# Patient Record
Sex: FEMALE | Race: BLACK OR AFRICAN AMERICAN | Employment: OTHER | URBAN - METROPOLITAN AREA
[De-identification: names, ages, dates, MRNs, and addresses within clinical notes are randomized per-mention and may not be internally consistent; named-entity substitution may affect disease eponyms.]

---

## 2017-01-11 RX ORDER — FELODIPINE 10 MG/1
TABLET, EXTENDED RELEASE ORAL
Qty: 30 TAB | Refills: 0 | Status: SHIPPED | OUTPATIENT
Start: 2017-01-11 | End: 2017-01-11 | Stop reason: SDUPTHER

## 2017-01-12 RX ORDER — FELODIPINE 10 MG/1
TABLET, EXTENDED RELEASE ORAL
Qty: 90 TAB | Refills: 0 | Status: SHIPPED | OUTPATIENT
Start: 2017-01-12 | End: 2017-05-11 | Stop reason: SDUPTHER

## 2017-02-10 ENCOUNTER — OFFICE VISIT (OUTPATIENT)
Dept: INTERNAL MEDICINE CLINIC | Age: 72
End: 2017-02-10

## 2017-02-10 VITALS
DIASTOLIC BLOOD PRESSURE: 125 MMHG | HEART RATE: 97 BPM | SYSTOLIC BLOOD PRESSURE: 172 MMHG | OXYGEN SATURATION: 78 % | WEIGHT: 268.8 LBS | HEIGHT: 62 IN | TEMPERATURE: 97.7 F | BODY MASS INDEX: 49.47 KG/M2

## 2017-02-10 DIAGNOSIS — G40.909 SEIZURE DISORDER (HCC): Primary | ICD-10-CM

## 2017-02-10 DIAGNOSIS — E66.01 MORBID OBESITY DUE TO EXCESS CALORIES (HCC): Chronic | ICD-10-CM

## 2017-02-10 DIAGNOSIS — E78.5 DYSLIPIDEMIA: ICD-10-CM

## 2017-02-10 DIAGNOSIS — I87.2 VENOUS INSUFFICIENCY: ICD-10-CM

## 2017-02-10 DIAGNOSIS — E11.9 TYPE 2 DIABETES MELLITUS WITHOUT COMPLICATION, WITHOUT LONG-TERM CURRENT USE OF INSULIN (HCC): Chronic | ICD-10-CM

## 2017-02-10 DIAGNOSIS — J45.909 UNCOMPLICATED ASTHMA, UNSPECIFIED ASTHMA SEVERITY: ICD-10-CM

## 2017-02-10 DIAGNOSIS — I48.0 PAROXYSMAL ATRIAL FIBRILLATION (HCC): Chronic | ICD-10-CM

## 2017-02-10 DIAGNOSIS — I10 ESSENTIAL HYPERTENSION: ICD-10-CM

## 2017-02-10 LAB
INR BLD: 1.8
PT POC: 21.2 SEC
VALID INTERNAL CONTROL?: YES

## 2017-02-10 RX ORDER — CLONIDINE HYDROCHLORIDE 0.2 MG/1
0.2 TABLET ORAL 2 TIMES DAILY
Qty: 30 TAB | Refills: 11 | Status: SHIPPED | OUTPATIENT
Start: 2017-02-10 | End: 2020-01-26 | Stop reason: CLARIF

## 2017-02-10 NOTE — PROGRESS NOTES
Chief Complaint   Patient presents with    Diabetes     follow up     Coagulation disorder   1. Have you been to the ER, urgent care clinic since your last visit? Hospitalized since your last visit? No    2. Have you seen or consulted any other health care providers outside of the [address] [place] [address] since your last visit? Include any pap smears or colon screening. No    Patient also in office for pt/inr check. Patient taking coumadin 5 mg 1 tab M-Sat, and nothing on Sun. PT: 21.2  INR: 1.8  Per Dr Gael Victor, take coumadin daily and rto in 2 weeks for pt/inr check.

## 2017-02-10 NOTE — PROGRESS NOTES
[facility]  [place] [address]  [address] [place] [address]  Phone:  [phone]  Fax: [phone]       Chief Complaint   Patient presents with    Diabetes     follow up     Coagulation disorder   . SUBJECTIVE:    Robert Boucher is a 74 y.o. female comes in for return visit accompanied by her daughter. Generally she has been doing well. She complains about nocturia but has been taking her diuretic at night. She did not really appreciate the mechanism by which the medication worked. Her breathing has been doing well with minimal wheezing. She remains ambulatory with the help of her rollator but she remains quite deconditioned. She has been taking her statin as prescribed in view of her increased cardiovascular risk. 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 She has swelling of her lower extremities related to venous insufficiency which is chronic. Current Outpatient Prescriptions   Medication Sig Dispense Refill    cloNIDine HCl (CATAPRES) 0.2 mg tablet Take 1 Tab by mouth two (2) times a day. 30 Tab 11    felodipine (PLENDIL SR) 10 mg 24 hr tablet TAKE 1 TABLET BY MOUTH EVERY DAY 90 Tab 0    phenytoin ER (DILANTIN ER) 100 mg ER capsule TAKE 4 CAPSULES BY MOUTH EVERY NIGHT AT BEDTIME 360 Cap 6    metoprolol tartrate (LOPRESSOR) 25 mg tablet TAKE 1 TABLET BY MOUTH AT 9 AM AND AT 9  Tab 1    aspirin delayed-release 81 mg tablet Take 81 mg by mouth daily.  spironolactone-hydrochlorothiazide (ALDACTAZIDE) 25-25 mg per tablet TAKE 1 TABLET BY MOUTH ONCE DAILY 90 Tab 11    tiotropium (SPIRIVA) 18 mcg inhalation capsule Take 1 Cap by inhalation daily. 30 Cap 11    albuterol (PROAIR HFA) 90 mcg/actuation inhaler Take 2 Puffs by inhalation every four (4) hours as needed for Wheezing or Shortness of Breath.  1 Inhaler 11    guaiFENesin-codeine (ROBITUSSIN AC) 100-10 mg/5 mL solution Take 5 mL by mouth four (4) times daily as needed for Cough. Max Daily Amount: 20 mL. 180 mL 3    levETIRAcetam 1,000 mg tablet Take 1 Tab by mouth two (2) times a day. 60 Tab 11    atorvastatin (LIPITOR) 80 mg tablet Take 1 Tab by mouth daily. 30 Tab 11    albuterol (PROVENTIL VENTOLIN) 2.5 mg /3 mL (0.083 %) nebulizer solution USE 1 VIAL VIA NEBULIZER EVERY 6 HOURS AS NEEDED. DX: J45.909 120 Each 11    lisinopril (PRINIVIL, ZESTRIL) 40 mg tablet Take 1 Tab by mouth daily. 30 Tab 11    allopurinol (ZYLOPRIM) 300 mg tablet TAKE 1 TABLET BY MOUTH ONCE DAILY 30 Tab 11    warfarin (COUMADIN) 5 mg tablet TAKE 1 TABLET BY MOUTH MONDAY THROUGH FRIDAY AND 1& 1/2 TABLETS ON SATURDAY AND SUNDAY 60 Tab 11    budesonide-formoterol (SYMBICORT) 160-4.5 mcg/Actuation HFA inhaler Take 2 Puffs by inhalation two (2) times a day. 10.2 Inhaler 11    atorvastatin (LIPITOR) 40 mg tablet TK 1 T PO ONCE D  11    metFORMIN (GLUCOPHAGE) 500 mg tablet TAKE 1 TABLET BY MOUTH TWICE DAILY WITH MEALS 180 Tab 3    sitagliptin (JANUVIA) 100 mg tablet Take 1 tablet by mouth daily.  30 tablet 11     Past Medical History   Diagnosis Date    Arthritis     Asthma     Diabetes ([facility]     Hypertension     Other ill-defined conditions(799.89)      rt knee surgery    Stroke [facility])      Past Surgical History   Procedure Laterality Date    Hx colonoscopy      Hx gyn       No Known Allergies      REVIEW OF SYSTEMS:  General: negative for - chills or fever  ENT: negative for - headaches, nasal congestion or tinnitus  Respiratory: negative for - cough, hemoptysis, shortness of breath or wheezing  Cardiovascular : negative for - chest pain, edema, palpitations or shortness of breath  Gastrointestinal: negative for - abdominal pain, blood in stools, heartburn or nausea/vomiting  Genito-Urinary: no dysuria, trouble voiding, or hematuria  Musculoskeletal: negative for - gait disturbance, joint pain, joint stiffness or joint swelling  Neurological: no TIA or stroke symptoms  Hematologic: no bruises, no bleeding, no swollen glands  Integument: no lumps, mole changes, nail changes or rash  Endocrine: no malaise/lethargy or unexpected weight changes      Social History     Social History    Marital status:      Spouse name: N/A    Number of children: 1    Years of education: N/A     Occupational History    disabled--CVA      Social History Main Topics    Smoking status: Former Smoker    Smokeless tobacco: Never Used      Comment: 20+years ago    Alcohol use No    Drug use: No    Sexual activity: Not Currently     Other Topics Concern    None     Social History Narrative     History reviewed. No pertinent family history. OBJECTIVE:    Visit Vitals    BP (!) 172/125 (BP 1 Location: Left arm, BP Patient Position: Sitting)    Pulse 97    Temp 97.7 °F (36.5 °C) (Oral)    Ht 5' 2\" (1.575 m)    Wt 268 lb 12.8 oz (121.9 kg)    SpO2 (!) 78%    BMI 49.16 kg/m2     CONSTITUTIONAL: well , well nourished, appears age appropriate  EYES: perrla, eom intact  ENMT:moist mucous membranes, pharynx clear  NECK: supple. Thyroid normal  RESPIRATORY: Chest: clear to ascultation and percussion   CARDIOVASCULAR: Heart: regular rate and rhythm  GASTROINTESTINAL: Abdomen: soft, bowel sounds active  HEMATOLOGIC: no pathological lymph nodes palpated  MUSCULOSKELETAL: Extremities: no edema, pulse 1+   INTEGUMENT: No unusual rashes or suspicious skin lesions noted. Nails appear normal.  NEUROLOGIC: non-focal exam   MENTAL STATUS: alert and oriented, appropriate affect      ASSESSMENT:  1. Seizure disorder ([address]    2. Uncomplicated asthma, unspecified asthma severity    3. Dyslipidemia    4. Essential hypertension    5. Venous insufficiency    6. Morbid obesity due to excess calories ([address]    7. Type 2 diabetes mellitus without complication, without long-term current use of insulin ([address]    8. Paroxysmal atrial fibrillation (HCC)        PLAN:    1.  The patient's blood pressure is 156/70. I will add Clonidine . 2 mg q.h.s. I contemplated adding Diltiazem but she is on a beta blocker and I did not want to slow her heart rate down too much although her heart rate is quite reasonable at this point and probably could not be tolerated. I also instruct the daughter to give her the diuretic in the morning rather than night. 2. She has been seizure free and will continue her anticonvulsants. 3. INR is acceptable today. 4. I again encourage her to minimize weight gain by reducing carbohydrate intake. 5. She is no longer on Metformin because her hemoglobin A1C's have been perfectly normal.  I thought in the long run this would be quite acceptable but I will continue to monitor her hemoglobin A1C. 6. No adjustments are made in her statin. 7. She will follow-up with her podiatrist also. ADDENDUM:  The INR was actually 1.8, lower than I anticipated and she will therefore increase her Coumadin to 5 mg daily. She was previously taking it six days a week. Follow-up Disposition:  Return in about 3 months (around 5/10/2017), or RTO in 2 weeks for INR.       Kaylee Shields MD

## 2017-02-10 NOTE — MR AVS SNAPSHOT
Visit Information Date & Time Provider Department Dept. Phone Encounter #  
 2/10/2017  9:00 AM Lorenzo Kraus MD [facility] [phone] [id number] Follow-up Instructions Return in about 3 months (around 5/10/2017), or RTO in 2 weeks for INR. Your Appointments 4/28/2017  9:00 AM  
ROUTINE CARE with Lorenzo Kraus MD  
[address] [place] [address] and Primary Care [facility] Appt Note: 6 month f/u  
 [address] [facility]  
  
   
 [address] [id number] Upcoming Health Maintenance Date Due  
 FOOT EXAM Q1 4/13/2016 MICROALBUMIN Q1 4/13/2016 EYE EXAM RETINAL OR DILATED Q1 4/13/2016 MEDICARE YEARLY EXAM 2/5/2017 GLAUCOMA SCREENING Q2Y 4/13/2017 HEMOGLOBIN A1C Q6M 5/28/2017 LIPID PANEL Q1 11/28/2017 FOBT Q 1 YEAR AGE 50-75 11/28/2017 Pneumococcal 65+ Low/Medium Risk (2 of 2 - PPSV23) 2/10/2018 BREAST CANCER SCRN MAMMOGRAM 7/1/2018 DTaP/Tdap/Td series (2 - Td) 2/10/2027 Allergies as of 2/10/2017  Review Complete On: 12/11/2016 By: Lorenzo Kraus MD  
 No Known Allergies Current Immunizations  Never Reviewed Name Date Influenza High Dose Vaccine PF 11/28/2016 Not reviewed this visit You Were Diagnosed With   
  
 Codes Comments Seizure disorder ([facility]    -  Primary ICD-10-CM: C20.311 ICD-9-CM: 345.90 Uncomplicated asthma, unspecified asthma severity     ICD-10-CM: J45.909 ICD-9-CM: 493.90 Dyslipidemia     ICD-10-CM: E78.5 ICD-9-CM: 272.4 Essential hypertension     ICD-10-CM: I10 
ICD-9-CM: 401.9 Venous insufficiency     ICD-10-CM: I87.2 ICD-9-CM: 459.81 Morbid obesity due to excess calories (HCC)     ICD-10-CM: E66.01 
ICD-9-CM: 278.01 Type 2 diabetes mellitus without complication, without long-term current use of insulin (HCC)     ICD-10-CM: E11.9 ICD-9-CM: 250.00 Vitals BP Pulse Temp Height(growth percentile) Weight(growth percentile) SpO2  
 (!) 172/125 (BP 1 Location: Left arm, BP Patient Position: Sitting) 97 97.7 °F (36.5 °C) (Oral) 5' 2\" (1.575 m) 268 lb 12.8 oz (121.9 kg) (!) 78% BMI OB Status Smoking Status 49.16 kg/m2 Postmenopausal Former Smoker BMI and BSA Data Body Mass Index Body Surface Area  
 49.16 kg/m 2 2.31 m 2 Preferred Pharmacy Pharmacy Name Phone [address], [address]  [phone] Your Updated Medication List  
  
   
This list is accurate as of: 2/10/17 10:02 AM.  Always use your most recent med list.  
  
  
  
  
 * albuterol 2.5 mg /3 mL (0.083 %) nebulizer solution Commonly known as:  PROVENTIL VENTOLIN  
USE 1 VIAL VIA NEBULIZER EVERY 6 HOURS AS NEEDED. DX: J45.909  
  
 * albuterol 90 mcg/actuation inhaler Commonly known as:  PROAIR HFA Take 2 Puffs by inhalation every four (4) hours as needed for Wheezing or Shortness of Breath. allopurinol 300 mg tablet Commonly known as:  ZYLOPRIM  
TAKE 1 TABLET BY MOUTH ONCE DAILY  
  
 aspirin delayed-release 81 mg tablet Take 81 mg by mouth daily. * atorvastatin 80 mg tablet Commonly known as:  LIPITOR Take 1 Tab by mouth daily. * atorvastatin 40 mg tablet Commonly known as:  LIPITOR TK 1 T PO ONCE D  
  
 budesonide-formoterol 160-4.5 mcg/actuation HFA inhaler Commonly known as:  SYMBICORT Take 2 Puffs by inhalation two (2) times a day. cloNIDine HCl 0.2 mg tablet Commonly known as:  CATAPRES Take 1 Tab by mouth two (2) times a day. felodipine 10 mg 24 hr tablet Commonly known as:  PLENDIL SR  
TAKE 1 TABLET BY MOUTH EVERY DAY  
  
 guaiFENesin-codeine 100-10 mg/5 mL solution Commonly known as:  ROBITUSSIN AC Take 5 mL by mouth four (4) times daily as needed for Cough. Max Daily Amount: 20 mL. levETIRAcetam 1,000 mg tablet Take 1 Tab by mouth two (2) times a day. lisinopril 40 mg tablet Commonly known as:  Aundra Charlene Take 1 Tab by mouth daily. metFORMIN 500 mg tablet Commonly known as:  GLUCOPHAGE  
TAKE 1 TABLET BY MOUTH TWICE DAILY WITH MEALS  
  
 metoprolol tartrate 25 mg tablet Commonly known as:  LOPRESSOR  
TAKE 1 TABLET BY MOUTH AT 9 AM AND AT 9 PM  
  
 phenytoin  mg ER capsule Commonly known as:  DILANTIN ER  
TAKE 4 CAPSULES BY MOUTH EVERY NIGHT AT BEDTIME SITagliptin 100 mg tablet Commonly known as:  Susannah Bannister Take 1 tablet by mouth daily. spironolactone-hydrochlorothiazide 25-25 mg per tablet Commonly known as:  ALDACTAZIDE TAKE 1 TABLET BY MOUTH ONCE DAILY  
  
 tiotropium 18 mcg inhalation capsule Commonly known as:  Wilfred Bunkers Take 1 Cap by inhalation daily. warfarin 5 mg tablet Commonly known as:  COUMADIN  
TAKE 1 TABLET BY MOUTH MONDAY THROUGH FRIDAY AND 1& 1/2 TABLETS ON SATURDAY AND SUNDAY * Notice: This list has 4 medication(s) that are the same as other medications prescribed for you. Read the directions carefully, and ask your doctor or other care provider to review them with you. Prescriptions Sent to Pharmacy Refills  
 cloNIDine HCl (CATAPRES) 0.2 mg tablet 11 Sig: Take 1 Tab by mouth two (2) times a day. Class: Normal  
 Pharmacy: [facility] [address] #: [phone] Route: Oral  
  
We Performed the Following HEMOGLOBIN A1C WITH EAG [71904 CPT(R)] METABOLIC PANEL, BASIC [98275 CPT(R)] Follow-up Instructions Return in about 3 months (around 5/10/2017), or RTO in 2 weeks for INR. Introducing [facility]! Beti Ramsey introduces [employer] patient portal. Now you can access parts of your medical record, email your doctor's office, and request medication refills online. 1. In your internet browser, go to [email or web address] 2. Click on the First Time User? Click Here link in the Sign In box. You will see the New Member Sign Up page. 3. Enter your Flipswap Access Code exactly as it appears below. You will not need to use this code after youve completed the sign-up process. If you do not sign up before the expiration date, you must request a new code. · Flipswap Access Code: [email or web address] Expires: 2/26/2017 11:55 AM 
 
4. Enter the last four digits of your Social Security Number (xxxx) and Date of Birth (mm/dd/yyyy) as indicated and click Submit. You will be taken to the next sign-up page. 5. Create a VoÃ¶lks SAt ID. This will be your Flipswap login ID and cannot be changed, so think of one that is secure and easy to remember. 6. Create a Flipswap password. You can change your password at any time. 7. Enter your Password Reset Question and Answer. This can be used at a later time if you forget your password. 8. Enter your e-mail address. You will receive e-mail notification when new information is available in [address] 9. Click Sign Up. You can now view and download portions of your medical record. 10. Click the Download Summary menu link to download a portable copy of your medical information. If you have questions, please visit the Frequently Asked Questions section of the Fl[employer] website. Remember, Flipswap is NOT to be used for urgent needs. For medical emergencies, dial 911. Now available from your iPhone and Android! Please provide this summary of care documentation to your next provider. Your primary care clinician is listed as Manohar Mo. If you have any questions after today's visit, please call [phone].

## 2017-02-11 LAB
BUN SERPL-MCNC: 20 MG/DL (ref 8–27)
BUN/CREAT SERPL: 22 (ref 11–26)
CALCIUM SERPL-MCNC: 9.6 MG/DL (ref 8.7–10.3)
CHLORIDE SERPL-SCNC: 105 MMOL/L (ref 96–106)
CO2 SERPL-SCNC: 18 MMOL/L (ref 18–29)
CREAT SERPL-MCNC: 0.92 MG/DL (ref 0.57–1)
EST. AVERAGE GLUCOSE BLD GHB EST-MCNC: 140 MG/DL
GLUCOSE SERPL-MCNC: 94 MG/DL (ref 65–99)
HBA1C MFR BLD: 6.5 % (ref 4.8–5.6)
POTASSIUM SERPL-SCNC: 5.6 MMOL/L (ref 3.5–5.2)
SODIUM SERPL-SCNC: 142 MMOL/L (ref 134–144)

## 2017-02-24 ENCOUNTER — CLINICAL SUPPORT (OUTPATIENT)
Dept: INTERNAL MEDICINE CLINIC | Age: 72
End: 2017-02-24

## 2017-02-24 DIAGNOSIS — I48.0 PAROXYSMAL ATRIAL FIBRILLATION (HCC): Primary | Chronic | ICD-10-CM

## 2017-02-24 LAB
INR BLD: 2
PT POC: 24 SEC
VALID INTERNAL CONTROL?: YES

## 2017-02-24 NOTE — PROGRESS NOTES
Patient in office for pt/inr check. Patient taking coumadin 5 mg daily, nothing on Sunday. PT: 24.0  INR: 2.0  Per Dr. Devika Urbina, no changes and rto in 1 month for pt/inr check.

## 2017-02-24 NOTE — MR AVS SNAPSHOT
Visit Information Date & Time Provider Department Dept. Phone Encounter #  
 2/24/2017  9:00 AM Kaylee Shields MD [facility] [phone] [id number] Your Appointments 5/8/2017  9:00 AM  
Any with Kaylee Shields MD  
[address] [place] [address] and Primary Care [facility]) Appt Note: 3 month follow up  
 [address] [facility] [place]  
  
   
 [address] [address] Upcoming Health Maintenance Date Due  
 FOOT EXAM Q1 4/13/2016 MICROALBUMIN Q1 4/13/2016 EYE EXAM RETINAL OR DILATED Q1 4/13/2016 GLAUCOMA SCREENING Q2Y 4/13/2017 HEMOGLOBIN A1C Q6M 8/10/2017 LIPID PANEL Q1 11/28/2017 FOBT Q 1 YEAR AGE 50-75 11/28/2017 Pneumococcal 65+ Low/Medium Risk (2 of 2 - PPSV23) 2/10/2018 MEDICARE YEARLY EXAM 2/11/2018 BREAST CANCER SCRN MAMMOGRAM 7/1/2018 DTaP/Tdap/Td series (2 - Td) 2/10/2027 Allergies as of 2/24/2017  Review Complete On: 2/11/2017 By: Kaylee Shields MD  
 No Known Allergies Current Immunizations  Never Reviewed Name Date Influenza High Dose Vaccine PF 11/28/2016 Not reviewed this visit You Were Diagnosed With   
  
 Codes Comments Paroxysmal atrial fibrillation (HCC)    -  Primary ICD-10-CM: I48.0 ICD-9-CM: 427.31 Vitals OB Status Postmenopausal       
  
  
Preferred Pharmacy Pharmacy Name Phone [address], [address] [phone] Your Updated Medication List  
  
   
This list is accurate as of: 2/24/17  9:46 AM.  Always use your most recent med list.  
  
  
  
  
 * albuterol 2.5 mg /3 mL (0.083 %) nebulizer solution Commonly known as:  PROVENTIL VENTOLIN  
USE 1 VIAL VIA NEBULIZER EVERY 6 HOURS AS NEEDED. DX: J45.909  
  
 * albuterol 90 mcg/actuation inhaler Commonly known as:  PROAIR HFA  
 Take 2 Puffs by inhalation every four (4) hours as needed for Wheezing or Shortness of Breath. allopurinol 300 mg tablet Commonly known as:  ZYLOPRIM  
TAKE 1 TABLET BY MOUTH ONCE DAILY  
  
 aspirin delayed-release 81 mg tablet Take 81 mg by mouth daily. * atorvastatin 80 mg tablet Commonly known as:  LIPITOR Take 1 Tab by mouth daily. * atorvastatin 40 mg tablet Commonly known as:  LIPITOR TK 1 T PO ONCE D  
  
 budesonide-formoterol 160-4.5 mcg/actuation HFA inhaler Commonly known as:  SYMBICORT Take 2 Puffs by inhalation two (2) times a day. cloNIDine HCl 0.2 mg tablet Commonly known as:  CATAPRES Take 1 Tab by mouth two (2) times a day. felodipine 10 mg 24 hr tablet Commonly known as:  PLENDIL SR  
TAKE 1 TABLET BY MOUTH EVERY DAY  
  
 guaiFENesin-codeine 100-10 mg/5 mL solution Commonly known as:  ROBITUSSIN AC Take 5 mL by mouth four (4) times daily as needed for Cough. Max Daily Amount: 20 mL.  
  
 levETIRAcetam 1,000 mg tablet Take 1 Tab by mouth two (2) times a day. lisinopril 40 mg tablet Commonly known as:  Doris Ciales Take 1 Tab by mouth daily. metFORMIN 500 mg tablet Commonly known as:  GLUCOPHAGE  
TAKE 1 TABLET BY MOUTH TWICE DAILY WITH MEALS  
  
 metoprolol tartrate 25 mg tablet Commonly known as:  LOPRESSOR  
TAKE 1 TABLET BY MOUTH AT 9 AM AND AT 9 PM  
  
 phenytoin  mg ER capsule Commonly known as:  DILANTIN ER  
TAKE 4 CAPSULES BY MOUTH EVERY NIGHT AT BEDTIME SITagliptin 100 mg tablet Commonly known as:  Vallery Lora Take 1 tablet by mouth daily. spironolactone-hydrochlorothiazide 25-25 mg per tablet Commonly known as:  ALDACTAZIDE TAKE 1 TABLET BY MOUTH ONCE DAILY  
  
 tiotropium 18 mcg inhalation capsule Commonly known as:  Reinbeck Gottron Take 1 Cap by inhalation daily. warfarin 5 mg tablet Commonly known as:  COUMADIN  
 TAKE 1 TABLET BY MOUTH MONDAY THROUGH FRIDAY AND 1& 1/2 TABLETS ON SATURDAY AND SUNDAY * Notice: This list has 4 medication(s) that are the same as other medications prescribed for you. Read the directions carefully, and ask your doctor or other care provider to review them with you. We Performed the Following AMB POC PT/INR [39991 CPT(R)] Introducing [facility] & HEALTH SERVICES! [facility] introduces [employer] patient portal. Now you can access parts of your medical record, email your doctor's office, and request medication refills online. 1. In your internet browser, go to [email or web address] 2. Click on the First Time User? Click Here link in the Sign In box. You will see the New Member Sign Up page. 3. Enter your [employer] Access Code exactly as it appears below. You will not need to use this code after youve completed the sign-up process. If you do not sign up before the expiration date, you must request a new code. · [employer] Access Code: [email or web address] Expires: 2/26/2017 11:55 AM 
 
4. Enter the last four digits of your Social Security Number (xxxx) and Date of Birth (mm/dd/yyyy) as indicated and click Submit. You will be taken to the next sign-up page. 5. Create a [employer]din ID. This will be your [employer] login ID and cannot be changed, so think of one that is secure and easy to remember. 6. Create a [employer] password. You can change your password at any time. 7. Enter your Password Reset Question and Answer. This can be used at a later time if you forget your password. 8. Enter your e-mail address. You will receive e-mail notification when new information is available in [address] 9. Click Sign Up. You can now view and download portions of your medical record. 10. Click the Download Summary menu link to download a portable copy of your medical information.  
 
If you have questions, please visit the Frequently Asked Questions section of the [employer]. Remember, [employer] is NOT to be used for urgent needs. For medical emergencies, dial 911. Now available from your iPhone and Android! Please provide this summary of care documentation to your next provider. Your primary care clinician is listed as Manohar Mo. If you have any questions after today's visit, please call [phone].

## 2017-03-24 ENCOUNTER — CLINICAL SUPPORT (OUTPATIENT)
Dept: INTERNAL MEDICINE CLINIC | Age: 72
End: 2017-03-24

## 2017-03-24 DIAGNOSIS — I48.0 PAROXYSMAL ATRIAL FIBRILLATION (HCC): Primary | Chronic | ICD-10-CM

## 2017-03-24 LAB
INR BLD: 2.4
PT POC: 28.4 SEC
VALID INTERNAL CONTROL?: YES

## 2017-03-24 NOTE — MR AVS SNAPSHOT
Visit Information Date & Time Provider Department Dept. Phone Encounter #  
 3/24/2017  9:00 AM Jacoby Roberts MD [facility] [phone] [id number] Your Appointments 5/8/2017  9:00 AM  
Any with Jacoby Roberts MD  
[address] [place] [address] and Primary Care [facility]) Appt Note: 3 month follow up  
 [address] [place]  
  
   
 [address] [id number] Upcoming Health Maintenance Date Due  
 FOOT EXAM Q1 4/13/2016 MICROALBUMIN Q1 4/13/2016 EYE EXAM RETINAL OR DILATED Q1 4/13/2016 GLAUCOMA SCREENING Q2Y 4/13/2017 HEMOGLOBIN A1C Q6M 8/10/2017 LIPID PANEL Q1 11/28/2017 FOBT Q 1 YEAR AGE 50-75 11/28/2017 Pneumococcal 65+ Low/Medium Risk (2 of 2 - PPSV23) 2/10/2018 MEDICARE YEARLY EXAM 2/11/2018 DTaP/Tdap/Td series (2 - Td) 2/10/2027 Allergies as of 3/24/2017  Review Complete On: 2/11/2017 By: Jacoby Roberts MD  
 No Known Allergies Current Immunizations  Never Reviewed Name Date Influenza High Dose Vaccine PF 11/28/2016 Not reviewed this visit You Were Diagnosed With   
  
 Codes Comments Paroxysmal atrial fibrillation (HCC)    -  Primary ICD-10-CM: I48.0 ICD-9-CM: 427.31 Vitals OB Status Smoking Status Postmenopausal Former Smoker Preferred Pharmacy Pharmacy Name Phone [address]  [phone] Your Updated Medication List  
  
   
This list is accurate as of: 3/24/17 10:18 AM.  Always use your most recent med list.  
  
  
  
  
 * albuterol 2.5 mg /3 mL (0.083 %) nebulizer solution Commonly known as:  PROVENTIL VENTOLIN  
USE 1 VIAL VIA NEBULIZER EVERY 6 HOURS AS NEEDED. DX: J45.909  
  
 * albuterol 90 mcg/actuation inhaler Commonly known as:  PROAIR HFA  
 Take 2 Puffs by inhalation every four (4) hours as needed for Wheezing or Shortness of Breath. allopurinol 300 mg tablet Commonly known as:  ZYLOPRIM  
TAKE 1 TABLET BY MOUTH ONCE DAILY  
  
 aspirin delayed-release 81 mg tablet Take 81 mg by mouth daily. * atorvastatin 80 mg tablet Commonly known as:  LIPITOR Take 1 Tab by mouth daily. * atorvastatin 40 mg tablet Commonly known as:  LIPITOR TK 1 T PO ONCE D  
  
 budesonide-formoterol 160-4.5 mcg/actuation HFA inhaler Commonly known as:  SYMBICORT Take 2 Puffs by inhalation two (2) times a day. cloNIDine HCl 0.2 mg tablet Commonly known as:  CATAPRES Take 1 Tab by mouth two (2) times a day. felodipine 10 mg 24 hr tablet Commonly known as:  PLENDIL SR  
TAKE 1 TABLET BY MOUTH EVERY DAY  
  
 guaiFENesin-codeine 100-10 mg/5 mL solution Commonly known as:  ROBITUSSIN AC Take 5 mL by mouth four (4) times daily as needed for Cough. Max Daily Amount: 20 mL.  
  
 levETIRAcetam 1,000 mg tablet Take 1 Tab by mouth two (2) times a day. lisinopril 40 mg tablet Commonly known as:  Chandni Lopes Take 1 Tab by mouth daily. metFORMIN 500 mg tablet Commonly known as:  GLUCOPHAGE  
TAKE 1 TABLET BY MOUTH TWICE DAILY WITH MEALS  
  
 metoprolol tartrate 25 mg tablet Commonly known as:  LOPRESSOR  
TAKE 1 TABLET BY MOUTH AT 9 AM AND AT 9 PM  
  
 phenytoin  mg ER capsule Commonly known as:  DILANTIN ER  
TAKE 4 CAPSULES BY MOUTH EVERY NIGHT AT BEDTIME SITagliptin 100 mg tablet Commonly known as:  Guatay Norlander Take 1 tablet by mouth daily. spironolactone-hydrochlorothiazide 25-25 mg per tablet Commonly known as:  ALDACTAZIDE TAKE 1 TABLET BY MOUTH ONCE DAILY  
  
 tiotropium 18 mcg inhalation capsule Commonly known as:  Graydon Hoguet Take 1 Cap by inhalation daily. warfarin 5 mg tablet Commonly known as:  COUMADIN  
 TAKE 1 TABLET BY MOUTH MONDAY THROUGH FRIDAY AND 1& 1/2 TABLETS ON SATURDAY AND SUNDAY * Notice: This list has 4 medication(s) that are the same as other medications prescribed for you. Read the directions carefully, and ask your doctor or other care provider to review them with you. We Performed the Following AMB POC PT/INR [28554 CPT(R)] Introducing [facility] SERVICES! Zackary Rodriguez introduces [employer] patient portal. Now you can access parts of your medical record, email your doctor's office, and request medication refills online. 1. In your internet browser, go to [email or web address] 2. Click on the First Time User? Click Here link in the Sign In box. You will see the New Member Sign Up page. 3. Enter your [employer] Access Code exactly as it appears below. You will not need to use this code after youve completed the sign-up process. If you do not sign up before the expiration date, you must request a new code. · [employer] Access Code: [email or web address] Expires: 6/22/2017 10:18 AM 
 
4. Enter the last four digits of your Social Security Number (xxxx) and Date of Birth (mm/dd/yyyy) as indicated and click Submit. You will be taken to the next sign-up page. 5. Create a [employer] ID. This will be your [employer] login ID and cannot be changed, so think of one that is secure and easy to remember. 6. Create a [employer] password. You can change your password at any time. 7. Enter your Password Reset Question and Answer. This can be used at a later time if you forget your password. 8. Enter your e-mail address. You will receive e-mail notification when new information is available in [address] 9. Click Sign Up. You can now view and download portions of your medical record. 10. Click the Download Summary menu link to download a portable copy of your medical information.  
 
If you have questions, please visit the Frequently Asked Questions section of the [employer]. Remember, [employer] is NOT to be used for urgent needs. For medical emergencies, dial 911. Now available from your iPhone and Android! Please provide this summary of care documentation to your next provider. Your primary care clinician is listed as Manohar Mo. If you have any questions after today's visit, please call [phone].

## 2017-03-24 NOTE — PROGRESS NOTES
Patient in office for pt/inr check. Patient taking coumadin 5 mg, 1 tab daily, nothing on Sunday. PT: 28.4  INR: 2.4  Per Dr. Radha Lira, no changes and rto in 1 month for pt/inr check and wellness.

## 2017-04-04 RX ORDER — ALLOPURINOL 300 MG/1
TABLET ORAL
Qty: 30 TAB | Refills: 11 | Status: SHIPPED | OUTPATIENT
Start: 2017-04-04 | End: 2017-04-05 | Stop reason: SDUPTHER

## 2017-04-05 RX ORDER — ALLOPURINOL 300 MG/1
TABLET ORAL
Qty: 90 TAB | Refills: 3 | Status: SHIPPED | OUTPATIENT
Start: 2017-04-05 | End: 2018-01-22 | Stop reason: SDUPTHER

## 2017-04-24 ENCOUNTER — OFFICE VISIT (OUTPATIENT)
Dept: INTERNAL MEDICINE CLINIC | Age: 72
End: 2017-04-24

## 2017-04-24 VITALS
OXYGEN SATURATION: 99 % | BODY MASS INDEX: 49.37 KG/M2 | HEIGHT: 62 IN | RESPIRATION RATE: 16 BRPM | TEMPERATURE: 97.3 F | HEART RATE: 43 BPM | WEIGHT: 268.3 LBS | DIASTOLIC BLOOD PRESSURE: 61 MMHG | SYSTOLIC BLOOD PRESSURE: 139 MMHG

## 2017-04-24 DIAGNOSIS — E11.9 TYPE 2 DIABETES MELLITUS WITHOUT COMPLICATION, WITHOUT LONG-TERM CURRENT USE OF INSULIN (HCC): Chronic | ICD-10-CM

## 2017-04-24 DIAGNOSIS — R79.89 PRERENAL AZOTEMIA: ICD-10-CM

## 2017-04-24 DIAGNOSIS — R19.7 DIARRHEA, UNSPECIFIED TYPE: Primary | ICD-10-CM

## 2017-04-24 DIAGNOSIS — J45.909 UNCOMPLICATED ASTHMA, UNSPECIFIED ASTHMA SEVERITY: ICD-10-CM

## 2017-04-24 DIAGNOSIS — Z00.00 ROUTINE GENERAL MEDICAL EXAMINATION AT A HEALTH CARE FACILITY: ICD-10-CM

## 2017-04-24 DIAGNOSIS — I10 ESSENTIAL HYPERTENSION: ICD-10-CM

## 2017-04-24 DIAGNOSIS — E66.01 MORBID OBESITY DUE TO EXCESS CALORIES (HCC): Chronic | ICD-10-CM

## 2017-04-24 DIAGNOSIS — I48.0 PAROXYSMAL ATRIAL FIBRILLATION (HCC): Chronic | ICD-10-CM

## 2017-04-24 NOTE — PROGRESS NOTES
[facility]  [facility]  [address] [place] [id number]  Phone:  [phone]  Fax: [phone]       Chief Complaint   Patient presents with    Annual Wellness Visit    Coagulation disorder   . SUBJECTIVE:    Monica Ontiveros is a 74 y.o. female comes in for return visit stating that she has done fairly well. Her biggest complaint is that of having episodic diarrhea which she has had off and on for well over a year plus. She did not want to tell me about this earlier because of the concern about the need for a colonoscopy. She has had no blood. She will take Pepto Bismol or Imodium but when taking the latter it prevents her from having a bowel movement for two to three days. She has been taking her Coumadin for her atrial fibrillation. Her asthma has been quite stable. She has been taking all of her medications as prescribed. Current Outpatient Prescriptions   Medication Sig Dispense Refill    allopurinol (ZYLOPRIM) 300 mg tablet TAKE 1 TABLET BY MOUTH ONCE DAILY 90 Tab 3    felodipine (PLENDIL SR) 10 mg 24 hr tablet TAKE 1 TABLET BY MOUTH EVERY DAY 90 Tab 0    phenytoin ER (DILANTIN ER) 100 mg ER capsule TAKE 4 CAPSULES BY MOUTH EVERY NIGHT AT BEDTIME 360 Cap 6    metoprolol tartrate (LOPRESSOR) 25 mg tablet TAKE 1 TABLET BY MOUTH AT 9 AM AND AT 9  Tab 1    aspirin delayed-release 81 mg tablet Take 81 mg by mouth daily.  spironolactone-hydrochlorothiazide (ALDACTAZIDE) 25-25 mg per tablet TAKE 1 TABLET BY MOUTH ONCE DAILY 90 Tab 11    tiotropium (SPIRIVA) 18 mcg inhalation capsule Take 1 Cap by inhalation daily. 30 Cap 11    albuterol (PROAIR HFA) 90 mcg/actuation inhaler Take 2 Puffs by inhalation every four (4) hours as needed for Wheezing or Shortness of Breath. 1 Inhaler 11    levETIRAcetam 1,000 mg tablet Take 1 Tab by mouth two (2) times a day. 60 Tab 11    atorvastatin (LIPITOR) 80 mg tablet Take 1 Tab by mouth daily. 30 Tab 11    albuterol (PROVENTIL VENTOLIN) 2.5 mg /3 mL (0.083 %) nebulizer solution USE 1 VIAL VIA NEBULIZER EVERY 6 HOURS AS NEEDED. DX: J45.909 120 Each 11    lisinopril (PRINIVIL, ZESTRIL) 40 mg tablet Take 1 Tab by mouth daily. 30 Tab 11    warfarin (COUMADIN) 5 mg tablet TAKE 1 TABLET BY MOUTH MONDAY THROUGH FRIDAY AND 1& 1/2 TABLETS ON SATURDAY AND SUNDAY 60 Tab 11    budesonide-formoterol (SYMBICORT) 160-4.5 mcg/Actuation HFA inhaler Take 2 Puffs by inhalation two (2) times a day. 10.2 Inhaler 11    cloNIDine HCl (CATAPRES) 0.2 mg tablet Take 1 Tab by mouth two (2) times a day. 30 Tab 11    guaiFENesin-codeine (ROBITUSSIN AC) 100-10 mg/5 mL solution Take 5 mL by mouth four (4) times daily as needed for Cough. Max Daily Amount: 20 mL. 180 mL 3    metFORMIN (GLUCOPHAGE) 500 mg tablet TAKE 1 TABLET BY MOUTH TWICE DAILY WITH MEALS 180 Tab 3    sitagliptin (JANUVIA) 100 mg tablet Take 1 tablet by mouth daily.  30 tablet 11     Past Medical History:   Diagnosis Date    Arthritis     Asthma     Diabetes ([facility] [address]     Hypertension     Other ill-defined conditions     rt knee surgery    Stroke [facility])      Past Surgical History:   Procedure Laterality Date    HX COLONOSCOPY      HX GYN       Allergies   Allergen Reactions    Clonidine Other (comments)     Patient becomes incoherent         REVIEW OF SYSTEMS:  General: negative for - chills or fever  ENT: negative for - headaches, nasal congestion or tinnitus  Respiratory: negative for - cough, hemoptysis, shortness of breath or wheezing  Cardiovascular : negative for - chest pain, edema, palpitations or shortness of breath  Gastrointestinal: negative for - abdominal pain, blood in stools, heartburn or nausea/vomiting  Genito-Urinary: no dysuria, trouble voiding, or hematuria  Musculoskeletal: negative for - gait disturbance, joint pain, joint stiffness or joint swelling  Neurological: no TIA or stroke symptoms  Hematologic: no bruises, no bleeding, no swollen glands  Integument: no lumps, mole changes, nail changes or rash  Endocrine: no malaise/lethargy or unexpected weight changes      Social History     Social History    Marital status:      Spouse name: N/A    Number of children: 1    Years of education: N/A     Occupational History    disabled--CVA      Social History Main Topics    Smoking status: Former Smoker    Smokeless tobacco: Never Used      Comment: 20+years ago    Alcohol use No    Drug use: No    Sexual activity: Not Currently     Other Topics Concern    None     Social History Narrative     History reviewed. No pertinent family history. OBJECTIVE:    Visit Vitals    /61 (BP 1 Location: Left arm, BP Patient Position: Sitting)    Pulse (!) 43    Temp 97.3 °F (36.3 °C) (Oral)    Resp 16    Ht 5' 2\" (1.575 m)    Wt 268 lb 4.8 oz (121.7 kg)    SpO2 99%    BMI 49.07 kg/m2     CONSTITUTIONAL: well , well nourished, appears age appropriate  EYES: perrla, eom intact  ENMT:moist mucous membranes, pharynx clear  NECK: supple. Thyroid normal  RESPIRATORY: Chest: clear to ascultation and percussion   CARDIOVASCULAR: Heart: regular rate and rhythm  GASTROINTESTINAL: Abdomen: soft, bowel sounds active  HEMATOLOGIC: no pathological lymph nodes palpated  MUSCULOSKELETAL: Extremities: trace edema, pulse 1+   INTEGUMENT: No unusual rashes or suspicious skin lesions noted. Nails appear normal.  NEUROLOGIC: non-focal exam   MENTAL STATUS: alert and oriented, appropriate affect      ASSESSMENT:  1. Diarrhea, unspecified type    2. Paroxysmal atrial fibrillation (HCC)    3. Type 2 diabetes mellitus without complication, without long-term current use of insulin ([address]    4. Essential hypertension    5. Uncomplicated asthma, unspecified asthma severity    6. Morbid obesity due to excess calories ([address]    7. Prerenal azotemia    8. Routine general medical examination at a health care facility        PLAN:    1.  As far as the patient's chronic, recurring diarrhea is concerned, she really needs to have a colonoscopy done. She does not want to have this. In the intervening time I suggest using the Pepto Bismol since the Imodium is a bit potent. 2. INR today is acceptable and she will continue the current dose of Coumadin. 3. Her last hemoglobin A1C was excellent as is relates to her diabetes. 4. Blood pressure is superb. No adjustments are made. 5. Her asthma is quite stable. She will continue her inhalers as prescribed. 6. I again encourage her to lose weight by reducing carbohydrate intake, specifically avoiding sweets, white bread, and white potatoes. Follow-up Disposition:  Return in about 2 months (around 6/24/2017), or rto monthly for INR. Barbara Light MD    This is a Subsequent Medicare Annual Wellness Visit providing Personalized Prevention Plan Services (PPPS) (Performed 12 months after initial AWV and PPPS )    I have reviewed the patient's medical history in detail and updated the computerized patient record. History     Past Medical History:   Diagnosis Date    Arthritis     Asthma     Diabetes ([facility] [address]     Hypertension     Other ill-defined conditions     rt knee surgery    Stroke [facility])       Past Surgical History:   Procedure Laterality Date    HX COLONOSCOPY      HX GYN       Current Outpatient Prescriptions   Medication Sig Dispense Refill    allopurinol (ZYLOPRIM) 300 mg tablet TAKE 1 TABLET BY MOUTH ONCE DAILY 90 Tab 3    felodipine (PLENDIL SR) 10 mg 24 hr tablet TAKE 1 TABLET BY MOUTH EVERY DAY 90 Tab 0    phenytoin ER (DILANTIN ER) 100 mg ER capsule TAKE 4 CAPSULES BY MOUTH EVERY NIGHT AT BEDTIME 360 Cap 6    metoprolol tartrate (LOPRESSOR) 25 mg tablet TAKE 1 TABLET BY MOUTH AT 9 AM AND AT 9  Tab 1    aspirin delayed-release 81 mg tablet Take 81 mg by mouth daily.       spironolactone-hydrochlorothiazide (ALDACTAZIDE) 25-25 mg per tablet TAKE 1 TABLET BY MOUTH ONCE DAILY 90 Tab 11    tiotropium (SPIRIVA) 18 mcg inhalation capsule Take 1 Cap by inhalation daily. 30 Cap 11    albuterol (PROAIR HFA) 90 mcg/actuation inhaler Take 2 Puffs by inhalation every four (4) hours as needed for Wheezing or Shortness of Breath. 1 Inhaler 11    levETIRAcetam 1,000 mg tablet Take 1 Tab by mouth two (2) times a day. 60 Tab 11    atorvastatin (LIPITOR) 80 mg tablet Take 1 Tab by mouth daily. 30 Tab 11    albuterol (PROVENTIL VENTOLIN) 2.5 mg /3 mL (0.083 %) nebulizer solution USE 1 VIAL VIA NEBULIZER EVERY 6 HOURS AS NEEDED. DX: J45.909 120 Each 11    lisinopril (PRINIVIL, ZESTRIL) 40 mg tablet Take 1 Tab by mouth daily. 30 Tab 11    warfarin (COUMADIN) 5 mg tablet TAKE 1 TABLET BY MOUTH MONDAY THROUGH FRIDAY AND 1& 1/2 TABLETS ON SATURDAY AND SUNDAY 60 Tab 11    budesonide-formoterol (SYMBICORT) 160-4.5 mcg/Actuation HFA inhaler Take 2 Puffs by inhalation two (2) times a day. 10.2 Inhaler 11    cloNIDine HCl (CATAPRES) 0.2 mg tablet Take 1 Tab by mouth two (2) times a day. 30 Tab 11    guaiFENesin-codeine (ROBITUSSIN AC) 100-10 mg/5 mL solution Take 5 mL by mouth four (4) times daily as needed for Cough. Max Daily Amount: 20 mL. 180 mL 3    metFORMIN (GLUCOPHAGE) 500 mg tablet TAKE 1 TABLET BY MOUTH TWICE DAILY WITH MEALS 180 Tab 3    sitagliptin (JANUVIA) 100 mg tablet Take 1 tablet by mouth daily. 30 tablet 11     Allergies   Allergen Reactions    Clonidine Other (comments)     Patient becomes incoherent     History reviewed. No pertinent family history.   Social History   Substance Use Topics    Smoking status: Former Smoker    Smokeless tobacco: Never Used      Comment: 20+years ago    Alcohol use No     Patient Active Problem List   Diagnosis Code    Paroxysmal atrial fibrillation (HCC) I48.0    Morbid obesity (HCC) E66.01    DM type 2 (diabetes mellitus, type 2) (HCC) E11.9    Total knee replacement status Z96.659    Seizure disorder ([facility] [address] G40.909    Asthma J45.909    Dyslipidemia E78.5    HTN (hypertension) I10    Primary osteoarthritis of left knee M17.12    Venous insufficiency I87.2    Proteinuria R80.9       Depression Risk Factor Screening:     PHQ 2 / 9, over the last two weeks 4/24/2017   Little interest or pleasure in doing things Not at all   Feeling down, depressed or hopeless Not at all   Total Score PHQ 2 0     Alcohol Risk Factor Screening: On any occasion during the past 3 months, have you had more than 3 drinks containing alcohol? No    Do you average more than 7 drinks per week? No      Functional Ability and Level of Safety:     Hearing Loss   none    Activities of Daily Living   Partial assistance. Requires assistance with: no ADLs    Fall Risk     Fall Risk Assessment, last 12 mths 4/24/2017   Able to walk? Yes   Fall in past 12 months? No   Fall with injury? -   Number of falls in past 12 months -   Fall Risk Score -     Abuse Screen   Patient is not abused    Review of Systems   A comprehensive review of systems was negative. Physical Examination     Evaluation of Cognitive Function:  Mood/affect:  neutral  Appearance: age appropriate  Family member/caregiver input: none    Visit Vitals    /61 (BP 1 Location: Left arm, BP Patient Position: Sitting)    Pulse (!) 43    Temp 97.3 °F (36.3 °C) (Oral)    Resp 16    Ht 5' 2\" (1.575 m)    Wt 268 lb 4.8 oz (121.7 kg)    SpO2 99%    BMI 49.07 kg/m2     General appearance: alert, cooperative, no distress, appears stated age  Neck: supple, symmetrical, trachea midline, no adenopathy, thyroid: not enlarged, symmetric, no tenderness/mass/nodules, no carotid bruit and no JVD  Lungs: clear to auscultation bilaterally  Heart: regular rate and rhythm, S1, S2 normal, no murmur, click, rub or gallop  Abdomen: soft, non-tender.  Bowel sounds normal. No masses,  no organomegaly  Extremities: edema trace, mild degenerative changes L knee  Neurologic: Grossly normal    Patient Care Team:  Dionne Ferris, MD as PCP - General (Internal Medicine)    Advice/Referrals/Counseling   Education and counseling provided:  Are appropriate based on today's review and evaluation      Assessment/Plan       ICD-10-CM ICD-9-CM    1. Diarrhea, unspecified type R19.7 787.91    2. Paroxysmal atrial fibrillation (HCC) I48.0 427.31    3. Type 2 diabetes mellitus without complication, without long-term current use of insulin (HCC) E11.9 250.00    4. Essential hypertension I10 401.9    5. Uncomplicated asthma, unspecified asthma severity J45.909 493.90    6. Morbid obesity due to excess calories ([facility]) E66.01 278.01    7. Prerenal azotemia S15.16 640.6 METABOLIC PANEL, BASIC   8. Routine general medical examination at a health care facility Z00.00 V70.0    .

## 2017-04-24 NOTE — MR AVS SNAPSHOT
Visit Information Date & Time Provider Department Dept. Phone Encounter #  
 4/24/2017  9:45 AM Simone Lopez [facility] and [address] [id number] Follow-up Instructions Return in about 2 months (around 6/24/2017), or rto monthly for INR. Upcoming Health Maintenance Date Due MICROALBUMIN Q1 4/13/2016 FOOT EXAM Q1 5/24/2017* EYE EXAM RETINAL OR DILATED Q1 5/24/2017* GLAUCOMA SCREENING Q2Y 5/29/2017* HEMOGLOBIN A1C Q6M 8/10/2017 LIPID PANEL Q1 11/28/2017 FOBT Q 1 YEAR AGE 50-75 11/28/2017 Pneumococcal 65+ Low/Medium Risk (2 of 2 - PPSV23) 2/10/2018 MEDICARE YEARLY EXAM 2/11/2018 DTaP/Tdap/Td series (2 - Td) 2/10/2027 *Topic was postponed. The date shown is not the original due date. Allergies as of 4/24/2017  Review Complete On: 4/24/2017 By: Riya Servin MD  
  
 Severity Noted Reaction Type Reactions Clonidine  04/24/2017    Other (comments) Patient becomes incoherent Current Immunizations  Never Reviewed Name Date Influenza High Dose Vaccine PF 11/28/2016 Not reviewed this visit You Were Diagnosed With   
  
 Codes Comments Diarrhea, unspecified type    -  Primary ICD-10-CM: R19.7 ICD-9-CM: 787.91 Paroxysmal atrial fibrillation (HCC)     ICD-10-CM: I48.0 ICD-9-CM: 427.31 Type 2 diabetes mellitus without complication, without long-term current use of insulin (HCC)     ICD-10-CM: E11.9 ICD-9-CM: 250.00 Essential hypertension     ICD-10-CM: I10 
ICD-9-CM: 401.9 Uncomplicated asthma, unspecified asthma severity     ICD-10-CM: J45.909 ICD-9-CM: 493.90 Morbid obesity due to excess calories (HCC)     ICD-10-CM: E66.01 
ICD-9-CM: 278.01 Prerenal azotemia     ICD-10-CM: R79.89 ICD-9-CM: 790.6 Vitals BP Pulse Temp Resp Height(growth percentile) Weight(growth percentile)  139/61 (BP 1 Location: Left arm, BP Patient Position: Sitting) (!) 43 97.3 °F (36.3 °C) (Oral) 16 5' 2\" (1.575 m) 268 lb 4.8 oz (121.7 kg) SpO2 BMI OB Status Smoking Status 99% 49.07 kg/m2 Postmenopausal Former Smoker BMI and BSA Data Body Mass Index Body Surface Area 49.07 kg/m 2 2.31 m 2 Preferred Pharmacy Pharmacy Name Phone [address], [address] [phone] Your Updated Medication List  
  
   
This list is accurate as of: 4/24/17 12:45 PM.  Always use your most recent med list.  
  
  
  
  
 * albuterol 2.5 mg /3 mL (0.083 %) nebulizer solution Commonly known as:  PROVENTIL VENTOLIN  
USE 1 VIAL VIA NEBULIZER EVERY 6 HOURS AS NEEDED. DX: J45.909  
  
 * albuterol 90 mcg/actuation inhaler Commonly known as:  PROAIR HFA Take 2 Puffs by inhalation every four (4) hours as needed for Wheezing or Shortness of Breath. allopurinol 300 mg tablet Commonly known as:  ZYLOPRIM  
TAKE 1 TABLET BY MOUTH ONCE DAILY  
  
 aspirin delayed-release 81 mg tablet Take 81 mg by mouth daily. atorvastatin 80 mg tablet Commonly known as:  LIPITOR Take 1 Tab by mouth daily. budesonide-formoterol 160-4.5 mcg/actuation HFA inhaler Commonly known as:  SYMBICORT Take 2 Puffs by inhalation two (2) times a day. cloNIDine HCl 0.2 mg tablet Commonly known as:  CATAPRES Take 1 Tab by mouth two (2) times a day. felodipine 10 mg 24 hr tablet Commonly known as:  PLENDIL SR  
TAKE 1 TABLET BY MOUTH EVERY DAY  
  
 guaiFENesin-codeine 100-10 mg/5 mL solution Commonly known as:  ROBITUSSIN AC Take 5 mL by mouth four (4) times daily as needed for Cough. Max Daily Amount: 20 mL.  
  
 levETIRAcetam 1,000 mg tablet Take 1 Tab by mouth two (2) times a day. lisinopril 40 mg tablet Commonly known as:  Karene Bolls Take 1 Tab by mouth daily. metFORMIN 500 mg tablet Commonly known as:  GLUCOPHAGE  
 TAKE 1 TABLET BY MOUTH TWICE DAILY WITH MEALS  
  
 metoprolol tartrate 25 mg tablet Commonly known as:  LOPRESSOR  
TAKE 1 TABLET BY MOUTH AT 9 AM AND AT 9 PM  
  
 phenytoin  mg ER capsule Commonly known as:  DILANTIN ER  
TAKE 4 CAPSULES BY MOUTH EVERY NIGHT AT BEDTIME SITagliptin 100 mg tablet Commonly known as:  Anurag Palencias Take 1 tablet by mouth daily. spironolactone-hydrochlorothiazide 25-25 mg per tablet Commonly known as:  ALDACTAZIDE TAKE 1 TABLET BY MOUTH ONCE DAILY  
  
 tiotropium 18 mcg inhalation capsule Commonly known as:  Alexandre Smiles Take 1 Cap by inhalation daily. warfarin 5 mg tablet Commonly known as:  COUMADIN  
TAKE 1 TABLET BY MOUTH MONDAY THROUGH FRIDAY AND 1& 1/2 TABLETS ON SATURDAY AND SUNDAY * Notice: This list has 2 medication(s) that are the same as other medications prescribed for you. Read the directions carefully, and ask your doctor or other care provider to review them with you. We Performed the Following METABOLIC PANEL, BASIC [18369 CPT(R)] Follow-up Instructions Return in about 2 months (around 6/24/2017), or rto monthly for INR. Introducing [facility] & HEALTH SERVICES! Alexandr Benitez introduces [employer] patient portal. Now you can access parts of your medical record, email your doctor's office, and request medication refills online. 1. In your internet browser, go to [email or web address] 2. Click on the First Time User? Click Here link in the Sign In box. You will see the New Member Sign Up page. 3. Enter your [employer] Access Code exactly as it appears below. You will not need to use this code after youve completed the sign-up process. If you do not sign up before the expiration date, you must request a new code. · [employer] Access Code: [email or web address] Expires: 6/22/2017 10:18 AM 
 
4.  Enter the last four digits of your Social Security Number (xxxx) and Date of Birth (mm/dd/yyyy) as indicated and click Submit. You will be taken to the next sign-up page. 5. Create a [employer] ID. This will be your [employer] login ID and cannot be changed, so think of one that is secure and easy to remember. 6. Create a [employer] password. You can change your password at any time. 7. Enter your Password Reset Question and Answer. This can be used at a later time if you forget your password. 8. Enter your e-mail address. You will receive e-mail notification when new information is available in [address] 9. Click Sign Up. You can now view and download portions of your medical record. 10. Click the Download Summary menu link to download a portable copy of your medical information. If you have questions, please visit the Frequently Asked Questions section of the [employer] website. Remember, [employer] is NOT to be used for urgent needs. For medical emergencies, dial 911. Now available from your iPhone and Android! Please provide this summary of care documentation to your next provider. Your primary care clinician is listed as Manohar Mo. If you have any questions after today's visit, please call [phone].

## 2017-04-24 NOTE — PROGRESS NOTES
Chief Complaint   Patient presents with    Annual Wellness Visit    Coagulation disorder   1. Have you been to the ER, urgent care clinic since your last visit? Hospitalized since your last visit? No    2. Have you seen or consulted any other health care providers outside of the [address] [place] [address] since your last visit? Include any pap smears or colon screening.  No

## 2017-04-25 LAB
BUN SERPL-MCNC: 30 MG/DL (ref 8–27)
BUN/CREAT SERPL: 31 (ref 12–28)
CALCIUM SERPL-MCNC: 9.3 MG/DL (ref 8.7–10.3)
CHLORIDE SERPL-SCNC: 103 MMOL/L (ref 96–106)
CO2 SERPL-SCNC: 22 MMOL/L (ref 18–29)
CREAT SERPL-MCNC: 0.96 MG/DL (ref 0.57–1)
GLUCOSE SERPL-MCNC: 101 MG/DL (ref 65–99)
POTASSIUM SERPL-SCNC: 5.7 MMOL/L (ref 3.5–5.2)
SODIUM SERPL-SCNC: 140 MMOL/L (ref 134–144)

## 2017-05-03 RX ORDER — LISINOPRIL 40 MG/1
40 TABLET ORAL DAILY
Qty: 30 TAB | Refills: 11 | Status: SHIPPED | OUTPATIENT
Start: 2017-05-03 | End: 2018-05-29 | Stop reason: SDUPTHER

## 2017-05-03 RX ORDER — ATORVASTATIN CALCIUM 80 MG/1
80 TABLET, FILM COATED ORAL DAILY
Qty: 30 TAB | Refills: 11 | Status: SHIPPED | OUTPATIENT
Start: 2017-05-03 | End: 2017-11-13 | Stop reason: SDUPTHER

## 2017-05-12 RX ORDER — CODEINE PHOSPHATE AND GUAIFENESIN 10; 100 MG/5ML; MG/5ML
5 SOLUTION ORAL
Qty: 140 ML | Refills: 0 | Status: ON HOLD | OUTPATIENT
Start: 2017-05-12

## 2017-05-30 ENCOUNTER — CLINICAL SUPPORT (OUTPATIENT)
Dept: INTERNAL MEDICINE CLINIC | Age: 72
End: 2017-05-30

## 2017-05-30 DIAGNOSIS — I48.0 PAROXYSMAL ATRIAL FIBRILLATION (HCC): Primary | Chronic | ICD-10-CM

## 2017-05-30 LAB
INR BLD: 2
PT POC: 23.7 SECONDS
VALID INTERNAL CONTROL?: YES

## 2017-05-30 NOTE — MR AVS SNAPSHOT
Visit Information Date & Time Provider Department Dept. Phone Encounter #  
 5/30/2017  9:30 AM Chelsie Hernandez MD [facility] Sports Medicine and [place] [id number] Your Appointments 7/3/2017  9:00 AM  
Any with Chelsie Hernandez MD  
[address] [place] [address] and Primary Care [address] [facility]) Appt Note: f/u htn  
 [address] [facility] [place]  
  
   
 [address] [address] Upcoming Health Maintenance Date Due  
 FOOT EXAM Q1 4/13/2016 MICROALBUMIN Q1 4/13/2016 EYE EXAM RETINAL OR DILATED Q1 4/13/2016 GLAUCOMA SCREENING Q2Y 4/13/2017 INFLUENZA AGE 9 TO ADULT 8/1/2017 HEMOGLOBIN A1C Q6M 8/10/2017 LIPID PANEL Q1 11/28/2017 FOBT Q 1 YEAR AGE 50-75 11/28/2017 Pneumococcal 65+ Low/Medium Risk (2 of 2 - PPSV23) 2/10/2018 MEDICARE YEARLY EXAM 4/25/2018 DTaP/Tdap/Td series (2 - Td) 2/10/2027 Allergies as of 5/30/2017  Review Complete On: 4/24/2017 By: Chelsie Hernandez MD  
  
 Severity Noted Reaction Type Reactions Clonidine  04/24/2017    Other (comments) Patient becomes incoherent Current Immunizations  Never Reviewed Name Date Influenza High Dose Vaccine PF 11/28/2016 Not reviewed this visit Vitals OB Status Smoking Status Postmenopausal Former Smoker Preferred Pharmacy Pharmacy Name Phone [address]  [phone] Your Updated Medication List  
  
   
This list is accurate as of: 5/30/17 10:05 AM.  Always use your most recent med list.  
  
  
  
  
 * albuterol 2.5 mg /3 mL (0.083 %) nebulizer solution Commonly known as:  PROVENTIL VENTOLIN  
USE 1 VIAL VIA NEBULIZER EVERY 6 HOURS AS NEEDED. DX: J45.909  
  
 * albuterol 90 mcg/actuation inhaler Commonly known as:  PROAIR HFA  
 Take 2 Puffs by inhalation every four (4) hours as needed for Wheezing or Shortness of Breath. allopurinol 300 mg tablet Commonly known as:  ZYLOPRIM  
TAKE 1 TABLET BY MOUTH ONCE DAILY  
  
 aspirin delayed-release 81 mg tablet Take 81 mg by mouth daily. atorvastatin 80 mg tablet Commonly known as:  LIPITOR Take 1 Tab by mouth daily. budesonide-formoterol 160-4.5 mcg/actuation HFA inhaler Commonly known as:  SYMBICORT Take 2 Puffs by inhalation two (2) times a day. cloNIDine HCl 0.2 mg tablet Commonly known as:  CATAPRES Take 1 Tab by mouth two (2) times a day. felodipine 10 mg 24 hr tablet Commonly known as:  PLENDIL SR  
TAKE 1 TABLET BY MOUTH EVERY DAY  
  
 guaiFENesin-codeine 100-10 mg/5 mL solution Commonly known as:  Renzo Wale Take 5 mL by mouth three (3) times daily as needed for Cough. Max Daily Amount: 15 mL. levETIRAcetam 1,000 mg tablet Take 1 Tab by mouth two (2) times a day. lisinopril 40 mg tablet Commonly known as:  Chitra Virgil Take 1 Tab by mouth daily. metFORMIN 500 mg tablet Commonly known as:  GLUCOPHAGE  
TAKE 1 TABLET BY MOUTH TWICE DAILY WITH MEALS  
  
 metoprolol tartrate 25 mg tablet Commonly known as:  LOPRESSOR  
TAKE 1 TABLET BY MOUTH AT 9 AM AND AT 9 PM  
  
 phenytoin  mg ER capsule Commonly known as:  DILANTIN ER  
TAKE 4 CAPSULES BY MOUTH EVERY NIGHT AT BEDTIME SITagliptin 100 mg tablet Commonly known as:  Kamala Nigel Take 1 tablet by mouth daily. spironolactone-hydrochlorothiazide 25-25 mg per tablet Commonly known as:  ALDACTAZIDE TAKE 1 TABLET BY MOUTH ONCE DAILY  
  
 tiotropium 18 mcg inhalation capsule Commonly known as:  Deatra Severance Take 1 Cap by inhalation daily. warfarin 5 mg tablet Commonly known as:  COUMADIN  
TAKE 1 TABLET BY MOUTH MONDAY THROUGH FRIDAY AND 1& 1/2 TABLETS ON SATURDAY AND SUNDAY * Notice: This list has 2 medication(s) that are the same as other medications prescribed for you. Read the directions carefully, and ask your doctor or other care provider to review them with you. Introducing [facility]! [facility] introduces [employer] patient portal. Now you can access parts of your medical record, email your doctor's office, and request medication refills online. 1. In your internet browser, go to [email or web address] 2. Click on the First Time User? Click Here link in the Sign In box. You will see the New Member Sign Up page. 3. Enter your [employer] Access Code exactly as it appears below. You will not need to use this code after youve completed the sign-up process. If you do not sign up before the expiration date, you must request a new code. · [employer] Access Code: [email or web address] Expires: 6/22/2017 10:18 AM 
 
4. Enter the last four digits of your Social Security Number (xxxx) and Date of Birth (mm/dd/yyyy) as indicated and click Submit. You will be taken to the next sign-up page. 5. Create a [employer] ID. This will be your [employer] login ID and cannot be changed, so think of one that is secure and easy to remember. 6. Create a [employer] password. You can change your password at any time. 7. Enter your Password Reset Question and Answer. This can be used at a later time if you forget your password. 8. Enter your e-mail address. You will receive e-mail notification when new information is available in [address] 9. Click Sign Up. You can now view and download portions of your medical record. 10. Click the Download Summary menu link to download a portable copy of your medical information. If you have questions, please visit the Frequently Asked Questions section of the [employer] website. Remember, [employer] is NOT to be used for urgent needs. For medical emergencies, dial 911. Now available from your iPhone and Android! Please provide this summary of care documentation to your next provider. Your primary care clinician is listed as Manohar Mo. If you have any questions after today's visit, please call [phone].

## 2017-05-30 NOTE — PROGRESS NOTES
Patient in office for pt/inr check. Patient states that she is taking coumadin 5 mg daily, nothing on Sunday. PT: 23.7  INR: 2.0  Per Dr. Justine Pepper, no changes and rto in one month for pt/inr check.

## 2017-06-13 RX ORDER — WARFARIN SODIUM 5 MG/1
TABLET ORAL
Qty: 60 TAB | Refills: 11 | Status: SHIPPED | OUTPATIENT
Start: 2017-06-13 | End: 2017-06-13 | Stop reason: SDUPTHER

## 2017-06-13 RX ORDER — WARFARIN SODIUM 5 MG/1
TABLET ORAL
Qty: 103 TAB | Refills: 3 | Status: SHIPPED | OUTPATIENT
Start: 2017-06-13 | End: 2018-06-18 | Stop reason: SDUPTHER

## 2017-06-20 RX ORDER — BLOOD-GLUCOSE METER
1 EACH MISCELLANEOUS DAILY
Qty: 1 EACH | Refills: 0 | Status: ON HOLD | OUTPATIENT
Start: 2017-06-20

## 2017-06-20 RX ORDER — LANCETS 28 GAUGE
EACH MISCELLANEOUS
Qty: 100 LANCET | Refills: 11 | Status: ON HOLD | OUTPATIENT
Start: 2017-06-20

## 2017-07-13 ENCOUNTER — OFFICE VISIT (OUTPATIENT)
Dept: INTERNAL MEDICINE CLINIC | Age: 72
End: 2017-07-13

## 2017-07-13 VITALS
DIASTOLIC BLOOD PRESSURE: 69 MMHG | HEIGHT: 62 IN | BODY MASS INDEX: 48.55 KG/M2 | HEART RATE: 56 BPM | OXYGEN SATURATION: 16 % | TEMPERATURE: 98.6 F | RESPIRATION RATE: 18 BRPM | WEIGHT: 263.8 LBS | SYSTOLIC BLOOD PRESSURE: 107 MMHG

## 2017-07-13 DIAGNOSIS — E11.9 TYPE 2 DIABETES MELLITUS WITHOUT COMPLICATION, WITHOUT LONG-TERM CURRENT USE OF INSULIN (HCC): Chronic | ICD-10-CM

## 2017-07-13 DIAGNOSIS — E78.5 DYSLIPIDEMIA: ICD-10-CM

## 2017-07-13 DIAGNOSIS — R79.89 RENAL AZOTEMIA: ICD-10-CM

## 2017-07-13 DIAGNOSIS — E66.01 MORBID OBESITY DUE TO EXCESS CALORIES (HCC): Chronic | ICD-10-CM

## 2017-07-13 DIAGNOSIS — J45.909 UNCOMPLICATED ASTHMA, UNSPECIFIED ASTHMA SEVERITY: ICD-10-CM

## 2017-07-13 DIAGNOSIS — G40.909 SEIZURE DISORDER (HCC): ICD-10-CM

## 2017-07-13 DIAGNOSIS — I10 ESSENTIAL HYPERTENSION: ICD-10-CM

## 2017-07-13 DIAGNOSIS — O22.30 DVT (DEEP VEIN THROMBOSIS) IN PREGNANCY: Primary | ICD-10-CM

## 2017-07-13 NOTE — MR AVS SNAPSHOT
Visit Information Date & Time Provider Department Dept. Phone Encounter #  
 7/13/2017  9:00 AM Carol Spicer MD [facility] Sports Medicine and [address] [id number] Follow-up Instructions Return in about 3 months (around 10/13/2017), or RTO 1 month for INR. Your Appointments 10/23/2017  9:30 AM  
Any with Carol Spicer MD  
[address] [place] [address] and Primary Care [address] [place] [address]) Appt Note: 3 month f/u  
 [address] [facility]  
  
   
 [address] [id number] Upcoming Health Maintenance Date Due MICROALBUMIN Q1 4/13/2016 GLAUCOMA SCREENING Q2Y 4/13/2017 HEMOGLOBIN A1C Q6M 8/10/2017 FOOT EXAM Q1 10/13/2017* EYE EXAM RETINAL OR DILATED Q1 10/13/2017* INFLUENZA AGE 9 TO ADULT 8/1/2017 LIPID PANEL Q1 11/28/2017 FOBT Q 1 YEAR AGE 50-75 11/28/2017 Pneumococcal 65+ Low/Medium Risk (2 of 2 - PPSV23) 2/10/2018 MEDICARE YEARLY EXAM 4/25/2018 DTaP/Tdap/Td series (2 - Td) 2/10/2027 *Topic was postponed. The date shown is not the original due date. Allergies as of 7/13/2017  Review Complete On: 7/13/2017 By: Katey Choudhury Severity Noted Reaction Type Reactions Clonidine  04/24/2017    Other (comments) Patient becomes incoherent Current Immunizations  Never Reviewed Name Date Influenza High Dose Vaccine PF 11/28/2016 Not reviewed this visit You Were Diagnosed With   
  
 Codes Comments DVT (deep vein thrombosis) in pregnancy ([facility].)    -  Primary ICD-10-CM: O22.30, I82.409 ICD-9-CM: 671.30 Morbid obesity due to excess calories (HCC)     ICD-10-CM: E66.01 
ICD-9-CM: 278.01 Type 2 diabetes mellitus without complication, without long-term current use of insulin (HCC)     ICD-10-CM: E11.9 ICD-9-CM: 250.00 Seizure disorder ([facility].)     ICD-10-CM: G40.909 ICD-9-CM: 345.90  Essential hypertension     ICD-10-CM: I10 
 ICD-9-CM: 401.9 Uncomplicated asthma, unspecified asthma severity     ICD-10-CM: J45.909 ICD-9-CM: 493.90 Renal azotemia     ICD-10-CM: R79.89 ICD-9-CM: 790.6 Dyslipidemia     ICD-10-CM: E78.5 ICD-9-CM: 272.4 Vitals BP Pulse Temp Resp Height(growth percentile) Weight(growth percentile) 107/69 (BP 1 Location: Left arm, BP Patient Position: Sitting) (!) 56 98.6 °F (37 °C) (Oral) 18 5' 2\" (1.575 m) 263 lb 12.8 oz (119.7 kg) SpO2 BMI OB Status Smoking Status (!) 16% 48.25 kg/m2 Postmenopausal Former Smoker Vitals History BMI and BSA Data Body Mass Index Body Surface Area  
 48.25 kg/m 2 2.29 m 2 Preferred Pharmacy Pharmacy Name Phone [address], [address]  [phone] Your Updated Medication List  
  
   
This list is accurate as of: 7/13/17 11:37 AM.  Always use your most recent med list.  
  
  
  
  
 * albuterol 2.5 mg /3 mL (0.083 %) nebulizer solution Commonly known as:  PROVENTIL VENTOLIN  
USE 1 VIAL VIA NEBULIZER EVERY 6 HOURS AS NEEDED. DX: J45.909  
  
 * albuterol 90 mcg/actuation inhaler Commonly known as:  PROAIR HFA Take 2 Puffs by inhalation every four (4) hours as needed for Wheezing or Shortness of Breath. allopurinol 300 mg tablet Commonly known as:  ZYLOPRIM  
TAKE 1 TABLET BY MOUTH ONCE DAILY  
  
 aspirin delayed-release 81 mg tablet Take 81 mg by mouth daily. atorvastatin 80 mg tablet Commonly known as:  LIPITOR Take 1 Tab by mouth daily. Blood-Glucose Meter Misc Commonly known as:  TRUE METRIX AIR GLUCOSE METER  
1 Device by Does Not Apply route daily. Use to test blood sugars daily. dx.e11.9  
  
 budesonide-formoterol 160-4.5 mcg/actuation HFA inhaler Commonly known as:  SYMBICORT Take 2 Puffs by inhalation two (2) times a day. cloNIDine HCl 0.2 mg tablet Commonly known as:  CATAPRES  
 Take 1 Tab by mouth two (2) times a day. felodipine 10 mg 24 hr tablet Commonly known as:  PLENDIL SR  
TAKE 1 TABLET BY MOUTH EVERY DAY  
  
 glucose blood VI test strips strip Commonly known as:  TRUE METRIX GLUCOSE TEST STRIP Use to test blood sugar once daily. Dx.e11.9  
  
 guaiFENesin-codeine 100-10 mg/5 mL solution Commonly known as:  Latoya [place] Take 5 mL by mouth three (3) times daily as needed for Cough. Max Daily Amount: 15 mL.  
  
 lancets 28 gauge Misc Commonly known as:  Melany Hence Use to test blood sugar once daily. Dx.e11.9  
  
 levETIRAcetam 1,000 mg tablet Take 1 Tab by mouth two (2) times a day. lisinopril 40 mg tablet Commonly known as:  Donavan Boehringer Take 1 Tab by mouth daily. metFORMIN 500 mg tablet Commonly known as:  GLUCOPHAGE  
TAKE 1 TABLET BY MOUTH TWICE DAILY WITH MEALS  
  
 metoprolol tartrate 25 mg tablet Commonly known as:  LOPRESSOR  
TAKE 1 TABLET BY MOUTH AT 9 AM AND AT 9 PM  
  
 phenytoin  mg ER capsule Commonly known as:  DILANTIN ER  
TAKE 4 CAPSULES BY MOUTH EVERY NIGHT AT BEDTIME SITagliptin 100 mg tablet Commonly known as:  Alicia Bodily Take 1 tablet by mouth daily. spironolactone-hydrochlorothiazide 25-25 mg per tablet Commonly known as:  ALDACTAZIDE TAKE 1 TABLET BY MOUTH ONCE DAILY  
  
 tiotropium 18 mcg inhalation capsule Commonly known as:  Cara Leavens Take 1 Cap by inhalation daily. warfarin 5 mg tablet Commonly known as:  COUMADIN  
TAKE 1 TABLET BY MOUTH MONDAY THROUGH FRIDAY AND 1& 1/2 TABLETS ON SATURDAY AND SUNDAY * Notice: This list has 2 medication(s) that are the same as other medications prescribed for you. Read the directions carefully, and ask your doctor or other care provider to review them with you. We Performed the Following APOLIPOPROTEIN B [id number] CPT(R)] CREATININE, UR, RANDOM [id number] CPT(R)] CYSTATIN C [[id number] Custom] HEMOGLOBIN A1C WITH EAG [24909 CPT(R)] METABOLIC PANEL, BASIC [09798 CPT(R)] [place] [[id number] Custom] Follow-up Instructions Return in about 3 months (around 10/13/2017), or RTO 1 month for INR. Introducing [facility]! Carol Louis introduces [employer] patient portal. Now you can access parts of your medical record, email your doctor's office, and request medication refills online. 1. In your internet browser, go to [email or web address] 2. Click on the First Time User? Click Here link in the Sign In box. You will see the New Member Sign Up page. 3. Enter your [employer] Access Code exactly as it appears below. You will not need to use this code after youve completed the sign-up process. If you do not sign up before the expiration date, you must request a new code. · [employer] Access Code: [email or web address] Expires: 10/11/2017 11:37 AM 
 
4. Enter the last four digits of your Social Security Number (xxxx) and Date of Birth (mm/dd/yyyy) as indicated and click Submit. You will be taken to the next sign-up page. 5. Create a [employer] ID. This will be your [employer] login ID and cannot be changed, so think of one that is secure and easy to remember. 6. Create a [employer] password. You can change your password at any time. 7. Enter your Password Reset Question and Answer. This can be used at a later time if you forget your password. 8. Enter your e-mail address. You will receive e-mail notification when new information is available in [address] 9. Click Sign Up. You can now view and download portions of your medical record. 10. Click the Download Summary menu link to download a portable copy of your medical information. If you have questions, please visit the Frequently Asked Questions section of the [employer] website. Remember, [employer] is NOT to be used for urgent needs. For medical emergencies, dial 911. Now available from your iPhone and Android! Please provide this summary of care documentation to your next provider. Your primary care clinician is listed as Manohar Mo. If you have any questions after today's visit, please call [phone].

## 2017-07-13 NOTE — PROGRESS NOTES
[address] [facility]  [place] [address]  [address]  [address] [address]  Phone:  [phone]  Fax: [phone]    Chief Complaint   Patient presents with    Hypertension    Coagulation disorder     pt/inr check       SUBJECTIVE:    Shanna Lynn is a 74 y.o. female comes in for a return visit stating that she has done fairly well. There has been no meaningful weight loss since I last saw her. She continues to ambulate minimally, although, she is able to walk with the use of a Rollator. Her asthma has been relatively quiescent. She is taking her Coumadin and her last INR was excellent. She states that her diabetes has been doing quite well. She has a past history of primary hypertension, dyslipidemia, and gout  all of which have been reasonably stable. Finally, she remains seizure free. Current Outpatient Prescriptions   Medication Sig Dispense Refill    glucose blood VI test strips (TRUE METRIX GLUCOSE TEST STRIP) strip Use to test blood sugar once daily. Dx.e11.9 50 Strip 11    warfarin (COUMADIN) 5 mg tablet TAKE 1 TABLET BY MOUTH MONDAY THROUGH FRIDAY AND 1& 1/2 TABLETS ON SATURDAY AND SUNDAY 103 Tab 3    felodipine (PLENDIL SR) 10 mg 24 hr tablet TAKE 1 TABLET BY MOUTH EVERY DAY 90 Tab 3    atorvastatin (LIPITOR) 80 mg tablet Take 1 Tab by mouth daily. 30 Tab 11    lisinopril (PRINIVIL, ZESTRIL) 40 mg tablet Take 1 Tab by mouth daily. 30 Tab 11    allopurinol (ZYLOPRIM) 300 mg tablet TAKE 1 TABLET BY MOUTH ONCE DAILY 90 Tab 3    phenytoin ER (DILANTIN ER) 100 mg ER capsule TAKE 4 CAPSULES BY MOUTH EVERY NIGHT AT BEDTIME 360 Cap 6    aspirin delayed-release 81 mg tablet Take 81 mg by mouth daily.  spironolactone-hydrochlorothiazide (ALDACTAZIDE) 25-25 mg per tablet TAKE 1 TABLET BY MOUTH ONCE DAILY 90 Tab 11    tiotropium (SPIRIVA) 18 mcg inhalation capsule Take 1 Cap by inhalation daily.  30 Cap 11    levETIRAcetam 1,000 mg tablet Take 1 Tab by mouth two (2) times a day. 60 Tab 11    metFORMIN (GLUCOPHAGE) 500 mg tablet TAKE 1 TABLET BY MOUTH TWICE DAILY WITH MEALS 180 Tab 3    sitagliptin (JANUVIA) 100 mg tablet Take 1 tablet by mouth daily. 30 tablet 11    metoprolol tartrate (LOPRESSOR) 25 mg tablet TAKE 1 TABLET BY MOUTH TWICE DAILY AT 9 AM AND AT 9  Tab 0    Blood-Glucose Meter (TRUE METRIX AIR GLUCOSE METER) [facility] 1 Device by Does Not Apply route daily. Use to test blood sugars daily. dx.e11.9 1 Each 0    lancets (TRUEPLUS LANCETS) 28 gauge misc Use to test blood sugar once daily. Dx.e11.9 100 Lancet 11    guaiFENesin-codeine (VIRTUSSIN AC) 100-10 mg/5 mL solution Take 5 mL by mouth three (3) times daily as needed for Cough. Max Daily Amount: 15 mL. 140 mL 0    cloNIDine HCl (CATAPRES) 0.2 mg tablet Take 1 Tab by mouth two (2) times a day. 30 Tab 11    albuterol (PROAIR HFA) 90 mcg/actuation inhaler Take 2 Puffs by inhalation every four (4) hours as needed for Wheezing or Shortness of Breath. 1 Inhaler 11    albuterol (PROVENTIL VENTOLIN) 2.5 mg /3 mL (0.083 %) nebulizer solution USE 1 VIAL VIA NEBULIZER EVERY 6 HOURS AS NEEDED. DX: J45.909 120 Each 11    budesonide-formoterol (SYMBICORT) 160-4.5 mcg/Actuation HFA inhaler Take 2 Puffs by inhalation two (2) times a day.  10.2 Inhaler 11     Past Medical History:   Diagnosis Date    Arthritis     Asthma     Diabetes [facility]     Hypertension     Other ill-defined conditions     rt knee surgery    Stroke [facility])      Past Surgical History:   Procedure Laterality Date    HX COLONOSCOPY      HX GYN       Allergies   Allergen Reactions    Clonidine Other (comments)     Patient becomes incoherent       REVIEW OF SYSTEMS:  General: negative for - chills or fever  ENT: negative for - headaches, nasal congestion or tinnitus  Respiratory: negative for - cough, hemoptysis, shortness of breath or wheezing  Cardiovascular : negative for - chest pain, edema, palpitations or shortness of breath  Gastrointestinal: negative for - abdominal pain, blood in stools, heartburn or nausea/vomiting  Genito-Urinary: no dysuria, trouble voiding, or hematuria  Musculoskeletal: negative for - gait disturbance, joint pain, joint stiffness or joint swelling  Neurological: no TIA or stroke symptoms  Hematologic: no bruises, no bleeding, no swollen glands  Integument: no lumps, mole changes, nail changes or rash  Endocrine:no malaise/lethargy or unexpected weight changes      Social History     Social History    Marital status:      Spouse name: N/A    Number of children: 1    Years of education: N/A     Occupational History    disabled--CVA      Social History Main Topics    Smoking status: Former Smoker    Smokeless tobacco: Never Used      Comment: 20+years ago    Alcohol use No    Drug use: No    Sexual activity: Not Currently     Other Topics Concern    None     Social History Narrative     History reviewed. No pertinent family history. OBJECTIVE:     Visit Vitals    /69 (BP 1 Location: Left arm, BP Patient Position: Sitting)    Pulse (!) 56    Temp 98.6 °F (37 °C) (Oral)    Resp 18    Ht 5' 2\" (1.575 m)    Wt 263 lb 12.8 oz (119.7 kg)    SpO2 (!) 16%    BMI 48.25 kg/m2     CONSTITUTIONAL: well , well nourished, appears age appropriate  EYES: perrla, eom intact  ENMT:moist mucous membranes, pharynx clear  NECK: supple. Thyroid normal  RESPIRATORY: Chest: clear to ascultation and percussion   CARDIOVASCULAR: Heart: regular rate and rhythm  GASTROINTESTINAL: Abdomen: soft, bowel sounds active  HEMATOLOGIC: no pathological lymph nodes palpated  MUSCULOSKELETAL: Extremities: no edema, pulse 1+   INTEGUMENT: No unusual rashes or suspicious skin lesions noted. Nails appear normal.  NEUROLOGIC: non-focal exam   MENTAL STATUS: alert and oriented, appropriate affect     ASSESSMENT:   1. DVT (deep vein thrombosis) in pregnancy ([address]    2.  Morbid obesity due to excess calories ([address] 3. Type 2 diabetes mellitus without complication, without long-term current use of insulin ([facility]    4. Seizure disorder ([facility]    5. Essential hypertension    6. Uncomplicated asthma, unspecified asthma severity    7. Renal azotemia    8. Dyslipidemia        PLAN:    1. INR is acceptable and no adjustments are made today. She is taking one tablet Monday through Saturday and none on Sunday. 2. She needs to lose weight. I again emphasize the need to minimize carbohydrate intake by suggesting that she avoid sweets, white bread, and white potatoes. 3. She does have headaches and has had them for the last week or so. This is relatively new. I suggest to the family that they give me a call if the headaches continue for another week or so. At that point, I will have her see an ophthalmologist and a neurologist.                     4. Her diabetes historically has been doing well, but a hemoglobin A1C will be checked today. 5. Blood pressure is excellent and no adjustments are made. 6. Her asthma is reasonably stable. She continues with p.r.n. use of her nebulizer. 7. She remains on her anticonvulsants. She has not had any breakthrough seizures. 8.  She also has a renal azotemia. There is modest degree of proteinuria present with a P/C ratio of about 2, which is moderately elevated. I am not entirely sure of the etiology. Hopefully, this is not progressive. Appropriate labs will be drawn as it relates to this. ATTENTION:   This medical record was transcribed using an electronic medical records system. Although proofread, it may and can contain electronic and spelling errors. Other human spelling and other errors may be present. Corrections may be executed at a later time. Please feel free to contact us for any clarifications as needed.       Follow-up Disposition:  Return in about 3 months (around 10/13/2017), or RTO 1 month for INR.       Namita Recio MD

## 2017-07-13 NOTE — PROGRESS NOTES
Chief Complaint   Patient presents with    Hypertension    Coagulation disorder     pt/inr check     1. Have you been to the ER, urgent care clinic since your last visit? Hospitalized since your last visit? No    2. Have you seen or consulted any other health care providers outside of the [address] [place] [address] since your last visit? Include any pap smears or colon screening. No     Patient states that she has \"migraines\"/headaches daily, she rates the pain at about an 8.

## 2017-07-14 RX ORDER — METOPROLOL TARTRATE 25 MG/1
TABLET, FILM COATED ORAL
Qty: 180 TAB | Refills: 0 | Status: SHIPPED | OUTPATIENT
Start: 2017-07-14 | End: 2017-10-11 | Stop reason: SDUPTHER

## 2017-07-15 LAB
APO B SERPL-MCNC: 116 MG/DL (ref 54–133)
BUN SERPL-MCNC: 29 MG/DL (ref 8–27)
BUN/CREAT SERPL: 24 (ref 12–28)
CALCIUM SERPL-MCNC: 9.6 MG/DL (ref 8.7–10.3)
CHLORIDE SERPL-SCNC: 101 MMOL/L (ref 96–106)
CO2 SERPL-SCNC: 18 MMOL/L (ref 18–29)
CREAT SERPL-MCNC: 1.2 MG/DL (ref 0.57–1)
CYSTATIN C SERPL-MCNC: 2.19 MG/L (ref 0.53–0.95)
EST. AVERAGE GLUCOSE BLD GHB EST-MCNC: 137 MG/DL
GLUCOSE SERPL-MCNC: 105 MG/DL (ref 65–99)
HBA1C MFR BLD: 6.4 % (ref 4.8–5.6)
POTASSIUM SERPL-SCNC: 5.8 MMOL/L (ref 3.5–5.2)
SODIUM SERPL-SCNC: 138 MMOL/L (ref 134–144)

## 2017-07-19 LAB
CREAT UR-MCNC: 94.2 MG/DL
PROT UR-MCNC: 13.4 MG/DL

## 2017-08-18 RX ORDER — LEVETIRACETAM 1000 MG/1
1000 TABLET ORAL 2 TIMES DAILY
Qty: 60 TAB | Refills: 11 | Status: SHIPPED | OUTPATIENT
Start: 2017-08-18 | End: 2018-09-13 | Stop reason: SDUPTHER

## 2017-10-11 RX ORDER — METOPROLOL TARTRATE 25 MG/1
TABLET, FILM COATED ORAL
Qty: 180 TAB | Refills: 0 | Status: SHIPPED | OUTPATIENT
Start: 2017-10-11 | End: 2018-01-11 | Stop reason: SDUPTHER

## 2017-10-23 ENCOUNTER — OFFICE VISIT (OUTPATIENT)
Dept: INTERNAL MEDICINE CLINIC | Age: 72
End: 2017-10-23

## 2017-10-23 VITALS
SYSTOLIC BLOOD PRESSURE: 123 MMHG | WEIGHT: 270.3 LBS | TEMPERATURE: 98.3 F | RESPIRATION RATE: 18 BRPM | BODY MASS INDEX: 49.74 KG/M2 | DIASTOLIC BLOOD PRESSURE: 76 MMHG | HEART RATE: 62 BPM | OXYGEN SATURATION: 91 % | HEIGHT: 62 IN

## 2017-10-23 DIAGNOSIS — I10 ESSENTIAL HYPERTENSION: ICD-10-CM

## 2017-10-23 DIAGNOSIS — I48.0 PAROXYSMAL ATRIAL FIBRILLATION (HCC): Chronic | ICD-10-CM

## 2017-10-23 DIAGNOSIS — S06.2X0A LACERATION AND CONTUSION OF CEREBRAL CORTEX, RIGHT, WITHOUT LOSS OF CONSCIOUSNESS, INITIAL ENCOUNTER (HCC): Primary | ICD-10-CM

## 2017-10-23 DIAGNOSIS — Z23 ENCOUNTER FOR IMMUNIZATION: ICD-10-CM

## 2017-10-23 DIAGNOSIS — E78.5 DYSLIPIDEMIA: ICD-10-CM

## 2017-10-23 DIAGNOSIS — J45.20 INTERMITTENT ASTHMA WITHOUT COMPLICATION, UNSPECIFIED ASTHMA SEVERITY: ICD-10-CM

## 2017-10-23 DIAGNOSIS — E11.9 TYPE 2 DIABETES MELLITUS WITHOUT COMPLICATION, WITHOUT LONG-TERM CURRENT USE OF INSULIN (HCC): Chronic | ICD-10-CM

## 2017-10-23 NOTE — MR AVS SNAPSHOT
Visit Information Date & Time Provider Department Dept. Phone Encounter #  
 10/23/2017  9:30 AM Cindy Shelton MD [facility] and [address] [id number] Upcoming Health Maintenance Date Due  
 FOOT EXAM Q1 4/13/2016 MICROALBUMIN Q1 4/13/2016 EYE EXAM RETINAL OR DILATED Q1 4/13/2016 GLAUCOMA SCREENING Q2Y 4/13/2017 INFLUENZA AGE 9 TO ADULT 8/1/2017 FOBT Q 1 YEAR AGE 50-75 11/28/2017 LIPID PANEL Q1 11/28/2017 HEMOGLOBIN A1C Q6M 1/13/2018 Pneumococcal 65+ Low/Medium Risk (2 of 2 - PPSV23) 2/10/2018 MEDICARE YEARLY EXAM 4/25/2018 DTaP/Tdap/Td series (2 - Td) 2/10/2027 Allergies as of 10/23/2017  Review Complete On: 10/23/2017 By: Bulmaro Ortiz Severity Noted Reaction Type Reactions Clonidine  04/24/2017    Other (comments) Patient becomes incoherent Current Immunizations  Never Reviewed Name Date Influenza High Dose Vaccine PF 11/28/2016 Not reviewed this visit You Were Diagnosed With   
  
 Codes Comments Laceration and contusion of cerebral cortex, right, without loss of consciousness, initial encounter ([facility] [address] [facility]    -  Primary ICD-10-CM: B22.447Y 
ICD-9-CM: 851.81 Type 2 diabetes mellitus without complication, without long-term current use of insulin (HCC)     ICD-10-CM: E11.9 ICD-9-CM: 250.00 Intermittent asthma without complication, unspecified asthma severity     ICD-10-CM: J45.20 ICD-9-CM: 493.90 Essential hypertension     ICD-10-CM: I10 
ICD-9-CM: 401.9 Dyslipidemia     ICD-10-CM: E78.5 ICD-9-CM: 272.4 Paroxysmal atrial fibrillation (HCC)     ICD-10-CM: I48.0 ICD-9-CM: 427.31 Vitals BP Pulse Temp Resp Height(growth percentile) Weight(growth percentile) 123/76 (BP 1 Location: Left arm, BP Patient Position: Sitting) 62 98.3 °F (36.8 °C) (Oral) 18 5' 2\" (1.575 m) 270 lb 4.8 oz (122.6 kg) SpO2 BMI OB Status Smoking Status 91% 49.44 kg/m2 Postmenopausal Former Smoker BMI and BSA Data Body Mass Index Body Surface Area  
 49.44 kg/m 2 2.32 m 2 Preferred Pharmacy Pharmacy Name Phone [address] [phone] Your Updated Medication List  
  
   
This list is accurate as of: 10/23/17 12:05 PM.  Always use your most recent med list.  
  
  
  
  
 * albuterol 2.5 mg /3 mL (0.083 %) nebulizer solution Commonly known as:  PROVENTIL VENTOLIN  
USE 1 VIAL VIA NEBULIZER EVERY 6 HOURS AS NEEDED. DX: J45.909  
  
 * albuterol 90 mcg/actuation inhaler Commonly known as:  PROAIR HFA Take 2 Puffs by inhalation every four (4) hours as needed for Wheezing or Shortness of Breath. allopurinol 300 mg tablet Commonly known as:  ZYLOPRIM  
TAKE 1 TABLET BY MOUTH ONCE DAILY  
  
 aspirin delayed-release 81 mg tablet Take 81 mg by mouth daily. atorvastatin 80 mg tablet Commonly known as:  LIPITOR Take 1 Tab by mouth daily. Blood-Glucose Meter Misc Commonly known as:  TRUE METRIX AIR GLUCOSE METER  
1 Device by Does Not Apply route daily. Use to test blood sugars daily. dx.e11.9  
  
 budesonide-formoterol 160-4.5 mcg/actuation Hfaa Commonly known as:  SYMBICORT Take 2 Puffs by inhalation two (2) times a day. cloNIDine HCl 0.2 mg tablet Commonly known as:  CATAPRES Take 1 Tab by mouth two (2) times a day. felodipine 10 mg 24 hr tablet Commonly known as:  PLENDIL SR  
TAKE 1 TABLET BY MOUTH EVERY DAY  
  
 glucose blood VI test strips strip Commonly known as:  TRUE METRIX GLUCOSE TEST STRIP Use to test blood sugar once daily. Dx.e11.9  
  
 guaiFENesin-codeine 100-10 mg/5 mL solution Commonly known as:  Leon Ernesto Take 5 mL by mouth three (3) times daily as needed for Cough. Max Daily Amount: 15 mL.  
  
 lancets 28 gauge Misc Commonly known as:  Carley Griffith  
 Use to test blood sugar once daily. Dx.e11.9  
  
 levETIRAcetam 1,000 mg tablet Take 1 Tab by mouth two (2) times a day. lisinopril 40 mg tablet Commonly known as:  Doris Decker Take 1 Tab by mouth daily. metFORMIN 500 mg tablet Commonly known as:  GLUCOPHAGE  
TAKE 1 TABLET BY MOUTH TWICE DAILY WITH MEALS  
  
 metoprolol tartrate 25 mg tablet Commonly known as:  LOPRESSOR  
TAKE 1 TABLET BY MOUTH TWICE DAILY AT 9 AM AND AT 9 PM  
  
 phenytoin  mg ER capsule Commonly known as:  DILANTIN ER  
TAKE 4 CAPSULES BY MOUTH EVERY NIGHT AT BEDTIME SITagliptin 100 mg tablet Commonly known as:  Vallery Lora Take 1 tablet by mouth daily. spironolactone-hydrochlorothiazide 25-25 mg per tablet Commonly known as:  ALDACTAZIDE TAKE 1 TABLET BY MOUTH ONCE DAILY  
  
 tiotropium 18 mcg inhalation capsule Commonly known as:  [place] [employer] Take 1 Cap by inhalation daily. warfarin 5 mg tablet Commonly known as:  COUMADIN  
TAKE 1 TABLET BY MOUTH MONDAY THROUGH FRIDAY AND 1& 1/2 TABLETS ON SATURDAY AND SUNDAY * Notice: This list has 2 medication(s) that are the same as other medications prescribed for you. Read the directions carefully, and ask your doctor or other care provider to review them with you. We Performed the Following APOLIPOPROTEIN B [id number] CPT(R)] CBC WITH AUTOMATED DIFF [33388 CPT(R)] HEMOGLOBIN A1C WITH EAG [28073 CPT(R)] METABOLIC PANEL, BASIC [18934 CPT(R)] Introducing [facility]! Pierre Armendariz introduces [employer] patient portal. Now you can access parts of your medical record, email your doctor's office, and request medication refills online. 1. In your internet browser, go to [email or web address] 2. Click on the First Time User? Click Here link in the Sign In box. You will see the New Member Sign Up page. 3. Enter your [employer] Access Code exactly as it appears below.  You will not need to use this code after youve completed the sign-up process. If you do not sign up before the expiration date, you must request a new code. · [employer] Access Code: [email or web address] Expires: 1/21/2018 12:05 PM 
 
4. Enter the last four digits of your Social Security Number (xxxx) and Date of Birth (mm/dd/yyyy) as indicated and click Submit. You will be taken to the next sign-up page. 5. Create a [employer] ID. This will be your [employer] login ID and cannot be changed, so think of one that is secure and easy to remember. 6. Create a [employer] password. You can change your password at any time. 7. Enter your Password Reset Question and Answer. This can be used at a later time if you forget your password. 8. Enter your e-mail address. You will receive e-mail notification when new information is available in [address] 9. Click Sign Up. You can now view and download portions of your medical record. 10. Click the Download Summary menu link to download a portable copy of your medical information. If you have questions, please visit the Frequently Asked Questions section of the [employer] website. Remember, [employer] is NOT to be used for urgent needs. For medical emergencies, dial 911. Now available from your iPhone and Android! Please provide this summary of care documentation to your next provider. Your primary care clinician is listed as Manohar Mo. If you have any questions after today's visit, please call [phone].

## 2017-10-23 NOTE — PROGRESS NOTES
[address] [facility]  [place] [address]  [address] [place] [address]  Phone:  [phone]  Fax: [phone]       Chief Complaint   Patient presents with    Hypertension    Diabetes   . SUBJECTIVE:    Marianne Arnett is a 74 y.o. female Comes in for follow up. She inadvertently fell at home striking the right side of her head. She sent to [facility], and appropriate x-rays were done, and everything was negative. She did have a laceration on her right brow, but this was treated with an adhesive. She has had no seizures, which is great. Asthma has been reasonably stable. Her diabetes is also doing quite well based on blood sugars that are brought in by the daughter. She has been taking her antihypertensive medication as prescribed, as is the case with her statin. Her stamina remains quite low because she is not consistently ambulatory for the sole purpose of exercising. Current Outpatient Prescriptions   Medication Sig Dispense Refill    metoprolol tartrate (LOPRESSOR) 25 mg tablet TAKE 1 TABLET BY MOUTH TWICE DAILY AT 9 AM AND AT 9  Tab 0    levETIRAcetam 1,000 mg tablet Take 1 Tab by mouth two (2) times a day. 60 Tab 11    Blood-Glucose Meter (TRUE METRIX AIR GLUCOSE METER) misc 1 Device by Does Not Apply route daily. Use to test blood sugars daily. dx.e11.9 1 Each 0    lancets (TRUEPLUS LANCETS) 28 gauge misc Use to test blood sugar once daily. Dx.e11.9 100 Lancet 11    warfarin (COUMADIN) 5 mg tablet TAKE 1 TABLET BY MOUTH MONDAY THROUGH FRIDAY AND 1& 1/2 TABLETS ON SATURDAY AND SUNDAY 103 Tab 3    felodipine (PLENDIL SR) 10 mg 24 hr tablet TAKE 1 TABLET BY MOUTH EVERY DAY 90 Tab 3    guaiFENesin-codeine (VIRTUSSIN AC) 100-10 mg/5 mL solution Take 5 mL by mouth three (3) times daily as needed for Cough. Max Daily Amount: 15 mL. 140 mL 0    atorvastatin (LIPITOR) 80 mg tablet Take 1 Tab by mouth daily.  30 Tab 11    lisinopril (Leena Escort) 40 mg tablet Take 1 Tab by mouth daily. 30 Tab 11    allopurinol (ZYLOPRIM) 300 mg tablet TAKE 1 TABLET BY MOUTH ONCE DAILY 90 Tab 3    cloNIDine HCl (CATAPRES) 0.2 mg tablet Take 1 Tab by mouth two (2) times a day. 30 Tab 11    phenytoin ER (DILANTIN ER) 100 mg ER capsule TAKE 4 CAPSULES BY MOUTH EVERY NIGHT AT BEDTIME 360 Cap 6    aspirin delayed-release 81 mg tablet Take 81 mg by mouth daily.  spironolactone-hydrochlorothiazide (ALDACTAZIDE) 25-25 mg per tablet TAKE 1 TABLET BY MOUTH ONCE DAILY 90 Tab 11    tiotropium (SPIRIVA) 18 mcg inhalation capsule Take 1 Cap by inhalation daily. 30 Cap 11    albuterol (PROAIR HFA) 90 mcg/actuation inhaler Take 2 Puffs by inhalation every four (4) hours as needed for Wheezing or Shortness of Breath. 1 Inhaler 11    albuterol (PROVENTIL VENTOLIN) 2.5 mg /3 mL (0.083 %) nebulizer solution USE 1 VIAL VIA NEBULIZER EVERY 6 HOURS AS NEEDED. DX: J45.909 120 Each 11    sitagliptin (JANUVIA) 100 mg tablet Take 1 tablet by mouth daily. 30 tablet 11    budesonide-formoterol (SYMBICORT) 160-4.5 mcg/Actuation HFA inhaler Take 2 Puffs by inhalation two (2) times a day. 10.2 Inhaler 11    glucose blood VI test strips (TRUE METRIX GLUCOSE TEST STRIP) strip Use to test blood sugar once daily.  Dx.e11.9 50 Strip 11    metFORMIN (GLUCOPHAGE) 500 mg tablet TAKE 1 TABLET BY MOUTH TWICE DAILY WITH MEALS 180 Tab 3     Past Medical History:   Diagnosis Date    Arthritis     Asthma     Diabetes ([address]     Hypertension     Other ill-defined conditions(799.89)     rt knee surgery    Stroke [facility])      Past Surgical History:   Procedure Laterality Date    HX COLONOSCOPY      HX GYN       Allergies   Allergen Reactions    Clonidine Other (comments)     Patient becomes incoherent         REVIEW OF SYSTEMS:  General: negative for - chills or fever  ENT: negative for - headaches, nasal congestion or tinnitus  Respiratory: negative for - cough, hemoptysis, shortness of breath or wheezing  Cardiovascular : negative for - chest pain, edema, palpitations or shortness of breath  Gastrointestinal: negative for - abdominal pain, blood in stools, heartburn or nausea/vomiting  Genito-Urinary: no dysuria, trouble voiding, or hematuria  Musculoskeletal: negative for - gait disturbance, joint pain, joint stiffness or joint swelling  Neurological: no TIA or stroke symptoms  Hematologic: no bruises, no bleeding, no swollen glands  Integument: no lumps, mole changes, nail changes or rash  Endocrine: no malaise/lethargy or unexpected weight changes      Social History     Social History    Marital status:      Spouse name: N/A    Number of children: 1    Years of education: N/A     Occupational History    disabled--CVA      Social History Main Topics    Smoking status: Former Smoker    Smokeless tobacco: Never Used      Comment: 20+years ago    Alcohol use No    Drug use: No    Sexual activity: Not Currently     Other Topics Concern    None     Social History Narrative     History reviewed. No pertinent family history. OBJECTIVE:    Visit Vitals    /76 (BP 1 Location: Left arm, BP Patient Position: Sitting)    Pulse 62    Temp 98.3 °F (36.8 °C) (Oral)    Resp 18    Ht 5' 2\" (1.575 m)    Wt 270 lb 4.8 oz (122.6 kg)    SpO2 91%    BMI 49.44 kg/m2     CONSTITUTIONAL: well , well nourished, appears age appropriate  EYES: perrla, eom intact  ENMT:moist mucous membranes, pharynx clear  NECK: supple. Thyroid normal  RESPIRATORY: Chest: clear to ascultation and percussion   CARDIOVASCULAR: Heart: regular rate and rhythm  GASTROINTESTINAL: Abdomen: soft, bowel sounds active  HEMATOLOGIC: no pathological lymph nodes palpated  MUSCULOSKELETAL: Extremities: no edema, pulse 1+   INTEGUMENT: No unusual rashes or suspicious skin lesions noted. Nails appear normal.  NEUROLOGIC: non-focal exam   MENTAL STATUS: alert and oriented, appropriate affect      ASSESSMENT:  1.  Laceration and contusion of cerebral cortex, right, without loss of consciousness, initial encounter ([facility]    2. Type 2 diabetes mellitus without complication, without long-term current use of insulin ([facility] [facility]    3. Intermittent asthma without complication, unspecified asthma severity    4. Essential hypertension    5. Dyslipidemia    6. Paroxysmal atrial fibrillation (HCC)    7. Encounter for immunization        PLAN:    1. The laceration is stable. Nothing more need be done. 2. Her diabetes is doing well, but I will await the results of her hemoglobin A1c.  3. She will continue her Coumadin. INR today is acceptable. No adjustments are made. 4. Her asthma is reasonably stable. 5. Blood pressure is excellent today. No adjustments are made. She will continue statin as prescribed and efficacy and compliance will be assessed. She will return to the office monthly for an INR. .  Orders Placed This Encounter    Influenza virus vaccine ([address]) 65 years and older    METABOLIC PANEL, BASIC    CBC WITH AUTOMATED DIFF    HEMOGLOBIN A1C WITH EAG    APOLIPOPROTEIN B         Follow-up Disposition:  Return in about 3 months (around 1/23/2018), or monthly for INR.       Anette Muñoz MD

## 2017-10-23 NOTE — PROGRESS NOTES
1. Have you been to the ER, urgent care clinic since your last visit? Hospitalized since your last visit? Yes Reason for visit: fall    2. Have you seen or consulted any other health care providers outside of the [address] [place] [address] since your last visit? Include any pap smears or colon screening.  Yes Where: [facility]

## 2017-10-24 LAB
APO B SERPL-MCNC: 101 MG/DL (ref 54–133)
BASOPHILS # BLD AUTO: 0 X10E3/UL (ref 0–0.2)
BASOPHILS NFR BLD AUTO: 0 %
BUN SERPL-MCNC: 27 MG/DL (ref 8–27)
BUN/CREAT SERPL: 26 (ref 12–28)
CALCIUM SERPL-MCNC: 9.5 MG/DL (ref 8.7–10.3)
CHLORIDE SERPL-SCNC: 100 MMOL/L (ref 96–106)
CO2 SERPL-SCNC: 21 MMOL/L (ref 18–29)
CREAT SERPL-MCNC: 1.05 MG/DL (ref 0.57–1)
EOSINOPHIL # BLD AUTO: 0.3 X10E3/UL (ref 0–0.4)
EOSINOPHIL NFR BLD AUTO: 5 %
ERYTHROCYTE [DISTWIDTH] IN BLOOD BY AUTOMATED COUNT: 14.7 % (ref 12.3–15.4)
EST. AVERAGE GLUCOSE BLD GHB EST-MCNC: 137 MG/DL
GFR SERPLBLD CREATININE-BSD FMLA CKD-EPI: 52 ML/MIN/1.73
GFR SERPLBLD CREATININE-BSD FMLA CKD-EPI: 60 ML/MIN/1.73
GLUCOSE SERPL-MCNC: 97 MG/DL (ref 65–99)
HBA1C MFR BLD: 6.4 % (ref 4.8–5.6)
HCT VFR BLD AUTO: 36.1 % (ref 34–46.6)
HGB BLD-MCNC: 11.8 G/DL (ref 11.1–15.9)
IMM GRANULOCYTES # BLD: 0 X10E3/UL (ref 0–0.1)
IMM GRANULOCYTES NFR BLD: 0 %
LYMPHOCYTES # BLD AUTO: 2.4 X10E3/UL (ref 0.7–3.1)
LYMPHOCYTES NFR BLD AUTO: 43 %
MCH RBC QN AUTO: 29 PG (ref 26.6–33)
MCHC RBC AUTO-ENTMCNC: 32.7 G/DL (ref 31.5–35.7)
MCV RBC AUTO: 89 FL (ref 79–97)
MONOCYTES # BLD AUTO: 0.7 X10E3/UL (ref 0.1–0.9)
MONOCYTES NFR BLD AUTO: 13 %
NEUTROPHILS # BLD AUTO: 2.2 X10E3/UL (ref 1.4–7)
NEUTROPHILS NFR BLD AUTO: 39 %
PLATELET # BLD AUTO: 234 X10E3/UL (ref 150–379)
POTASSIUM SERPL-SCNC: 4.9 MMOL/L (ref 3.5–5.2)
RBC # BLD AUTO: 4.07 X10E6/UL (ref 3.77–5.28)
SODIUM SERPL-SCNC: 138 MMOL/L (ref 134–144)
WBC # BLD AUTO: 5.5 X10E3/UL (ref 3.4–10.8)

## 2017-10-25 ENCOUNTER — TELEPHONE (OUTPATIENT)
Dept: INTERNAL MEDICINE CLINIC | Age: 72
End: 2017-10-25

## 2017-10-25 NOTE — TELEPHONE ENCOUNTER
Patient called office requesting refill of cough syrp. Per Dr. Stefania Ferrari patient given cheratussin ac 1 tsp q 6 hrs. prn #120 ml. Patient told no refills.

## 2017-11-14 RX ORDER — ATORVASTATIN CALCIUM 80 MG/1
80 TABLET, FILM COATED ORAL DAILY
Qty: 90 TAB | Refills: 3 | Status: SHIPPED | OUTPATIENT
Start: 2017-11-14 | End: 2018-11-09 | Stop reason: SDUPTHER

## 2017-11-27 ENCOUNTER — CLINICAL SUPPORT (OUTPATIENT)
Dept: INTERNAL MEDICINE CLINIC | Age: 72
End: 2017-11-27

## 2017-11-27 DIAGNOSIS — O22.30 DVT (DEEP VEIN THROMBOSIS) IN PREGNANCY: Primary | ICD-10-CM

## 2017-11-27 LAB
INR BLD: 1.9
PT POC: 22.2 SECONDS
VALID INTERNAL CONTROL?: YES

## 2017-11-27 NOTE — PROGRESS NOTES
Patient in office for pt/inr check. Patient states that she is taking coumadin 5 mg; 1 tab daily and nothing on Sunday. Results for orders placed or performed in visit on 11/27/17   AMB POC PT/INR   Result Value Ref Range    VALID INTERNAL CONTROL POC Yes     Prothrombin time (POC) 22.2 seconds    INR POC 1.9      Per Dr. Ruddy Palmer, no changes and return to office in 2 weeks for pt/inr check.

## 2017-11-29 RX ORDER — SPIRONOLACTONE AND HYDROCHLOROTHIAZIDE 25; 25 MG/1; MG/1
1 TABLET ORAL DAILY
Qty: 90 TAB | Refills: 11 | Status: SHIPPED | OUTPATIENT
Start: 2017-11-29 | End: 2019-01-30 | Stop reason: SDUPTHER

## 2017-12-12 ENCOUNTER — CLINICAL SUPPORT (OUTPATIENT)
Dept: INTERNAL MEDICINE CLINIC | Age: 72
End: 2017-12-12

## 2017-12-12 DIAGNOSIS — Z79.01 ENCOUNTER FOR MONITORING COUMADIN THERAPY: Primary | ICD-10-CM

## 2017-12-12 DIAGNOSIS — Z51.81 ENCOUNTER FOR MONITORING COUMADIN THERAPY: Primary | ICD-10-CM

## 2017-12-12 LAB
INR BLD: 1.6
PT POC: 19.2 SECONDS
VALID INTERNAL CONTROL?: YES

## 2017-12-12 NOTE — PROGRESS NOTES
Kandice Martinez is a 74 y.o. female who presents today for Anticoagulation monitoring. Current dose:  Coumadin 5mg  1 tab PO everyday except on Sunday she takes none  Medication Changes:  yes   Dietary Changes:  no    Latest INR:  Results for orders placed or performed in visit on 12/12/17   AMB POC PT/INR   Result Value Ref Range    VALID INTERNAL CONTROL POC Yes     Prothrombin time (POC) 19.2 seconds    INR POC 1.6          New Coumadin dose: coumadin 5 mg 1 tab PO  daily  . Next check to be scheduled for  2 weeks.     Gabriel Doyle LPN

## 2017-12-12 NOTE — MR AVS SNAPSHOT
Visit Information Date & Time Provider Department Dept. Phone Encounter #  
 12/12/2017  4:00 PM [facility] [phone] [id number] Your Appointments 12/12/2017  4:00 PM  
Nurse Visit with Mingkaya Deepali [facility] ([facility]) Appt Note: PT/INR; rescheduled appt  
 [address]  
[phone]  
  
   
 [address] [facility] [address]  
  
    
 1/22/2018  9:00 AM  
Any with Katerina Blevins MD  
[facility] [address]) Appt Note: 3 month f/u htn  
 [address] [place]  
  
   
 [address] [facility] [address] Upcoming Health Maintenance Date Due  
 FOOT EXAM Q1 4/13/2016 MICROALBUMIN Q1 4/13/2016 EYE EXAM RETINAL OR DILATED Q1 4/13/2016 GLAUCOMA SCREENING Q2Y 4/13/2017 LIPID PANEL Q1 11/28/2017 FOBT Q 1 YEAR AGE 50-75 11/28/2017 Pneumococcal 65+ Low/Medium Risk (2 of 2 - PPSV23) 2/10/2018 HEMOGLOBIN A1C Q6M 4/23/2018 MEDICARE YEARLY EXAM 4/25/2018 DTaP/Tdap/Td series (2 - Td) 2/10/2027 Allergies as of 12/12/2017  Review Complete On: 10/29/2017 By: Katerina Blevins MD  
  
 Severity Noted Reaction Type Reactions Clonidine  04/24/2017    Other (comments) Patient becomes incoherent Current Immunizations  Never Reviewed Name Date Influenza High Dose Vaccine PF 10/23/2017, 11/28/2016 Not reviewed this visit You Were Diagnosed With   
  
 Codes Comments Encounter for monitoring coumadin therapy    -  Primary ICD-10-CM: Z51.81, Z79.01 
ICD-9-CM: V58.83, V58.61 Vitals OB Status Smoking Status Postmenopausal Former Smoker Preferred Pharmacy Pharmacy Name Phone [address], [address] [phone] Your Updated Medication List  
  
   
This list is accurate as of: 12/12/17  3:05 PM.  Always use your most recent med list.  
  
  
  
  
 * albuterol 2.5 mg /3 mL (0.083 %) nebulizer solution Commonly known as:  PROVENTIL VENTOLIN  
USE 1 VIAL VIA NEBULIZER EVERY 6 HOURS AS NEEDED. DX: J45.909  
  
 * albuterol 90 mcg/actuation inhaler Commonly known as:  PROAIR HFA Take 2 Puffs by inhalation every four (4) hours as needed for Wheezing or Shortness of Breath. allopurinol 300 mg tablet Commonly known as:  ZYLOPRIM  
TAKE 1 TABLET BY MOUTH ONCE DAILY  
  
 aspirin delayed-release 81 mg tablet Take 81 mg by mouth daily. atorvastatin 80 mg tablet Commonly known as:  LIPITOR Take 1 Tab by mouth daily. Blood-Glucose Meter Misc Commonly known as:  TRUE METRIX AIR GLUCOSE METER  
1 Device by Does Not Apply route daily. Use to test blood sugars daily. dx.e11.9  
  
 budesonide-formoterol 160-4.5 mcg/actuation Hfaa Commonly known as:  SYMBICORT Take 2 Puffs by inhalation two (2) times a day. cloNIDine HCl 0.2 mg tablet Commonly known as:  CATAPRES Take 1 Tab by mouth two (2) times a day. felodipine 10 mg 24 hr tablet Commonly known as:  PLENDIL SR  
TAKE 1 TABLET BY MOUTH EVERY DAY  
  
 glucose blood VI test strips strip Commonly known as:  TRUE METRIX GLUCOSE TEST STRIP Use to test blood sugar once daily. Dx.e11.9  
  
 guaiFENesin-codeine 100-10 mg/5 mL solution Commonly known as:  Dorethia Donaldson Take 5 mL by mouth three (3) times daily as needed for Cough. Max Daily Amount: 15 mL.  
  
 lancets 28 gauge Misc Commonly known as:  Morro Ricketts Use to test blood sugar once daily. Dx.e11.9  
  
 levETIRAcetam 1,000 mg tablet Take 1 Tab by mouth two (2) times a day. lisinopril 40 mg tablet Commonly known as:  Marge Annmarie Take 1 Tab by mouth daily. metFORMIN 500 mg tablet Commonly known as:  GLUCOPHAGE  
 TAKE 1 TABLET BY MOUTH TWICE DAILY WITH MEALS  
  
 metoprolol tartrate 25 mg tablet Commonly known as:  LOPRESSOR  
TAKE 1 TABLET BY MOUTH TWICE DAILY AT 9 AM AND AT 9 PM  
  
 phenytoin  mg ER capsule Commonly known as:  DILANTIN ER  
TAKE 4 CAPSULES BY MOUTH EVERY NIGHT AT BEDTIME SITagliptin 100 mg tablet Commonly known as:  Ferol Chiquito Take 1 tablet by mouth daily. spironolactone-hydrochlorothiazide 25-25 mg per tablet Commonly known as:  ALDACTAZIDE Take 1 Tab by mouth daily. tiotropium 18 mcg inhalation capsule Commonly known as:  Marvia Gottron Take 1 Cap by inhalation daily. warfarin 5 mg tablet Commonly known as:  COUMADIN  
TAKE 1 TABLET BY MOUTH MONDAY THROUGH FRIDAY AND 1& 1/2 TABLETS ON SATURDAY AND SUNDAY * Notice: This list has 2 medication(s) that are the same as other medications prescribed for you. Read the directions carefully, and ask your doctor or other care provider to review them with you. We Performed the Following AMB POC PT/INR [64536 CPT(R)] Introducing [facility] & HEALTH SERVICES! [facility] introduces [employer] patient portal. Now you can access parts of your medical record, email your doctor's office, and request medication refills online. 1. In your internet browser, go to [email or web address] 2. Click on the First Time User? Click Here link in the Sign In box. You will see the New Member Sign Up page. 3. Enter your [employer] Access Code exactly as it appears below. You will not need to use this code after youve completed the sign-up process. If you do not sign up before the expiration date, you must request a new code. · [employer] Access Code: [email or web address] Expires: 1/21/2018 11:05 AM 
 
4. Enter the last four digits of your Social Security Number (xxxx) and Date of Birth (mm/dd/yyyy) as indicated and click Submit. You will be taken to the next sign-up page. 5. Create a L[employer]Up ID. This will be your L[employer] login ID and cannot be changed, so think of one that is secure and easy to remember. 6. Create a L[employer]Up password. You can change your password at any time. 7. Enter your Password Reset Question and Answer. This can be used at a later time if you forget your password. 8. Enter your e-mail address. You will receive e-mail notification when new information is available in [address] 9. Click Sign Up. You can now view and download portions of your medical record. 10. Click the Download Summary menu link to download a portable copy of your medical information. If you have questions, please visit the Frequently Asked Questions section of the [employer] website. Remember, [employer] is NOT to be used for urgent needs. For medical emergencies, dial 911. Now available from your iPhone and Android! Please provide this summary of care documentation to your next provider. Your primary care clinician is listed as Manohar Mo. If you have any questions after today's visit, please call [phone].

## 2017-12-31 RX ORDER — PHENYTOIN SODIUM 100 MG/1
CAPSULE, EXTENDED RELEASE ORAL
Qty: 360 CAP | Refills: 0 | Status: SHIPPED | OUTPATIENT
Start: 2017-12-31 | End: 2018-01-22 | Stop reason: SDUPTHER

## 2018-01-02 ENCOUNTER — CLINICAL SUPPORT (OUTPATIENT)
Dept: INTERNAL MEDICINE CLINIC | Age: 73
End: 2018-01-02

## 2018-01-02 DIAGNOSIS — Z51.81 ENCOUNTER FOR MONITORING COUMADIN THERAPY: Primary | ICD-10-CM

## 2018-01-02 DIAGNOSIS — Z79.01 ENCOUNTER FOR MONITORING COUMADIN THERAPY: Primary | ICD-10-CM

## 2018-01-02 LAB
INR BLD: 2.1
PT POC: 25 SECONDS
VALID INTERNAL CONTROL?: YES

## 2018-01-02 NOTE — MR AVS SNAPSHOT
Visit Information Date & Time Provider Department Dept. Phone Encounter #  
 1/2/2018  4:30 PM [facility] and [address] [place] [address] [phone] [id number] Your Appointments 1/22/2018  9:00 AM  
Any with Annmarie Cole MD  
[address] [place] [address] and Primary Care [facility]) Appt Note: 3 month f/u htn  
 [address] [place]  
  
   
 [address] [address] Upcoming Health Maintenance Date Due  
 FOOT EXAM Q1 4/13/2016 MICROALBUMIN Q1 4/13/2016 EYE EXAM RETINAL OR DILATED Q1 4/13/2016 GLAUCOMA SCREENING Q2Y 4/13/2017 LIPID PANEL Q1 11/28/2017 FOBT Q 1 YEAR AGE 50-75 11/28/2017 Pneumococcal 65+ Low/Medium Risk (2 of 2 - PPSV23) 2/10/2018 HEMOGLOBIN A1C Q6M 4/23/2018 MEDICARE YEARLY EXAM 4/25/2018 BREAST CANCER SCRN MAMMOGRAM 7/1/2018 DTaP/Tdap/Td series (2 - Td) 2/10/2027 Allergies as of 1/2/2018  Review Complete On: 10/29/2017 By: Annmarie Cole MD  
  
 Severity Noted Reaction Type Reactions Clonidine  04/24/2017    Other (comments) Patient becomes incoherent Current Immunizations  Never Reviewed Name Date Influenza High Dose Vaccine PF 10/23/2017, 11/28/2016 Not reviewed this visit You Were Diagnosed With   
  
 Codes Comments Encounter for monitoring coumadin therapy    -  Primary ICD-10-CM: Z51.81, Z79.01 
ICD-9-CM: V58.83, V58.61 Vitals OB Status Smoking Status Postmenopausal Former Smoker Preferred Pharmacy Pharmacy Name Phone [address], [address] [phone] Your Updated Medication List  
  
   
This list is accurate as of: 1/2/18  5:00 PM.  Always use your most recent med list.  
  
  
  
  
 * albuterol 2.5 mg /3 mL (0.083 %) nebulizer solution Commonly known as:  PROVENTIL VENTOLIN  
 USE 1 VIAL VIA NEBULIZER EVERY 6 HOURS AS NEEDED. DX: J45.909  
  
 * albuterol 90 mcg/actuation inhaler Commonly known as:  PROAIR HFA Take 2 Puffs by inhalation every four (4) hours as needed for Wheezing or Shortness of Breath. allopurinol 300 mg tablet Commonly known as:  ZYLOPRIM  
TAKE 1 TABLET BY MOUTH ONCE DAILY  
  
 aspirin delayed-release 81 mg tablet Take 81 mg by mouth daily. atorvastatin 80 mg tablet Commonly known as:  LIPITOR Take 1 Tab by mouth daily. Blood-Glucose Meter Misc Commonly known as:  TRUE METRIX AIR GLUCOSE METER  
1 Device by Does Not Apply route daily. Use to test blood sugars daily. dx.e11.9  
  
 budesonide-formoterol 160-4.5 mcg/actuation Hfaa Commonly known as:  SYMBICORT Take 2 Puffs by inhalation two (2) times a day. cloNIDine HCl 0.2 mg tablet Commonly known as:  CATAPRES Take 1 Tab by mouth two (2) times a day. felodipine 10 mg 24 hr tablet Commonly known as:  PLENDIL SR  
TAKE 1 TABLET BY MOUTH EVERY DAY  
  
 glucose blood VI test strips strip Commonly known as:  TRUE METRIX GLUCOSE TEST STRIP Use to test blood sugar once daily. Dx.e11.9  
  
 guaiFENesin-codeine 100-10 mg/5 mL solution Commonly known as:  Gela Jovana Take 5 mL by mouth three (3) times daily as needed for Cough. Max Daily Amount: 15 mL.  
  
 lancets 28 gauge Misc Commonly known as:  Charlesetta Shorten Use to test blood sugar once daily. Dx.e11.9  
  
 levETIRAcetam 1,000 mg tablet Take 1 Tab by mouth two (2) times a day. lisinopril 40 mg tablet Commonly known as:  Rheta Deandre Take 1 Tab by mouth daily. metFORMIN 500 mg tablet Commonly known as:  GLUCOPHAGE  
TAKE 1 TABLET BY MOUTH TWICE DAILY WITH MEALS  
  
 metoprolol tartrate 25 mg tablet Commonly known as:  LOPRESSOR  
TAKE 1 TABLET BY MOUTH TWICE DAILY AT 9 AM AND AT 9 PM  
  
 phenytoin  mg ER capsule Commonly known as:  DILANTIN ER  
 TAKE 4 CAPSULES BY MOUTH EVERY NIGHT AT BEDTIME SITagliptin 100 mg tablet Commonly known as:  Valaria Dicker Take 1 tablet by mouth daily. spironolactone-hydrochlorothiazide 25-25 mg per tablet Commonly known as:  ALDACTAZIDE Take 1 Tab by mouth daily. tiotropium 18 mcg inhalation capsule Commonly known as:  Valere John Take 1 Cap by inhalation daily. warfarin 5 mg tablet Commonly known as:  COUMADIN  
TAKE 1 TABLET BY MOUTH MONDAY THROUGH FRIDAY AND 1& 1/2 TABLETS ON SATURDAY AND SUNDAY * Notice: This list has 2 medication(s) that are the same as other medications prescribed for you. Read the directions carefully, and ask your doctor or other care provider to review them with you. We Performed the Following AMB POC PT/INR [22407 CPT(R)] Introducing [facility] & HEALTH SERVICES! [facility] introduces [employer] patient portal. Now you can access parts of your medical record, email your doctor's office, and request medication refills online. 1. In your internet browser, go to [email or web address] 2. Click on the First Time User? Click Here link in the Sign In box. You will see the New Member Sign Up page. 3. Enter your [employer] Access Code exactly as it appears below. You will not need to use this code after youve completed the sign-up process. If you do not sign up before the expiration date, you must request a new code. · [employer] Access Code: [email or web address] Expires: 1/21/2018 11:05 AM 
 
4. Enter the last four digits of your Social Security Number (xxxx) and Date of Birth (mm/dd/yyyy) as indicated and click Submit. You will be taken to the next sign-up page. 5. Create a [employer] ID. This will be your [employer] login ID and cannot be changed, so think of one that is secure and easy to remember. 6. Create a [employer] password. You can change your password at any time. 7. Enter your Password Reset Question and Answer.  This can be used at a later time if you forget your password. 8. Enter your e-mail address. You will receive e-mail notification when new information is available in [address] 9. Click Sign Up. You can now view and download portions of your medical record. 10. Click the Download Summary menu link to download a portable copy of your medical information. If you have questions, please visit the Frequently Asked Questions section of the [employer] website. Remember, [employer] is NOT to be used for urgent needs. For medical emergencies, dial 911. Now available from your iPhone and Android! Please provide this summary of care documentation to your next provider. Your primary care clinician is listed as Manohar Mo. If you have any questions after today's visit, please call [phone].

## 2018-01-02 NOTE — PROGRESS NOTES
Lasandra Opitz is a 74 y.o. female who presents today for Anticoagulation monitoring. Current dose:  Coumadin 5 mg PO daily. Medication Changes:  no  Dietary Changes:  no    Latest INR:  Results for orders placed or performed in visit on 01/02/18   AMB POC PT/INR   Result Value Ref Range    VALID INTERNAL CONTROL POC Yes     Prothrombin time (POC) 25.0 seconds    INR POC 2.1          New Coumadin dose:.current treatment plan is effective, no change in therapy. Next check to be scheduled for  4 weeks.     Sissy Urban LPN

## 2018-01-11 RX ORDER — METOPROLOL TARTRATE 25 MG/1
TABLET, FILM COATED ORAL
Qty: 180 TAB | Refills: 0 | Status: SHIPPED | OUTPATIENT
Start: 2018-01-11 | End: 2018-01-22 | Stop reason: SDUPTHER

## 2018-01-22 ENCOUNTER — OFFICE VISIT (OUTPATIENT)
Dept: INTERNAL MEDICINE CLINIC | Age: 73
End: 2018-01-22

## 2018-01-22 VITALS
BODY MASS INDEX: 49.96 KG/M2 | HEART RATE: 58 BPM | WEIGHT: 271.5 LBS | RESPIRATION RATE: 16 BRPM | DIASTOLIC BLOOD PRESSURE: 85 MMHG | HEIGHT: 62 IN | OXYGEN SATURATION: 94 % | TEMPERATURE: 97.9 F | SYSTOLIC BLOOD PRESSURE: 137 MMHG

## 2018-01-22 DIAGNOSIS — Z96.651 STATUS POST TOTAL RIGHT KNEE REPLACEMENT: Chronic | ICD-10-CM

## 2018-01-22 DIAGNOSIS — E11.9 TYPE 2 DIABETES MELLITUS WITHOUT COMPLICATION, WITHOUT LONG-TERM CURRENT USE OF INSULIN (HCC): Chronic | ICD-10-CM

## 2018-01-22 DIAGNOSIS — E78.5 DYSLIPIDEMIA: ICD-10-CM

## 2018-01-22 DIAGNOSIS — I48.0 PAROXYSMAL ATRIAL FIBRILLATION (HCC): Primary | Chronic | ICD-10-CM

## 2018-01-22 DIAGNOSIS — I10 ESSENTIAL HYPERTENSION: ICD-10-CM

## 2018-01-22 DIAGNOSIS — E66.01 MORBID OBESITY (HCC): Chronic | ICD-10-CM

## 2018-01-22 DIAGNOSIS — J45.20 INTERMITTENT ASTHMA WITHOUT COMPLICATION, UNSPECIFIED ASTHMA SEVERITY: ICD-10-CM

## 2018-01-22 LAB
INR BLD: 3.2
PT POC: 38.4 SECONDS
VALID INTERNAL CONTROL?: YES

## 2018-01-22 RX ORDER — METOPROLOL TARTRATE 25 MG/1
TABLET, FILM COATED ORAL
Qty: 180 TAB | Refills: 3 | Status: SHIPPED | OUTPATIENT
Start: 2018-01-22 | End: 2019-01-30 | Stop reason: SDUPTHER

## 2018-01-22 RX ORDER — ALLOPURINOL 300 MG/1
TABLET ORAL
Qty: 90 TAB | Refills: 3 | Status: SHIPPED | OUTPATIENT
Start: 2018-01-22 | End: 2018-04-17 | Stop reason: SDUPTHER

## 2018-01-22 RX ORDER — PHENYTOIN SODIUM 100 MG/1
CAPSULE, EXTENDED RELEASE ORAL
Qty: 360 CAP | Refills: 3 | Status: SHIPPED | OUTPATIENT
Start: 2018-01-22 | End: 2019-02-20 | Stop reason: SDUPTHER

## 2018-01-22 NOTE — PROGRESS NOTES
[facility]  [place] [address]  [address] [place] [address]  Phone:  [phone]  Fax: [phone]    Chief Complaint   Patient presents with    Blood Clot     Patient also in office for pt/inr check. SUBJECTIVE:    Breanna Wells is a 74 y.o. female   Comes in for a return visit stating she has done fairly well. She is accompanied by her granddaughter. Her diabetes has been doing quite well based on her previous HbA1c. She has not had any symptomatic hypoglycemia either. I reminded her granddaughter to check her blood sugars ac and not pc. She has remained seizure free with the Keppra and Phenytoin. She complains of a pain in her right foot. She also has pain in the right prosthetic knee but does not want anything done. She has continuing to walk with her Rollator. There has been no meaningful weight change. She has a past history of primary hypertension, dyslipidemia and gout. Her asthma has been reasonably stable also. MedDATA/gwo              Current Outpatient Prescriptions   Medication Sig Dispense Refill    glucose blood VI test strips (TRUE METRIX GLUCOSE TEST STRIP) strip Use to test blood sugar once daily. Dx.e11.9 100 Strip 11    metoprolol tartrate (LOPRESSOR) 25 mg tablet TAKE 1 TABLET BY MOUTH TWICE DAILY AT 9 AM AND AT 9  Tab 3    phenytoin ER (DILANTIN ER) 100 mg ER capsule TAKE 4 CAPSULES BY MOUTH EVERY NIGHT AT BEDTIME 360 Cap 3    allopurinol (ZYLOPRIM) 300 mg tablet TAKE 1 TABLET BY MOUTH ONCE DAILY 90 Tab 3    spironolactone-hydrochlorothiazide (ALDACTAZIDE) 25-25 mg per tablet Take 1 Tab by mouth daily. 90 Tab 11    atorvastatin (LIPITOR) 80 mg tablet Take 1 Tab by mouth daily. 90 Tab 3    levETIRAcetam 1,000 mg tablet Take 1 Tab by mouth two (2) times a day. 60 Tab 11    Blood-Glucose Meter (TRUE METRIX AIR GLUCOSE METER) misc 1 Device by Does Not Apply route daily. Use to test blood sugars daily. dx.e11.9 1 Each 0  lancets (TRUEPLUS LANCETS) 28 gauge misc Use to test blood sugar once daily. Dx.e11.9 100 Lancet 11    warfarin (COUMADIN) 5 mg tablet TAKE 1 TABLET BY MOUTH MONDAY THROUGH FRIDAY AND 1& 1/2 TABLETS ON SATURDAY AND SUNDAY 103 Tab 3    felodipine (PLENDIL SR) 10 mg 24 hr tablet TAKE 1 TABLET BY MOUTH EVERY DAY 90 Tab 3    lisinopril (PRINIVIL, ZESTRIL) 40 mg tablet Take 1 Tab by mouth daily. 30 Tab 11    aspirin delayed-release 81 mg tablet Take 81 mg by mouth daily.  tiotropium (SPIRIVA) 18 mcg inhalation capsule Take 1 Cap by inhalation daily. 30 Cap 11    albuterol (PROAIR HFA) 90 mcg/actuation inhaler Take 2 Puffs by inhalation every four (4) hours as needed for Wheezing or Shortness of Breath. 1 Inhaler 11    albuterol (PROVENTIL VENTOLIN) 2.5 mg /3 mL (0.083 %) nebulizer solution USE 1 VIAL VIA NEBULIZER EVERY 6 HOURS AS NEEDED. DX: J45.909 120 Each 11    budesonide-formoterol (SYMBICORT) 160-4.5 mcg/Actuation HFA inhaler Take 2 Puffs by inhalation two (2) times a day. 10.2 Inhaler 11    guaiFENesin-codeine (VIRTUSSIN AC) 100-10 mg/5 mL solution Take 5 mL by mouth three (3) times daily as needed for Cough. Max Daily Amount: 15 mL. 140 mL 0    cloNIDine HCl (CATAPRES) 0.2 mg tablet Take 1 Tab by mouth two (2) times a day. 30 Tab 11    metFORMIN (GLUCOPHAGE) 500 mg tablet TAKE 1 TABLET BY MOUTH TWICE DAILY WITH MEALS 180 Tab 3    sitagliptin (JANUVIA) 100 mg tablet Take 1 tablet by mouth daily.  30 tablet 11     Past Medical History:   Diagnosis Date    Arthritis     Asthma     Diabetes ([facility] [address]     Hypertension     Other ill-defined conditions(799.89)     rt knee surgery    Stroke [facility])      Past Surgical History:   Procedure Laterality Date    HX COLONOSCOPY      HX GYN       Allergies   Allergen Reactions    Clonidine Other (comments)     Patient becomes incoherent       REVIEW OF SYSTEMS:  General: negative for - chills or fever  ENT: negative for - headaches, nasal congestion or tinnitus  Respiratory: negative for - cough, hemoptysis, shortness of breath or wheezing  Cardiovascular : negative for - chest pain, edema, palpitations or shortness of breath  Gastrointestinal: negative for - abdominal pain, blood in stools, heartburn or nausea/vomiting  Genito-Urinary: no dysuria, trouble voiding, or hematuria  Musculoskeletal: negative for - gait disturbance, joint pain, joint stiffness or joint swelling  Neurological: no TIA or stroke symptoms  Hematologic: no bruises, no bleeding, no swollen glands  Integument: no lumps, mole changes, nail changes or rash  Endocrine:no malaise/lethargy or unexpected weight changes      Social History     Social History    Marital status:      Spouse name: N/A    Number of children: 1    Years of education: N/A     Occupational History    disabled--CVA      Social History Main Topics    Smoking status: Former Smoker    Smokeless tobacco: Never Used      Comment: 20+years ago    Alcohol use No    Drug use: No    Sexual activity: Not Currently     Other Topics Concern    None     Social History Narrative     History reviewed. No pertinent family history. OBJECTIVE:     Visit Vitals    /85 (BP 1 Location: Right arm, BP Patient Position: Sitting)    Pulse (!) 58    Temp 97.9 °F (36.6 °C) (Oral)    Resp 16    Ht 5' 2\" (1.575 m)    Wt 271 lb 8 oz (123.2 kg)    SpO2 94%    BMI 49.66 kg/m2     CONSTITUTIONAL: well , well nourished, appears age appropriate  EYES: perrla, eom intact  ENMT:moist mucous membranes, pharynx clear  NECK: supple. Thyroid normal  RESPIRATORY: Chest: clear to ascultation and percussion   CARDIOVASCULAR: Heart: regular rate and rhythm  GASTROINTESTINAL: Abdomen: soft, bowel sounds active  HEMATOLOGIC: no pathological lymph nodes palpated  MUSCULOSKELETAL: Extremities: trace edema, pulse 1+   INTEGUMENT: No unusual rashes or suspicious skin lesions noted.  Nails appear normal.  NEUROLOGIC: non-focal exam   MENTAL STATUS: alert and oriented, appropriate affect     ASSESSMENT:   1. Paroxysmal atrial fibrillation (HCC)    2. Morbid obesity ([address]    3. Type 2 diabetes mellitus without complication, without long-term current use of insulin (HCC)    4. Status post total right knee replacement    5. Essential hypertension    6. Dyslipidemia    7. Intermittent asthma without complication, unspecified asthma severity      Diagnoses and all orders for this visit:    1. Paroxysmal atrial fibrillation [facility])  Assessment & Plan:  Stable, based on history, physical exam and review of pertinent labs, studies and medications; meds reconciled; continue current treatment plan. Key CAD CHF Meds             metoprolol tartrate (LOPRESSOR) 25 mg tablet  (Taking) TAKE 1 TABLET BY MOUTH TWICE DAILY AT 9 AM AND AT 9 PM    spironolactone-hydrochlorothiazide (ALDACTAZIDE) 25-25 mg per tablet  (Taking) Take 1 Tab by mouth daily. warfarin (COUMADIN) 5 mg tablet  (Taking) TAKE 1 TABLET BY MOUTH MONDAY THROUGH FRIDAY AND 1& 1/2 TABLETS ON SATURDAY AND SUNDAY    felodipine (PLENDIL SR) 10 mg 24 hr tablet  (Taking) TAKE 1 TABLET BY MOUTH EVERY DAY    lisinopril (PRINIVIL, ZESTRIL) 40 mg tablet  (Taking) Take 1 Tab by mouth daily. aspirin delayed-release 81 mg tablet  (Taking) Take 81 mg by mouth daily. cloNIDine HCl (CATAPRES) 0.2 mg tablet Take 1 Tab by mouth two (2) times a day. Key Antihyperlipidemia Meds             atorvastatin (LIPITOR) 80 mg tablet  (Taking) Take 1 Tab by mouth daily. Lab Results   Component Value Date/Time    Sodium 142 01/22/2018 10:10 AM    Potassium 5.3 01/22/2018 10:10 AM    Cholesterol, total 175 01/22/2018 10:10 AM    HDL Cholesterol 60 01/22/2018 10:10 AM    LDL, calculated 99 01/22/2018 10:10 AM    Triglyceride 79 01/22/2018 10:10 AM    INR 5.6 10/24/2016 10:05 AM    INR POC 3.2 01/22/2018 09:33 AM    Prothrombin time 55.8 10/24/2016 10:05 AM       Orders:  -     AMB POC PT/INR    2.  Morbid obesity [facility])  Assessment & Plan:  Stable, based on history, physical exam and review of pertinent labs, studies and medications; meds reconciled; lifestyle modifications recommended. Key Obesity Meds             spironolactone-hydrochlorothiazide (ALDACTAZIDE) 25-25 mg per tablet  (Taking) Take 1 Tab by mouth daily. metFORMIN (GLUCOPHAGE) 500 mg tablet TAKE 1 TABLET BY MOUTH TWICE DAILY WITH MEALS        Lab Results   Component Value Date/Time    Hemoglobin A1c 6.3 01/22/2018 10:10 AM    Glucose 87 01/22/2018 10:10 AM    Cholesterol, total 175 01/22/2018 10:10 AM    HDL Cholesterol 60 01/22/2018 10:10 AM    LDL, calculated 99 01/22/2018 10:10 AM    Triglyceride 79 01/22/2018 10:10 AM    Sodium 142 01/22/2018 10:10 AM    Potassium 5.3 01/22/2018 10:10 AM             3. Type 2 diabetes mellitus without complication, without long-term current use of insulin ([facility])  Assessment & Plan:  Stable, based on history, physical exam and review of pertinent labs, studies and medications; meds reconciled; continue current treatment plan. Key Antihyperglycemic Medications             metFORMIN (GLUCOPHAGE) 500 mg tablet TAKE 1 TABLET BY MOUTH TWICE DAILY WITH MEALS    sitagliptin (JANUVIA) 100 mg tablet Take 1 tablet by mouth daily. Other Key Diabetic Medications             phenytoin ER (DILANTIN ER) 100 mg ER capsule  (Taking) TAKE 4 CAPSULES BY MOUTH EVERY NIGHT AT BEDTIME    atorvastatin (LIPITOR) 80 mg tablet  (Taking) Take 1 Tab by mouth daily. levETIRAcetam 1,000 mg tablet  (Taking) Take 1 Tab by mouth two (2) times a day. lisinopril (PRINIVIL, ZESTRIL) 40 mg tablet  (Taking) Take 1 Tab by mouth daily.         Lab Results   Component Value Date/Time    Hemoglobin A1c 6.3 01/22/2018 10:10 AM    Glucose 87 01/22/2018 10:10 AM    Creatinine 0.99 01/22/2018 10:10 AM    Cholesterol, total 175 01/22/2018 10:10 AM    HDL Cholesterol 60 01/22/2018 10:10 AM    LDL, calculated 99 01/22/2018 10:10 AM    Triglyceride 79 01/22/2018 10:10 AM     Diabetic Foot and Eye Exam  Status   Topic Date Due    Eye Exam  04/13/2016    Diabetic Foot Care  01/22/2019       Orders:  -     MICROALBUMIN, UR, RAND  -      DIABETES FOOT EXAM  -     REFERRAL TO OPHTHALMOLOGY  -     HEMOGLOBIN A1C WITH EAG    4. Status post total right knee replacement    5. Essential hypertension  -     METABOLIC PANEL, BASIC    6. Dyslipidemia  -     LIPID PANEL    7. Intermittent asthma without complication, unspecified asthma severity    Other orders  -     glucose blood VI test strips (TRUE METRIX GLUCOSE TEST STRIP) strip; Use to test blood sugar once daily. Dx.e11.9  -     metoprolol tartrate (LOPRESSOR) 25 mg tablet; TAKE 1 TABLET BY MOUTH TWICE DAILY AT 9 AM AND AT 9 PM  -     phenytoin ER (DILANTIN ER) 100 mg ER capsule; TAKE 4 CAPSULES BY MOUTH EVERY NIGHT AT BEDTIME  -     allopurinol (ZYLOPRIM) 300 mg tablet; TAKE 1 TABLET BY MOUTH ONCE DAILY        PLAN:    1. Her diabetes hopefully is doing well but I will await the results of her hemoglobin A1c. She is to check blood sugars ac breakfast one day, ac dinner the next day alternating. An increased frequency will occur only if the HbA1c is not at goal.   2. INR is a bit elevated. She will hold Coumadin for the next 2 days and resume thereafter as previously taken. I see no reason to make any adjustments since this dose has been quite stable for the last several years. She is currently taking Warfarin 5 mg. 3. Again, she needs to lose weight. We have talked about this ad nauseum, so to speak. This is driving some of her existing co-morbidities. I emphasized the importance of eating meals, minimizing snacking and avoiding processed carbohydrates. 4. Blood pressure excellent today, no adjustments are made. 5. She will continue statin as prescribed, efficacy and compliance will be assessed. 6. She remains fortunately seizure-free, no adjustments are made.   7. She is given refills on Allopurinol, Metoprolol and Dilantin. MedDATA/gwo     8. Asthma has been reasonably stable. She will continue her bronchodilators as prescribed. 9.  She also continues to complain of pain in her right prosthetic knee. She does not want anything done. There is a significant amount of joint laxity present. .  Orders Placed This Encounter    MICROALBUMIN, UR, RAND    LIPID PANEL    METABOLIC PANEL, BASIC    HEMOGLOBIN A1C WITH EAG    REFERRAL TO OPHTHALMOLOGY    AMB POC PT/INR    glucose blood VI test strips (TRUE METRIX GLUCOSE TEST STRIP) strip    metoprolol tartrate (LOPRESSOR) 25 mg tablet    phenytoin ER (DILANTIN ER) 100 mg ER capsule    allopurinol (ZYLOPRIM) 300 mg tablet         ATTENTION:   This medical record was transcribed using an electronic medical records system. Although proofread, it may and can contain electronic and spelling errors. Other human spelling and other errors may be present. Corrections may be executed at a later time. Please feel free to contact us for any clarifications as needed. Follow-up Disposition:  Return in about 3 months (around 4/22/2018), or monthly INR.       Janell Briceño MD

## 2018-01-22 NOTE — MR AVS SNAPSHOT
[address] [facility] 
 
 
 [address] [id number] 
[phone] Patient: Jamari Thorne MRN: [id number] [facility]:[birth year] Visit Information Date & Time Provider Department Dept. Phone Encounter #  
 1/22/2018  9:00 AM Hector Bowser MD [facility] and [place] [address] [id number] Your Appointments 2/5/2018  4:30 PM  
Nurse Visit with [address] [facility] ([facility]) Appt Note: PT/INR check [address] [place]  
  
   
 [address] [id number] Upcoming Health Maintenance Date Due  
 FOOT EXAM Q1 4/13/2016 MICROALBUMIN Q1 4/13/2016 EYE EXAM RETINAL OR DILATED Q1 4/13/2016 GLAUCOMA SCREENING Q2Y 4/13/2017 LIPID PANEL Q1 11/28/2017 FOBT Q 1 YEAR AGE 50-75 11/28/2017 Pneumococcal 65+ Low/Medium Risk (2 of 2 - PPSV23) 2/10/2018 HEMOGLOBIN A1C Q6M 4/23/2018 MEDICARE YEARLY EXAM 4/25/2018 BREAST CANCER SCRN MAMMOGRAM 7/1/2018 DTaP/Tdap/Td series (2 - Td) 2/10/2027 Allergies as of 1/22/2018  Review Complete On: 1/22/2018 By: Chavez Jose Severity Noted Reaction Type Reactions Clonidine  04/24/2017    Other (comments) Patient becomes incoherent Current Immunizations  Never Reviewed Name Date Influenza High Dose Vaccine PF 10/23/2017, 11/28/2016 Not reviewed this visit You Were Diagnosed With   
  
 Codes Comments Paroxysmal atrial fibrillation (HCC)    -  Primary ICD-10-CM: I48.0 ICD-9-CM: 427.31 Morbid obesity ([facility] [address]     ICD-10-CM: E66.01 
ICD-9-CM: 278.01 Type 2 diabetes mellitus without complication, without long-term current use of insulin (HCC)     ICD-10-CM: E11.9 ICD-9-CM: 250.00 Status post total right knee replacement     ICD-10-CM: G02.239 ICD-9-CM: V43.65 Essential hypertension     ICD-10-CM: I10 
ICD-9-CM: 401.9 Dyslipidemia     ICD-10-CM: E78.5 ICD-9-CM: 272.4 Intermittent asthma without complication, unspecified asthma severity     ICD-10-CM: J45.20 ICD-9-CM: 493.90 Vitals BP Pulse Temp Resp Height(growth percentile) Weight(growth percentile) 137/85 (BP 1 Location: Right arm, BP Patient Position: Sitting) (!) 58 97.9 °F (36.6 °C) (Oral) 16 5' 2\" (1.575 m) 271 lb 8 oz (123.2 kg) SpO2 BMI OB Status Smoking Status 94% 49.66 kg/m2 Postmenopausal Former Smoker Vitals History BMI and BSA Data Body Mass Index Body Surface Area  
 49.66 kg/m 2 2.32 m 2 Preferred Pharmacy Pharmacy Name Phone [address], [address]  [phone] Your Updated Medication List  
  
   
This list is accurate as of: 1/22/18 10:19 AM.  Always use your most recent med list.  
  
  
  
  
 * albuterol 2.5 mg /3 mL (0.083 %) nebulizer solution Commonly known as:  PROVENTIL VENTOLIN  
USE 1 VIAL VIA NEBULIZER EVERY 6 HOURS AS NEEDED. DX: J45.909  
  
 * albuterol 90 mcg/actuation inhaler Commonly known as:  PROAIR HFA Take 2 Puffs by inhalation every four (4) hours as needed for Wheezing or Shortness of Breath. allopurinol 300 mg tablet Commonly known as:  ZYLOPRIM  
TAKE 1 TABLET BY MOUTH ONCE DAILY  
  
 aspirin delayed-release 81 mg tablet Take 81 mg by mouth daily. atorvastatin 80 mg tablet Commonly known as:  LIPITOR Take 1 Tab by mouth daily. Blood-Glucose Meter Misc Commonly known as:  TRUE METRIX AIR GLUCOSE METER  
1 Device by Does Not Apply route daily. Use to test blood sugars daily. dx.e11.9  
  
 budesonide-formoterol 160-4.5 mcg/actuation Hfaa Commonly known as:  SYMBICORT Take 2 Puffs by inhalation two (2) times a day. cloNIDine HCl 0.2 mg tablet Commonly known as:  CATAPRES Take 1 Tab by mouth two (2) times a day. felodipine 10 mg 24 hr tablet Commonly known as:  PLENDIL SR  
TAKE 1 TABLET BY MOUTH EVERY DAY  
  
 glucose blood VI test strips strip Commonly known as:  TRUE METRIX GLUCOSE TEST STRIP Use to test blood sugar once daily. Dx.e11.9  
  
 guaiFENesin-codeine 100-10 mg/5 mL solution Commonly known as:  Dotty Roof Take 5 mL by mouth three (3) times daily as needed for Cough. Max Daily Amount: 15 mL.  
  
 lancets 28 gauge Misc Commonly known as:  Perlie Reinbeck Use to test blood sugar once daily. Dx.e11.9  
  
 levETIRAcetam 1,000 mg tablet Take 1 Tab by mouth two (2) times a day. lisinopril 40 mg tablet Commonly known as:  Aquino Evans Take 1 Tab by mouth daily. metFORMIN 500 mg tablet Commonly known as:  GLUCOPHAGE  
TAKE 1 TABLET BY MOUTH TWICE DAILY WITH MEALS  
  
 metoprolol tartrate 25 mg tablet Commonly known as:  LOPRESSOR  
TAKE 1 TABLET BY MOUTH TWICE DAILY AT 9 AM AND AT 9 PM  
  
 phenytoin  mg ER capsule Commonly known as:  DILANTIN ER  
TAKE 4 CAPSULES BY MOUTH EVERY NIGHT AT BEDTIME SITagliptin 100 mg tablet Commonly known as:  Heydi Loop Take 1 tablet by mouth daily. spironolactone-hydrochlorothiazide 25-25 mg per tablet Commonly known as:  ALDACTAZIDE Take 1 Tab by mouth daily. tiotropium 18 mcg inhalation capsule Commonly known as:  Margaretann Spell Take 1 Cap by inhalation daily. warfarin 5 mg tablet Commonly known as:  COUMADIN  
TAKE 1 TABLET BY MOUTH MONDAY THROUGH FRIDAY AND 1& 1/2 TABLETS ON SATURDAY AND SUNDAY * Notice: This list has 2 medication(s) that are the same as other medications prescribed for you. Read the directions carefully, and ask your doctor or other care provider to review them with you. Prescriptions Sent to Pharmacy Refills  
 glucose blood VI test strips (TRUE METRIX GLUCOSE TEST STRIP) strip 11 Sig: Use to test blood sugar once daily. Dx.e11.9  Class: Normal  
 Pharmacy: [facility] [address] Ph #: [phone]  
 metoprolol tartrate (LOPRESSOR) 25 mg tablet 3 Sig: TAKE 1 TABLET BY MOUTH TWICE DAILY AT 9 AM AND AT 9 PM  
 Class: Normal  
 Pharmacy: [facility] [address] Ph #: [phone]  
 phenytoin ER (DILANTIN ER) 100 mg ER capsule 3 Sig: TAKE 4 CAPSULES BY MOUTH EVERY NIGHT AT BEDTIME Class: Normal  
 Pharmacy: [facility] [address] [place] [address] Par Ph #: [phone]  
 allopurinol (ZYLOPRIM) 300 mg tablet 3 Sig: TAKE 1 TABLET BY MOUTH ONCE DAILY Class: Normal  
 Pharmacy: [facility], [address] [place] [address] Par Ph #: [phone] We Performed the Following AMB POC PT/INR [06284 CPT(R)] HEMOGLOBIN A1C WITH EAG [80672 CPT(R)]  DIABETES FOOT EXAM [HM7 Custom] LIPID PANEL [47405 CPT(R)] METABOLIC PANEL, BASIC [50980 CPT(R)] MICROALBUMIN, UR, RAND [id number] CPT(R)] REFERRAL TO OPHTHALMOLOGY [REF57 Custom] Comments:  
 Please evaluate patient for ophthalmic involvement via diabetes Referral Information Referral ID Referred By Referred To  
  
 [id number] Ashly GREENE Not Available Visits Status Start Date End Date 1 New Request 1/22/18 1/22/19 If your referral has a status of pending review or denied, additional information will be sent to support the outcome of this decision. Introducing [facility]! [facility] introduces [employer] patient portal. Now you can access parts of your medical record, email your doctor's office, and request medication refills online. 1. In your internet browser, go to [email or web address] 2. Click on the First Time User? Click Here link in the Sign In box. You will see the New Member Sign Up page. 3. Enter your [employer] Access Code exactly as it appears below. You will not need to use this code after youve completed the sign-up process. If you do not sign up before the expiration date, you must request a new code. · [employer] Access Code: [email or web address] Expires: 4/22/2018  9:31 AM 
 
4. Enter the last four digits of your Social Security Number (xxxx) and Date of Birth (mm/dd/yyyy) as indicated and click Submit. You will be taken to the next sign-up page. 5. Create a [employer] ID. This will be your [employer] login ID and cannot be changed, so think of one that is secure and easy to remember. 6. Create a [employer] password. You can change your password at any time. 7. Enter your Password Reset Question and Answer. This can be used at a later time if you forget your password. 8. Enter your e-mail address. You will receive e-mail notification when new information is available in [address] 9. Click Sign Up. You can now view and download portions of your medical record. 10. Click the Download Summary menu link to download a portable copy of your medical information. If you have questions, please visit the Frequently Asked Questions section of the [employer] website. Remember, [employer] is NOT to be used for urgent needs. For medical emergencies, dial 911. Now available from your iPhone and Android! Please provide this summary of care documentation to your next provider. Your primary care clinician is listed as Manohar Mo. If you have any questions after today's visit, please call [phone].

## 2018-01-22 NOTE — PROGRESS NOTES
Chief Complaint   Patient presents with    Blood Clot     Patient also in office for pt/inr check. 1. Have you been to the ER, urgent care clinic since your last visit? Hospitalized since your last visit? No    2. Have you seen or consulted any other health care providers outside of the [address] [place] [address] since your last visit? Include any pap smears or colon screening. No    Patient also in office for pt/inr check. Patient states that she is taking coumadin 5 mg daily.   Results for orders placed or performed in visit on 01/22/18   AMB POC PT/INR   Result Value Ref Range    VALID INTERNAL CONTROL POC Yes     Prothrombin time (POC) 38.4 seconds    INR POC 3.2

## 2018-01-23 LAB
BUN SERPL-MCNC: 26 MG/DL (ref 8–27)
BUN/CREAT SERPL: 26 (ref 12–28)
CALCIUM SERPL-MCNC: 9.2 MG/DL (ref 8.7–10.3)
CHLORIDE SERPL-SCNC: 102 MMOL/L (ref 96–106)
CHOLEST SERPL-MCNC: 175 MG/DL (ref 100–199)
CO2 SERPL-SCNC: 22 MMOL/L (ref 18–29)
CREAT SERPL-MCNC: 0.99 MG/DL (ref 0.57–1)
EST. AVERAGE GLUCOSE BLD GHB EST-MCNC: 134 MG/DL
GLUCOSE SERPL-MCNC: 87 MG/DL (ref 65–99)
HBA1C MFR BLD: 6.3 % (ref 4.8–5.6)
HDLC SERPL-MCNC: 60 MG/DL
LDLC SERPL CALC-MCNC: 99 MG/DL (ref 0–99)
MICROALBUMIN UR-MCNC: 135.1 UG/ML
POTASSIUM SERPL-SCNC: 5.3 MMOL/L (ref 3.5–5.2)
SODIUM SERPL-SCNC: 142 MMOL/L (ref 134–144)
TRIGL SERPL-MCNC: 79 MG/DL (ref 0–149)
VLDLC SERPL CALC-MCNC: 16 MG/DL (ref 5–40)

## 2018-01-28 PROBLEM — O22.30 DVT (DEEP VEIN THROMBOSIS) IN PREGNANCY: Status: RESOLVED | Noted: 2017-07-13 | Resolved: 2018-01-28

## 2018-01-28 NOTE — ASSESSMENT & PLAN NOTE
Stable, based on history, physical exam and review of pertinent labs, studies and medications; meds reconciled; continue current treatment plan. Key CAD CHF Meds             metoprolol tartrate (LOPRESSOR) 25 mg tablet  (Taking) TAKE 1 TABLET BY MOUTH TWICE DAILY AT 9 AM AND AT 9 PM    spironolactone-hydrochlorothiazide (ALDACTAZIDE) 25-25 mg per tablet  (Taking) Take 1 Tab by mouth daily. warfarin (COUMADIN) 5 mg tablet  (Taking) TAKE 1 TABLET BY MOUTH MONDAY THROUGH FRIDAY AND 1& 1/2 TABLETS ON SATURDAY AND SUNDAY    felodipine (PLENDIL SR) 10 mg 24 hr tablet  (Taking) TAKE 1 TABLET BY MOUTH EVERY DAY    lisinopril (PRINIVIL, ZESTRIL) 40 mg tablet  (Taking) Take 1 Tab by mouth daily. aspirin delayed-release 81 mg tablet  (Taking) Take 81 mg by mouth daily. cloNIDine HCl (CATAPRES) 0.2 mg tablet Take 1 Tab by mouth two (2) times a day. Key Antihyperlipidemia Meds             atorvastatin (LIPITOR) 80 mg tablet  (Taking) Take 1 Tab by mouth daily.         Lab Results   Component Value Date/Time    Sodium 142 01/22/2018 10:10 AM    Potassium 5.3 01/22/2018 10:10 AM    Cholesterol, total 175 01/22/2018 10:10 AM    HDL Cholesterol 60 01/22/2018 10:10 AM    LDL, calculated 99 01/22/2018 10:10 AM    Triglyceride 79 01/22/2018 10:10 AM    INR 5.6 10/24/2016 10:05 AM    INR POC 3.2 01/22/2018 09:33 AM    Prothrombin time 55.8 10/24/2016 10:05 AM

## 2018-01-28 NOTE — ASSESSMENT & PLAN NOTE
Stable, based on history, physical exam and review of pertinent labs, studies and medications; meds reconciled; continue current treatment plan. Key Antihyperglycemic Medications             metFORMIN (GLUCOPHAGE) 500 mg tablet TAKE 1 TABLET BY MOUTH TWICE DAILY WITH MEALS    sitagliptin (JANUVIA) 100 mg tablet Take 1 tablet by mouth daily. Other Key Diabetic Medications             phenytoin ER (DILANTIN ER) 100 mg ER capsule  (Taking) TAKE 4 CAPSULES BY MOUTH EVERY NIGHT AT BEDTIME    atorvastatin (LIPITOR) 80 mg tablet  (Taking) Take 1 Tab by mouth daily. levETIRAcetam 1,000 mg tablet  (Taking) Take 1 Tab by mouth two (2) times a day. lisinopril (PRINIVIL, ZESTRIL) 40 mg tablet  (Taking) Take 1 Tab by mouth daily.         Lab Results   Component Value Date/Time    Hemoglobin A1c 6.3 01/22/2018 10:10 AM    Glucose 87 01/22/2018 10:10 AM    Creatinine 0.99 01/22/2018 10:10 AM    Cholesterol, total 175 01/22/2018 10:10 AM    HDL Cholesterol 60 01/22/2018 10:10 AM    LDL, calculated 99 01/22/2018 10:10 AM    Triglyceride 79 01/22/2018 10:10 AM     Diabetic Foot and Eye Exam HM Status   Topic Date Due    Eye Exam  04/13/2016    Diabetic Foot Care  01/22/2019

## 2018-01-28 NOTE — ASSESSMENT & PLAN NOTE
Stable, based on history, physical exam and review of pertinent labs, studies and medications; meds reconciled; lifestyle modifications recommended. Key Obesity Meds             spironolactone-hydrochlorothiazide (ALDACTAZIDE) 25-25 mg per tablet  (Taking) Take 1 Tab by mouth daily.     metFORMIN (GLUCOPHAGE) 500 mg tablet TAKE 1 TABLET BY MOUTH TWICE DAILY WITH MEALS        Lab Results   Component Value Date/Time    Hemoglobin A1c 6.3 01/22/2018 10:10 AM    Glucose 87 01/22/2018 10:10 AM    Cholesterol, total 175 01/22/2018 10:10 AM    HDL Cholesterol 60 01/22/2018 10:10 AM    LDL, calculated 99 01/22/2018 10:10 AM    Triglyceride 79 01/22/2018 10:10 AM    Sodium 142 01/22/2018 10:10 AM    Potassium 5.3 01/22/2018 10:10 AM

## 2018-03-01 ENCOUNTER — CLINICAL SUPPORT (OUTPATIENT)
Dept: INTERNAL MEDICINE CLINIC | Age: 73
End: 2018-03-01

## 2018-03-01 DIAGNOSIS — I48.0 PAROXYSMAL ATRIAL FIBRILLATION (HCC): Primary | Chronic | ICD-10-CM

## 2018-03-01 LAB
INR BLD: 5.9
PT POC: 70.5 SECONDS
VALID INTERNAL CONTROL?: YES

## 2018-03-01 NOTE — PROGRESS NOTES
Patient in office for pt/inr check. Patient states that she take coumadin 5 mg daily. Results for orders placed or performed in visit on 03/01/18   AMB POC PT/INR   Result Value Ref Range    VALID INTERNAL CONTROL POC Yes     Prothrombin time (POC) 70.5 seconds    INR POC 5.9      Per Dr. Bhumi Oliver, hold coumadin and return to office on 3/6/18 for pt/inr check.

## 2018-03-06 ENCOUNTER — CLINICAL SUPPORT (OUTPATIENT)
Dept: INTERNAL MEDICINE CLINIC | Age: 73
End: 2018-03-06

## 2018-03-06 DIAGNOSIS — I48.0 PAROXYSMAL ATRIAL FIBRILLATION (HCC): Primary | Chronic | ICD-10-CM

## 2018-03-06 LAB
INR BLD: 1.3
PT POC: 15.2 SECONDS
VALID INTERNAL CONTROL?: YES

## 2018-03-06 NOTE — MR AVS SNAPSHOT
[address] [facility] 
 
 
 [address] [address] 
[phone] Patient: Florecita Villaseñor MRN: [id number]:[id number] Visit Information Date & Time Provider Department Dept. Phone Encounter #  
 3/6/2018  9:30 AM [facility] and [address] [place] [address] [phone] [id number] Your Appointments 4/23/2018  9:00 AM  
Any with Trena Saunders MD  
[address] [place] [address] and Primary Care [facility]) Appt Note: 3 month follow up  
 [address] [facility]  
  
   
 [address] [address] Upcoming Health Maintenance Date Due  
 EYE EXAM RETINAL OR DILATED Q1 4/13/2016 GLAUCOMA SCREENING Q2Y 4/13/2017 Pneumococcal 65+ Low/Medium Risk (2 of 2 - PPSV23) 2/10/2018 MEDICARE YEARLY EXAM 4/25/2018 HEMOGLOBIN A1C Q6M 7/22/2018 FOOT EXAM Q1 1/22/2019 MICROALBUMIN Q1 1/22/2019 LIPID PANEL Q1 1/22/2019 DTaP/Tdap/Td series (2 - Td) 2/10/2027 Allergies as of 3/6/2018  Review Complete On: 1/28/2018 By: Trena Saunders MD  
  
 Severity Noted Reaction Type Reactions Clonidine  04/24/2017    Other (comments) Patient becomes incoherent Current Immunizations  Never Reviewed Name Date Influenza High Dose Vaccine PF 10/23/2017, 11/28/2016 Not reviewed this visit You Were Diagnosed With   
  
 Codes Comments Paroxysmal atrial fibrillation (HCC)    -  Primary ICD-10-CM: I48.0 ICD-9-CM: 427.31 Vitals OB Status Smoking Status Postmenopausal Former Smoker Preferred Pharmacy Pharmacy Name Phone [address] [facility] [facility], [address] [phone] Your Updated Medication List  
  
   
This list is accurate as of 3/6/18 10:19 AM.  Always use your most recent med list.  
  
  
  
  
 * albuterol 2.5 mg /3 mL (0.083 %) nebulizer solution Commonly known as:  PROVENTIL VENTOLIN  
USE 1 VIAL VIA NEBULIZER EVERY 6 HOURS AS NEEDED. DX: J45.909  
  
 * albuterol 90 mcg/actuation inhaler Commonly known as:  PROAIR HFA Take 2 Puffs by inhalation every four (4) hours as needed for Wheezing or Shortness of Breath. allopurinol 300 mg tablet Commonly known as:  ZYLOPRIM  
TAKE 1 TABLET BY MOUTH ONCE DAILY  
  
 aspirin delayed-release 81 mg tablet Take 81 mg by mouth daily. atorvastatin 80 mg tablet Commonly known as:  LIPITOR Take 1 Tab by mouth daily. Blood-Glucose Meter Misc Commonly known as:  TRUE METRIX AIR GLUCOSE METER  
1 Device by Does Not Apply route daily. Use to test blood sugars daily. dx.e11.9  
  
 budesonide-formoterol 160-4.5 mcg/actuation Hfaa Commonly known as:  SYMBICORT Take 2 Puffs by inhalation two (2) times a day. cloNIDine HCl 0.2 mg tablet Commonly known as:  CATAPRES Take 1 Tab by mouth two (2) times a day. felodipine 10 mg 24 hr tablet Commonly known as:  PLENDIL SR  
TAKE 1 TABLET BY MOUTH EVERY DAY  
  
 glucose blood VI test strips strip Commonly known as:  TRUE METRIX GLUCOSE TEST STRIP Use to test blood sugar once daily. Dx.e11.9  
  
 guaiFENesin-codeine 100-10 mg/5 mL solution Commonly known as:  Deneice Niall Take 5 mL by mouth three (3) times daily as needed for Cough. Max Daily Amount: 15 mL.  
  
 lancets 28 gauge Misc Commonly known as:  Nelsy Hudson Use to test blood sugar once daily. Dx.e11.9  
  
 levETIRAcetam 1,000 mg tablet Take 1 Tab by mouth two (2) times a day. lisinopril 40 mg tablet Commonly known as:  Era Yeison Take 1 Tab by mouth daily. metFORMIN 500 mg tablet Commonly known as:  GLUCOPHAGE  
TAKE 1 TABLET BY MOUTH TWICE DAILY WITH MEALS  
  
 metoprolol tartrate 25 mg tablet Commonly known as:  LOPRESSOR  
TAKE 1 TABLET BY MOUTH TWICE DAILY AT 9 AM AND AT 9 PM  
  
 phenytoin  mg ER capsule Commonly known as:  DILANTIN ER  
TAKE 4 CAPSULES BY MOUTH EVERY NIGHT AT BEDTIME SITagliptin 100 mg tablet Commonly known as:  Gill Shack Take 1 tablet by mouth daily. spironolactone-hydrochlorothiazide 25-25 mg per tablet Commonly known as:  ALDACTAZIDE Take 1 Tab by mouth daily. tiotropium 18 mcg inhalation capsule Commonly known as:  Devota Martha Take 1 Cap by inhalation daily. warfarin 5 mg tablet Commonly known as:  COUMADIN  
TAKE 1 TABLET BY MOUTH MONDAY THROUGH FRIDAY AND 1& 1/2 TABLETS ON SATURDAY AND SUNDAY * Notice: This list has 2 medication(s) that are the same as other medications prescribed for you. Read the directions carefully, and ask your doctor or other care provider to review them with you. We Performed the Following AMB POC PT/INR [65509 CPT(R)] Introducing [facility] & [facility] SERVICES! Lillie Stewart introduces [employer] patient portal. Now you can access parts of your medical record, email your doctor's office, and request medication refills online. 1. In your internet browser, go to [email or web address] 2. Click on the First Time User? Click Here link in the Sign In box. You will see the New Member Sign Up page. 3. Enter your Soraa Access Code exactly as it appears below. You will not need to use this code after youve completed the sign-up process. If you do not sign up before the expiration date, you must request a new code. · Soraa Access Code: [email or web address] Expires: 4/22/2018  9:31 AM 
 
4. Enter the last four digits of your Social Security Number (xxxx) and Date of Birth (mm/dd/yyyy) as indicated and click Submit. You will be taken to the next sign-up page. 5. Create a Ryonett ID. This will be your Soraa login ID and cannot be changed, so think of one that is secure and easy to remember. 6. Create a Ryonett password. You can change your password at any time. 7. Enter your Password Reset Question and Answer. This can be used at a later time if you forget your password. 8. Enter your e-mail address. You will receive e-mail notification when new information is available in [address] 9. Click Sign Up. You can now view and download portions of your medical record. 10. Click the Download Summary menu link to download a portable copy of your medical information. If you have questions, please visit the Frequently Asked Questions section of the [employer] website. Remember, [employer] is NOT to be used for urgent needs. For medical emergencies, dial 911. Now available from your iPhone and Android! Please provide this summary of care documentation to your next provider. Your primary care clinician is listed as Manohar Mo. If you have any questions after today's visit, please call [phone].

## 2018-03-06 NOTE — PROGRESS NOTES
Patient in office for pt/inr check. Patient states that she is currently not taking coumadin. Results for orders placed or performed in visit on 03/06/18   AMB POC PT/INR   Result Value Ref Range    VALID INTERNAL CONTROL POC Yes     Prothrombin time (POC) 15.2 seconds    INR POC 1.3      Per Dr. Marianna Rockwell, he advises that patient start back taking coumadin 5 mg daily and return to office in 1 week for pt/inr check.

## 2018-03-13 ENCOUNTER — CLINICAL SUPPORT (OUTPATIENT)
Dept: INTERNAL MEDICINE CLINIC | Age: 73
End: 2018-03-13

## 2018-03-13 DIAGNOSIS — I48.0 PAROXYSMAL ATRIAL FIBRILLATION (HCC): Primary | Chronic | ICD-10-CM

## 2018-03-13 LAB
INR BLD: 2
PT POC: 24.4 SECONDS
VALID INTERNAL CONTROL?: YES

## 2018-03-13 NOTE — PROGRESS NOTES
Patient in office for pt/inr check. Patient states that she is taking coumadin 5 mg daily. Results for orders placed or performed in visit on 03/13/18   AMB POC PT/INR   Result Value Ref Range    VALID INTERNAL CONTROL POC Yes     Prothrombin time (POC) 24.4 seconds    INR POC 2.0      Per Garfield Pearce, no changes and return to office in 1 month for pt/inr check.

## 2018-03-13 NOTE — MR AVS SNAPSHOT
[address] [facility] 
 
 
 [address] [address] 
[phone] Patient: Meagan Arredondo MRN: [id number] Visit Information Date & Time Provider Department Dept. Phone Encounter #  
 3/13/2018  9:00 AM NURSE [address] Sports Medicine and [address] [place] [address] [phone] [id number] Your Appointments 4/23/2018  9:00 AM  
Any with Sulaiman Shankar MD  
[address] [place] [address] and Primary Care [facility]) Appt Note: 3 month follow up  
 [address] 1 [facility]  
  
   
 [address] [address] Upcoming Health Maintenance Date Due  
 EYE EXAM RETINAL OR DILATED Q1 4/13/2016 GLAUCOMA SCREENING Q2Y 4/13/2017 Pneumococcal 65+ Low/Medium Risk (2 of 2 - PPSV23) 2/10/2018 MEDICARE YEARLY EXAM 4/25/2018 HEMOGLOBIN A1C Q6M 7/22/2018 FOOT EXAM Q1 1/22/2019 MICROALBUMIN Q1 1/22/2019 LIPID PANEL Q1 1/22/2019 DTaP/Tdap/Td series (2 - Td) 2/10/2027 Allergies as of 3/13/2018  Review Complete On: 1/28/2018 By: Sulaiman Shankar MD  
  
 Severity Noted Reaction Type Reactions Clonidine  04/24/2017    Other (comments) Patient becomes incoherent Current Immunizations  Never Reviewed Name Date Influenza High Dose Vaccine PF 10/23/2017, 11/28/2016 Not reviewed this visit You Were Diagnosed With   
  
 Codes Comments Paroxysmal atrial fibrillation (HCC)    -  Primary ICD-10-CM: I48.0 ICD-9-CM: 427.31 Vitals OB Status Smoking Status Postmenopausal Former Smoker Preferred Pharmacy Pharmacy Name Phone [address] [facility], [address] [phone] Your Updated Medication List  
  
   
This list is accurate as of 3/13/18 10:19 AM.  Always use your most recent med list.  
  
  
  
  
 * albuterol 2.5 mg /3 mL (0.083 %) nebulizer solution Commonly known as:  PROVENTIL VENTOLIN  
USE 1 VIAL VIA NEBULIZER EVERY 6 HOURS AS NEEDED. DX: J45.909  
  
 * albuterol 90 mcg/actuation inhaler Commonly known as:  PROAIR HFA Take 2 Puffs by inhalation every four (4) hours as needed for Wheezing or Shortness of Breath. allopurinol 300 mg tablet Commonly known as:  ZYLOPRIM  
TAKE 1 TABLET BY MOUTH ONCE DAILY  
  
 aspirin delayed-release 81 mg tablet Take 81 mg by mouth daily. atorvastatin 80 mg tablet Commonly known as:  LIPITOR Take 1 Tab by mouth daily. Blood-Glucose Meter Misc Commonly known as:  TRUE METRIX AIR GLUCOSE METER  
1 Device by Does Not Apply route daily. Use to test blood sugars daily. dx.e11.9  
  
 budesonide-formoterol 160-4.5 mcg/actuation Hfaa Commonly known as:  SYMBICORT Take 2 Puffs by inhalation two (2) times a day. cloNIDine HCl 0.2 mg tablet Commonly known as:  CATAPRES Take 1 Tab by mouth two (2) times a day. felodipine 10 mg 24 hr tablet Commonly known as:  PLENDIL SR  
TAKE 1 TABLET BY MOUTH EVERY DAY  
  
 glucose blood VI test strips strip Commonly known as:  TRUE METRIX GLUCOSE TEST STRIP Use to test blood sugar once daily. Dx.e11.9  
  
 guaiFENesin-codeine 100-10 mg/5 mL solution Commonly known as:  Gela [place] Take 5 mL by mouth three (3) times daily as needed for Cough. Max Daily Amount: 15 mL.  
  
 lancets 28 gauge Misc Commonly known as:  Charlesetta Shorten Use to test blood sugar once daily. Dx.e11.9  
  
 levETIRAcetam 1,000 mg tablet Take 1 Tab by mouth two (2) times a day. lisinopril 40 mg tablet Commonly known as:  Rheta Deandre Take 1 Tab by mouth daily. metFORMIN 500 mg tablet Commonly known as:  GLUCOPHAGE  
TAKE 1 TABLET BY MOUTH TWICE DAILY WITH MEALS  
  
 metoprolol tartrate 25 mg tablet Commonly known as:  LOPRESSOR  
 TAKE 1 TABLET BY MOUTH TWICE DAILY AT 9 AM AND AT 9 PM  
  
 phenytoin  mg ER capsule Commonly known as:  DILANTIN ER  
TAKE 4 CAPSULES BY MOUTH EVERY NIGHT AT BEDTIME SITagliptin 100 mg tablet Commonly known as:  Vickie Seat Take 1 tablet by mouth daily. spironolactone-hydrochlorothiazide 25-25 mg per tablet Commonly known as:  ALDACTAZIDE Take 1 Tab by mouth daily. tiotropium 18 mcg inhalation capsule Commonly known as:  [place] Take 1 Cap by inhalation daily. warfarin 5 mg tablet Commonly known as:  COUMADIN  
TAKE 1 TABLET BY MOUTH MONDAY THROUGH FRIDAY AND 1& 1/2 TABLETS ON SATURDAY AND SUNDAY * Notice: This list has 2 medication(s) that are the same as other medications prescribed for you. Read the directions carefully, and ask your doctor or other care provider to review them with you. We Performed the Following AMB POC PT/INR [61542 CPT(R)] Introducing [facility] & [facility] SERVICES! Matti Schuler introduces [employer] patient portal. Now you can access parts of your medical record, email your doctor's office, and request medication refills online. 1. In your internet browser, go to [email or web address] 2. Click on the First Time User? Click Here link in the Sign In box. You will see the New Member Sign Up page. 3. Enter your [employer] Reviews Access Code exactly as it appears below. You will not need to use this code after youve completed the sign-up process. If you do not sign up before the expiration date, you must request a new code. · [employer] Access Code: [email or web address] Expires: 4/22/2018 10:31 AM 
 
4. Enter the last four digits of your Social Security Number (xxxx) and Date of Birth (mm/dd/yyyy) as indicated and click Submit. You will be taken to the next sign-up page. 5. Create a Golden Reviews ID. This will be your [employer] login ID and cannot be changed, so think of one that is secure and easy to remember. 6. Create a [employer] password. You can change your password at any time. 7. Enter your Password Reset Question and Answer. This can be used at a later time if you forget your password. 8. Enter your e-mail address. You will receive e-mail notification when new information is available in [address] 9. Click Sign Up. You can now view and download portions of your medical record. 10. Click the Download Summary menu link to download a portable copy of your medical information. If you have questions, please visit the Frequently Asked Questions section of the [employer] website. Remember, [employer] is NOT to be used for urgent needs. For medical emergencies, dial 911. Now available from your iPhone and Android! Please provide this summary of care documentation to your next provider. Your primary care clinician is listed as Manohar Mo. If you have any questions after today's visit, please call [phone].

## 2018-04-17 ENCOUNTER — OFFICE VISIT (OUTPATIENT)
Dept: INTERNAL MEDICINE CLINIC | Age: 73
End: 2018-04-17

## 2018-04-17 VITALS
BODY MASS INDEX: 50.44 KG/M2 | RESPIRATION RATE: 14 BRPM | WEIGHT: 274.1 LBS | HEIGHT: 62 IN | OXYGEN SATURATION: 94 % | HEART RATE: 62 BPM | SYSTOLIC BLOOD PRESSURE: 139 MMHG | DIASTOLIC BLOOD PRESSURE: 80 MMHG | TEMPERATURE: 97.8 F

## 2018-04-17 DIAGNOSIS — I10 ESSENTIAL HYPERTENSION: ICD-10-CM

## 2018-04-17 DIAGNOSIS — E78.5 DYSLIPIDEMIA: ICD-10-CM

## 2018-04-17 DIAGNOSIS — I48.0 PAROXYSMAL ATRIAL FIBRILLATION (HCC): Primary | Chronic | ICD-10-CM

## 2018-04-17 DIAGNOSIS — G40.909 SEIZURE DISORDER (HCC): ICD-10-CM

## 2018-04-17 DIAGNOSIS — E11.9 TYPE 2 DIABETES MELLITUS WITHOUT COMPLICATION, WITHOUT LONG-TERM CURRENT USE OF INSULIN (HCC): Chronic | ICD-10-CM

## 2018-04-17 DIAGNOSIS — E66.01 MORBID OBESITY (HCC): Chronic | ICD-10-CM

## 2018-04-17 DIAGNOSIS — M10.9 GOUT, UNSPECIFIED CAUSE, UNSPECIFIED CHRONICITY, UNSPECIFIED SITE: ICD-10-CM

## 2018-04-17 DIAGNOSIS — J45.20 INTERMITTENT ASTHMA WITHOUT COMPLICATION, UNSPECIFIED ASTHMA SEVERITY: ICD-10-CM

## 2018-04-17 LAB
INR BLD: 2.6
PT POC: 31.8 SECONDS
VALID INTERNAL CONTROL?: YES

## 2018-04-17 NOTE — MR AVS SNAPSHOT
[address] [place] [address]. [address] [address] 
[phone] Patient: Levi Grier MRN: [id number] Visit Information Date & Time Provider Department Dept. Phone Encounter #  
 4/17/2018 10:45 AM MD [facility] and Primary Care [id number] Upcoming Health Maintenance Date Due  
 EYE EXAM RETINAL OR DILATED Q1 4/13/2016 GLAUCOMA SCREENING Q2Y 4/13/2017 Pneumococcal 65+ Low/Medium Risk (2 of 2 - PPSV23) 5/17/2018* MEDICARE YEARLY EXAM 4/25/2018 HEMOGLOBIN A1C Q6M 7/22/2018 FOOT EXAM Q1 1/22/2019 MICROALBUMIN Q1 1/22/2019 LIPID PANEL Q1 1/22/2019 DTaP/Tdap/Td series (2 - Td) 2/10/2027 *Topic was postponed. The date shown is not the original due date. Allergies as of 4/17/2018  Review Complete On: 4/17/2018 By: Min Rosario Severity Noted Reaction Type Reactions Clonidine  04/24/2017    Other (comments) Patient becomes incoherent Current Immunizations  Never Reviewed Name Date Influenza High Dose Vaccine PF 10/23/2017, 11/28/2016 Not reviewed this visit You Were Diagnosed With   
  
 Codes Comments Paroxysmal atrial fibrillation (HCC)    -  Primary ICD-10-CM: I48.0 ICD-9-CM: 427.31 [facility]     ICD-10-CM: E66.01 
ICD-9-CM: 278.01 Type 2 diabetes mellitus without complication, without long-term current use of insulin (HCC)     ICD-10-CM: E11.9 ICD-9-CM: 250.00 Seizure disorder ([facility]     ICD-10-CM: G40.909 ICD-9-CM: 345.90 Intermittent asthma without complication, unspecified asthma severity     ICD-10-CM: J45.20 ICD-9-CM: 493.90 Dyslipidemia     ICD-10-CM: E78.5 ICD-9-CM: 272.4 Essential hypertension     ICD-10-CM: I10 
ICD-9-CM: 401.9 Gout, unspecified cause, unspecified chronicity, unspecified site     ICD-10-CM: M10.9 ICD-9-CM: 274.9 Vitals BP Pulse Temp Resp Height(growth percentile) Weight(growth percentile) 139/80 (BP 1 Location: Left arm, BP Patient Position: Sitting) 62 97.8 °F (36.6 °C) (Oral) 14 5' 2\" (1.575 m) 274 lb 1.6 oz (124.3 kg) SpO2 BMI OB Status Smoking Status 94% 50.13 kg/m2 Postmenopausal Former Smoker Vitals History BMI and BSA Data Body Mass Index Body Surface Area  
 50.13 kg/m 2 2.33 m 2 Preferred Pharmacy Pharmacy Name Phone [facility], [address]  [phone] Your Updated Medication List  
  
   
This list is accurate as of 4/17/18 12:34 PM.  Always use your most recent med list.  
  
  
  
  
 * albuterol 2.5 mg /3 mL (0.083 %) nebulizer solution Commonly known as:  PROVENTIL VENTOLIN  
USE 1 VIAL VIA NEBULIZER EVERY 6 HOURS AS NEEDED. DX: J45.909  
  
 * albuterol 90 mcg/actuation inhaler Commonly known as:  PROAIR HFA Take 2 Puffs by inhalation every four (4) hours as needed for Wheezing or Shortness of Breath. allopurinol 300 mg tablet Commonly known as:  ZYLOPRIM  
TAKE 1 TABLET BY MOUTH ONCE DAILY  
  
 aspirin delayed-release 81 mg tablet Take 81 mg by mouth daily. atorvastatin 80 mg tablet Commonly known as:  LIPITOR Take 1 Tab by mouth daily. Blood-Glucose Meter Misc Commonly known as:  TRUE METRIX AIR GLUCOSE METER  
1 Device by Does Not Apply route daily. Use to test blood sugars daily. dx.e11.9  
  
 budesonide-formoterol 160-4.5 mcg/actuation Hfaa Commonly known as:  SYMBICORT Take 2 Puffs by inhalation two (2) times a day. cloNIDine HCl 0.2 mg tablet Commonly known as:  CATAPRES Take 1 Tab by mouth two (2) times a day. felodipine 10 mg 24 hr tablet Commonly known as:  PLENDIL SR  
TAKE 1 TABLET BY MOUTH EVERY DAY  
  
 glucose blood VI test strips strip Commonly known as:  TRUE METRIX GLUCOSE TEST STRIP  
 Use to test blood sugar once daily. Dx.e11.9  
  
 guaiFENesin-codeine 100-10 mg/5 mL solution Commonly known as:  Courtney Carrillo Take 5 mL by mouth three (3) times daily as needed for Cough. Max Daily Amount: 15 mL.  
  
 lancets 28 gauge Misc Commonly known as:  Viv Read Use to test blood sugar once daily. Dx.e11.9  
  
 levETIRAcetam 1,000 mg tablet Take 1 Tab by mouth two (2) times a day. lisinopril 40 mg tablet Commonly known as:  Self [place] Take 1 Tab by mouth daily. metFORMIN 500 mg tablet Commonly known as:  GLUCOPHAGE  
TAKE 1 TABLET BY MOUTH TWICE DAILY WITH MEALS  
  
 metoprolol tartrate 25 mg tablet Commonly known as:  LOPRESSOR  
TAKE 1 TABLET BY MOUTH TWICE DAILY AT 9 AM AND AT 9 PM  
  
 phenytoin  mg ER capsule Commonly known as:  DILANTIN ER  
TAKE 4 CAPSULES BY MOUTH EVERY NIGHT AT BEDTIME SITagliptin 100 mg tablet Commonly known as:  Angela Velez Take 1 tablet by mouth daily. spironolactone-hydrochlorothiazide 25-25 mg per tablet Commonly known as:  ALDACTAZIDE Take 1 Tab by mouth daily. tiotropium 18 mcg inhalation capsule Commonly known as:  Lavone Crank Take 1 Cap by inhalation daily. warfarin 5 mg tablet Commonly known as:  COUMADIN  
TAKE 1 TABLET BY MOUTH MONDAY THROUGH FRIDAY AND 1& 1/2 TABLETS ON SATURDAY AND SUNDAY * Notice: This list has 2 medication(s) that are the same as other medications prescribed for you. Read the directions carefully, and ask your doctor or other care provider to review them with you. We Performed the Following AMB POC PT/INR [54905 CPT(R)] APOLIPOPROTEIN B [id number] CPT(R)] HEMOGLOBIN A1C WITH EAG [84956 CPT(R)] METABOLIC PANEL, BASIC [78760 CPT(R)] Introducing [facility] HEALTH SERVICES! Hannah Raphael introduces [employer] patient portal. Now you can access parts of your medical record, email your doctor's office, and request medication refills online. 1. In your internet browser, go to [email or web address] 2. Click on the First Time User? Click Here link in the Sign In box. You will see the New Member Sign Up page. 3. Enter your [email or web address] Access Code exactly as it appears below. You will not need to use this code after youve completed the sign-up process. If you do not sign up before the expiration date, you must request a new code. · [email or web address] Access Code: [email or web address] Expires: 4/22/2018 10:31 AM 
 
4. Enter the last four digits of your Social Security Number (xxxx) and Date of Birth (mm/dd/yyyy) as indicated and click Submit. You will be taken to the next sign-up page. 5. Create a GoHealtht ID. This will be your [email or web address] login ID and cannot be changed, so think of one that is secure and easy to remember. 6. Create a [email or web address] password. You can change your password at any time. 7. Enter your Password Reset Question and Answer. This can be used at a later time if you forget your password. 8. Enter your e-mail address. You will receive e-mail notification when new information is available in [address] 9. Click Sign Up. You can now view and download portions of your medical record. 10. Click the Download Summary menu link to download a portable copy of your medical information. If you have questions, please visit the Frequently Asked Questions section of the [email or web address] website. Remember, [email or web address] is NOT to be used for urgent needs. For medical emergencies, dial 911. Now available from your iPhone and Android! Please provide this summary of care documentation to your next provider. Your primary care clinician is listed as Manohar Mo. If you have any questions after today's visit, please call [phone].

## 2018-04-18 LAB
APO B SERPL-MCNC: 103 MG/DL (ref 54–133)
BUN SERPL-MCNC: 27 MG/DL (ref 8–27)
BUN/CREAT SERPL: 29 (ref 12–28)
CALCIUM SERPL-MCNC: 9.4 MG/DL (ref 8.7–10.3)
CHLORIDE SERPL-SCNC: 106 MMOL/L (ref 96–106)
CO2 SERPL-SCNC: 21 MMOL/L (ref 18–29)
CREAT SERPL-MCNC: 0.93 MG/DL (ref 0.57–1)
EST. AVERAGE GLUCOSE BLD GHB EST-MCNC: 137 MG/DL
GFR SERPLBLD CREATININE-BSD FMLA CKD-EPI: 60 ML/MIN/1.73
GFR SERPLBLD CREATININE-BSD FMLA CKD-EPI: 70 ML/MIN/1.73
GLUCOSE SERPL-MCNC: 99 MG/DL (ref 65–99)
HBA1C MFR BLD: 6.4 % (ref 4.8–5.6)
POTASSIUM SERPL-SCNC: 5.8 MMOL/L (ref 3.5–5.2)
SODIUM SERPL-SCNC: 143 MMOL/L (ref 134–144)

## 2018-04-22 RX ORDER — ALBUTEROL SULFATE 0.83 MG/ML
SOLUTION RESPIRATORY (INHALATION)
Qty: 120 EACH | Refills: 11 | Status: SHIPPED | OUTPATIENT
Start: 2018-04-22 | End: 2018-09-10 | Stop reason: SDUPTHER

## 2018-04-22 RX ORDER — ALLOPURINOL 300 MG/1
TABLET ORAL
Qty: 90 TAB | Refills: 3 | Status: SHIPPED | OUTPATIENT
Start: 2018-04-22 | End: 2019-05-15 | Stop reason: SDUPTHER

## 2018-04-22 NOTE — PROGRESS NOTES
[facility]  [facility]  [address] [place] [address]  Phone:  [phone]  Fax: [phone]       Chief Complaint   Patient presents with    Irregular Heart Beat     3 month follow up    . SUBJECTIVE:    Claudia Aguilar is a 74 y.o. female comes in for return visit accompanied by her multiple daughter. She states she is basically doing well. She has not been coughing much indicating that her wheezing is reasonably stable. There has been no meaningful weight loss. Her exercising is minimal.  She leads a sedentary existence at home. She remains on her coumadin for her paroxysmal atrial fibrillation. She has a past history of primary hypertension, dyslipidemia and diabetes mellitus. Current Outpatient Prescriptions   Medication Sig Dispense Refill    albuterol (PROVENTIL VENTOLIN) 2.5 mg /3 mL (0.083 %) nebulizer solution USE 1 VIAL VIA NEBULIZER EVERY 6 HOURS AS NEEDED. DX: J45.909 120 Each 11    allopurinol (ZYLOPRIM) 300 mg tablet TAKE 1 TABLET BY MOUTH ONCE DAILY 90 Tab 3    glucose blood VI test strips (TRUE METRIX GLUCOSE TEST STRIP) strip Use to test blood sugar once daily. Dx.e11.9 100 Strip 11    metoprolol tartrate (LOPRESSOR) 25 mg tablet TAKE 1 TABLET BY MOUTH TWICE DAILY AT 9 AM AND AT 9  Tab 3    phenytoin ER (DILANTIN ER) 100 mg ER capsule TAKE 4 CAPSULES BY MOUTH EVERY NIGHT AT BEDTIME 360 Cap 3    spironolactone-hydrochlorothiazide (ALDACTAZIDE) 25-25 mg per tablet Take 1 Tab by mouth daily. 90 Tab 11    atorvastatin (LIPITOR) 80 mg tablet Take 1 Tab by mouth daily. 90 Tab 3    levETIRAcetam 1,000 mg tablet Take 1 Tab by mouth two (2) times a day. 60 Tab 11    Blood-Glucose Meter (TRUE METRIX AIR GLUCOSE METER) [facility] 1 Device by Does Not Apply route daily. Use to test blood sugars daily. dx.e11.9 1 Each 0    lancets (TRUEPLUS LANCETS) 28 gauge misc Use to test blood sugar once daily.  Dx.e11.9 100 Lancet 11    warfarin (COUMADIN) 5 mg tablet TAKE 1 TABLET BY MOUTH MONDAY THROUGH FRIDAY AND 1& 1/2 TABLETS ON SATURDAY AND SUNDAY 103 Tab 3    felodipine (PLENDIL SR) 10 mg 24 hr tablet TAKE 1 TABLET BY MOUTH EVERY DAY 90 Tab 3    lisinopril (PRINIVIL, ZESTRIL) 40 mg tablet Take 1 Tab by mouth daily. 30 Tab 11    aspirin delayed-release 81 mg tablet Take 81 mg by mouth daily.  tiotropium (SPIRIVA) 18 mcg inhalation capsule Take 1 Cap by inhalation daily. 30 Cap 11    albuterol (PROAIR HFA) 90 mcg/actuation inhaler Take 2 Puffs by inhalation every four (4) hours as needed for Wheezing or Shortness of Breath. 1 Inhaler 11    budesonide-formoterol (SYMBICORT) 160-4.5 mcg/Actuation HFA inhaler Take 2 Puffs by inhalation two (2) times a day. 10.2 Inhaler 11    guaiFENesin-codeine (VIRTUSSIN AC) 100-10 mg/5 mL solution Take 5 mL by mouth three (3) times daily as needed for Cough. Max Daily Amount: 15 mL. 140 mL 0    cloNIDine HCl (CATAPRES) 0.2 mg tablet Take 1 Tab by mouth two (2) times a day. 30 Tab 11    metFORMIN (GLUCOPHAGE) 500 mg tablet TAKE 1 TABLET BY MOUTH TWICE DAILY WITH MEALS 180 Tab 3    sitagliptin (JANUVIA) 100 mg tablet Take 1 tablet by mouth daily.  30 tablet 11     Past Medical History:   Diagnosis Date    Arthritis     Asthma     Diabetes ([facility]     Hypertension     Other ill-defined conditions(799.89)     rt knee surgery    Stroke [facility])      Past Surgical History:   Procedure Laterality Date    HX COLONOSCOPY      HX GYN       Allergies   Allergen Reactions    Clonidine Other (comments)     Patient becomes incoherent         REVIEW OF SYSTEMS:  General: negative for - chills or fever  ENT: negative for - headaches, nasal congestion or tinnitus  Respiratory: negative for - cough, hemoptysis, shortness of breath or wheezing  Cardiovascular : negative for - chest pain, edema, palpitations or shortness of breath  Gastrointestinal: negative for - abdominal pain, blood in stools, heartburn or nausea/vomiting  Genito-Urinary: no dysuria, trouble voiding, or hematuria  Musculoskeletal: negative for - gait disturbance, joint pain, joint stiffness or joint swelling  Neurological: no TIA or stroke symptoms  Hematologic: no bruises, no bleeding, no swollen glands  Integument: no lumps, mole changes, nail changes or rash  Endocrine: no malaise/lethargy or unexpected weight changes      Social History     Social History    Marital status:      Spouse name: N/A    Number of children: 1    Years of education: N/A     Occupational History    disabled--CVA      Social History Main Topics    Smoking status: Former Smoker    Smokeless tobacco: Never Used      Comment: 20+years ago    Alcohol use No    Drug use: No    Sexual activity: Not Currently     Other Topics Concern    Not on file     Social History Narrative     No family history on file. OBJECTIVE:    Visit Vitals    /80 (BP 1 Location: Left arm, BP Patient Position: Sitting)    Pulse 62    Temp 97.8 °F (36.6 °C) (Oral)    Resp 14    Ht 5' 2\" (1.575 m)    Wt 274 lb 1.6 oz (124.3 kg)    SpO2 94%    BMI 50.13 kg/m2     CONSTITUTIONAL: well , well nourished, appears age appropriate  EYES: perrla, eom intact  ENMT:moist mucous membranes, pharynx clear  NECK: supple. Thyroid normal  RESPIRATORY: Chest: clear to ascultation and percussion   CARDIOVASCULAR: Heart: regular rate and rhythm  GASTROINTESTINAL: Abdomen: soft, bowel sounds active  HEMATOLOGIC: no pathological lymph nodes palpated  MUSCULOSKELETAL: Extremities: trace edema, pulse 1+   INTEGUMENT: No unusual rashes or suspicious skin lesions noted. Nails appear normal.  NEUROLOGIC: non-focal exam   MENTAL STATUS: alert and oriented, appropriate affect      ASSESSMENT:  1. Paroxysmal atrial fibrillation (HCC)    2. Morbid obesity ([address]    3. Type 2 diabetes mellitus without complication, without long-term current use of insulin ([address]    4. Seizure disorder ([address]    5. Intermittent asthma without complication, unspecified asthma severity    6. Dyslipidemia    7. Essential hypertension    8. Gout, unspecified cause, unspecified chronicity, unspecified site        PLAN:    1. Her rhythm is regular today. She will continue her coumadin in view of the paroxysmal nature. Nothing more need be done from a cardiac perspective. 2. I encouraged her to lose weight which means eating meals, avoiding snacks and eliminating the consumption of processed carbohydrates. 3. Her diabetes historically has been doing well but I will await the results of the hemoglobin A1c.    4. She remains seizure-free on her current regimen. She follows up with neurology. 5. Her asthma is also stable today. She will continue her bronchodilators as prescribed. 6. She is in a primary cardiovascular risk prevention mode. Lipid values will be assessed to ensure compliance and efficacy. 7. Blood pressure is excellent today, no adjustments are made. 8. Her gout is also stable with no flare. I remind her of the importance of remaining on the allopurinol. .  Orders Placed This Encounter    APOLIPOPROTEIN B    METABOLIC PANEL, BASIC    HEMOGLOBIN A1C WITH EAG    AMB POC PT/INR    albuterol (PROVENTIL VENTOLIN) 2.5 mg /3 mL (0.083 %) nebulizer solution    allopurinol (ZYLOPRIM) 300 mg tablet         Follow-up Disposition:  Return in about 3 months (around 7/17/2018), or monthly for INR.       Guanakito Garcia MD

## 2018-05-25 ENCOUNTER — CLINICAL SUPPORT (OUTPATIENT)
Dept: INTERNAL MEDICINE CLINIC | Age: 73
End: 2018-05-25

## 2018-05-25 DIAGNOSIS — I48.0 PAROXYSMAL ATRIAL FIBRILLATION (HCC): Primary | Chronic | ICD-10-CM

## 2018-05-25 LAB
INR BLD: 4
PT POC: 47.5 SECONDS
VALID INTERNAL CONTROL?: YES

## 2018-05-25 NOTE — PROGRESS NOTES
Chief Complaint   Patient presents with    Coagulation disorder     Patient in office for pt/inr check. Patient states that she is taking coumadin 5 mg dialy. Results for orders placed or performed in visit on 05/25/18   AMB POC PT/INR   Result Value Ref Range    VALID INTERNAL CONTROL POC Yes     Prothrombin time (POC) 47.5 seconds    INR POC 4.0      Per Dr. Gilda Muro, he advises that patient hold couamdin x 3 days, then return to normal dosage on forth day, and return to office in one week for pt/inr check.

## 2018-05-25 NOTE — MR AVS SNAPSHOT
[address] [facility] 
 
 
 [address] [address] 
[phone] Patient: Sami January MRN: [id number]:[birth year] Visit Information Date & Time Provider Department Dept. Phone Encounter #  
 5/25/2018  9:20 AM NURSE [address] Sports Medicine and [address] [place] [address] [phone] [id number] Your Appointments 5/31/2018  9:00 AM  
Any with Juanita Vazquez MD  
[address] [place] [address] and Primary Care [address] [place] [address]) Appt Note: WELLNESS VISIT  
 [address] [phone]  
  
   
 [address] [id number]  
  
    
 7/24/2018 10:45 AM  
Any with Juanita Vazquez MD  
[address] [place] [address] and Primary Care [address] [place] [address]) Appt Note: 3 month f/u  
 [address] [facility]  
[phone] Upcoming Health Maintenance Date Due  
 EYE EXAM RETINAL OR DILATED Q1 4/13/2016 GLAUCOMA SCREENING Q2Y 4/13/2017 Pneumococcal 65+ Low/Medium Risk (2 of 2 - PPSV23) 2/10/2018 MEDICARE YEARLY EXAM 4/25/2018 Influenza Age 5 to Adult 8/1/2018 HEMOGLOBIN A1C Q6M 10/17/2018 FOOT EXAM Q1 1/22/2019 MICROALBUMIN Q1 1/22/2019 LIPID PANEL Q1 1/22/2019 DTaP/Tdap/Td series (2 - Td) 2/10/2027 Allergies as of 5/25/2018  Review Complete On: 4/22/2018 By: Juanita Vazquez MD  
  
 Severity Noted Reaction Type Reactions Clonidine  04/24/2017    Other (comments) Patient becomes incoherent Current Immunizations  Never Reviewed Name Date Influenza High Dose Vaccine PF 10/23/2017, 11/28/2016 Not reviewed this visit You Were Diagnosed With   
  
 Codes Comments Paroxysmal atrial fibrillation (HCC)    -  Primary ICD-10-CM: I48.0 ICD-9-CM: 427.31 Vitals OB Status Smoking Status Postmenopausal Former Smoker Preferred Pharmacy Pharmacy Name Phone  [facility] [address] [address] [place] [address] [phone] Your Updated Medication List  
  
   
This list is accurate as of 5/25/18 10:37 AM.  Always use your most recent med list.  
  
  
  
  
 * albuterol 90 mcg/actuation inhaler Commonly known as:  PROAIR HFA Take 2 Puffs by inhalation every four (4) hours as needed for Wheezing or Shortness of Breath. * albuterol 2.5 mg /3 mL (0.083 %) nebulizer solution Commonly known as:  PROVENTIL VENTOLIN  
USE 1 VIAL VIA NEBULIZER EVERY 6 HOURS AS NEEDED. DX: J45.909  
  
 allopurinol 300 mg tablet Commonly known as:  ZYLOPRIM  
TAKE 1 TABLET BY MOUTH ONCE DAILY  
  
 aspirin delayed-release 81 mg tablet Take 81 mg by mouth daily. atorvastatin 80 mg tablet Commonly known as:  LIPITOR Take 1 Tab by mouth daily. Blood-Glucose Meter Misc Commonly known as:  TRUE METRIX AIR GLUCOSE METER  
1 Device by Does Not Apply route daily. Use to test blood sugars daily. dx.e11.9  
  
 budesonide-formoterol 160-4.5 mcg/actuation Hfaa Commonly known as:  SYMBICORT Take 2 Puffs by inhalation two (2) times a day. cloNIDine HCl 0.2 mg tablet Commonly known as:  CATAPRES Take 1 Tab by mouth two (2) times a day. felodipine 10 mg 24 hr tablet Commonly known as:  PLENDIL SR  
TAKE 1 TABLET BY MOUTH EVERY DAY  
  
 glucose blood VI test strips strip Commonly known as:  TRUE METRIX GLUCOSE TEST STRIP Use to test blood sugar once daily. Dx.e11.9  
  
 guaiFENesin-codeine 100-10 mg/5 mL solution Commonly known as:  Kermitt Pancake Take 5 mL by mouth three (3) times daily as needed for Cough. Max Daily Amount: 15 mL.  
  
 lancets 28 gauge Misc Commonly known as:  Luan Perales Use to test blood sugar once daily. Dx.e11.9  
  
 levETIRAcetam 1,000 mg tablet Take 1 Tab by mouth two (2) times a day. lisinopril 40 mg tablet Commonly known as:  Noreen Dowdy Take 1 Tab by mouth daily. metFORMIN 500 mg tablet Commonly known as:  GLUCOPHAGE  
TAKE 1 TABLET BY MOUTH TWICE DAILY WITH MEALS  
  
 metoprolol tartrate 25 mg tablet Commonly known as:  LOPRESSOR  
TAKE 1 TABLET BY MOUTH TWICE DAILY AT 9 AM AND AT 9 PM  
  
 phenytoin  mg ER capsule Commonly known as:  DILANTIN ER  
TAKE 4 CAPSULES BY MOUTH EVERY NIGHT AT BEDTIME SITagliptin 100 mg tablet Commonly known as:  Vallejo Mulch Take 1 tablet by mouth daily. spironolactone-hydrochlorothiazide 25-25 mg per tablet Commonly known as:  ALDACTAZIDE Take 1 Tab by mouth daily. tiotropium 18 mcg inhalation capsule Commonly known as:  Alvera Romberg Take 1 Cap by inhalation daily. warfarin 5 mg tablet Commonly known as:  COUMADIN  
TAKE 1 TABLET BY MOUTH MONDAY THROUGH FRIDAY AND 1& 1/2 TABLETS ON SATURDAY AND SUNDAY * Notice: This list has 2 medication(s) that are the same as other medications prescribed for you. Read the directions carefully, and ask your doctor or other care provider to review them with you. We Performed the Following AMB POC PT/INR [41795 CPT(R)] Introducing [facility]! [employer] introduces [employer] patient portal. Now you can access parts of your medical record, email your doctor's office, and request medication refills online. 1. In your internet browser, go to [email or web address] 2. Click on the First Time User? Click Here link in the Sign In box. You will see the New Member Sign Up page. 3. Enter your [employer] Access Code exactly as it appears below. You will not need to use this code after youve completed the sign-up process. If you do not sign up before the expiration date, you must request a new code. · [employer] Access Code: [email or web address] Expires: 7/21/2018  2:53 PM 
 
4. Enter the last four digits of your Social Security Number (xxxx) and Date of Birth (mm/dd/yyyy) as indicated and click Submit. You will be taken to the next sign-up page. 5. Create a [employer] ID. This will be your [employer] login ID and cannot be changed, so think of one that is secure and easy to remember. 6. Create a [employer] password. You can change your password at any time. 7. Enter your Password Reset Question and Answer. This can be used at a later time if you forget your password. 8. Enter your e-mail address. You will receive e-mail notification when new information is available in [address] 9. Click Sign Up. You can now view and download portions of your medical record. 10. Click the Download Summary menu link to download a portable copy of your medical information. If you have questions, please visit the Frequently Asked Questions section of the [employer] website. Remember, [employer] is NOT to be used for urgent needs. For medical emergencies, dial 911. Now available from your iPhone and Android! Please provide this summary of care documentation to your next provider. Your primary care clinician is listed as Manohar Mo. If you have any questions after today's visit, please call [phone].

## 2018-05-30 RX ORDER — LISINOPRIL 40 MG/1
40 TABLET ORAL DAILY
Qty: 30 TAB | Refills: 11 | Status: SHIPPED | OUTPATIENT
Start: 2018-05-30 | End: 2018-05-31 | Stop reason: SDUPTHER

## 2018-05-31 ENCOUNTER — OFFICE VISIT (OUTPATIENT)
Dept: INTERNAL MEDICINE CLINIC | Age: 73
End: 2018-05-31

## 2018-05-31 VITALS
OXYGEN SATURATION: 93 % | HEIGHT: 62 IN | HEART RATE: 57 BPM | DIASTOLIC BLOOD PRESSURE: 58 MMHG | BODY MASS INDEX: 50.57 KG/M2 | WEIGHT: 274.8 LBS | SYSTOLIC BLOOD PRESSURE: 138 MMHG | RESPIRATION RATE: 14 BRPM | TEMPERATURE: 97.5 F

## 2018-05-31 DIAGNOSIS — I48.0 PAROXYSMAL ATRIAL FIBRILLATION (HCC): Primary | Chronic | ICD-10-CM

## 2018-05-31 DIAGNOSIS — Z00.00 WELLNESS EXAMINATION: ICD-10-CM

## 2018-05-31 DIAGNOSIS — E78.5 DYSLIPIDEMIA: ICD-10-CM

## 2018-05-31 DIAGNOSIS — J45.20 INTERMITTENT ASTHMA WITHOUT COMPLICATION, UNSPECIFIED ASTHMA SEVERITY: ICD-10-CM

## 2018-05-31 DIAGNOSIS — E87.5 HYPERKALEMIA: ICD-10-CM

## 2018-05-31 DIAGNOSIS — I10 ESSENTIAL HYPERTENSION: ICD-10-CM

## 2018-05-31 DIAGNOSIS — E11.9 TYPE 2 DIABETES MELLITUS WITHOUT COMPLICATION, WITHOUT LONG-TERM CURRENT USE OF INSULIN (HCC): Chronic | ICD-10-CM

## 2018-05-31 DIAGNOSIS — E66.01 MORBID OBESITY (HCC): Chronic | ICD-10-CM

## 2018-05-31 DIAGNOSIS — Z13.39 SCREENING FOR ALCOHOLISM: ICD-10-CM

## 2018-05-31 DIAGNOSIS — Z13.31 SCREENING FOR DEPRESSION: ICD-10-CM

## 2018-05-31 LAB
INR BLD: 1.6
PT POC: 18.9 SECONDS
VALID INTERNAL CONTROL?: YES

## 2018-05-31 NOTE — MR AVS SNAPSHOT
[address] [facility] 
 
 
 [address] [address] 
[phone] Patient: Ramon Guerra MRN: [id number]:[id number] Visit Information Date & Time Provider Department Dept. Phone Encounter #  
 5/31/2018  9:00 AM [facility] [phone] Follow-up Instructions Return in about 2 months (around 7/31/2018), or INR in 2 weeks. Your Appointments 7/24/2018 10:45 AM  
Any with Drew Larsen MD  
[address] [place] [address] and Primary Care [facility]) Appt Note: 3 month f/u  
 [address] [facility]  
  
   
 [address] [id number] Upcoming Health Maintenance Date Due  
 EYE EXAM RETINAL OR DILATED Q1 4/13/2016 GLAUCOMA SCREENING Q2Y 4/13/2017 MEDICARE YEARLY EXAM 4/25/2018 Pneumococcal 65+ Low/Medium Risk (2 of 2 - PPSV23) 11/30/2018* Influenza Age 5 to Adult 8/1/2018 HEMOGLOBIN A1C Q6M 10/17/2018 FOOT EXAM Q1 1/22/2019 MICROALBUMIN Q1 1/22/2019 LIPID PANEL Q1 1/22/2019 DTaP/Tdap/Td series (2 - Td) 2/10/2027 *Topic was postponed. The date shown is not the original due date. Allergies as of 5/31/2018  Review Complete On: 5/31/2018 By: Shruthi Cruz Severity Noted Reaction Type Reactions Clonidine  04/24/2017    Other (comments) Patient becomes incoherent Current Immunizations  Never Reviewed Name Date Influenza High Dose Vaccine PF 10/23/2017, 11/28/2016 Not reviewed this visit You Were Diagnosed With   
  
 Codes Comments Paroxysmal atrial fibrillation (HCC)    -  Primary ICD-10-CM: I48.0 ICD-9-CM: 427.31 Type 2 diabetes mellitus without complication, without long-term current use of insulin (HCC)     ICD-10-CM: E11.9 ICD-9-CM: 250.00  Morbid obesity ([facility]     ICD-10-CM: E66.01 
ICD-9-CM: 278.01   
 Intermittent asthma without complication, unspecified asthma severity     ICD-10-CM: J45.20 ICD-9-CM: 493.90 Dyslipidemia     ICD-10-CM: E78.5 ICD-9-CM: 272.4 Essential hypertension     ICD-10-CM: I10 
ICD-9-CM: 401.9 Hyperkalemia     ICD-10-CM: E87.5 ICD-9-CM: 276.7 Vitals BP Pulse Temp Resp Height(growth percentile) Weight(growth percentile) 138/58 (!) 57 97.5 °F (36.4 °C) (Oral) 14 5' 2\" (1.575 m) 274 lb 12.8 oz (124.6 kg) SpO2 BMI OB Status Smoking Status 93% 50.26 kg/m2 Postmenopausal Former Smoker Vitals History BMI and BSA Data Body Mass Index Body Surface Area  
 50.26 kg/m 2 2.33 m 2 Preferred Pharmacy Pharmacy Name Phone [address], [address]  [phone] Your Updated Medication List  
  
   
This list is accurate as of 5/31/18 10:54 AM.  Always use your most recent med list.  
  
  
  
  
 * albuterol 90 mcg/actuation inhaler Commonly known as:  PROAIR HFA Take 2 Puffs by inhalation every four (4) hours as needed for Wheezing or Shortness of Breath. * albuterol 2.5 mg /3 mL (0.083 %) nebulizer solution Commonly known as:  PROVENTIL VENTOLIN  
USE 1 VIAL VIA NEBULIZER EVERY 6 HOURS AS NEEDED. DX: J45.909  
  
 allopurinol 300 mg tablet Commonly known as:  ZYLOPRIM  
TAKE 1 TABLET BY MOUTH ONCE DAILY  
  
 aspirin delayed-release 81 mg tablet Take 81 mg by mouth daily. atorvastatin 80 mg tablet Commonly known as:  LIPITOR Take 1 Tab by mouth daily. Blood-Glucose Meter Misc Commonly known as:  TRUE METRIX AIR GLUCOSE METER  
1 Device by Does Not Apply route daily. Use to test blood sugars daily. dx.e11.9  
  
 budesonide-formoterol 160-4.5 mcg/actuation Hfaa Commonly known as:  SYMBICORT Take 2 Puffs by inhalation two (2) times a day. cloNIDine HCl 0.2 mg tablet Commonly known as:  CATAPRES  
 Take 1 Tab by mouth two (2) times a day. felodipine 10 mg 24 hr tablet Commonly known as:  PLENDIL SR  
TAKE 1 TABLET BY MOUTH EVERY DAY  
  
 glucose blood VI test strips strip Commonly known as:  TRUE METRIX GLUCOSE TEST STRIP Use to test blood sugar once daily. Dx.e11.9  
  
 guaiFENesin-codeine 100-10 mg/5 mL solution Commonly known as:  Rush [place] Take 5 mL by mouth three (3) times daily as needed for Cough. Max Daily Amount: 15 mL.  
  
 lancets 28 gauge Misc Commonly known as:  Alejandra Ferris Use to test blood sugar once daily. Dx.e11.9  
  
 levETIRAcetam 1,000 mg tablet Take 1 Tab by mouth two (2) times a day. lisinopril 40 mg tablet Commonly known as:  Kelly Pedro Take 1 Tab by mouth daily. metFORMIN 500 mg tablet Commonly known as:  GLUCOPHAGE  
TAKE 1 TABLET BY MOUTH TWICE DAILY WITH MEALS  
  
 metoprolol tartrate 25 mg tablet Commonly known as:  LOPRESSOR  
TAKE 1 TABLET BY MOUTH TWICE DAILY AT 9 AM AND AT 9 PM  
  
 phenytoin  mg ER capsule Commonly known as:  DILANTIN ER  
TAKE 4 CAPSULES BY MOUTH EVERY NIGHT AT BEDTIME SITagliptin 100 mg tablet Commonly known as:  Jeniffer Darner Take 1 tablet by mouth daily. spironolactone-hydrochlorothiazide 25-25 mg per tablet Commonly known as:  ALDACTAZIDE Take 1 Tab by mouth daily. tiotropium 18 mcg inhalation capsule Commonly known as:  Roosvelt [place]field Take 1 Cap by inhalation daily. warfarin 5 mg tablet Commonly known as:  COUMADIN  
TAKE 1 TABLET BY MOUTH MONDAY THROUGH FRIDAY AND 1& 1/2 TABLETS ON SATURDAY AND SUNDAY * Notice: This list has 2 medication(s) that are the same as other medications prescribed for you. Read the directions carefully, and ask your doctor or other care provider to review them with you. We Performed the Following AMB POC PT/INR [22777 CPT(R)] METABOLIC PANEL, BASIC [99843 CPT(R)] Follow-up Instructions Return in about 2 months (around 7/31/2018), or INR in 2 weeks. Introducing \[facility]\"" & HEALTH SERVICES! Lacey Yuan introduces [employer] patient portal. Now you can access parts of your medical record, email your doctor's office, and request medication refills online. 1. In your internet browser, go to [email or web address] 2. Click on the First Time User? Click Here link in the Sign In box. You will see the New Member Sign Up page. 3. Enter your [employer] Access Code exactly as it appears below. You will not need to use this code after youve completed the sign-up process. If you do not sign up before the expiration date, you must request a new code. · [employer] Access Code: [email or web address] Expires: 7/21/2018  2:53 PM 
 
4. Enter the last four digits of your Social Security Number (xxxx) and Date of Birth (mm/dd/yyyy) as indicated and click Submit. You will be taken to the next sign-up page. 5. Create a [employer] ID. This will be your [employer] login ID and cannot be changed, so think of one that is secure and easy to remember. 6. Create a [employer] password. You can change your password at any time. 7. Enter your Password Reset Question and Answer. This can be used at a later time if you forget your password. 8. Enter your e-mail address. You will receive e-mail notification when new information is available in [address] 9. Click Sign Up. You can now view and download portions of your medical record. 10. Click the Download Summary menu link to download a portable copy of your medical information. If you have questions, please visit the Frequently Asked Questions section of the [employer] website. Remember, [employer] is NOT to be used for urgent needs. For medical emergencies, dial 911. Now available from your iPhone and Android! Please provide this summary of care documentation to your next provider. Your primary care clinician is listed as Manohar Elizabeth  If you have any questions after today's visit, please call [phone].

## 2018-05-31 NOTE — PROGRESS NOTES
Chief Complaint   Patient presents with   [facility] Annual Wellness Visit     1. Have you been to the ER, urgent care clinic since your last visit? Hospitalized since your last visit? No    2. Have you seen or consulted any other health care providers outside of the [address] [place] [address] since your last visit? Include any pap smears or colon screening.  No

## 2018-05-31 NOTE — PROGRESS NOTES
[address] [place] [address] and Primary Care  [place] [address]  [address] [place] [address]  Phone:  [phone]  Fax: [phone]       Chief Complaint   Patient presents with   [facility] Wellness Visit   . SUBJECTIVE:    Jasper Perry is a 74 y.o. female comes in for return visit stating that she has done fairly well. Her asthma has been quite stable. Her INR was elevated at her last visit and she just started warfarin on Monday. It is 1.6 today. She is eating more vegetables which is fine. I did not change the dose of warfarin. She continues to have pain in her legs aggravated with walking. She has a right total knee replacement and moderate osteoarthritis of her left knee. She uses a rollator. There has been no meaningful weight loss. She has a past history of primary hypertension and dyslipidemia. She is beginning to retain potassium. I reduced her lisinopril to 20 mg daily but did not stop the Aldactazide. Current Outpatient Prescriptions   Medication Sig Dispense Refill    lisinopril (PRINIVIL, ZESTRIL) 20 mg tablet Take 1 Tab by mouth daily. 90 Tab 3    felodipine (PLENDIL SR) 10 mg 24 hr tablet TAKE 1 TABLET BY MOUTH EVERY DAY 90 Tab 3    albuterol (PROVENTIL VENTOLIN) 2.5 mg /3 mL (0.083 %) nebulizer solution USE 1 VIAL VIA NEBULIZER EVERY 6 HOURS AS NEEDED. DX: J45.909 120 Each 11    allopurinol (ZYLOPRIM) 300 mg tablet TAKE 1 TABLET BY MOUTH ONCE DAILY 90 Tab 3    metoprolol tartrate (LOPRESSOR) 25 mg tablet TAKE 1 TABLET BY MOUTH TWICE DAILY AT 9 AM AND AT 9  Tab 3    phenytoin ER (DILANTIN ER) 100 mg ER capsule TAKE 4 CAPSULES BY MOUTH EVERY NIGHT AT BEDTIME 360 Cap 3    spironolactone-hydrochlorothiazide (ALDACTAZIDE) 25-25 mg per tablet Take 1 Tab by mouth daily. 90 Tab 11    atorvastatin (LIPITOR) 80 mg tablet Take 1 Tab by mouth daily. 90 Tab 3    levETIRAcetam 1,000 mg tablet Take 1 Tab by mouth two (2) times a day.  60 Tab 11    Blood-Glucose Meter (TRUE METRIX AIR GLUCOSE METER) [facility] 1 Device by Does Not Apply route daily. Use to test blood sugars daily. dx.e11.9 1 Each 0    lancets (TRUEPLUS LANCETS) 28 gauge misc Use to test blood sugar once daily. Dx.e11.9 100 Lancet 11    warfarin (COUMADIN) 5 mg tablet TAKE 1 TABLET BY MOUTH MONDAY THROUGH FRIDAY AND 1& 1/2 TABLETS ON SATURDAY AND SUNDAY 103 Tab 3    aspirin delayed-release 81 mg tablet Take 81 mg by mouth daily.  tiotropium (SPIRIVA) 18 mcg inhalation capsule Take 1 Cap by inhalation daily. 30 Cap 11    albuterol (PROAIR HFA) 90 mcg/actuation inhaler Take 2 Puffs by inhalation every four (4) hours as needed for Wheezing or Shortness of Breath. 1 Inhaler 11    budesonide-formoterol (SYMBICORT) 160-4.5 mcg/Actuation HFA inhaler Take 2 Puffs by inhalation two (2) times a day. 10.2 Inhaler 11    glucose blood VI test strips (TRUE METRIX GLUCOSE TEST STRIP) strip Use to test blood sugar once daily. Dx.e11.9 100 Strip 11    guaiFENesin-codeine (VIRTUSSIN AC) 100-10 mg/5 mL solution Take 5 mL by mouth three (3) times daily as needed for Cough. Max Daily Amount: 15 mL. 140 mL 0    cloNIDine HCl (CATAPRES) 0.2 mg tablet Take 1 Tab by mouth two (2) times a day. 30 Tab 11    metFORMIN (GLUCOPHAGE) 500 mg tablet TAKE 1 TABLET BY MOUTH TWICE DAILY WITH MEALS 180 Tab 3    sitagliptin (JANUVIA) 100 mg tablet Take 1 tablet by mouth daily.  30 tablet 11     Past Medical History:   Diagnosis Date    Arthritis     Asthma     Diabetes [facility]     Hypertension     Other ill-defined conditions(799.89)     rt knee surgery    Stroke [facility])      Past Surgical History:   Procedure Laterality Date    HX COLONOSCOPY      HX GYN       Allergies   Allergen Reactions    Clonidine Other (comments)     Patient becomes incoherent         REVIEW OF SYSTEMS:  General: negative for - chills or fever  ENT: negative for - headaches, nasal congestion or tinnitus  Respiratory: negative for - cough, hemoptysis, shortness of breath or wheezing  Cardiovascular : negative for - chest pain, edema, palpitations or shortness of breath  Gastrointestinal: negative for - abdominal pain, blood in stools, heartburn or nausea/vomiting  Genito-Urinary: no dysuria, trouble voiding, or hematuria  Musculoskeletal: negative for - gait disturbance, joint pain, joint stiffness or joint swelling  Neurological: no TIA or stroke symptoms  Hematologic: no bruises, no bleeding, no swollen glands  Integument: no lumps, mole changes, nail changes or rash  Endocrine: no malaise/lethargy or unexpected weight changes      Social History     Social History    Marital status:      Spouse name: N/A    Number of children: 1    Years of education: N/A     Occupational History    disabled--CVA      Social History Main Topics    Smoking status: Former Smoker    Smokeless tobacco: Never Used      Comment: 20+years ago    Alcohol use No    Drug use: No    Sexual activity: Not Currently     Other Topics Concern    None     Social History Narrative     History reviewed. No pertinent family history. OBJECTIVE:    Visit Vitals    /58    Pulse (!) 57    Temp 97.5 °F (36.4 °C) (Oral)    Resp 14    Ht 5' 2\" (1.575 m)    Wt 274 lb 12.8 oz (124.6 kg)    SpO2 93%    BMI 50.26 kg/m2     CONSTITUTIONAL: well , well nourished, appears age appropriate  EYES: perrla, eom intact  ENMT:moist mucous membranes, pharynx clear  NECK: supple. Thyroid normal  RESPIRATORY: Chest: clear to ascultation and percussion   CARDIOVASCULAR: Heart: regular rate and rhythm  GASTROINTESTINAL: Abdomen: soft, bowel sounds active  HEMATOLOGIC: no pathological lymph nodes palpated  MUSCULOSKELETAL: Extremities: trace edema, pulse 1+   INTEGUMENT: No unusual rashes or suspicious skin lesions noted. Nails appear normal.  NEUROLOGIC: non-focal exam   MENTAL STATUS: alert and oriented, appropriate affect      ASSESSMENT:  1.  Paroxysmal atrial fibrillation ([address]    2. Type 2 diabetes mellitus without complication, without long-term current use of insulin ([address]    3. Morbid obesity ([address]    4. Intermittent asthma without complication, unspecified asthma severity    5. Dyslipidemia    6. Essential hypertension    7. Hyperkalemia    8. Screening for alcoholism    9. Screening for depression    10. Wellness examination        PLAN:    1. INR is 1.6. She will return in two weeks for a repeat INR. She will continue the warfarin 5 mg daily. 2. Her diabetes is doing quite well based on her last hemoglobin A1C. I remind her the importance of minimizing the consumption of processed carbohydrates. 3. Her morbid obesity continues to be the driving force behind her multiple comorbidities including her osteoarthritis and her metabolic derangements. Again, eating meals and eliminating snacking are the most important things she can do above and beyond is avoiding sweets, white breads, and white potatoes. 4. Her asthma is quite stable. 5. She will continue a statin as prescribed. 6. Blood pressure is excellent today. No adjustments are made. 7. BMP will be checked. If her potassium is elevated, I will discontinue the lisinopril totally and continue the spironolactone. She had no pre-renal component. .  Orders Placed This Encounter    Depression Screen Annual    METABOLIC PANEL, BASIC    AMB POC PT/INR    lisinopril (PRINIVIL, ZESTRIL) 20 mg tablet         Follow-up Disposition:  Return in about 2 months (around 7/31/2018), or INR in 2 weeks. Amish Penny MD      This is the Subsequent Medicare Annual Wellness Exam, performed 12 months or more after the Initial AWV or the last Subsequent AWV    I have reviewed the patient's medical history in detail and updated the computerized patient record.      History     Past Medical History:   Diagnosis Date    Arthritis     Asthma     Diabetes ([address]     Hypertension     Other ill-defined conditions(799.89)     rt knee surgery    Stroke [facility])       Past Surgical History:   Procedure Laterality Date    HX COLONOSCOPY      HX GYN       Current Outpatient Prescriptions   Medication Sig Dispense Refill    lisinopril (PRINIVIL, ZESTRIL) 20 mg tablet Take 1 Tab by mouth daily. 90 Tab 3    felodipine (PLENDIL SR) 10 mg 24 hr tablet TAKE 1 TABLET BY MOUTH EVERY DAY 90 Tab 3    albuterol (PROVENTIL VENTOLIN) 2.5 mg /3 mL (0.083 %) nebulizer solution USE 1 VIAL VIA NEBULIZER EVERY 6 HOURS AS NEEDED. DX: J45.909 120 Each 11    allopurinol (ZYLOPRIM) 300 mg tablet TAKE 1 TABLET BY MOUTH ONCE DAILY 90 Tab 3    metoprolol tartrate (LOPRESSOR) 25 mg tablet TAKE 1 TABLET BY MOUTH TWICE DAILY AT 9 AM AND AT 9  Tab 3    phenytoin ER (DILANTIN ER) 100 mg ER capsule TAKE 4 CAPSULES BY MOUTH EVERY NIGHT AT BEDTIME 360 Cap 3    spironolactone-hydrochlorothiazide (ALDACTAZIDE) 25-25 mg per tablet Take 1 Tab by mouth daily. 90 Tab 11    atorvastatin (LIPITOR) 80 mg tablet Take 1 Tab by mouth daily. 90 Tab 3    levETIRAcetam 1,000 mg tablet Take 1 Tab by mouth two (2) times a day. 60 Tab 11    Blood-Glucose Meter (TRUE METRIX AIR GLUCOSE METER) mis 1 Device by Does Not Apply route daily. Use to test blood sugars daily. dx.e11.9 1 Each 0    lancets (TRUEPLUS LANCETS) 28 gauge misc Use to test blood sugar once daily. Dx.e11.9 100 Lancet 11    warfarin (COUMADIN) 5 mg tablet TAKE 1 TABLET BY MOUTH MONDAY THROUGH FRIDAY AND 1& 1/2 TABLETS ON SATURDAY AND SUNDAY 103 Tab 3    aspirin delayed-release 81 mg tablet Take 81 mg by mouth daily.  tiotropium (SPIRIVA) 18 mcg inhalation capsule Take 1 Cap by inhalation daily. 30 Cap 11    albuterol (PROAIR HFA) 90 mcg/actuation inhaler Take 2 Puffs by inhalation every four (4) hours as needed for Wheezing or Shortness of Breath. 1 Inhaler 11    budesonide-formoterol (SYMBICORT) 160-4.5 mcg/Actuation HFA inhaler Take 2 Puffs by inhalation two (2) times a day. 10. 2 Inhaler 11    glucose blood VI test strips (TRUE METRIX GLUCOSE TEST STRIP) strip Use to test blood sugar once daily. Dx.e11.9 100 Strip 11    guaiFENesin-codeine (VIRTUSSIN AC) 100-10 mg/5 mL solution Take 5 mL by mouth three (3) times daily as needed for Cough. Max Daily Amount: 15 mL. 140 mL 0    cloNIDine HCl (CATAPRES) 0.2 mg tablet Take 1 Tab by mouth two (2) times a day. 30 Tab 11    metFORMIN (GLUCOPHAGE) 500 mg tablet TAKE 1 TABLET BY MOUTH TWICE DAILY WITH MEALS 180 Tab 3    sitagliptin (JANUVIA) 100 mg tablet Take 1 tablet by mouth daily. 30 tablet 11     Allergies   Allergen Reactions    Clonidine Other (comments)     Patient becomes incoherent     History reviewed. No pertinent family history. Social History   Substance Use Topics    Smoking status: Former Smoker    Smokeless tobacco: Never Used      Comment: 20+years ago    Alcohol use No     Patient Active Problem List   Diagnosis Code    Paroxysmal atrial fibrillation (HCC) I48.0    Morbid obesity (HCC) E66.01    DM type 2 (diabetes mellitus, type 2) ([facility] E11.9    Total knee replacement status Z96.659    Seizure disorder ([facility] G40.909    Asthma J45.909    Dyslipidemia E78.5    HTN (hypertension) I10    Primary osteoarthritis of left knee M17.12    Venous insufficiency I87.2    Proteinuria R80.9       Depression Risk Factor Screening:     PHQ over the last two weeks 5/31/2018   Little interest or pleasure in doing things Not at all   Feeling down, depressed or hopeless Not at all   Total Score PHQ 2 0     Alcohol Risk Factor Screening: You do not drink alcohol or very rarely. Functional Ability and Level of Safety:   Hearing Loss  Hearing is good. Activities of Daily Living  The home contains: no safety equipment. Patient does total self care    Fall Risk  Fall Risk Assessment, last 12 mths 5/31/2018   Able to walk? Yes   Fall in past 12 months?  No   Fall with injury? -   Number of falls in past 12 months -   Fall Risk Score -       Abuse Screen  Patient is not abused    Cognitive Screening   Evaluation of Cognitive Function:  Has your family/caregiver stated any concerns about your memory: no  Normal    Patient Care Team   Patient Care Team:  Amish Penny MD as PCP - General (Internal Medicine)    Assessment/Plan   Education and counseling provided:  Are appropriate based on today's review and evaluation    Diagnoses and all orders for this visit:    1. Paroxysmal atrial fibrillation (HCC)  -     AMB POC PT/INR    2. Type 2 diabetes mellitus without complication, without long-term current use of insulin ([address]    3. Morbid obesity ([facility]    4. Intermittent asthma without complication, unspecified asthma severity    5. Dyslipidemia    6. Essential hypertension    7. Hyperkalemia  -     METABOLIC PANEL, BASIC    8. Screening for alcoholism  -     Annual  Alcohol Screen 15 min ()    9. Screening for depression  -     Depression Screen Annual    10. Wellness examination    Other orders  -     lisinopril (PRINIVIL, ZESTRIL) 20 mg tablet; Take 1 Tab by mouth daily.         Health Maintenance Due   Topic Date Due    EYE EXAM RETINAL OR DILATED Q1  04/13/2016    GLAUCOMA SCREENING Q2Y  04/13/2017

## 2018-06-01 LAB
BUN SERPL-MCNC: 26 MG/DL (ref 8–27)
BUN/CREAT SERPL: 23 (ref 12–28)
CALCIUM SERPL-MCNC: 9.7 MG/DL (ref 8.7–10.3)
CHLORIDE SERPL-SCNC: 103 MMOL/L (ref 96–106)
CO2 SERPL-SCNC: 20 MMOL/L (ref 18–29)
CREAT SERPL-MCNC: 1.15 MG/DL (ref 0.57–1)
GFR SERPLBLD CREATININE-BSD FMLA CKD-EPI: 47 ML/MIN/1.73
GFR SERPLBLD CREATININE-BSD FMLA CKD-EPI: 54 ML/MIN/1.73
GLUCOSE SERPL-MCNC: 94 MG/DL (ref 65–99)
POTASSIUM SERPL-SCNC: 5.2 MMOL/L (ref 3.5–5.2)
SODIUM SERPL-SCNC: 138 MMOL/L (ref 134–144)

## 2018-06-03 RX ORDER — LISINOPRIL 20 MG/1
20 TABLET ORAL DAILY
Qty: 90 TAB | Refills: 3 | Status: ON HOLD | OUTPATIENT
Start: 2018-06-03

## 2018-06-03 NOTE — PATIENT INSTRUCTIONS
Medicare Wellness Visit, Female    The best way to live healthy is to have a lifestyle where you eat a well-balanced diet, exercise regularly, limit alcohol use, and quit all forms of tobacco/nicotine, if applicable. Regular preventive services are another way to keep healthy. Preventive services (vaccines, screening tests, monitoring & exams) can help personalize your care plan, which helps you manage your own care. Screening tests can find health problems at the earliest stages, when they are easiest to treat. [address] [facility] follows the current, evidence-based guidelines published by the [facility] ([facility]) when recommending preventive services for our patients. Because we follow these guidelines, sometimes recommendations change over time as research supports it. (For example, mammograms used to be recommended annually. Even though Medicare will still pay for an annual mammogram, the newer guidelines recommend a mammogram every two years for women of average risk.)    Of course, you and your provider may decide to screen more often for some diseases, based on your risk and co-morbidities (chronic disease you are already diagnosed with). Preventive services for you include:    - Medicare offers their members a free annual wellness visit, which is time for you and your primary care provider to discuss and plan for your preventive service needs. Take advantage of this benefit every year!    -All people over age 72 should receive the recommended pneumonia vaccines. Current USPSTF guidelines recommend a series of two vaccines for the best pneumonia protection.     -All adults should have a yearly flu vaccine and a tetanus vaccine every 10 years. All adults age 61 years should receive a shingles vaccine once in their lifetime.      -A bone mass density test is recommended when a woman turns 65 to screen for osteoporosis.  This test is only recommended once as a screening. Some providers will use this same test as a disease monitoring tool if you already have osteoporosis. -All adults age 38-68 years who are overweight should have a diabetes screening test once every three years.     -Other screening tests & preventive services for persons with diabetes include: an eye exam to screen for diabetic retinopathy, a kidney function test, a foot exam, and stricter control over your cholesterol.     -Cardiovascular screening for adults with routine risk involves an electrocardiogram (ECG) at intervals determined by the provider.     -Colorectal cancer screenings should be done for adults age 54-65 years with normal risk. There are a number of acceptable methods of screening for this type of cancer. Each test has its own benefits and drawbacks. Discuss with your provider what is most appropriate for you during your annual wellness visit. The different tests include: colonoscopy (considered the best screening method), a fecal occult blood test, a fecal DNA test, and sigmoidoscopy. -Breast cancer screenings are recommended every other year for women of normal risk age 54-69 years.     -Cervical cancer screenings for women over age 72 are only recommended with certain risk factors.     -All adults born between [facility] should be screened once for Hepatitis C.      Here is a list of your current Health Maintenance items (your personalized list of preventive services) with a due date:  Health Maintenance Due   Topic Date Due    Eye Exam  04/13/2016    Glaucoma Screening   04/13/2017

## 2018-06-14 ENCOUNTER — CLINICAL SUPPORT (OUTPATIENT)
Dept: INTERNAL MEDICINE CLINIC | Age: 73
End: 2018-06-14

## 2018-06-14 DIAGNOSIS — I48.0 PAROXYSMAL ATRIAL FIBRILLATION (HCC): Primary | Chronic | ICD-10-CM

## 2018-06-14 LAB
INR BLD: 2.2
PT POC: 26.4 SECONDS
VALID INTERNAL CONTROL?: YES

## 2018-06-14 RX ORDER — PREDNISONE 20 MG/1
20 TABLET ORAL
Qty: 21 TAB | Refills: 0 | Status: SHIPPED | OUTPATIENT
Start: 2018-06-14 | End: 2018-08-30 | Stop reason: ALTCHOICE

## 2018-06-14 NOTE — PROGRESS NOTES
Chief Complaint   Patient presents with    Coagulation disorder     Patient in office for pt/inr check. Patient states that she is taking coumadin 5 mg daily. Results for orders placed or performed in visit on 06/14/18   AMB POC PT/INR   Result Value Ref Range    VALID INTERNAL CONTROL POC Yes     Prothrombin time (POC) 26.4 seconds    INR POC 2.2      Per Dr. Coleen Townsend, no changes and return to office in one month for pt/inr check.

## 2018-06-14 NOTE — MR AVS SNAPSHOT
[facility] 
 
 
 [address] [address] 
[phone] Patient: Jackie Cuevas MRN: [id number] SIMON:[id number] Visit Information Date & Time Provider Department Dept. Phone Encounter #  
 6/14/2018  9:20 AM NURSE [facility] and [address] [place] [address] [phone] [id number] Your Appointments 7/31/2018 10:45 AM  
Any with Nicolas Garcia MD  
[address] [place] [address] and Primary Care [facility]) Appt Note: 2 month follow up  
 [address] [facility]  
  
   
 [address] [id number] Upcoming Health Maintenance Date Due  
 EYE EXAM RETINAL OR DILATED Q1 4/13/2016 GLAUCOMA SCREENING Q2Y 4/13/2017 Pneumococcal 65+ Low/Medium Risk (2 of 2 - PPSV23) 11/30/2018* Influenza Age 5 to Adult 8/1/2018 HEMOGLOBIN A1C Q6M 10/17/2018 FOOT EXAM Q1 1/22/2019 MICROALBUMIN Q1 1/22/2019 LIPID PANEL Q1 1/22/2019 MEDICARE YEARLY EXAM 6/1/2019 DTaP/Tdap/Td series (2 - Td) 2/10/2027 *Topic was postponed. The date shown is not the original due date. Allergies as of 6/14/2018  Review Complete On: 6/3/2018 By: Nicolas Garcia MD  
  
 Severity Noted Reaction Type Reactions Clonidine  04/24/2017    Other (comments) Patient becomes incoherent Current Immunizations  Never Reviewed Name Date Influenza High Dose Vaccine PF 10/23/2017, 11/28/2016 Not reviewed this visit You Were Diagnosed With   
  
 Codes Comments Paroxysmal atrial fibrillation (HCC)    -  Primary ICD-10-CM: I48.0 ICD-9-CM: 427.31 Vitals OB Status Smoking Status Postmenopausal Former Smoker Preferred Pharmacy Pharmacy Name Phone [address] [phone] Your Updated Medication List  
  
   
 This list is accurate as of 6/14/18 10:12 AM.  Always use your most recent med list.  
  
  
  
  
 * albuterol 90 mcg/actuation inhaler Commonly known as:  PROAIR HFA Take 2 Puffs by inhalation every four (4) hours as needed for Wheezing or Shortness of Breath. * albuterol 2.5 mg /3 mL (0.083 %) nebulizer solution Commonly known as:  PROVENTIL VENTOLIN  
USE 1 VIAL VIA NEBULIZER EVERY 6 HOURS AS NEEDED. DX: J45.909  
  
 allopurinol 300 mg tablet Commonly known as:  ZYLOPRIM  
TAKE 1 TABLET BY MOUTH ONCE DAILY  
  
 aspirin delayed-release 81 mg tablet Take 81 mg by mouth daily. atorvastatin 80 mg tablet Commonly known as:  LIPITOR Take 1 Tab by mouth daily. Blood-Glucose Meter Misc Commonly known as:  TRUE METRIX AIR GLUCOSE METER  
1 Device by Does Not Apply route daily. Use to test blood sugars daily. dx.e11.9  
  
 budesonide-formoterol 160-4.5 mcg/actuation Hfaa Commonly known as:  SYMBICORT Take 2 Puffs by inhalation two (2) times a day. cloNIDine HCl 0.2 mg tablet Commonly known as:  CATAPRES Take 1 Tab by mouth two (2) times a day. felodipine 10 mg 24 hr tablet Commonly known as:  PLENDIL SR  
TAKE 1 TABLET BY MOUTH EVERY DAY  
  
 glucose blood VI test strips strip Commonly known as:  TRUE METRIX GLUCOSE TEST STRIP Use to test blood sugar once daily. Dx.e11.9  
  
 guaiFENesin-codeine 100-10 mg/5 mL solution Commonly known as:  Ardelle Hacking Take 5 mL by mouth three (3) times daily as needed for Cough. Max Daily Amount: 15 mL.  
  
 lancets 28 gauge Misc Commonly known as:  Anya Holman Use to test blood sugar once daily. Dx.e11.9  
  
 levETIRAcetam 1,000 mg tablet Take 1 Tab by mouth two (2) times a day. lisinopril 20 mg tablet Commonly known as:  Lorena Fess Take 1 Tab by mouth daily. metFORMIN 500 mg tablet Commonly known as:  GLUCOPHAGE  
TAKE 1 TABLET BY MOUTH TWICE DAILY WITH MEALS  
  
 metoprolol tartrate 25 mg tablet Commonly known as:  LOPRESSOR  
TAKE 1 TABLET BY MOUTH TWICE DAILY AT 9 AM AND AT 9 PM  
  
 phenytoin  mg ER capsule Commonly known as:  DILANTIN ER  
TAKE 4 CAPSULES BY MOUTH EVERY NIGHT AT BEDTIME SITagliptin 100 mg tablet Commonly known as:  Vallejo Mulch Take 1 tablet by mouth daily. spironolactone-hydrochlorothiazide 25-25 mg per tablet Commonly known as:  ALDACTAZIDE Take 1 Tab by mouth daily. tiotropium 18 mcg inhalation capsule Commonly known as:  Alvera Romberg Take 1 Cap by inhalation daily. warfarin 5 mg tablet Commonly known as:  COUMADIN  
TAKE 1 TABLET BY MOUTH MONDAY THROUGH FRIDAY AND 1& 1/2 TABLETS ON SATURDAY AND SUNDAY * Notice: This list has 2 medication(s) that are the same as other medications prescribed for you. Read the directions carefully, and ask your doctor or other care provider to review them with you. We Performed the Following AMB POC PT/INR [17772 CPT(R)] Introducing [facility]! [facility] introduces [employer] patient portal. Now you can access parts of your medical record, email your doctor's office, and request medication refills online. 1. In your internet browser, go to [email or web address] EHRt 2. Click on the First Time User? Click Here link in the Sign In box. You will see the New Member Sign Up page. 3. Enter your [employer] Access Code exactly as it appears below. You will not need to use this code after youve completed the sign-up process. If you do not sign up before the expiration date, you must request a new code. · [employer] Access Code: [email or web address] Expires: 7/21/2018  2:53 PM 
 
4. Enter the last four digits of your Social Security Number (xxxx) and Date of Birth (mm/dd/yyyy) as indicated and click Submit. You will be taken to the next sign-up page. 5. Create a [employer] ID.  This will be your [employer] login ID and cannot be changed, so think of one that is secure and easy to remember. 6. Create a [email or web address] password. You can change your password at any time. 7. Enter your Password Reset Question and Answer. This can be used at a later time if you forget your password. 8. Enter your e-mail address. You will receive e-mail notification when new information is available in [address] 9. Click Sign Up. You can now view and download portions of your medical record. 10. Click the Download Summary menu link to download a portable copy of your medical information. If you have questions, please visit the Frequently Asked Questions section of the [email or web address] website. Remember, [email or web address] is NOT to be used for urgent needs. For medical emergencies, dial 911. Now available from your iPhone and Android! Please provide this summary of care documentation to your next provider. Your primary care clinician is listed as Manohar Mo. If you have any questions after today's visit, please call [phone].

## 2018-06-19 RX ORDER — WARFARIN SODIUM 5 MG/1
TABLET ORAL
Qty: 103 TAB | Refills: 3 | Status: SHIPPED | OUTPATIENT
Start: 2018-06-19 | End: 2018-09-10 | Stop reason: SDUPTHER

## 2018-06-21 RX ORDER — LANCETS 28 GAUGE
EACH MISCELLANEOUS
Qty: 100 LANCET | Refills: 3 | Status: SHIPPED | OUTPATIENT
Start: 2018-06-21 | End: 2019-08-20 | Stop reason: SDUPTHER

## 2018-07-05 ENCOUNTER — TELEPHONE (OUTPATIENT)
Dept: INTERNAL MEDICINE CLINIC | Age: 73
End: 2018-07-05

## 2018-07-05 NOTE — TELEPHONE ENCOUNTER
NURSING CALL STATING THAT PT 12.3 and INR 1.0 TAKING 5 MG 1 TAB DAILY. PER DR. KELLY PATIENT TO TAKE 2 TABS TODAY AND 2 TABS Friday THEN RESUME 1 YAB DAILY AND REPEAT IN 1 WEEK.

## 2018-07-12 ENCOUNTER — TELEPHONE (OUTPATIENT)
Dept: INTERNAL MEDICINE CLINIC | Age: 73
End: 2018-07-12

## 2018-07-19 ENCOUNTER — TELEPHONE (OUTPATIENT)
Dept: INTERNAL MEDICINE CLINIC | Age: 73
End: 2018-07-19

## 2018-07-26 ENCOUNTER — TELEPHONE (OUTPATIENT)
Dept: INTERNAL MEDICINE CLINIC | Age: 73
End: 2018-07-26

## 2018-07-26 NOTE — TELEPHONE ENCOUNTER
Nursing called in PT 37.1 AND INR 3.1 TAKING 5MG DAILY. PER DR. KELLY PATIENT TO HOLD X 2 DAYS, THEN RESTART AT 5MG DAILY AND REPEAT IN 1 WEEK. Anthony Norris

## 2018-08-09 ENCOUNTER — TELEPHONE (OUTPATIENT)
Dept: INTERNAL MEDICINE CLINIC | Age: 73
End: 2018-08-09

## 2018-08-09 NOTE — TELEPHONE ENCOUNTER
We get a call from [place] that patient PT 18.5 and INR 1.5 taking a 1/2 tab on mon, tues, and wed. Per Dr. Fady Mcgraw patient to increase to 5mg daily and repeat on Thursday.

## 2018-08-16 ENCOUNTER — CLINICAL SUPPORT (OUTPATIENT)
Dept: INTERNAL MEDICINE CLINIC | Age: 73
End: 2018-08-16

## 2018-08-16 DIAGNOSIS — I48.0 PAROXYSMAL ATRIAL FIBRILLATION (HCC): Primary | Chronic | ICD-10-CM

## 2018-08-16 LAB
INR BLD: 2.8
PT POC: 34.1 SECONDS
VALID INTERNAL CONTROL?: YES

## 2018-08-16 NOTE — MR AVS SNAPSHOT
Jaylen Fernández 
 
 
 [address] [address] 
[phone] Patient: Nia Reyes MRN: [id number] FIK:6/80/9983 Visit Information Date & Time Provider Department Dept. Phone Encounter #  
 8/16/2018 10:20 [facility] [address] [place] [address] [phone] [id number] Your Appointments 8/30/2018  9:30 AM  
Any with Aleida Earl MD  
[address] [place] [address] and Primary Care [facility] Appt Note: 2 month follow up; 2 month follow up  
 [address] [facility]  
  
   
 [address] [address] Upcoming Health Maintenance Date Due  
 EYE EXAM RETINAL OR DILATED Q1 4/13/2016 GLAUCOMA SCREENING Q2Y 4/13/2017 Influenza Age 5 to Adult 8/1/2018 Pneumococcal 65+ Low/Medium Risk (2 of 2 - PPSV23) 11/30/2018* HEMOGLOBIN A1C Q6M 10/17/2018 FOOT EXAM Q1 1/22/2019 MICROALBUMIN Q1 1/22/2019 LIPID PANEL Q1 1/22/2019 MEDICARE YEARLY EXAM 6/1/2019 DTaP/Tdap/Td series (2 - Td) 2/10/2027 *Topic was postponed. The date shown is not the original due date. Allergies as of 8/16/2018  Review Complete On: 6/3/2018 By: Aleida Earl MD  
  
 Severity Noted Reaction Type Reactions Clonidine  04/24/2017    Other (comments) Patient becomes incoherent Current Immunizations  Never Reviewed Name Date Influenza High Dose Vaccine PF 10/23/2017, 11/28/2016 Not reviewed this visit You Were Diagnosed With   
  
 Codes Comments Paroxysmal atrial fibrillation (HCC)    -  Primary ICD-10-CM: I48.0 ICD-9-CM: 427.31 Vitals OB Status Smoking Status Postmenopausal Former Smoker Preferred Pharmacy Pharmacy Name Phone [id number] [place] [address] [facility] [phone] Your Updated Medication List  
  
   
 This list is accurate as of 8/16/18  2:06 PM.  Always use your most recent med list.  
  
  
  
  
 * albuterol 90 mcg/actuation inhaler Commonly known as:  PROAIR HFA Take 2 Puffs by inhalation every four (4) hours as needed for Wheezing or Shortness of Breath. * albuterol 2.5 mg /3 mL (0.083 %) nebulizer solution Commonly known as:  PROVENTIL VENTOLIN  
USE 1 VIAL VIA NEBULIZER EVERY 6 HOURS AS NEEDED. DX: J45.909  
  
 allopurinol 300 mg tablet Commonly known as:  ZYLOPRIM  
TAKE 1 TABLET BY MOUTH ONCE DAILY  
  
 aspirin delayed-release 81 mg tablet Take 81 mg by mouth daily. atorvastatin 80 mg tablet Commonly known as:  LIPITOR Take 1 Tab by mouth daily. Blood-Glucose Meter Misc Commonly known as:  TRUE METRIX AIR GLUCOSE METER  
1 Device by Does Not Apply route daily. Use to test blood sugars daily. dx.e11.9  
  
 budesonide-formoterol 160-4.5 mcg/actuation Hfaa Commonly known as:  SYMBICORT Take 2 Puffs by inhalation two (2) times a day. cloNIDine HCl 0.2 mg tablet Commonly known as:  CATAPRES Take 1 Tab by mouth two (2) times a day. felodipine 10 mg 24 hr tablet Commonly known as:  PLENDIL SR  
TAKE 1 TABLET BY MOUTH EVERY DAY  
  
 glucose blood VI test strips strip Commonly known as:  TRUE METRIX GLUCOSE TEST STRIP Use to test blood sugar once daily. Dx.e11.9  
  
 guaiFENesin-codeine 100-10 mg/5 mL solution Commonly known as:  Malena Ally Take 5 mL by mouth three (3) times daily as needed for Cough. Max Daily Amount: 15 mL. * lancets 28 gauge Misc Commonly known as:  Starlet Sober Use to test blood sugar once daily. Dx.e11.9  
  
 * lancets 28 gauge Misc Commonly known as:  FREESTYLE LANCETS Use to test blood sugar once daily. Dx.e11.9  
  
 levETIRAcetam 1,000 mg tablet Take 1 Tab by mouth two (2) times a day. lisinopril 20 mg tablet Commonly known as:  Jaime Nicolas Take 1 Tab by mouth daily. metFORMIN 500 mg tablet Commonly known as:  GLUCOPHAGE  
TAKE 1 TABLET BY MOUTH TWICE DAILY WITH MEALS  
  
 metoprolol tartrate 25 mg tablet Commonly known as:  LOPRESSOR  
TAKE 1 TABLET BY MOUTH TWICE DAILY AT 9 AM AND AT 9 PM  
  
 phenytoin  mg ER capsule Commonly known as:  DILANTIN ER  
TAKE 4 CAPSULES BY MOUTH EVERY NIGHT AT BEDTIME  
  
 predniSONE 20 mg tablet Commonly known as:  Junito Razor Take 1 Tab by mouth three (3) times daily (after meals). Indications: asthma SITagliptin 100 mg tablet Commonly known as:  Prema Pierini Take 1 tablet by mouth daily. spironolactone-hydrochlorothiazide 25-25 mg per tablet Commonly known as:  ALDACTAZIDE Take 1 Tab by mouth daily. tiotropium 18 mcg inhalation capsule Commonly known as:  Da Doe Take 1 Cap by inhalation daily. warfarin 5 mg tablet Commonly known as:  COUMADIN  
TAKE 1 TABLET BY MOUTH MONDAY THROUGH FRIDAY AND 1& 1/2 TABLETS ON SATURDAY AND SUNDAY * Notice: This list has 4 medication(s) that are the same as other medications prescribed for you. Read the directions carefully, and ask your doctor or other care provider to review them with you. We Performed the Following AMB POC PT/INR [40729 CPT(R)] Introducing [facility]! [employer] introduces [employer] patient portal. Now you can access parts of your medical record, email your doctor's office, and request medication refills online. 1. In your internet browser, go to [email or web address] 2. Click on the First Time User? Click Here link in the Sign In box. You will see the New Member Sign Up page. 3. Enter your [employer] Access Code exactly as it appears below. You will not need to use this code after youve completed the sign-up process. If you do not sign up before the expiration date, you must request a new code. · [employer] Access Code: [email or web address] Expires: 11/14/2018 10:22 AM 
 
 4. Enter the last four digits of your Social Security Number (xxxx) and Date of Birth (mm/dd/yyyy) as indicated and click Submit. You will be taken to the next sign-up page. 5. Create a [employer] ID. This will be your [employer] login ID and cannot be changed, so think of one that is secure and easy to remember. 6. Create a [employer] password. You can change your password at any time. 7. Enter your Password Reset Question and Answer. This can be used at a later time if you forget your password. 8. Enter your e-mail address. You will receive e-mail notification when new information is available in [address] 9. Click Sign Up. You can now view and download portions of your medical record. 10. Click the Download Summary menu link to download a portable copy of your medical information. If you have questions, please visit the Frequently Asked Questions section of the [employer] website. Remember, [employer] is NOT to be used for urgent needs. For medical emergencies, dial 911. Now available from your iPhone and Android! Please provide this summary of care documentation to your next provider. Your primary care clinician is listed as Manohar Mo. If you have any questions after today's visit, please call [phone].

## 2018-08-16 NOTE — PROGRESS NOTES
Chief Complaint   Patient presents with    Coagulation disorder     Patient in office for pt/inr check. Patient states that she is taking coumadin 5 mg daily. Results for orders placed or performed in visit on 08/16/18   AMB POC PT/INR   Result Value Ref Range    VALID INTERNAL CONTROL POC Yes     Prothrombin time (POC) 34.1 seconds    INR POC 2.8      Per Dr. Laila Pace, no changes and return to office in two weeks for pt/inr check.

## 2018-08-30 ENCOUNTER — OFFICE VISIT (OUTPATIENT)
Dept: INTERNAL MEDICINE CLINIC | Age: 73
End: 2018-08-30

## 2018-08-30 VITALS
BODY MASS INDEX: 49.94 KG/M2 | WEIGHT: 271.4 LBS | TEMPERATURE: 98.2 F | DIASTOLIC BLOOD PRESSURE: 71 MMHG | HEART RATE: 51 BPM | OXYGEN SATURATION: 93 % | HEIGHT: 62 IN | RESPIRATION RATE: 14 BRPM | SYSTOLIC BLOOD PRESSURE: 118 MMHG

## 2018-08-30 DIAGNOSIS — G40.909 SEIZURE DISORDER (HCC): ICD-10-CM

## 2018-08-30 DIAGNOSIS — M17.12 PRIMARY OSTEOARTHRITIS OF LEFT KNEE: ICD-10-CM

## 2018-08-30 DIAGNOSIS — I48.0 PAROXYSMAL ATRIAL FIBRILLATION (HCC): Primary | Chronic | ICD-10-CM

## 2018-08-30 DIAGNOSIS — E66.01 MORBID OBESITY (HCC): Chronic | ICD-10-CM

## 2018-08-30 DIAGNOSIS — I10 ESSENTIAL HYPERTENSION: ICD-10-CM

## 2018-08-30 DIAGNOSIS — E11.9 TYPE 2 DIABETES MELLITUS WITHOUT COMPLICATION, WITHOUT LONG-TERM CURRENT USE OF INSULIN (HCC): Chronic | ICD-10-CM

## 2018-08-30 DIAGNOSIS — J45.20 INTERMITTENT ASTHMA WITHOUT COMPLICATION, UNSPECIFIED ASTHMA SEVERITY: ICD-10-CM

## 2018-08-30 LAB
INR BLD: 4.9
PT POC: 58.2 SECONDS
VALID INTERNAL CONTROL?: YES

## 2018-08-30 NOTE — PROGRESS NOTES
Chief Complaint Patient presents with  Irregular Heart Beat  
  2 month follow up; patient states that she is taking coumadin 5 mg daily. 1. Have you been to the ER, urgent care clinic since your last visit? Hospitalized since your last visit? No 
 
2. Have you seen or consulted any other health care providers outside of the [address] [place] [address] since your last visit? Include any pap smears or colon screening.  No

## 2018-08-30 NOTE — PROGRESS NOTES
[address] [facility] and Primary Care 
[place] [address] 
[address] [place] [address] [address] Phone:  [phone]  Fax: [phone] Chief Complaint Patient presents with  Irregular Heart Beat  
  2 month follow up; patient states that she is taking coumadin 5 mg daily. .   
 
SUBJECTIVE: 
  Eusebio Sheets is a 74 y.o. female Comes in for a return visit stating that she is doing well. She went to [facility] and had a flare of osteoarthritis of left knee and could not walk. She stayed for one day and they then transferred her to rehab. She has been in rehab for several weeks and is walking at her normal status. She comes in stating she has been doing well other than this very vague pain on the dorsal aspect of both feet. Her asthma is reasonably stable. Her diabetes is doing quite well also. She is taking Coumadin for current DVT and elevated today at 4.9 primarily because she is not eating vegetables as I instructed her to do. She has a past history of dyslipidemia, primary hypertension, and obesity. Current Outpatient Prescriptions Medication Sig Dispense Refill  lancets (FREESTYLE LANCETS) 28 gauge misc Use to test blood sugar once daily. Dx.e11.9 100 Lancet 3  warfarin (COUMADIN) 5 mg tablet TAKE 1 TABLET BY MOUTH MONDAY THROUGH FRIDAY AND 1& 1/2 TABLETS ON SATURDAY AND SUNDAY 103 Tab 3  
 felodipine (PLENDIL SR) 10 mg 24 hr tablet TAKE 1 TABLET BY MOUTH EVERY DAY 90 Tab 3  
 allopurinol (ZYLOPRIM) 300 mg tablet TAKE 1 TABLET BY MOUTH ONCE DAILY 90 Tab 3  
 glucose blood VI test strips (TRUE METRIX GLUCOSE TEST STRIP) strip Use to test blood sugar once daily. Dx.e11.9 100 Strip 11  
 metoprolol tartrate (LOPRESSOR) 25 mg tablet TAKE 1 TABLET BY MOUTH TWICE DAILY AT 9 AM AND AT 9  Tab 3  phenytoin ER (DILANTIN ER) 100 mg ER capsule TAKE 4 CAPSULES BY MOUTH EVERY NIGHT AT BEDTIME 360 Cap 3  
  spironolactone-hydrochlorothiazide (ALDACTAZIDE) 25-25 mg per tablet Take 1 Tab by mouth daily. 90 Tab 11  
 atorvastatin (LIPITOR) 80 mg tablet Take 1 Tab by mouth daily. 90 Tab 3  
 levETIRAcetam 1,000 mg tablet Take 1 Tab by mouth two (2) times a day. 60 Tab 11  Blood-Glucose Meter (TRUE METRIX AIR GLUCOSE METER) [facility] 1 Device by Does Not Apply route daily. Use to test blood sugars daily. dx.e11.9 1 Each 0  
 lancets (TRUEPLUS LANCETS) 28 gauge misc Use to test blood sugar once daily. Dx.e11.9 100 Lancet 11  
 aspirin delayed-release 81 mg tablet Take 81 mg by mouth daily.  tiotropium (SPIRIVA) 18 mcg inhalation capsule Take 1 Cap by inhalation daily. 30 Cap 11  
 budesonide-formoterol (SYMBICORT) 160-4.5 mcg/Actuation HFA inhaler Take 2 Puffs by inhalation two (2) times a day. 10.2 Inhaler 11  
 lisinopril (PRINIVIL, ZESTRIL) 20 mg tablet Take 1 Tab by mouth daily. 90 Tab 3  
 albuterol (PROVENTIL VENTOLIN) 2.5 mg /3 mL (0.083 %) nebulizer solution USE 1 VIAL VIA NEBULIZER EVERY 6 HOURS AS NEEDED. DX: J45.909 120 Each 11  
 guaiFENesin-codeine (VIRTUSSIN AC) 100-10 mg/5 mL solution Take 5 mL by mouth three (3) times daily as needed for Cough. Max Daily Amount: 15 mL. 140 mL 0  
 cloNIDine HCl (CATAPRES) 0.2 mg tablet Take 1 Tab by mouth two (2) times a day. 30 Tab 11  
 albuterol (PROAIR HFA) 90 mcg/actuation inhaler Take 2 Puffs by inhalation every four (4) hours as needed for Wheezing or Shortness of Breath. 1 Inhaler 11  
 metFORMIN (GLUCOPHAGE) 500 mg tablet TAKE 1 TABLET BY MOUTH TWICE DAILY WITH MEALS 180 Tab 3  
 sitagliptin (JANUVIA) 100 mg tablet Take 1 tablet by mouth daily. 30 tablet 11 Past Medical History:  
Diagnosis Date  Arthritis  Asthma  Diabetes ([facility]  Hypertension  Other ill-defined conditions(799.89)   
 rt knee surgery  Stroke ([facility] Past Surgical History:  
Procedure Laterality Date  HX COLONOSCOPY    
 HX GYN Allergies Allergen Reactions  Clonidine Other (comments) Patient becomes incoherent REVIEW OF SYSTEMS: 
General: negative for - chills or fever ENT: negative for - headaches, nasal congestion or tinnitus Respiratory: negative for - cough, hemoptysis, shortness of breath or wheezing Cardiovascular : negative for - chest pain, edema, palpitations or shortness of breath Gastrointestinal: negative for - abdominal pain, blood in stools, heartburn or nausea/vomiting Genito-Urinary: no dysuria, trouble voiding, or hematuria Musculoskeletal: negative for - gait disturbance, joint pain, joint stiffness or joint swelling Neurological: no TIA or stroke symptoms Hematologic: no bruises, no bleeding, no swollen glands Integument: no lumps, mole changes, nail changes or rash Endocrine: no malaise/lethargy or unexpected weight changes Social History Social History  Marital status:  Spouse name: N/A  
 Number of children: 1  Years of education: N/A Occupational History  disabled--CVA Social History Main Topics  Smoking status: Former Smoker  Smokeless tobacco: Never Used Comment: 20+years ago  Alcohol use No  
 Drug use: No  
 Sexual activity: Not Currently Other Topics Concern  None Social History Narrative History reviewed. No pertinent family history. OBJECTIVE: 
 
Visit Vitals  /71  Pulse (!) 51  Temp 98.2 °F (36.8 °C) (Oral)  Resp 14  
 Ht 5' 2\" (1.575 m)  Wt 271 lb 6.4 oz (123.1 kg)  SpO2 93%  BMI 49.64 kg/m2 CONSTITUTIONAL: well , well nourished, appears age appropriate EYES: perrla, eom intact ENMT:moist mucous membranes, pharynx clear NECK: supple. Thyroid normal 
RESPIRATORY: Chest: clear to ascultation and percussion CARDIOVASCULAR: Heart: regular rate and rhythm GASTROINTESTINAL: Abdomen: soft, bowel sounds active HEMATOLOGIC: no pathological lymph nodes palpated MUSCULOSKELETAL: Extremities: trace edema, pulse 1+ INTEGUMENT: No unusual rashes or suspicious skin lesions noted. Nails appear normal. 
NEUROLOGIC: non-focal exam  
MENTAL STATUS: alert and oriented, appropriate affect ASSESSMENT: 
1. Paroxysmal atrial fibrillation (HCC) 2. Morbid obesity ([address] 3. Type 2 diabetes mellitus without complication, without long-term current use of insulin ([address] 4. Seizure disorder ([facility] 5. Intermittent asthma without complication, unspecified asthma severity 6. Essential hypertension 7. Primary osteoarthritis of left knee PLAN: 
 
1. INR has been elevated. She will hold it for two days, resuming at 5 mg daily and increase her intake of vegetables. 2. Her diabetes historically has been doing well. I will await results of HbA1c.  
3. Her obesity continues to be a major problem contributing to her comorbidities. I again encouraged weight loss. This can be done by eating meals, eliminating snacks and to reduce her consumption or processed carbohydrates. 4. Blood pressure excellent today, no adjustments made. 5. Asthma is quite stable. She will continue bronchodilators as prescribed. 6. She has had problems previously with Lisinopril and Clonidine. . 
Orders Placed This Encounter  HEMOGLOBIN A1C WITH EAG  
 METABOLIC PANEL, BASIC  
 AMB POC PT/INR Follow-up Disposition: 
Return in about 2 months (around 10/30/2018), or RTO 2 weeks for INR. Sascha Mondragon MD

## 2018-08-30 NOTE — MR AVS SNAPSHOT
[address] [facility] 
 
 
 [address] [facility] [address] 
[phone] Patient: Sandrine White MRN: [id number] ACH:3/70/0475 Visit Information Date & Time Provider Department Dept. Phone Encounter #  
 8/30/2018  9:30 AM MD [facility] Sports Medicine and Primary Care [phone] [id number] Follow-up Instructions Return in about 2 months (around 10/30/2018), or RTO 2 weeks for INR. Your Appointments 9/13/2018  4:00 PM  
Nurse Visit with Maye [address] [place] [address] and Primary Care ([facility]) Appt Note: pt/inr  
 [address] [facility] 1 Medical [place]  
  
   
 [address] [facility] [address]  
  
    
 11/1/2018 10:30 AM  
Any with Jacquelin Wang MD  
[address] [place] [address] and Primary Care [facility]) Appt Note: 2 month follow up  
 [address] [facility]  
  
   
 [facility] [address] Upcoming Health Maintenance Date Due  
 GLAUCOMA SCREENING Q2Y 4/13/2017 Influenza Age 5 to Adult 8/1/2018 EYE EXAM RETINAL OR DILATED Q1 9/30/2018* Pneumococcal 65+ Low/Medium Risk (2 of 2 - PPSV23) 11/30/2018* HEMOGLOBIN A1C Q6M 10/17/2018 FOOT EXAM Q1 1/22/2019 MICROALBUMIN Q1 1/22/2019 LIPID PANEL Q1 1/22/2019 MEDICARE YEARLY EXAM 6/1/2019 DTaP/Tdap/Td series (2 - Td) 2/10/2027 *Topic was postponed. The date shown is not the original due date. Allergies as of 8/30/2018  Review Complete On: 8/30/2018 By: Debi Rendon Severity Noted Reaction Type Reactions Clonidine  04/24/2017    Other (comments) Patient becomes incoherent Current Immunizations  Never Reviewed Name Date Influenza High Dose Vaccine PF 10/23/2017, 11/28/2016 Not reviewed this visit You Were Diagnosed With   
  
 Codes Comments Paroxysmal atrial fibrillation (HCC)    -  Primary ICD-10-CM: I48.0 ICD-9-CM: 427.31 Morbid obesity ([facility]     ICD-10-CM: E66.01 
ICD-9-CM: 278.01 Type 2 diabetes mellitus without complication, without long-term current use of insulin (HCC)     ICD-10-CM: E11.9 ICD-9-CM: 250.00 Seizure disorder ([facility]     ICD-10-CM: G40.909 ICD-9-CM: 345.90 Intermittent asthma without complication, unspecified asthma severity     ICD-10-CM: J45.20 ICD-9-CM: 493.90 Essential hypertension     ICD-10-CM: I10 
ICD-9-CM: 401.9 Primary osteoarthritis of left knee     ICD-10-CM: M17.12 
ICD-9-CM: 715.16 Vitals BP Pulse Temp Resp Height(growth percentile) Weight(growth percentile) 118/71 (!) 51 98.2 °F (36.8 °C) (Oral) 14 5' 2\" (1.575 m) 271 lb 6.4 oz (123.1 kg) SpO2 BMI OB Status Smoking Status 93% 49.64 kg/m2 Postmenopausal Former Smoker Vitals History BMI and BSA Data Body Mass Index Body Surface Area  
 49.64 kg/m 2 2.32 m 2 Preferred Pharmacy Pharmacy Name Phone [address], [address] [phone] Your Updated Medication List  
  
   
This list is accurate as of 8/30/18  2:13 PM.  Always use your most recent med list.  
  
  
  
  
 * albuterol 90 mcg/actuation inhaler Commonly known as:  PROAIR HFA Take 2 Puffs by inhalation every four (4) hours as needed for Wheezing or Shortness of Breath. * albuterol 2.5 mg /3 mL (0.083 %) nebulizer solution Commonly known as:  PROVENTIL VENTOLIN  
USE 1 VIAL VIA NEBULIZER EVERY 6 HOURS AS NEEDED. DX: J45.909  
  
 allopurinol 300 mg tablet Commonly known as:  ZYLOPRIM  
TAKE 1 TABLET BY MOUTH ONCE DAILY  
  
 aspirin delayed-release 81 mg tablet Take 81 mg by mouth daily. atorvastatin 80 mg tablet Commonly known as:  LIPITOR Take 1 Tab by mouth daily. Blood-Glucose Meter Misc Commonly known as:  TRUE METRIX AIR GLUCOSE METER  
 1 Device by Does Not Apply route daily. Use to test blood sugars daily. dx.e11.9  
  
 budesonide-formoterol 160-4.5 mcg/actuation Hfaa Commonly known as:  SYMBICORT Take 2 Puffs by inhalation two (2) times a day. cloNIDine HCl 0.2 mg tablet Commonly known as:  CATAPRES Take 1 Tab by mouth two (2) times a day. felodipine 10 mg 24 hr tablet Commonly known as:  PLENDIL SR  
TAKE 1 TABLET BY MOUTH EVERY DAY  
  
 glucose blood VI test strips strip Commonly known as:  TRUE METRIX GLUCOSE TEST STRIP Use to test blood sugar once daily. Dx.e11.9  
  
 guaiFENesin-codeine 100-10 mg/5 mL solution Commonly known as:  Zoanne Enzo Take 5 mL by mouth three (3) times daily as needed for Cough. Max Daily Amount: 15 mL. * lancets 28 gauge Misc Commonly known as:  Ghassan Alcantara Use to test blood sugar once daily. Dx.e11.9  
  
 * lancets 28 gauge Misc Commonly known as:  FREESTYLE LANCETS Use to test blood sugar once daily. Dx.e11.9  
  
 levETIRAcetam 1,000 mg tablet Take 1 Tab by mouth two (2) times a day. lisinopril 20 mg tablet Commonly known as:  [place] Take 1 Tab by mouth daily. metFORMIN 500 mg tablet Commonly known as:  GLUCOPHAGE  
TAKE 1 TABLET BY MOUTH TWICE DAILY WITH MEALS  
  
 metoprolol tartrate 25 mg tablet Commonly known as:  LOPRESSOR  
TAKE 1 TABLET BY MOUTH TWICE DAILY AT 9 AM AND AT 9 PM  
  
 phenytoin  mg ER capsule Commonly known as:  DILANTIN ER  
TAKE 4 CAPSULES BY MOUTH EVERY NIGHT AT BEDTIME SITagliptin 100 mg tablet Commonly known as:  Waylon Knights Take 1 tablet by mouth daily. spironolactone-hydrochlorothiazide 25-25 mg per tablet Commonly known as:  ALDACTAZIDE Take 1 Tab by mouth daily. tiotropium 18 mcg inhalation capsule Commonly known as:  Maryella Frank Take 1 Cap by inhalation daily. warfarin 5 mg tablet Commonly known as:  COUMADIN  
 TAKE 1 TABLET BY MOUTH MONDAY THROUGH FRIDAY AND 1& 1/2 TABLETS ON SATURDAY AND SUNDAY * Notice: This list has 4 medication(s) that are the same as other medications prescribed for you. Read the directions carefully, and ask your doctor or other care provider to review them with you. We Performed the Following AMB POC PT/INR [13650 CPT(R)] HEMOGLOBIN A1C WITH EAG [07014 CPT(R)] METABOLIC PANEL, BASIC [16909 CPT(R)] Follow-up Instructions Return in about 2 months (around 10/30/2018), or RTO 2 weeks for INR. Introducing [facility] & HEALTH [facility]! [facility] introduces [employer] patient portal. Now you can access parts of your medical record, email your doctor's office, and request medication refills online. 1. In your internet browser, go to [email or web address] 2. Click on the First Time User? Click Here link in the Sign In box. You will see the New Member Sign Up page. 3. Enter your [employer] Access Code exactly as it appears below. You will not need to use this code after youve completed the sign-up process. If you do not sign up before the expiration date, you must request a new code. · [employer] Access Code: [email or web address] Expires: 11/14/2018 10:22 AM 
 
4. Enter the last four digits of your Social Security Number (xxxx) and Date of Birth (mm/dd/yyyy) as indicated and click Submit. You will be taken to the next sign-up page. 5. Create a [employer] ID. This will be your [employer] login ID and cannot be changed, so think of one that is secure and easy to remember. 6. Create a [employer] password. You can change your password at any time. 7. Enter your Password Reset Question and Answer. This can be used at a later time if you forget your password. 8. Enter your e-mail address. You will receive e-mail notification when new information is available in [address] 9. Click Sign Up. You can now view and download portions of your medical record. 10. Click the Download Summary menu link to download a portable copy of your medical information. If you have questions, please visit the Frequently Asked Questions section of the [employer] website. Remember, [employer] is NOT to be used for urgent needs. For medical emergencies, dial 911. Now available from your iPhone and Android! Please provide this summary of care documentation to your next provider. Your primary care clinician is listed as Manohar Mo. If you have any questions after today's visit, please call [phone].

## 2018-08-31 LAB
BUN SERPL-MCNC: 31 MG/DL (ref 8–27)
BUN/CREAT SERPL: 26 (ref 12–28)
CALCIUM SERPL-MCNC: 9.2 MG/DL (ref 8.7–10.3)
CHLORIDE SERPL-SCNC: 102 MMOL/L (ref 96–106)
CO2 SERPL-SCNC: 19 MMOL/L (ref 20–29)
CREAT SERPL-MCNC: 1.2 MG/DL (ref 0.57–1)
EST. AVERAGE GLUCOSE BLD GHB EST-MCNC: 143 MG/DL
GLUCOSE SERPL-MCNC: 100 MG/DL (ref 65–99)
HBA1C MFR BLD: 6.6 % (ref 4.8–5.6)
POTASSIUM SERPL-SCNC: 5.2 MMOL/L (ref 3.5–5.2)
SODIUM SERPL-SCNC: 137 MMOL/L (ref 134–144)

## 2018-09-10 RX ORDER — WARFARIN SODIUM 5 MG/1
TABLET ORAL
Qty: 103 TAB | Refills: 3 | Status: SHIPPED | OUTPATIENT
Start: 2018-09-10 | End: 2020-01-14 | Stop reason: SDUPTHER

## 2018-09-10 RX ORDER — ALBUTEROL SULFATE 0.83 MG/ML
SOLUTION RESPIRATORY (INHALATION)
Qty: 120 EACH | Refills: 11 | Status: SHIPPED | OUTPATIENT
Start: 2018-09-10 | End: 2020-10-07 | Stop reason: SDUPTHER

## 2018-09-13 ENCOUNTER — CLINICAL SUPPORT (OUTPATIENT)
Dept: INTERNAL MEDICINE CLINIC | Age: 73
End: 2018-09-13

## 2018-09-13 DIAGNOSIS — I48.0 PAROXYSMAL ATRIAL FIBRILLATION (HCC): Primary | Chronic | ICD-10-CM

## 2018-09-13 LAB
INR BLD: 2.8
PT POC: 33.3 SECONDS
VALID INTERNAL CONTROL?: YES

## 2018-09-13 RX ORDER — LEVETIRACETAM 1000 MG/1
1000 TABLET ORAL 2 TIMES DAILY
Qty: 60 TAB | Refills: 11 | Status: SHIPPED | OUTPATIENT
Start: 2018-09-13 | End: 2019-01-14 | Stop reason: SDUPTHER

## 2018-09-13 NOTE — MR AVS SNAPSHOT
[address] [facility] 
 
 
 [address] [id number] 
[phone] Patient: Brandon Heredia MRN: [id number] MAREK:[id number] Visit Information Date & Time Provider Department Dept. Phone Encounter #  
 9/13/2018  4:00 PM [facility] Medicine and Primary Care [phone] [id number] Your Appointments 9/13/2018  4:00 PM  
Nurse Visit with [address] and Primary Care ([facility]) Appt Note: pt/inr  
 [address]  
[phone]  
  
   
 [address] [id number]  
  
    
 10/15/2018  9:00 AM  
Nurse Visit with [address] and Primary Care ([facility]) Appt Note: 1 month PT/INR  
 [address]  
[phone]  
  
    
 11/1/2018 10:30 AM  
Any with Daniel Gilliland MD  
[address] [place] [address] and Primary Care [facility]) Appt Note: 2 month follow up  
 [address] [facility]  
  
   
 [address] [address] Upcoming Health Maintenance Date Due  
 GLAUCOMA SCREENING Q2Y 4/13/2017 Influenza Age 5 to Adult 8/1/2018 EYE EXAM RETINAL OR DILATED Q1 9/30/2018* Pneumococcal 65+ Low/Medium Risk (2 of 2 - PPSV23) 11/30/2018* FOOT EXAM Q1 1/22/2019 MICROALBUMIN Q1 1/22/2019 LIPID PANEL Q1 1/22/2019 HEMOGLOBIN A1C Q6M 2/28/2019 MEDICARE YEARLY EXAM 6/1/2019 DTaP/Tdap/Td series (2 - Td) 2/10/2027 *Topic was postponed. The date shown is not the original due date. Allergies as of 9/13/2018  Review Complete On: 9/3/2018 By: Daniel Gilliland MD  
  
 Severity Noted Reaction Type Reactions Clonidine  04/24/2017    Other (comments) Patient becomes incoherent Current Immunizations  Never Reviewed Name Date Influenza High Dose Vaccine PF 10/23/2017, 11/28/2016 Not reviewed this visit You Were Diagnosed With   
  
 Codes Comments Paroxysmal atrial fibrillation (HCC)    -  Primary ICD-10-CM: I48.0 ICD-9-CM: 427.31 Vitals OB Status Smoking Status Postmenopausal Former Smoker Preferred Pharmacy Pharmacy Name Phone [phone], [address] [phone] Your Updated Medication List  
  
   
This list is accurate as of 9/13/18 10:08 AM.  Always use your most recent med list.  
  
  
  
  
 * albuterol 90 mcg/actuation inhaler Commonly known as:  PROAIR HFA Take 2 Puffs by inhalation every four (4) hours as needed for Wheezing or Shortness of Breath. * albuterol 2.5 mg /3 mL (0.083 %) nebulizer solution Commonly known as:  PROVENTIL VENTOLIN  
USE 1 VIAL VIA NEBULIZER EVERY 6 HOURS AS NEEDED. DX: J45.909  
  
 allopurinol 300 mg tablet Commonly known as:  ZYLOPRIM  
TAKE 1 TABLET BY MOUTH ONCE DAILY  
  
 aspirin delayed-release 81 mg tablet Take 81 mg by mouth daily. atorvastatin 80 mg tablet Commonly known as:  LIPITOR Take 1 Tab by mouth daily. Blood-Glucose Meter Misc Commonly known as:  TRUE METRIX AIR GLUCOSE METER  
1 Device by Does Not Apply route daily. Use to test blood sugars daily. dx.e11.9  
  
 budesonide-formoterol 160-4.5 mcg/actuation Hfaa Commonly known as:  SYMBICORT Take 2 Puffs by inhalation two (2) times a day. cloNIDine HCl 0.2 mg tablet Commonly known as:  CATAPRES Take 1 Tab by mouth two (2) times a day. felodipine 10 mg 24 hr tablet Commonly known as:  PLENDIL SR  
TAKE 1 TABLET BY MOUTH EVERY DAY  
  
 * glucose blood VI test strips strip Commonly known as:  TRUE METRIX GLUCOSE TEST STRIP Use to test blood sugar once daily. Dx.e11.9  
  
 * glucose blood VI test strips strip Commonly known as:  FREESTYLE LITE STRIPS Use to test blood sugar daily  
  
 guaiFENesin-codeine 100-10 mg/5 mL solution Commonly known as:  POPLAR [facility] Take 5 mL by mouth three (3) times daily as needed for Cough. Max Daily Amount: 15 mL. * lancets 28 gauge Misc Commonly known as:  Noelle Petite Use to test blood sugar once daily. Dx.e11.9  
  
 * lancets 28 gauge Misc Commonly known as:  FREESTYLE LANCETS Use to test blood sugar once daily. Dx.e11.9  
  
 levETIRAcetam 1,000 mg tablet Take 1 Tab by mouth two (2) times a day. lisinopril 20 mg tablet Commonly known as:  Lev Hu Take 1 Tab by mouth daily. metFORMIN 500 mg tablet Commonly known as:  GLUCOPHAGE  
TAKE 1 TABLET BY MOUTH TWICE DAILY WITH MEALS  
  
 metoprolol tartrate 25 mg tablet Commonly known as:  LOPRESSOR  
TAKE 1 TABLET BY MOUTH TWICE DAILY AT 9 AM AND AT 9 PM  
  
 phenytoin  mg ER capsule Commonly known as:  DILANTIN ER  
TAKE 4 CAPSULES BY MOUTH EVERY NIGHT AT BEDTIME SITagliptin 100 mg tablet Commonly known as:  Thirza Calkin Take 1 tablet by mouth daily. spironolactone-hydrochlorothiazide 25-25 mg per tablet Commonly known as:  ALDACTAZIDE Take 1 Tab by mouth daily. tiotropium 18 mcg inhalation capsule Commonly known as:  Laray Earl Take 1 Cap by inhalation daily. warfarin 5 mg tablet Commonly known as:  COUMADIN  
TAKE 1 TABLET BY MOUTH MONDAY THROUGH FRIDAY AND 1& 1/2 TABLETS ON SATURDAY AND SUNDAY * Notice: This list has 6 medication(s) that are the same as other medications prescribed for you. Read the directions carefully, and ask your doctor or other care provider to review them with you. We Performed the Following AMB POC PT/INR [11439 CPT(R)] Introducing [facility] & HEALTH SERVICES! [facility] introduces [employer] patient portal. Now you can access parts of your medical record, email your doctor's office, and request medication refills online. 1. In your internet browser, go to [email or web address] 2. Click on the First Time User? Click Here link in the Sign In box. You will see the New Member Sign Up page. 3. Enter your [employer] Access Code exactly as it appears below. You will not need to use this code after youve completed the sign-up process. If you do not sign up before the expiration date, you must request a new code. · [employer] Access Code: [email or web address] Expires: 11/14/2018 10:22 AM 
 
4. Enter the last four digits of your Social Security Number (xxxx) and Date of Birth (mm/dd/yyyy) as indicated and click Submit. You will be taken to the next sign-up page. 5. Create a Net Element ID. This will be your [employer] login ID and cannot be changed, so think of one that is secure and easy to remember. 6. Create a [employer] password. You can change your password at any time. 7. Enter your Password Reset Question and Answer. This can be used at a later time if you forget your password. 8. Enter your e-mail address. You will receive e-mail notification when new information is available in [address] 9. Click Sign Up. You can now view and download portions of your medical record. 10. Click the Download Summary menu link to download a portable copy of your medical information. If you have questions, please visit the Frequently Asked Questions section of the [employer] website. Remember, [employer] is NOT to be used for urgent needs. For medical emergencies, dial 911. Now available from your iPhone and Android! Please provide this summary of care documentation to your next provider. Your primary care clinician is listed as Manohar Mo. If you have any questions after today's visit, please call [phone].

## 2018-09-13 NOTE — PROGRESS NOTES
Chief Complaint   Patient presents with    Coagulation disorder     Patient in office for pt/inr check. Patient states that is taking couamdin 5 mg daily. Results for orders placed or performed in visit on 09/13/18   AMB POC PT/INR   Result Value Ref Range    VALID INTERNAL CONTROL POC Yes     Prothrombin time (POC) 33.3 seconds    INR POC 2.8      Per Dr. Cecil Gibbs, no changes and return to office in one for pt/inr check.

## 2018-09-28 ENCOUNTER — TELEPHONE (OUTPATIENT)
Dept: INTERNAL MEDICINE CLINIC | Age: 73
End: 2018-09-28

## 2018-09-28 NOTE — TELEPHONE ENCOUNTER
Nursing called with patient PT37.7 and INR 3.1 TAKING 5MG DAILY. Per Dr. Cummings Mask patient ask to Hold coumadin today and restart on Saturday. Patient ask to eat green vegetables and repeat 1 week.

## 2018-10-05 ENCOUNTER — TELEPHONE (OUTPATIENT)
Dept: INTERNAL MEDICINE CLINIC | Age: 73
End: 2018-10-05

## 2018-10-05 NOTE — TELEPHONE ENCOUNTER
Patient INR 35.0 and INR 2.9 TAKING 5MG DAILY. PER DR. KELLY PATIENT TO TAKE 5MG DAILY AND 2.5MG ON SUN. REPEAT IN 1 WEEK. HE ALSO ASK THAT SHE EAT SOME GREEN VEGETABLES.

## 2018-10-11 ENCOUNTER — TELEPHONE (OUTPATIENT)
Dept: INTERNAL MEDICINE CLINIC | Age: 73
End: 2018-10-11

## 2018-10-11 NOTE — TELEPHONE ENCOUNTER
Patient PT 30.9 and INR 2.6 taking 5mg daily and 2.5mg on Sunday. No change per Dr. Beryl Ramiresuts repeat in 1 week.

## 2018-10-16 ENCOUNTER — OFFICE VISIT (OUTPATIENT)
Dept: INTERNAL MEDICINE CLINIC | Age: 73
End: 2018-10-16

## 2018-10-16 VITALS
WEIGHT: 270.5 LBS | HEIGHT: 62 IN | RESPIRATION RATE: 14 BRPM | BODY MASS INDEX: 49.78 KG/M2 | DIASTOLIC BLOOD PRESSURE: 81 MMHG | OXYGEN SATURATION: 92 % | SYSTOLIC BLOOD PRESSURE: 134 MMHG | TEMPERATURE: 97.7 F | HEART RATE: 55 BPM

## 2018-10-16 DIAGNOSIS — I48.0 PAROXYSMAL ATRIAL FIBRILLATION (HCC): Primary | Chronic | ICD-10-CM

## 2018-10-16 DIAGNOSIS — G40.909 SEIZURE DISORDER (HCC): ICD-10-CM

## 2018-10-16 DIAGNOSIS — J45.20 INTERMITTENT ASTHMA WITHOUT COMPLICATION, UNSPECIFIED ASTHMA SEVERITY: ICD-10-CM

## 2018-10-16 DIAGNOSIS — M17.12 PRIMARY OSTEOARTHRITIS OF LEFT KNEE: ICD-10-CM

## 2018-10-16 DIAGNOSIS — E11.9 TYPE 2 DIABETES MELLITUS WITHOUT COMPLICATION, WITHOUT LONG-TERM CURRENT USE OF INSULIN (HCC): Chronic | ICD-10-CM

## 2018-10-16 DIAGNOSIS — E66.01 MORBID OBESITY (HCC): Chronic | ICD-10-CM

## 2018-10-16 DIAGNOSIS — R53.81 PHYSICAL DECONDITIONING: ICD-10-CM

## 2018-10-16 LAB
INR BLD: 3.2
PT POC: 38.3 SECONDS
VALID INTERNAL CONTROL?: YES

## 2018-10-16 NOTE — PROGRESS NOTES
Chief Complaint Patient presents with  
[facility] Follow Up  
  fall 1. Have you been to the ER, urgent care clinic since your last visit? Hospitalized since your last visit? Yes When: about 2 weeks ago  Where: [facility] Reason for visit: fall 2. Have you seen or consulted any other health care providers outside of the [address] [place] [address] since your last visit? Include any pap smears or colon screening.  No

## 2018-10-16 NOTE — MR AVS SNAPSHOT
[facility] 
 
 
 [address] [id number] 
[phone] Patient: Samara Arroyo MRN: [id number] LT Visit Information Date & Time Provider Department Dept. Phone Encounter #  
 10/16/2018 12:45 PM Claire Proctor MD [facility] and Primary Care [phone] [id number] Your Appointments 2018 10:30 AM  
Any with Claire Proctor MD  
[address] [place] [address] and Primary Care [address] [place] [address]) Appt Note: 2 month follow up  
 [address] 1 [facility]  
  
   
 [address] [id number] Upcoming Health Maintenance Date Due Shingrix Vaccine Age 50> (1 of 2) 1995 GLAUCOMA SCREENING Q2Y 2017 EYE EXAM RETINAL OR DILATED Q1 2018* FOOT EXAM Q1 2019 MICROALBUMIN Q1 2019 LIPID PANEL Q1 2019 HEMOGLOBIN A1C Q6M 2019 MEDICARE YEARLY EXAM 2019 DTaP/Tdap/Td series (2 - Td) 2/10/2027 *Topic was postponed. The date shown is not the original due date. Allergies as of 10/16/2018  Review Complete On: 10/16/2018 By: Danielle Michaels Severity Noted Reaction Type Reactions Clonidine  2017    Other (comments) Patient becomes incoherent Current Immunizations  Never Reviewed Name Date Influenza High Dose Vaccine PF 10/23/2017, 2016 Not reviewed this visit You Were Diagnosed With   
  
 Codes Comments Paroxysmal atrial fibrillation (HCC)    -  Primary ICD-10-CM: I48.0 ICD-9-CM: 427.31 Vitals BP Pulse Temp Resp Height(growth percentile) Weight(growth percentile) 134/81 (!) 55 97.7 °F (36.5 °C) (Oral) 14 5' 2\" (1.575 m) 270 lb 8 oz (122.7 kg) SpO2 BMI OB Status Smoking Status 92% 49.48 kg/m2 Postmenopausal Former Smoker Vitals History BMI and BSA Data  Body Mass Index Body Surface Area  
 49.48 kg/m 2 2.32 m 2  
  
  
 Preferred Pharmacy Pharmacy Name Phone [address], [address]  [phone] Your Updated Medication List  
  
   
This list is accurate as of 10/16/18  3:14 PM.  Always use your most recent med list.  
  
  
  
  
 * albuterol 90 mcg/actuation inhaler Commonly known as:  PROAIR HFA Take 2 Puffs by inhalation every four (4) hours as needed for Wheezing or Shortness of Breath. * albuterol 2.5 mg /3 mL (0.083 %) nebulizer solution Commonly known as:  PROVENTIL VENTOLIN  
USE 1 VIAL VIA NEBULIZER EVERY 6 HOURS AS NEEDED. DX: J45.909  
  
 allopurinol 300 mg tablet Commonly known as:  ZYLOPRIM  
TAKE 1 TABLET BY MOUTH ONCE DAILY  
  
 aspirin delayed-release 81 mg tablet Take 81 mg by mouth daily. atorvastatin 80 mg tablet Commonly known as:  LIPITOR Take 1 Tab by mouth daily. Blood-Glucose Meter Misc Commonly known as:  TRUE METRIX AIR GLUCOSE METER  
1 Device by Does Not Apply route daily. Use to test blood sugars daily. dx.e11.9  
  
 budesonide-formoterol 160-4.5 mcg/actuation Hfaa Commonly known as:  SYMBICORT Take 2 Puffs by inhalation two (2) times a day. cloNIDine HCl 0.2 mg tablet Commonly known as:  CATAPRES Take 1 Tab by mouth two (2) times a day. felodipine 10 mg 24 hr tablet Commonly known as:  PLENDIL SR  
TAKE 1 TABLET BY MOUTH EVERY DAY  
  
 * glucose blood VI test strips strip Commonly known as:  TRUE METRIX GLUCOSE TEST STRIP Use to test blood sugar once daily. Dx.e11.9  
  
 * glucose blood VI test strips strip Commonly known as:  FREESTYLE LITE STRIPS Use to test blood sugar daily  
  
 guaiFENesin-codeine 100-10 mg/5 mL solution Commonly known as:  Gelacio Living Take 5 mL by mouth three (3) times daily as needed for Cough. Max Daily Amount: 15 mL. * lancets 28 gauge Misc Commonly known as:  Kim Barron  
 Use to test blood sugar once daily. Dx.e11.9  
  
 * lancets 28 gauge Misc Commonly known as:  FREESTYLE LANCETS Use to test blood sugar once daily. Dx.e11.9  
  
 levETIRAcetam 1,000 mg tablet Take 1 Tab by mouth two (2) times a day. lisinopril 20 mg tablet Commonly known as:  Bowers Del Take 1 Tab by mouth daily. metFORMIN 500 mg tablet Commonly known as:  GLUCOPHAGE  
TAKE 1 TABLET BY MOUTH TWICE DAILY WITH MEALS  
  
 metoprolol tartrate 25 mg tablet Commonly known as:  LOPRESSOR  
TAKE 1 TABLET BY MOUTH TWICE DAILY AT 9 AM AND AT 9 PM  
  
 phenytoin  mg ER capsule Commonly known as:  DILANTIN ER  
TAKE 4 CAPSULES BY MOUTH EVERY NIGHT AT BEDTIME SITagliptin 100 mg tablet Commonly known as:  Kimmie Griffin Take 1 tablet by mouth daily. spironolactone-hydrochlorothiazide 25-25 mg per tablet Commonly known as:  ALDACTAZIDE Take 1 Tab by mouth daily. tiotropium 18 mcg inhalation capsule Commonly known as:  Miesha Glow Take 1 Cap by inhalation daily. warfarin 5 mg tablet Commonly known as:  COUMADIN  
TAKE 1 TABLET BY MOUTH MONDAY THROUGH FRIDAY AND 1& 1/2 TABLETS ON SATURDAY AND SUNDAY * Notice: This list has 6 medication(s) that are the same as other medications prescribed for you. Read the directions carefully, and ask your doctor or other care provider to review them with you. We Performed the Following AMB POC PT/INR [69951 CPT(R)] Introducing [facility] & HEALTH SERVICES! [employer] introduces [employer] patient portal. Now you can access parts of your medical record, email your doctor's office, and request medication refills online. 1. In your internet browser, go to [email or web address] 2. Click on the First Time User? Click Here link in the Sign In box. You will see the New Member Sign Up page. 3. Enter your [employer] Access Code exactly as it appears below.  You will not need to use this code after youve completed the sign-up process. If you do not sign up before the expiration date, you must request a new code. · [employer] Access Code: [email or web address] Expires: 11/14/2018 10:22 AM 
 
4. Enter the last four digits of your Social Security Number (xxxx) and Date of Birth (mm/dd/yyyy) as indicated and click Submit. You will be taken to the next sign-up page. 5. Create a [employer] ID. This will be your [employer] login ID and cannot be changed, so think of one that is secure and easy to remember. 6. Create a [employer] password. You can change your password at any time. 7. Enter your Password Reset Question and Answer. This can be used at a later time if you forget your password. 8. Enter your e-mail address. You will receive e-mail notification when new information is available in [address] 9. Click Sign Up. You can now view and download portions of your medical record. 10. Click the Download Summary menu link to download a portable copy of your medical information. If you have questions, please visit the Frequently Asked Questions section of the [employer] website. Remember, [employer] is NOT to be used for urgent needs. For medical emergencies, dial 911. Now available from your iPhone and Android! Please provide this summary of care documentation to your next provider. Your primary care clinician is listed as Manohar Mo. If you have any questions after today's visit, please call [phone].

## 2018-10-21 PROBLEM — R53.81 PHYSICAL DECONDITIONING: Status: ACTIVE | Noted: 2018-10-21

## 2018-10-21 NOTE — PROGRESS NOTES
[address] [place] [address] and Primary Care 
[place] [address] 
[address] [facility] [address] Phone:  [phone]  Fax: [phone] Chief Complaint Patient presents with  
[facility] Follow Up  
  fall Mayra Huynh SUBJECTIVE: 
  María Gore is a 74 y.o. female who comes in for a return visit accompanied by her family. She states she has basically been doing well. Her asthma has been reasonably stable. There has been no meaningful weight loss since I last saw her. She walks minimally. She does not exercise at all. She continues to use her rollator. She has been seizure-free. She has been taking her Warfarin in view of her history of paroxysmal atrial fibrillation. Finally, her osteoarthritis, particularly of her left knee, is reasonably stable for now. She is not in any pain other than with prolonged walking. Current Outpatient Medications Medication Sig Dispense Refill  levETIRAcetam 1,000 mg tablet Take 1 Tab by mouth two (2) times a day. 60 Tab 11  
 glucose blood VI test strips (FREESTYLE LITE STRIPS) strip Use to test blood sugar daily 50 Strip 11  
 warfarin (COUMADIN) 5 mg tablet TAKE 1 TABLET BY MOUTH MONDAY THROUGH FRIDAY AND 1& 1/2 TABLETS ON SATURDAY AND SUNDAY 103 Tab 3  
 tiotropium (SPIRIVA) 18 mcg inhalation capsule Take 1 Cap by inhalation daily. 30 Cap 11  
 albuterol (PROVENTIL VENTOLIN) 2.5 mg /3 mL (0.083 %) nebulizer solution USE 1 VIAL VIA NEBULIZER EVERY 6 HOURS AS NEEDED. DX: J45.909 120 Each 11  
 lancets (FREESTYLE LANCETS) 28 gauge misc Use to test blood sugar once daily. Dx.e11.9 100 Lancet 3  
 felodipine (PLENDIL SR) 10 mg 24 hr tablet TAKE 1 TABLET BY MOUTH EVERY DAY 90 Tab 3  
 allopurinol (ZYLOPRIM) 300 mg tablet TAKE 1 TABLET BY MOUTH ONCE DAILY 90 Tab 3  
 glucose blood VI test strips (TRUE METRIX GLUCOSE TEST STRIP) strip Use to test blood sugar once daily.  Dx.e11.9 100 Strip 11  
  metoprolol tartrate (LOPRESSOR) 25 mg tablet TAKE 1 TABLET BY MOUTH TWICE DAILY AT 9 AM AND AT 9  Tab 3  phenytoin ER (DILANTIN ER) 100 mg ER capsule TAKE 4 CAPSULES BY MOUTH EVERY NIGHT AT BEDTIME 360 Cap 3  
 spironolactone-hydrochlorothiazide (ALDACTAZIDE) 25-25 mg per tablet Take 1 Tab by mouth daily. 90 Tab 11  
 atorvastatin (LIPITOR) 80 mg tablet Take 1 Tab by mouth daily. 90 Tab 3  Blood-Glucose Meter (TRUE METRIX AIR GLUCOSE METER) [facility] 1 Device by Does Not Apply route daily. Use to test blood sugars daily. dx.e11.9 1 Each 0  
 lancets (TRUEPLUS LANCETS) 28 gauge misc Use to test blood sugar once daily. Dx.e11.9 100 Lancet 11  
 cloNIDine HCl (CATAPRES) 0.2 mg tablet Take 1 Tab by mouth two (2) times a day. 30 Tab 11  
 aspirin delayed-release 81 mg tablet Take 81 mg by mouth daily.  albuterol (PROAIR HFA) 90 mcg/actuation inhaler Take 2 Puffs by inhalation every four (4) hours as needed for Wheezing or Shortness of Breath. 1 Inhaler 11  
 budesonide-formoterol (SYMBICORT) 160-4.5 mcg/Actuation HFA inhaler Take 2 Puffs by inhalation two (2) times a day. 10.2 Inhaler 11  
 lisinopril (PRINIVIL, ZESTRIL) 20 mg tablet Take 1 Tab by mouth daily. 90 Tab 3  
 guaiFENesin-codeine (VIRTUSSIN AC) 100-10 mg/5 mL solution Take 5 mL by mouth three (3) times daily as needed for Cough. Max Daily Amount: 15 mL. 140 mL 0  
 metFORMIN (GLUCOPHAGE) 500 mg tablet TAKE 1 TABLET BY MOUTH TWICE DAILY WITH MEALS 180 Tab 3  
 sitagliptin (JANUVIA) 100 mg tablet Take 1 tablet by mouth daily. 30 tablet 11 Past Medical History:  
Diagnosis Date  Arthritis  Asthma  Diabetes ([facility] [address]  Hypertension  Other ill-defined conditions(799.89)   
 rt knee surgery  Stroke ([facility] [address] Past Surgical History:  
Procedure Laterality Date  HX COLONOSCOPY    
 HX GYN Allergies Allergen Reactions  Clonidine Other (comments) Patient becomes incoherent REVIEW OF SYSTEMS: 
 General: negative for - chills or fever ENT: negative for - headaches, nasal congestion or tinnitus Respiratory: negative for - cough, hemoptysis, shortness of breath or wheezing Cardiovascular : negative for - chest pain, edema, palpitations or shortness of breath Gastrointestinal: negative for - abdominal pain, blood in stools, heartburn or nausea/vomiting Genito-Urinary: no dysuria, trouble voiding, or hematuria Musculoskeletal: negative for - gait disturbance, joint pain, joint stiffness or joint swelling Neurological: no TIA or stroke symptoms Hematologic: no bruises, no bleeding, no swollen glands Integument: no lumps, mole changes, nail changes or rash Endocrine: no malaise/lethargy or unexpected weight changes Social History Socioeconomic History  Marital status:  Spouse name: Not on file  Number of children: 1  Years of education: Not on file  Highest education level: Not on file Social Needs  Financial resource strain: Not on file  Food insecurity - worry: Not on file  Food insecurity - inability: Not on file  Transportation needs - medical: Not on file  Transportation needs - non-medical: Not on file Occupational History  Occupation: disabled--[employer] Tobacco Use  Smoking status: Former Smoker  Smokeless tobacco: Never Used  Tobacco comment: 20+years ago Substance and Sexual Activity  Alcohol use: No  
 Drug use: No  
 Sexual activity: Not Currently Other Topics Concern  Not on file Social History Narrative  Not on file No family history on file. OBJECTIVE: 
 
Visit Vitals /81 Pulse (!) 55 Temp 97.7 °F (36.5 °C) (Oral) Resp 14 Ht 5' 2\" (1.575 m) Wt 270 lb 8 oz (122.7 kg) SpO2 92% BMI 49.48 kg/m² CONSTITUTIONAL: well , well nourished, appears age appropriate EYES: perrla, eom intact ENMT:moist mucous membranes, pharynx clear NECK: supple. Thyroid normal,no JVD RESPIRATORY: Chest: clear to ascultation and percussion CARDIOVASCULAR: Heart: regular rate and rhythm GASTROINTESTINAL: Abdomen: soft, bowel sounds active HEMATOLOGIC: no pathological lymph nodes palpated MUSCULOSKELETAL: Extremities: no edema, pulse 1+ INTEGUMENT: No unusual rashes or suspicious skin lesions noted. Nails appear normal. 
NEUROLOGIC: non-focal exam  
MENTAL STATUS: alert and oriented, appropriate affect ASSESSMENT: 
1. Paroxysmal atrial fibrillation (HCC) 2. Type 2 diabetes mellitus without complication, without long-term current use of insulin ([address] 3. Seizure disorder ([facility] [address] 4. Intermittent asthma without complication, unspecified asthma severity 5. Morbid obesity ([facility] [address] 6. Primary osteoarthritis of left knee 7. Physical deconditioning PLAN: 
 
1. Her INR is acceptable today, no adjustments are made. Her rate is regular for now with an excellent ventricular response. 2. Her diabetes historically has been doing well based on her last hemoglobin A1c. I remind her the importance of minimizing the consumption of processed carbohydrates. 3. She remains on her anticonvulsants and is seizure-free. 4. Her asthma is quite stable with no major exacerbations of late. 5. Weight loss is mandatory. She remains super morbidly obese. Again, emphasis is placed on eating meals, eliminating snacks and avoiding the consumption of sweets, white breads and white potatoes. 6. Her osteoarthritic left knee is reasonably stable. 7. She is deconditioned as she has been for years and, again, needs to walk more. . 
Orders Placed This Encounter  AMB POC PT/INR Follow-up Disposition: 
Return in about 2 months (around 12/16/2018).  
 
 
Jessica Lozano MD

## 2018-10-26 ENCOUNTER — TELEPHONE (OUTPATIENT)
Dept: INTERNAL MEDICINE CLINIC | Age: 73
End: 2018-10-26

## 2018-10-26 NOTE — TELEPHONE ENCOUNTER
Nurse arben called in PT 36.7 and INR 3.1 taking coumadin 5mg 1 tab daily and 2.5mg on sat and sun. Per Dr. Shannan Chacon no change. Repeat in 1 week.

## 2018-11-02 ENCOUNTER — TELEPHONE (OUTPATIENT)
Dept: INTERNAL MEDICINE CLINIC | Age: 73
End: 2018-11-02

## 2018-11-02 NOTE — TELEPHONE ENCOUNTER
Nurse called in PT 44.9 and 3.7 TAKING 5MG MON-FRI AND 2.5MG SAT AND SUN. PER DR. KELLY PATIENT TO HOLD X 2 DAYS RESTART 5MG MON-THURS AND 25MG FRI,SAT, SUN. REPEAT IN 1 WK.

## 2018-11-09 ENCOUNTER — TELEPHONE (OUTPATIENT)
Dept: INTERNAL MEDICINE CLINIC | Age: 73
End: 2018-11-09

## 2018-11-09 RX ORDER — ATORVASTATIN CALCIUM 80 MG/1
80 TABLET, FILM COATED ORAL DAILY
Qty: 90 TAB | Refills: 3 | Status: SHIPPED | OUTPATIENT
Start: 2018-11-09 | End: 2019-10-28 | Stop reason: SDUPTHER

## 2018-11-09 NOTE — TELEPHONE ENCOUNTER
Nurse called in PT21.7 and INR 1.8 TAKING 5MG MON THRU THURS, AND 2.5MG ON SUNDAYS. NO CHANGE PER DR. Olive Dubin.

## 2018-11-16 ENCOUNTER — TELEPHONE (OUTPATIENT)
Dept: INTERNAL MEDICINE CLINIC | Age: 73
End: 2018-11-16

## 2018-12-03 ENCOUNTER — TELEPHONE (OUTPATIENT)
Dept: INTERNAL MEDICINE CLINIC | Age: 73
End: 2018-12-03

## 2018-12-12 ENCOUNTER — TELEPHONE (OUTPATIENT)
Dept: INTERNAL MEDICINE CLINIC | Age: 73
End: 2018-12-12

## 2018-12-12 NOTE — TELEPHONE ENCOUNTER
Patient had a PT/INR  40.3 and 3.4 no adjustments were made,Dr. Makenna Osorio is asking that patient hold coumadin today and we have call at home care and ask that they go out on Thursday and do a check. at home care .

## 2018-12-14 ENCOUNTER — TELEPHONE (OUTPATIENT)
Dept: INTERNAL MEDICINE CLINIC | Age: 73
End: 2018-12-14

## 2018-12-14 NOTE — TELEPHONE ENCOUNTER
PATIENT PT 28.8 and INR 2.4taking 5mg every day and 2.5mg on sun. Per Dr. Coleen Townsend patient to take 5mg daily and 2.5mg on sat and sun. Repeat in 1 week.

## 2018-12-21 ENCOUNTER — TELEPHONE (OUTPATIENT)
Dept: INTERNAL MEDICINE CLINIC | Age: 73
End: 2018-12-21

## 2018-12-21 NOTE — TELEPHONE ENCOUNTER
Nurse Aquino called in PT 29.1 and INR 2.4 taking 5mg mon-fri and 2.5mg sat and sun no change per Dr. Kaleta Duane.

## 2018-12-28 ENCOUNTER — TELEPHONE (OUTPATIENT)
Dept: INTERNAL MEDICINE CLINIC | Age: 73
End: 2018-12-28

## 2018-12-28 NOTE — TELEPHONE ENCOUNTER
Nurse called in PT 31.8 and INR 2.7 Taking coumadin 5mg on mon-fri and 2.5mg sat and sun. No change repeat 1 week.

## 2019-01-04 ENCOUNTER — TELEPHONE (OUTPATIENT)
Dept: INTERNAL MEDICINE CLINIC | Age: 74
End: 2019-01-04

## 2019-01-04 NOTE — TELEPHONE ENCOUNTER
Gilson Benito [id number] called in patient PT 41.8 and INR 3.5 taking 5mg mon-fri and 2.5mg sat and sun. Per Dr. Madai Manzanares patient to hold coumadin today and restart regular dose and repeat in 1 wk.

## 2019-01-10 ENCOUNTER — OFFICE VISIT (OUTPATIENT)
Dept: INTERNAL MEDICINE CLINIC | Age: 74
End: 2019-01-10

## 2019-01-10 VITALS
WEIGHT: 268.5 LBS | DIASTOLIC BLOOD PRESSURE: 79 MMHG | HEIGHT: 62 IN | OXYGEN SATURATION: 95 % | HEART RATE: 66 BPM | BODY MASS INDEX: 49.41 KG/M2 | TEMPERATURE: 97.7 F | SYSTOLIC BLOOD PRESSURE: 145 MMHG | RESPIRATION RATE: 14 BRPM

## 2019-01-10 DIAGNOSIS — I48.0 PAROXYSMAL ATRIAL FIBRILLATION (HCC): Chronic | ICD-10-CM

## 2019-01-10 DIAGNOSIS — E78.5 DYSLIPIDEMIA: ICD-10-CM

## 2019-01-10 DIAGNOSIS — E11.9 TYPE 2 DIABETES MELLITUS WITHOUT COMPLICATION, WITHOUT LONG-TERM CURRENT USE OF INSULIN (HCC): Primary | Chronic | ICD-10-CM

## 2019-01-10 DIAGNOSIS — I10 ESSENTIAL HYPERTENSION: ICD-10-CM

## 2019-01-10 DIAGNOSIS — E66.01 MORBID OBESITY (HCC): Chronic | ICD-10-CM

## 2019-01-10 DIAGNOSIS — G40.909 SEIZURE DISORDER (HCC): ICD-10-CM

## 2019-01-10 LAB
INR BLD: 2.3
PT POC: 28 SECONDS
VALID INTERNAL CONTROL?: YES

## 2019-01-10 NOTE — PROGRESS NOTES
Chief Complaint Patient presents with  Irregular Heart Beat  
  2 month follow up 1. Have you been to the ER, urgent care clinic since your last visit? Hospitalized since your last visit? No 
 
2. Have you seen or consulted any other health care providers outside of the [address] [place] [address] since your last visit? Include any pap smears or colon screening. No 
 
Results for orders placed or performed in visit on 01/10/19 AMB POC PT/INR Result Value Ref Range VALID INTERNAL CONTROL POC Yes Prothrombin time (POC) 28.0 seconds INR POC 2.3

## 2019-01-10 NOTE — PROGRESS NOTES
[address] [facility] 
[place] [address] 
[address] [facility] [address] Phone:  [phone]  Fax: [phone] Chief Complaint Patient presents with  Irregular Heart Beat  
  2 month follow up Minnie Barnes SUBJECTIVE: 
  Nitza Banda is a 74 y.o. female Comes in for return visit stating that she has done well. She is accompanied by her daughters. She has not been wheezing much of late. Her walking is minimal unfortunately. She has moderate osteoarthritis of her left knee. There is mild pain when she walks with this particular joint. There has been no meaningful weight loss. Her blood pressure is checked by the family on a regular basis and for the most part systolics are generally less than 140. She has a past history of diabetes and dyslipidemia. Current Outpatient Medications Medication Sig Dispense Refill  atorvastatin (LIPITOR) 80 mg tablet Take 1 Tab by mouth daily. 90 Tab 3  
 glucose blood VI test strips (FREESTYLE LITE STRIPS) strip Use to test blood sugar daily 50 Strip 11  
 warfarin (COUMADIN) 5 mg tablet TAKE 1 TABLET BY MOUTH MONDAY THROUGH FRIDAY AND 1& 1/2 TABLETS ON SATURDAY AND SUNDAY 103 Tab 3  
 tiotropium (SPIRIVA) 18 mcg inhalation capsule Take 1 Cap by inhalation daily. 30 Cap 11  
 albuterol (PROVENTIL VENTOLIN) 2.5 mg /3 mL (0.083 %) nebulizer solution USE 1 VIAL VIA NEBULIZER EVERY 6 HOURS AS NEEDED. DX: J45.909 120 Each 11  
 lancets (FREESTYLE LANCETS) 28 gauge misc Use to test blood sugar once daily. Dx.e11.9 100 Lancet 3  
 felodipine (PLENDIL SR) 10 mg 24 hr tablet TAKE 1 TABLET BY MOUTH EVERY DAY 90 Tab 3  
 allopurinol (ZYLOPRIM) 300 mg tablet TAKE 1 TABLET BY MOUTH ONCE DAILY 90 Tab 3  
 glucose blood VI test strips (TRUE METRIX GLUCOSE TEST STRIP) strip Use to test blood sugar once daily.  Dx.e11.9 100 Strip 11  
 metoprolol tartrate (LOPRESSOR) 25 mg tablet TAKE 1 TABLET BY MOUTH TWICE DAILY AT 9 AM AND AT 9 PM (Patient taking differently: Take 12.5 mg by mouth two (2) times a day. TAKE 1 TABLET BY MOUTH TWICE DAILY AT 9 AM AND AT 9 PM) 180 Tab 3  phenytoin ER (DILANTIN ER) 100 mg ER capsule TAKE 4 CAPSULES BY MOUTH EVERY NIGHT AT BEDTIME 360 Cap 3  
 spironolactone-hydrochlorothiazide (ALDACTAZIDE) 25-25 mg per tablet Take 1 Tab by mouth daily. 90 Tab 11  Blood-Glucose Meter (TRUE METRIX AIR GLUCOSE METER) [facility] 1 Device by Does Not Apply route daily. Use to test blood sugars daily. dx.e11.9 1 Each 0  
 lancets (TRUEPLUS LANCETS) 28 gauge misc Use to test blood sugar once daily. Dx.e11.9 100 Lancet 11  
 aspirin delayed-release 81 mg tablet Take 81 mg by mouth daily.  albuterol (PROAIR HFA) 90 mcg/actuation inhaler Take 2 Puffs by inhalation every four (4) hours as needed for Wheezing or Shortness of Breath. 1 Inhaler 11  
 levETIRAcetam 1,000 mg tablet Take 1 Tab by mouth two (2) times a day. 60 Tab 11  
 lisinopril (PRINIVIL, ZESTRIL) 20 mg tablet Take 1 Tab by mouth daily. 90 Tab 3  
 guaiFENesin-codeine (VIRTUSSIN AC) 100-10 mg/5 mL solution Take 5 mL by mouth three (3) times daily as needed for Cough. Max Daily Amount: 15 mL. 140 mL 0  
 cloNIDine HCl (CATAPRES) 0.2 mg tablet Take 1 Tab by mouth two (2) times a day. 30 Tab 11  
 metFORMIN (GLUCOPHAGE) 500 mg tablet TAKE 1 TABLET BY MOUTH TWICE DAILY WITH MEALS 180 Tab 3  
 sitagliptin (JANUVIA) 100 mg tablet Take 1 tablet by mouth daily. 30 tablet 11  
 budesonide-formoterol (SYMBICORT) 160-4.5 mcg/Actuation HFA inhaler Take 2 Puffs by inhalation two (2) times a day. 10.2 Inhaler 11 Past Medical History:  
Diagnosis Date  Arthritis  Asthma  Diabetes ([facility] [address]  Hypertension  Other ill-defined conditions(799.89)   
 rt knee surgery  Stroke ([facility] [address] Past Surgical History:  
Procedure Laterality Date  HX COLONOSCOPY    
 HX GYN Allergies Allergen Reactions  Clonidine Other (comments) Patient becomes incoherent REVIEW OF SYSTEMS: 
General: negative for - chills or fever ENT: negative for - headaches, nasal congestion or tinnitus Respiratory: negative for - cough, hemoptysis, shortness of breath or wheezing Cardiovascular : negative for - chest pain, edema, palpitations or shortness of breath Gastrointestinal: negative for - abdominal pain, blood in stools, heartburn or nausea/vomiting Genito-Urinary: no dysuria, trouble voiding, or hematuria Musculoskeletal: negative for - gait disturbance, joint pain, joint stiffness or joint swelling Neurological: no TIA or stroke symptoms Hematologic: no bruises, no bleeding, no swollen glands Integument: no lumps, mole changes, nail changes or rash Endocrine: no malaise/lethargy or unexpected weight changes Social History Socioeconomic History  Marital status:  Spouse name: Not on file  Number of children: 1  Years of education: Not on file  Highest education level: Not on file Occupational History  Occupation: disabled--[employer] Tobacco Use  Smoking status: Former Smoker  Smokeless tobacco: Never Used  Tobacco comment: 20+years ago Substance and Sexual Activity  Alcohol use: No  
 Drug use: No  
 Sexual activity: Not Currently No family history on file. OBJECTIVE: 
 
Visit Vitals /79 Pulse 66 Temp 97.7 °F (36.5 °C) (Oral) Resp 14 Ht 5' 2\" (1.575 m) Wt 268 lb 8 oz (121.8 kg) SpO2 95% BMI 49.11 kg/m² CONSTITUTIONAL: well , well nourished, appears age appropriate EYES: perrla, eom intact ENMT:moist mucous membranes, pharynx clear NECK: supple. Thyroid normal 
RESPIRATORY: Chest: clear to ascultation and percussion CARDIOVASCULAR: Heart: regular rate and rhythm GASTROINTESTINAL: Abdomen: soft, bowel sounds active HEMATOLOGIC: no pathological lymph nodes palpated MUSCULOSKELETAL: Extremities: no edema, pulse 1+ INTEGUMENT: No unusual rashes or suspicious skin lesions noted. Nails appear normal. 
NEUROLOGIC: non-focal exam  
MENTAL STATUS: alert and oriented, appropriate affect ASSESSMENT: 
1. Type 2 diabetes mellitus without complication, without long-term current use of insulin ([facility] [address] 2. Essential hypertension 3. Seizure disorder ([facility] [address] 4. Morbid obesity ([facility] [address] 5. Dyslipidemia 6. Paroxysmal atrial fibrillation (HCC) PLAN: 
 
1. Her diabetes historically has been doing well. I will await the results of her hemoglobin A1c. 
2. Her blood pressure is reasonable today, no adjustments are made. 3. She will continue statin as prescribed. 4. Her asthma is quite stable and she will continue her bronchodilators. 5. I encouraged her to lose weight, which can be accomplished by eating meals, eliminating snacks and avoiding the consumption of processed carbohydrates. 6. INR today is acceptable, no adjustments are made. 7. She needs to walk more. She is quite deconditioned. 8. She remains seizure free and is obviously compliant with her anticonvulsants. . 
Orders Placed This Encounter  METABOLIC PANEL, BASIC  
 HEMOGLOBIN A1C WITH EAG  
 APOLIPOPROTEIN B  
 LIPID PANEL  
 AMB POC PT/INR Follow-up Disposition: 
Return in about 3 months (around 4/10/2019), or RTO 1 month for INR. Roseline Marina MD

## 2019-01-11 LAB
APO B SERPL-MCNC: 108 MG/DL (ref 54–133)
BUN SERPL-MCNC: 33 MG/DL (ref 8–27)
BUN/CREAT SERPL: 33 (ref 12–28)
CALCIUM SERPL-MCNC: 9.6 MG/DL (ref 8.7–10.3)
CHLORIDE SERPL-SCNC: 106 MMOL/L (ref 96–106)
CHOLEST SERPL-MCNC: 175 MG/DL (ref 100–199)
CO2 SERPL-SCNC: 18 MMOL/L (ref 20–29)
CREAT SERPL-MCNC: 1.01 MG/DL (ref 0.57–1)
EST. AVERAGE GLUCOSE BLD GHB EST-MCNC: 137 MG/DL
GLUCOSE SERPL-MCNC: 105 MG/DL (ref 65–99)
HBA1C MFR BLD: 6.4 % (ref 4.8–5.6)
HDLC SERPL-MCNC: 60 MG/DL
LDLC SERPL CALC-MCNC: 100 MG/DL (ref 0–99)
POTASSIUM SERPL-SCNC: 5.4 MMOL/L (ref 3.5–5.2)
SODIUM SERPL-SCNC: 140 MMOL/L (ref 134–144)
TRIGL SERPL-MCNC: 77 MG/DL (ref 0–149)
VLDLC SERPL CALC-MCNC: 15 MG/DL (ref 5–40)

## 2019-01-14 RX ORDER — LEVETIRACETAM 1000 MG/1
1000 TABLET ORAL 2 TIMES DAILY
Qty: 180 TAB | Refills: 3 | Status: SHIPPED | OUTPATIENT
Start: 2019-01-14 | End: 2020-02-17 | Stop reason: SDUPTHER

## 2019-01-31 RX ORDER — SPIRONOLACTONE AND HYDROCHLOROTHIAZIDE 25; 25 MG/1; MG/1
1 TABLET ORAL DAILY
Qty: 90 TAB | Refills: 3 | Status: SHIPPED | OUTPATIENT
Start: 2019-01-31 | End: 2020-01-17 | Stop reason: SDUPTHER

## 2019-01-31 RX ORDER — METOPROLOL TARTRATE 25 MG/1
TABLET, FILM COATED ORAL
Qty: 180 TAB | Refills: 3 | Status: SHIPPED | OUTPATIENT
Start: 2019-01-31 | End: 2019-11-18 | Stop reason: SDUPTHER

## 2019-02-03 NOTE — PROGRESS NOTES
Tell patient she has to increase her p.o. Intake----you can tell her daughter of course Discontinue lisinopril if she still taking it

## 2019-02-07 ENCOUNTER — TELEPHONE (OUTPATIENT)
Dept: INTERNAL MEDICINE CLINIC | Age: 74
End: 2019-02-07

## 2019-02-07 ENCOUNTER — CLINICAL SUPPORT (OUTPATIENT)
Dept: INTERNAL MEDICINE CLINIC | Age: 74
End: 2019-02-07

## 2019-02-07 DIAGNOSIS — I48.0 PAROXYSMAL ATRIAL FIBRILLATION (HCC): Primary | Chronic | ICD-10-CM

## 2019-02-07 LAB
INR BLD: 2.6
PT POC: 31.4 SECONDS
VALID INTERNAL CONTROL?: YES

## 2019-02-07 NOTE — TELEPHONE ENCOUNTER
Spoke with patient daughter and ask that they increase her fluid intake and make sure she is not taking lisinopril.

## 2019-02-07 NOTE — PROGRESS NOTES
Chief Complaint   Patient presents with    Coagulation disorder     Patient in office for pt/iinr check. Patient states that she is taking Coumadin 5 mg M-F and 2.5 mg S and S. Results for orders placed or performed in visit on 02/07/19   AMB POC PT/INR   Result Value Ref Range    VALID INTERNAL CONTROL POC Yes     Prothrombin time (POC) 31.4 seconds    INR POC 2.6      Per Dr. Jodi Zamora, no changes and return to office in one month for pt/inr check.

## 2019-02-20 RX ORDER — PHENYTOIN SODIUM 100 MG/1
CAPSULE, EXTENDED RELEASE ORAL
Qty: 360 CAP | Refills: 3 | Status: SHIPPED | OUTPATIENT
Start: 2019-02-20 | End: 2019-11-18 | Stop reason: SDUPTHER

## 2019-02-27 RX ORDER — PREDNISONE 20 MG/1
TABLET ORAL
Qty: 15 TAB | Refills: 0 | Status: SHIPPED | OUTPATIENT
Start: 2019-02-27 | End: 2019-04-15 | Stop reason: ALTCHOICE

## 2019-03-06 ENCOUNTER — HOSPITAL ENCOUNTER (EMERGENCY)
Age: 74
Discharge: HOME | End: 2019-03-06
Attending: EMERGENCY MEDICINE
Payer: MEDICARE

## 2019-03-06 ENCOUNTER — APPOINTMENT (OUTPATIENT)
Dept: GENERAL RADIOLOGY | Age: 74
End: 2019-03-06
Attending: EMERGENCY MEDICINE
Payer: MEDICARE

## 2019-03-06 VITALS
HEIGHT: 62 IN | WEIGHT: 267.64 LBS | RESPIRATION RATE: 15 BRPM | HEART RATE: 68 BPM | DIASTOLIC BLOOD PRESSURE: 71 MMHG | OXYGEN SATURATION: 98 % | BODY MASS INDEX: 49.25 KG/M2 | TEMPERATURE: 97.5 F | SYSTOLIC BLOOD PRESSURE: 153 MMHG

## 2019-03-06 DIAGNOSIS — R56.9 SEIZURE (HCC): Primary | ICD-10-CM

## 2019-03-06 DIAGNOSIS — J20.9 ACUTE BRONCHITIS, UNSPECIFIED ORGANISM: ICD-10-CM

## 2019-03-06 LAB
ALBUMIN SERPL-MCNC: 3.5 G/DL (ref 3.5–5)
ALBUMIN/GLOB SERPL: 0.7 {RATIO} (ref 1.1–2.2)
ALP SERPL-CCNC: 173 U/L (ref 45–117)
ALT SERPL-CCNC: 45 U/L (ref 12–78)
ANION GAP SERPL CALC-SCNC: 11 MMOL/L (ref 5–15)
AST SERPL-CCNC: 40 U/L (ref 15–37)
BASOPHILS # BLD: 0 K/UL (ref 0–0.1)
BASOPHILS NFR BLD: 0 % (ref 0–1)
BILIRUB SERPL-MCNC: 0.3 MG/DL (ref 0.2–1)
BUN SERPL-MCNC: 30 MG/DL (ref 6–20)
BUN/CREAT SERPL: 24 (ref 12–20)
CALCIUM SERPL-MCNC: 9.3 MG/DL (ref 8.5–10.1)
CHLORIDE SERPL-SCNC: 106 MMOL/L (ref 97–108)
CO2 SERPL-SCNC: 21 MMOL/L (ref 21–32)
COMMENT, HOLDF: NORMAL
CREAT SERPL-MCNC: 1.24 MG/DL (ref 0.55–1.02)
DIFFERENTIAL METHOD BLD: ABNORMAL
EOSINOPHIL # BLD: 0.1 K/UL (ref 0–0.4)
EOSINOPHIL NFR BLD: 2 % (ref 0–7)
ERYTHROCYTE [DISTWIDTH] IN BLOOD BY AUTOMATED COUNT: 14.8 % (ref 11.5–14.5)
GLOBULIN SER CALC-MCNC: 5.3 G/DL (ref 2–4)
GLUCOSE SERPL-MCNC: 174 MG/DL (ref 65–100)
HCT VFR BLD AUTO: 42.6 % (ref 35–47)
HGB BLD-MCNC: 13.1 G/DL (ref 11.5–16)
IMM GRANULOCYTES # BLD AUTO: 0 K/UL (ref 0–0.04)
IMM GRANULOCYTES NFR BLD AUTO: 0 % (ref 0–0.5)
INR BLD: 4.4 (ref 0.9–1.2)
LYMPHOCYTES # BLD: 3.3 K/UL (ref 0.8–3.5)
LYMPHOCYTES NFR BLD: 48 % (ref 12–49)
MCH RBC QN AUTO: 29.2 PG (ref 26–34)
MCHC RBC AUTO-ENTMCNC: 30.8 G/DL (ref 30–36.5)
MCV RBC AUTO: 94.9 FL (ref 80–99)
MONOCYTES # BLD: 0.4 K/UL (ref 0–1)
MONOCYTES NFR BLD: 6 % (ref 5–13)
NEUTS SEG # BLD: 3 K/UL (ref 1.8–8)
NEUTS SEG NFR BLD: 44 % (ref 32–75)
NRBC # BLD: 0 K/UL (ref 0–0.01)
NRBC BLD-RTO: 0 PER 100 WBC
PHENYTOIN SERPL-MCNC: 22.8 UG/ML (ref 10–20)
PLATELET # BLD AUTO: 210 K/UL (ref 150–400)
PMV BLD AUTO: 10.6 FL (ref 8.9–12.9)
POTASSIUM SERPL-SCNC: 4.7 MMOL/L (ref 3.5–5.1)
PROT SERPL-MCNC: 8.8 G/DL (ref 6.4–8.2)
RBC # BLD AUTO: 4.49 M/UL (ref 3.8–5.2)
SAMPLES BEING HELD,HOLD: NORMAL
SODIUM SERPL-SCNC: 138 MMOL/L (ref 136–145)
WBC # BLD AUTO: 6.9 K/UL (ref 3.6–11)

## 2019-03-06 PROCEDURE — 74011000250 HC RX REV CODE- 250: Performed by: EMERGENCY MEDICINE

## 2019-03-06 PROCEDURE — 85610 PROTHROMBIN TIME: CPT

## 2019-03-06 PROCEDURE — 77030029684 HC NEB SM VOL KT MONA -A

## 2019-03-06 PROCEDURE — 94640 AIRWAY INHALATION TREATMENT: CPT

## 2019-03-06 PROCEDURE — 80185 ASSAY OF PHENYTOIN TOTAL: CPT

## 2019-03-06 PROCEDURE — 36415 COLL VENOUS BLD VENIPUNCTURE: CPT

## 2019-03-06 PROCEDURE — 80053 COMPREHEN METABOLIC PANEL: CPT

## 2019-03-06 PROCEDURE — 80177 DRUG SCRN QUAN LEVETIRACETAM: CPT

## 2019-03-06 PROCEDURE — 85025 COMPLETE CBC W/AUTO DIFF WBC: CPT

## 2019-03-06 PROCEDURE — 99285 EMERGENCY DEPT VISIT HI MDM: CPT

## 2019-03-06 PROCEDURE — 71045 X-RAY EXAM CHEST 1 VIEW: CPT

## 2019-03-06 RX ORDER — DOXYCYCLINE HYCLATE 100 MG
100 TABLET ORAL 2 TIMES DAILY
Qty: 14 TAB | Refills: 0 | Status: SHIPPED | OUTPATIENT
Start: 2019-03-06 | End: 2019-03-13

## 2019-03-06 RX ADMIN — ALBUTEROL SULFATE: 2.5 SOLUTION RESPIRATORY (INHALATION) at 07:12

## 2019-03-06 NOTE — ED PROVIDER NOTES
Pt. Presents to the ER after having a seizure at home. Pt. Was sitting on the toilet, having a bowel movement. Her family went to check on her and she started having a seizure. Pt. Convulsed for approximately 1 minutes. She did not fall or go off the toilet. Pt. Has a history of seizures. By the time EMS arrived, she was back to her baseline. No further seizure-like activity was witnessed. Pt. Reports a mild cough recently and says that this is because of her asthma. No fevers/chills. NO CP or SOB. No other complaints. Past Medical History:   Diagnosis Date    Arthritis     Asthma     [facility] ([facility] [address]     Hypertension     Other ill-defined conditions(799.89)     rt knee surgery    Stroke [facility])        Past Surgical History:   Procedure Laterality Date    HX COLONOSCOPY      HX GYN           No family history on file. Social History     Socioeconomic History    Marital status:      Spouse name: Not on file    Number of children: 1    Years of education: Not on file    Highest education level: Not on file   Social Needs    Financial resource strain: Not on file    Food insecurity - worry: Not on file    Food insecurity - inability: Not on file   [employer] needs - medical: Not on file   [employer] needs - non-medical: Not on file   Occupational History    Occupation: disabled--[employer]   Tobacco Use    Smoking status: Former Smoker    Smokeless tobacco: Never Used    Tobacco comment: 20+years ago   Substance and Sexual Activity    Alcohol use: No    Drug use: No    Sexual activity: Not Currently   Other Topics Concern    Not on file   Social History Narrative    Not on file         ALLERGIES: Clonidine    Review of Systems   Constitutional: Negative for chills and fever. HENT: Negative for rhinorrhea and sore throat. Respiratory: Negative for cough and shortness of breath. Cardiovascular: Negative for chest pain.    Gastrointestinal: Negative for abdominal pain, diarrhea, nausea and vomiting. Genitourinary: Negative for dysuria and urgency. Musculoskeletal: Negative for arthralgias and back pain. Skin: Negative for rash. Neurological: Positive for seizures. Negative for dizziness, weakness and light-headedness. There were no vitals filed for this visit. Physical Exam     Vital signs reviewed. Nursing notes reviewed. Const:  No acute distress, well developed, well nourished  Head:  Atraumatic, normocephalic  Eyes:  PERRL, conjunctiva normal, no scleral icterus  Neck:  Supple, trachea midline  Cardiovascular:  RRR, no murmurs, no gallops, no rubs  Resp:  No resp distress, no increased work of breathing, mild diffuse wheezes, no rhonchi, no rales,  Abd:  Soft, non-tender, non-distended, no rebound, no guarding, no CVA tenderness  :  Deferred  MSK:  No pedal edema, normal ROM  Neuro:  Alert and oriented x3, no cranial nerve defect  Skin:  Warm, dry, intact  Psych: normal mood and affect, behavior is normal, judgement and thought content is normal        MDM  Number of Diagnoses or Management Options  Acute bronchitis, unspecified organism:   Seizure [facility]):      Amount and/or Complexity of Data Reviewed  Clinical lab tests: ordered and reviewed  Tests in the radiology section of CPT®: ordered and reviewed  Review and summarize past medical records: yes    Patient Progress  Patient progress: stable          Pt. Presents to the ER with complaints of a seizure. Pt. Has a history of seizures. Pt. Is well appearing in the ER. Pt. Recently has had sx consistent with bronchitis. I will start her on doxy. Pt. To f/u with her PCP or return to the ER with worsening sx.       Procedures

## 2019-03-06 NOTE — ED NOTES
Family at nurses station, speaking with Dr. Valery Aguirre about PT/INR level, asking if it can be drawn. POC PT/INR orders received. Will straight stick for lab.

## 2019-03-06 NOTE — ED NOTES
RT at bedside, unsuccessful attempt at art stick for labs, states she will call a coworker.  Dr. Ruth Ann Valdivia aware

## 2019-03-06 NOTE — ED NOTES
Provider reviewed discharge instructions and options with patient and patient verbalized understanding. RN reviewed discharge instructions using teachback method. Pt ambulated to exit without difficulty and in no signs of acute distress. Patient was counseled on medications prescribed at discharge. VSS at time of discharge. No complaints, needs, or questions at this time. Pt to call PCP in the morning for follow up.

## 2019-03-06 NOTE — ED TRIAGE NOTES
Pt arrives via EMS from home for a witnessed seizure while on [facility] that lasted approximately 1 minute. Pt denies pain, N/V, arrives A&Ox4. Pt takes Dilantin and Keppra for seizure.

## 2019-03-06 NOTE — DISCHARGE INSTRUCTIONS
Patient Education        Bronchitis: Care Instructions  Your Care Instructions    Bronchitis is inflammation of the bronchial tubes, which carry air to the lungs. The tubes swell and produce mucus, or phlegm. The mucus and inflamed bronchial tubes make you cough. You may have trouble breathing. Most cases of bronchitis are caused by viruses like those that cause colds. Antibiotics usually do not help and they may be harmful. Bronchitis usually develops rapidly and lasts about 2 to 3 weeks in otherwise healthy people. Follow-up care is a key part of your treatment and safety. Be sure to make and go to all appointments, and call your doctor if you are having problems. It's also a good idea to know your test results and keep a list of the medicines you take. How can you care for yourself at home? · Take all medicines exactly as prescribed. Call your doctor if you think you are having a problem with your medicine. · Get some extra rest.  · Take an over-the-counter pain medicine, such as acetaminophen (Tylenol), ibuprofen (Advil, Motrin), or naproxen (Aleve) to reduce fever and relieve body aches. Read and follow all instructions on the label. · Do not take two or more pain medicines at the same time unless the doctor told you to. Many pain medicines have acetaminophen, which is Tylenol. Too much acetaminophen (Tylenol) can be harmful. · Take an over-the-counter cough medicine that contains dextromethorphan to help quiet a dry, hacking cough so that you can sleep. Avoid cough medicines that have more than one active ingredient. Read and follow all instructions on the label. · Breathe moist air from a humidifier, hot shower, or sink filled with hot water. The heat and moisture will thin mucus so you can cough it out. · Do not smoke. Smoking can make bronchitis worse. If you need help quitting, talk to your doctor about stop-smoking programs and medicines.  These can increase your chances of quitting for good.  When should you call for help? Call 911 anytime you think you may need emergency care. For example, call if:    · You have severe trouble breathing.    Call your doctor now or seek immediate medical care if:    · You have new or worse trouble breathing.     · You cough up dark brown or bloody mucus (sputum).     · You have a new or higher fever.     · You have a new rash.    Watch closely for changes in your health, and be sure to contact your doctor if:    · You cough more deeply or more often, especially if you notice more mucus or a change in the color of your mucus.     · You are not getting better as expected. Where can you learn more? Go to http://tracy-yifan.info/. Enter H333 in the search box to learn more about \"Bronchitis: Care Instructions. \"  Current as of: September 5, 2018  Content Version: 11.9  © 2020-2570 [employer]. Care instructions adapted under license by [employer] (which disclaims liability or warranty for this information). If you have questions about a medical condition or this instruction, always ask your healthcare professional. [address] any warranty or liability for your use of this information.

## 2019-03-08 ENCOUNTER — CLINICAL SUPPORT (OUTPATIENT)
Dept: INTERNAL MEDICINE CLINIC | Age: 74
End: 2019-03-08

## 2019-03-08 DIAGNOSIS — I48.0 PAROXYSMAL ATRIAL FIBRILLATION (HCC): Primary | Chronic | ICD-10-CM

## 2019-03-08 LAB
INR BLD: 5.3
LEVETIRACETAM SERPL-MCNC: 47.2 UG/ML (ref 10–40)
PT POC: 63.9 SECONDS
VALID INTERNAL CONTROL?: YES

## 2019-03-08 NOTE — PROGRESS NOTES
Chief Complaint   Patient presents with    Coagulation disorder     Patient is here today for a Pt / INR check. Per Patinet she is taking Coumadin 5mg 1/2tab Sat- Sun and 1 tab M-F       Results for orders placed or performed during the hospital encounter of 03/06/19   LEVETIRACETAM (KEPPRA)   Result Value Ref Range    Levetiracetam (Keppra) 47.2 (H) 10.0 - 40.0 ug/mL   PHENYTOIN   Result Value Ref Range    Phenytoin 22.8 (H) 10.0 - 50.9 ug/mL   METABOLIC PANEL, COMPREHENSIVE   Result Value Ref Range    Sodium 138 136 - 145 mmol/L    Potassium 4.7 3.5 - 5.1 mmol/L    Chloride 106 97 - 108 mmol/L    CO2 21 21 - 32 mmol/L    Anion gap 11 5 - 15 mmol/L    Glucose 174 (H) 65 - 100 mg/dL    BUN 30 (H) 6 - 20 MG/DL    Creatinine 1.24 (H) 0.55 - 1.02 MG/DL    BUN/Creatinine ratio 24 (H) 12 - 20      GFR est AA 51 (L) >60 ml/min/1.73m2    GFR est non-AA 42 (L) >60 ml/min/1.73m2    Calcium 9.3 8.5 - 10.1 MG/DL    Bilirubin, total 0.3 0.2 - 1.0 MG/DL    ALT (SGPT) 45 12 - 78 U/L    AST (SGOT) 40 (H) 15 - 37 U/L    Alk. phosphatase 173 (H) 45 - 117 U/L    Protein, total 8.8 (H) 6.4 - 8.2 g/dL    Albumin 3.5 3.5 - 5.0 g/dL    Globulin 5.3 (H) 2.0 - 4.0 g/dL    A-G Ratio 0.7 (L) 1.1 - 2.2     CBC WITH AUTOMATED DIFF   Result Value Ref Range    WBC 6.9 3.6 - 11.0 K/uL    RBC 4.49 3.80 - 5.20 M/uL    HGB 13.1 11.5 - 16.0 g/dL    HCT 42.6 35.0 - 47.0 %    MCV 94.9 80.0 - 99.0 FL    MCH 29.2 26.0 - 34.0 PG    MCHC 30.8 30.0 - 36.5 g/dL    RDW 14.8 (H) 11.5 - 14.5 %    PLATELET 546 016 - 559 K/uL    MPV 10.6 8.9 - 12.9 FL    NRBC 0.0 0  WBC    ABSOLUTE NRBC 0.00 0.00 - 0.01 K/uL    NEUTROPHILS 44 32 - 75 %    LYMPHOCYTES 48 12 - 49 %    MONOCYTES 6 5 - 13 %    EOSINOPHILS 2 0 - 7 %    BASOPHILS 0 0 - 1 %    IMMATURE GRANULOCYTES 0 0.0 - 0.5 %    ABS. NEUTROPHILS 3.0 1.8 - 8.0 K/UL    ABS. LYMPHOCYTES 3.3 0.8 - 3.5 K/UL    ABS. MONOCYTES 0.4 0.0 - 1.0 K/UL    ABS. EOSINOPHILS 0.1 0.0 - 0.4 K/UL    ABS.  BASOPHILS 0.0 0.0 - 0.1 K/UL    ABS. IMM. GRANS. 0.0 0.00 - 0.04 K/UL    DF AUTOMATED     SAMPLES BEING HELD   Result Value Ref Range    SAMPLES BEING HELD 1BLUE     COMMENT        Add-on orders for these samples will be processed based on acceptable specimen integrity and analyte stability, which may vary by analyte. POC INR   Result Value Ref Range    INR (POC) 4.4 (HH) <1.2        Results for orders placed or performed in visit on 03/08/19   AMB POC PT/INR   Result Value Ref Range    VALID INTERNAL CONTROL POC Yes     Prothrombin time (POC) 63.9 seconds    INR POC 5.3       Per Dr. Vashti Tristan Patient is to stop Coumadin until Tuesday and start taking the Coumadin 1 tab Mon-Thurs and 1/2 tab Fri-Sun. Per Rosa Saenz he wants Patient to return in 2wk for another PT/INR check.

## 2019-03-25 ENCOUNTER — CLINICAL SUPPORT (OUTPATIENT)
Dept: INTERNAL MEDICINE CLINIC | Age: 74
End: 2019-03-25

## 2019-03-25 DIAGNOSIS — I48.0 PAROXYSMAL ATRIAL FIBRILLATION (HCC): Primary | ICD-10-CM

## 2019-03-25 LAB
INR BLD: 2.5
PT POC: 29.5 SECONDS
VALID INTERNAL CONTROL?: YES

## 2019-03-25 NOTE — PROGRESS NOTES
Chief Complaint   Patient presents with    Coagulation disorder     Patient is here for a PT INR check. Per Granddaughter she is taking Warfarin 5mg 1tab Mon-Thurs and 1/2 tab Fri-Sun. Results for orders placed or performed in visit on 03/25/19   AMB POC PT/INR   Result Value Ref Range    VALID INTERNAL CONTROL POC Yes     Prothrombin time (POC) 29.5 seconds    INR POC 2.5      Per Dr. Per Finney No Adjestments and Patient is to return in 2weeks for a PT/INR check.

## 2019-04-15 ENCOUNTER — OFFICE VISIT (OUTPATIENT)
Dept: INTERNAL MEDICINE CLINIC | Age: 74
End: 2019-04-15

## 2019-04-15 VITALS
DIASTOLIC BLOOD PRESSURE: 77 MMHG | OXYGEN SATURATION: 91 % | HEART RATE: 77 BPM | TEMPERATURE: 98.8 F | SYSTOLIC BLOOD PRESSURE: 129 MMHG | BODY MASS INDEX: 49.15 KG/M2 | WEIGHT: 267.1 LBS | HEIGHT: 62 IN | RESPIRATION RATE: 14 BRPM

## 2019-04-15 DIAGNOSIS — E11.9 TYPE 2 DIABETES MELLITUS WITHOUT COMPLICATION, WITHOUT LONG-TERM CURRENT USE OF INSULIN (HCC): Chronic | ICD-10-CM

## 2019-04-15 DIAGNOSIS — I10 ESSENTIAL HYPERTENSION: ICD-10-CM

## 2019-04-15 DIAGNOSIS — G40.909 SEIZURE DISORDER (HCC): ICD-10-CM

## 2019-04-15 DIAGNOSIS — R19.7 DIARRHEA, UNSPECIFIED TYPE: ICD-10-CM

## 2019-04-15 DIAGNOSIS — R53.81 PHYSICAL DECONDITIONING: ICD-10-CM

## 2019-04-15 DIAGNOSIS — E66.01 MORBID OBESITY (HCC): Chronic | ICD-10-CM

## 2019-04-15 DIAGNOSIS — M17.12 PRIMARY OSTEOARTHRITIS OF LEFT KNEE: ICD-10-CM

## 2019-04-15 DIAGNOSIS — J45.20 INTERMITTENT ASTHMA WITHOUT COMPLICATION, UNSPECIFIED ASTHMA SEVERITY: ICD-10-CM

## 2019-04-15 DIAGNOSIS — E78.5 DYSLIPIDEMIA: ICD-10-CM

## 2019-04-15 DIAGNOSIS — I48.0 PAROXYSMAL ATRIAL FIBRILLATION (HCC): Primary | Chronic | ICD-10-CM

## 2019-04-15 LAB
INR BLD: 2.2
PT POC: 26.8 SECONDS
VALID INTERNAL CONTROL?: YES

## 2019-04-15 NOTE — PROGRESS NOTES
[facility] 
[place] [address] 
[address] [facility] [address] Phone:  [phone]  Fax: [phone] Chief Complaint Patient presents with  Irregular Heart Beat  
  3 month follow up; Coumadin 5 mg; 1 tab M-Th and 1/2 all other days. .   
 
SUBJECTIVE: 
  Claude Manson is a 66 y.o. female Comes in for return visit stating that she is generally doing well. Unfortunately she developed diarrhea for the last four to five days and the family has been giving her Imodium. She has had this previous to this last episode also. She has had no upper tract GI symptoms. Her INR is excellent today. She remains on this because of her history of paroxysmal atrial fibrillation. There has been no meaningful weight loss. She has an unsteady gait related to her osteoarthritic knees. She has a past history of primary hypertension, dyslipidemia, as well as diabetes and seizure disorder. Current Outpatient Medications Medication Sig Dispense Refill  phenytoin ER (DILANTIN ER) 100 mg ER capsule TAKE 4 CAPSULES BY MOUTH EVERY NIGHT AT BEDTIME 360 Cap 3  
 spironolactone-hydrochlorothiazide (ALDACTAZIDE) 25-25 mg per tablet Take 1 Tab by mouth daily. 90 Tab 3  
 metoprolol tartrate (LOPRESSOR) 25 mg tablet TAKE 1 TABLET BY MOUTH TWICE DAILY AT 9 AM AND AT 9  Tab 3  
 levETIRAcetam 1,000 mg tablet Take 1 Tab by mouth two (2) times a day. 180 Tab 3  
 atorvastatin (LIPITOR) 80 mg tablet Take 1 Tab by mouth daily. 90 Tab 3  
 glucose blood VI test strips (FREESTYLE LITE STRIPS) strip Use to test blood sugar daily 50 Strip 11  
 warfarin (COUMADIN) 5 mg tablet TAKE 1 TABLET BY MOUTH MONDAY THROUGH FRIDAY AND 1& 1/2 TABLETS ON SATURDAY AND SUNDAY 103 Tab 3  
 tiotropium (SPIRIVA) 18 mcg inhalation capsule Take 1 Cap by inhalation daily.  30 Cap 11  
 albuterol (PROVENTIL VENTOLIN) 2.5 mg /3 mL (0.083 %) nebulizer solution USE 1 VIAL VIA NEBULIZER EVERY 6 HOURS AS NEEDED. DX: J45.909 120 Each 11  
 lancets (FREESTYLE LANCETS) 28 gauge misc Use to test blood sugar once daily. Dx.e11.9 100 Lancet 3  
 felodipine (PLENDIL SR) 10 mg 24 hr tablet TAKE 1 TABLET BY MOUTH EVERY DAY 90 Tab 3  
 allopurinol (ZYLOPRIM) 300 mg tablet TAKE 1 TABLET BY MOUTH ONCE DAILY 90 Tab 3  
 glucose blood VI test strips (TRUE METRIX GLUCOSE TEST STRIP) strip Use to test blood sugar once daily. Dx.e11.9 100 Strip 11  Blood-Glucose Meter (TRUE METRIX AIR GLUCOSE METER) [facility] [place] Device by Does Not Apply route daily. Use to test blood sugars daily. dx.e11.9 1 Each 0  
 lancets (TRUEPLUS LANCETS) 28 gauge misc Use to test blood sugar once daily. Dx.e11.9 100 Lancet 11  
 aspirin delayed-release 81 mg tablet Take 81 mg by mouth daily.  albuterol (PROAIR HFA) 90 mcg/actuation inhaler Take 2 Puffs by inhalation every four (4) hours as needed for Wheezing or Shortness of Breath. 1 Inhaler 11  
 lisinopril (PRINIVIL, ZESTRIL) 20 mg tablet Take 1 Tab by mouth daily. 90 Tab 3  
 guaiFENesin-codeine (VIRTUSSIN AC) 100-10 mg/5 mL solution Take 5 mL by mouth three (3) times daily as needed for Cough. Max Daily Amount: 15 mL. 140 mL 0  
 cloNIDine HCl (CATAPRES) 0.2 mg tablet Take 1 Tab by mouth two (2) times a day. 30 Tab 11  
 metFORMIN (GLUCOPHAGE) 500 mg tablet TAKE 1 TABLET BY MOUTH TWICE DAILY WITH MEALS 180 Tab 3  
 sitagliptin (JANUVIA) 100 mg tablet Take 1 tablet by mouth daily. 30 tablet 11  
 budesonide-formoterol (SYMBICORT) 160-4.5 mcg/Actuation HFA inhaler Take 2 Puffs by inhalation two (2) times a day. 10.2 Inhaler 11 Past Medical History:  
Diagnosis Date  Arthritis  Asthma  Diabetes ([facility] [address]  Hypertension  Other ill-defined conditions(799.89)   
 rt knee surgery  Stroke ([facility] [address] Past Surgical History:  
Procedure Laterality Date  HX COLONOSCOPY    
 HX GYN Allergies Allergen Reactions  Clonidine Other (comments) Patient becomes incoherent REVIEW OF SYSTEMS: 
General: negative for - chills or fever ENT: negative for - headaches, nasal congestion or tinnitus Respiratory: negative for - cough, hemoptysis, shortness of breath or wheezing Cardiovascular : negative for - chest pain, edema, palpitations or shortness of breath Gastrointestinal: negative for - abdominal pain, blood in stools, heartburn or nausea/vomiting Genito-Urinary: no dysuria, trouble voiding, or hematuria Musculoskeletal: negative for - gait disturbance, joint pain, joint stiffness or joint swelling Neurological: no TIA or stroke symptoms Hematologic: no bruises, no bleeding, no swollen glands Integument: no lumps, mole changes, nail changes or rash Endocrine: no malaise/lethargy or unexpected weight changes Social History Socioeconomic History  Marital status:  Spouse name: Not on file  Number of children: 1  Years of education: Not on file  Highest education level: Not on file Occupational History  Occupation: disabled--[employer] Tobacco Use  Smoking status: Former Smoker  Smokeless tobacco: Never Used  Tobacco comment: 20+years ago Substance and Sexual Activity  Alcohol use: No  
 Drug use: No  
 Sexual activity: Not Currently History reviewed. No pertinent family history. OBJECTIVE: 
 
Visit Vitals /77 Pulse 77 Temp 98.8 °F (37.1 °C) (Oral) Resp 14 Ht 5' 2\" (1.575 m) Wt 267 lb 1.6 oz (121.2 kg) SpO2 91% BMI 48.85 kg/m² CONSTITUTIONAL: well , well nourished, appears age appropriate EYES: perrla, eom intact ENMT:moist mucous membranes, pharynx clear NECK: supple. Thyroid normal 
RESPIRATORY: Chest: clear to ascultation and percussion CARDIOVASCULAR: Heart: regular rate and rhythm GASTROINTESTINAL: Abdomen: soft, bowel sounds active HEMATOLOGIC: no pathological lymph nodes palpated MUSCULOSKELETAL: Extremities: no edema, pulse 1+ INTEGUMENT: No unusual rashes or suspicious skin lesions noted. Nails appear normal. 
NEUROLOGIC: non-focal exam  
MENTAL STATUS: alert and oriented, appropriate affect ASSESSMENT: 
1. Paroxysmal atrial fibrillation (HCC) 2. Diarrhea, unspecified type 3. Essential hypertension 4. Type 2 diabetes mellitus without complication, without long-term current use of insulin ([facility] [address] 5. Seizure disorder ([facility] [address] 6. Intermittent asthma without complication, unspecified asthma severity 7. Primary osteoarthritis of left knee 8. Morbid obesity ([facility] [address] 9. Dyslipidemia 10. Physical deconditioning PLAN: 
 
1. INR is acceptable, no adjustments are made. 2. As far as her diarrhea is concerned, I am not entirely sure of the etiology. I will continue use of the Imodium as needed. She is on no medications that facilitate this. Specifically I discontinued the Metformin a year plus ago. 3. Her blood pressure is excellent, no adjustments are made. 4. Her diabetes historically has been doing well, but I will await results of her hemoglobin A1c. 
5. She remains seizure free, taking her Dilantin and Keppra. 6. Her asthma is quite stable. 7. Her osteoarthritic left knee is quite bothersome. 8. I encouraged weight reduction again. This means avoiding sweets, white breads and white potatoes. 9. She will continue statin as prescribed. 10. I encouraged her to increase her physical activity on a more consistent basis, which she is not doing. . 
Orders Placed This Encounter  MICROALBUMIN, UR, RAND  METABOLIC PANEL, BASIC  
 HEMOGLOBIN A1C WITH EAG  
 CBC WITH AUTOMATED DIFF  
 PHENYTOIN  
 AMB POC PT/INR Follow-up and Dispositions · Return in about 3 months (around 7/15/2019), or RTO monthly for INR. Sascha Mondragon MD

## 2019-04-16 LAB
BASOPHILS # BLD AUTO: 0 X10E3/UL (ref 0–0.2)
BASOPHILS NFR BLD AUTO: 0 %
BUN SERPL-MCNC: 31 MG/DL (ref 8–27)
BUN/CREAT SERPL: 19 (ref 12–28)
CALCIUM SERPL-MCNC: 9.2 MG/DL (ref 8.7–10.3)
CHLORIDE SERPL-SCNC: 104 MMOL/L (ref 96–106)
CO2 SERPL-SCNC: 18 MMOL/L (ref 20–29)
CREAT SERPL-MCNC: 1.6 MG/DL (ref 0.57–1)
EOSINOPHIL # BLD AUTO: 0.1 X10E3/UL (ref 0–0.4)
EOSINOPHIL NFR BLD AUTO: 3 %
ERYTHROCYTE [DISTWIDTH] IN BLOOD BY AUTOMATED COUNT: 15.9 % (ref 12.3–15.4)
EST. AVERAGE GLUCOSE BLD GHB EST-MCNC: 143 MG/DL
GLUCOSE SERPL-MCNC: 110 MG/DL (ref 65–99)
HBA1C MFR BLD: 6.6 % (ref 4.8–5.6)
HCT VFR BLD AUTO: 39.5 % (ref 34–46.6)
HGB BLD-MCNC: 12.9 G/DL (ref 11.1–15.9)
IMM GRANULOCYTES # BLD AUTO: 0 X10E3/UL (ref 0–0.1)
IMM GRANULOCYTES NFR BLD AUTO: 0 %
LYMPHOCYTES # BLD AUTO: 2.7 X10E3/UL (ref 0.7–3.1)
LYMPHOCYTES NFR BLD AUTO: 55 %
MCH RBC QN AUTO: 29.3 PG (ref 26.6–33)
MCHC RBC AUTO-ENTMCNC: 32.7 G/DL (ref 31.5–35.7)
MCV RBC AUTO: 90 FL (ref 79–97)
MONOCYTES # BLD AUTO: 0.5 X10E3/UL (ref 0.1–0.9)
MONOCYTES NFR BLD AUTO: 10 %
NEUTROPHILS # BLD AUTO: 1.5 X10E3/UL (ref 1.4–7)
NEUTROPHILS NFR BLD AUTO: 32 %
PLATELET # BLD AUTO: 214 X10E3/UL (ref 150–379)
POTASSIUM SERPL-SCNC: 5.4 MMOL/L (ref 3.5–5.2)
RBC # BLD AUTO: 4.4 X10E6/UL (ref 3.77–5.28)
SODIUM SERPL-SCNC: 139 MMOL/L (ref 134–144)
WBC # BLD AUTO: 4.8 X10E3/UL (ref 3.4–10.8)

## 2019-05-15 RX ORDER — ALLOPURINOL 300 MG/1
TABLET ORAL
Qty: 90 TAB | Refills: 3 | Status: SHIPPED | OUTPATIENT
Start: 2019-05-15 | End: 2020-04-22 | Stop reason: SDUPTHER

## 2019-05-28 ENCOUNTER — CLINICAL SUPPORT (OUTPATIENT)
Dept: INTERNAL MEDICINE CLINIC | Age: 74
End: 2019-05-28

## 2019-05-28 VITALS
SYSTOLIC BLOOD PRESSURE: 169 MMHG | OXYGEN SATURATION: 91 % | BODY MASS INDEX: 49.69 KG/M2 | RESPIRATION RATE: 20 BRPM | HEIGHT: 62 IN | DIASTOLIC BLOOD PRESSURE: 68 MMHG | HEART RATE: 71 BPM | WEIGHT: 270 LBS | TEMPERATURE: 96.5 F

## 2019-05-28 DIAGNOSIS — I48.0 PAROXYSMAL ATRIAL FIBRILLATION (HCC): Primary | Chronic | ICD-10-CM

## 2019-05-28 LAB
INR BLD: NORMAL
PT POC: 1.6 SECONDS
VALID INTERNAL CONTROL?: YES

## 2019-05-28 NOTE — PROGRESS NOTES
Patient here today for PT check per verbal order from Dr. Quincy Powell. PT was 1.6, new orders received, coumadin 5mg tab 1 po on Mon.thru Friday half tab on sat.  And Sun.

## 2019-05-29 LAB
ALBUMIN/CREAT UR: 102.6 MG/G CREAT (ref 0–30)
CREAT UR-MCNC: 49.7 MG/DL
MICROALBUMIN UR-MCNC: 51 UG/ML

## 2019-06-10 ENCOUNTER — CLINICAL SUPPORT (OUTPATIENT)
Dept: INTERNAL MEDICINE CLINIC | Age: 74
End: 2019-06-10

## 2019-06-10 DIAGNOSIS — I48.0 PAROXYSMAL ATRIAL FIBRILLATION (HCC): Primary | ICD-10-CM

## 2019-06-10 LAB
INR BLD: 3.7
PT POC: 45 SECONDS
VALID INTERNAL CONTROL?: YES

## 2019-06-10 NOTE — PROGRESS NOTES
Pt came in today for a PT/INR check. Pt states she is taking   Coumadin 5 mg 1 tab PO Monday-Friday and 1/2 tab PO Saturday and Sunday  PT-45.0  INR-3.7  Per Dr. Todd Granger pt is to start taking Coumadin 5 mg   1/2 tab PO Monday,Wednesday,Friday  1 tab PO Tuesday, Thursday, Saturday, Sunday  Pt is to return in 2 weeks for another PT/INR check.      Hung Mcfadden

## 2019-06-24 ENCOUNTER — CLINICAL SUPPORT (OUTPATIENT)
Dept: INTERNAL MEDICINE CLINIC | Age: 74
End: 2019-06-24

## 2019-06-24 DIAGNOSIS — I48.0 PAROXYSMAL ATRIAL FIBRILLATION (HCC): Primary | Chronic | ICD-10-CM

## 2019-06-25 LAB
INR BLD: 4.1
PT POC: 49 SECONDS
VALID INTERNAL CONTROL?: YES

## 2019-07-08 ENCOUNTER — CLINICAL SUPPORT (OUTPATIENT)
Dept: INTERNAL MEDICINE CLINIC | Age: 74
End: 2019-07-08

## 2019-07-08 DIAGNOSIS — I48.0 PAROXYSMAL ATRIAL FIBRILLATION (HCC): Primary | ICD-10-CM

## 2019-07-08 LAB
INR BLD: 2.2
PT POC: 25.8 SECONDS
VALID INTERNAL CONTROL?: YES

## 2019-07-08 NOTE — PROGRESS NOTES
Tretha Severance is a 66 y.o. female who presents today for Anticoagulation monitoring. Current dose:  Coumadin 5 mg 1/2 tab PO Mon- Fri and 1 tab PO on Sat & Sun  Medication Changes:  no  Dietary Changes:  no    Latest INR:  Results for orders placed or performed in visit on 07/08/19   AMB POC PT/INR   Result Value Ref Range    VALID INTERNAL CONTROL POC Yes     Prothrombin time (POC) 25.8 seconds    INR POC 2.2          New Coumadin dose:.current treatment plan is effective, no change in therapy. Next check to be scheduled for  2  weeks.   Per Dr. Bassem Ibarra LPN

## 2019-07-22 ENCOUNTER — OFFICE VISIT (OUTPATIENT)
Dept: INTERNAL MEDICINE CLINIC | Age: 74
End: 2019-07-22

## 2019-07-22 VITALS
OXYGEN SATURATION: 96 % | HEART RATE: 57 BPM | HEIGHT: 62 IN | BODY MASS INDEX: 50.33 KG/M2 | TEMPERATURE: 98.1 F | DIASTOLIC BLOOD PRESSURE: 67 MMHG | RESPIRATION RATE: 14 BRPM | SYSTOLIC BLOOD PRESSURE: 153 MMHG | WEIGHT: 273.5 LBS

## 2019-07-22 DIAGNOSIS — I48.0 PAROXYSMAL ATRIAL FIBRILLATION (HCC): Chronic | ICD-10-CM

## 2019-07-22 DIAGNOSIS — E78.5 DYSLIPIDEMIA: ICD-10-CM

## 2019-07-22 DIAGNOSIS — M17.12 PRIMARY OSTEOARTHRITIS OF LEFT KNEE: ICD-10-CM

## 2019-07-22 DIAGNOSIS — R53.81 PHYSICAL DECONDITIONING: ICD-10-CM

## 2019-07-22 DIAGNOSIS — G40.909 SEIZURE DISORDER (HCC): ICD-10-CM

## 2019-07-22 DIAGNOSIS — E66.01 MORBID OBESITY (HCC): Chronic | ICD-10-CM

## 2019-07-22 DIAGNOSIS — E11.21 TYPE 2 DIABETES WITH NEPHROPATHY (HCC): ICD-10-CM

## 2019-07-22 DIAGNOSIS — J45.20 INTERMITTENT ASTHMA WITHOUT COMPLICATION, UNSPECIFIED ASTHMA SEVERITY: ICD-10-CM

## 2019-07-22 DIAGNOSIS — I87.2 VENOUS INSUFFICIENCY: Primary | ICD-10-CM

## 2019-07-22 DIAGNOSIS — I10 ESSENTIAL HYPERTENSION: ICD-10-CM

## 2019-07-22 LAB
INR BLD: 3.1
PT POC: 36.8 SECONDS
VALID INTERNAL CONTROL?: YES

## 2019-07-22 NOTE — PROGRESS NOTES
Chief Complaint   Patient presents with   [facility] Annual Wellness Visit     Patient states that she had a fall last night. She has visible bruising to her bottom lip. 1. Have you been to the ER, urgent care clinic since your last visit? Hospitalized since your last visit? No    2. Have you seen or consulted any other health care providers outside of the [address] [place] [address] since your last visit? Include any pap smears or colon screening.  No

## 2019-07-22 NOTE — PROGRESS NOTES
[address] [facility]  [place] [address]  [address] [place] [address]  Phone:  [phone]  Fax: [phone]       Chief Complaint   Patient presents with   [facility] Wellness Visit     Patient states that she had a fall last night. She has visible bruising to her bottom lip. .      SUBJECTIVE:    Marlene Bush is a 66 y.o. female Comes in stating that she has done well. She is accompanied by her two daughters. Unfortunately she has fallen on occasion, which is not surprising. She has a gait disturbance secondary to her severe osteoarthritis involving her left knee. She has had a right total knee replacement. This is complicated by deconditioning and morbid obesity. Her physical inactivity is definitely aggravating the propensity for falling. Her asthma has been reasonably stable. Her last seizure was six months ago and appeared to be a breakthrough seizure. She has been compliant with her anticonvulsants. Her diabetes has been doing quite well. She has a past history of primary hypertension and dyslipidemia. She also has a history of recurrent paroxysmal atrial fib and is maintained on Warfarin. INRs to date have been acceptable. Current Outpatient Medications   Medication Sig Dispense Refill    allopurinol (ZYLOPRIM) 300 mg tablet TAKE 1 TABLET BY MOUTH ONCE DAILY 90 Tab 3    felodipine (PLENDIL SR) 10 mg 24 hr tablet TAKE 1 TABLET BY MOUTH EVERY DAY 90 Tab 3    phenytoin ER (DILANTIN ER) 100 mg ER capsule TAKE 4 CAPSULES BY MOUTH EVERY NIGHT AT BEDTIME 360 Cap 3    spironolactone-hydrochlorothiazide (ALDACTAZIDE) 25-25 mg per tablet Take 1 Tab by mouth daily. 90 Tab 3    metoprolol tartrate (LOPRESSOR) 25 mg tablet TAKE 1 TABLET BY MOUTH TWICE DAILY AT 9 AM AND AT 9  Tab 3    levETIRAcetam 1,000 mg tablet Take 1 Tab by mouth two (2) times a day. 180 Tab 3    atorvastatin (LIPITOR) 80 mg tablet Take 1 Tab by mouth daily.  90 Tab 3    glucose blood VI test strips (FREESTYLE LITE STRIPS) strip Use to test blood sugar daily 50 Strip 11    warfarin (COUMADIN) 5 mg tablet TAKE 1 TABLET BY MOUTH MONDAY THROUGH FRIDAY AND 1& 1/2 TABLETS ON SATURDAY AND SUNDAY 103 Tab 3    tiotropium (SPIRIVA) 18 mcg inhalation capsule Take 1 Cap by inhalation daily. 30 Cap 11    albuterol (PROVENTIL VENTOLIN) 2.5 mg /3 mL (0.083 %) nebulizer solution USE 1 VIAL VIA NEBULIZER EVERY 6 HOURS AS NEEDED. DX: J45.909 120 Each 11    lancets (FREESTYLE LANCETS) 28 gauge misc Use to test blood sugar once daily. Dx.e11.9 100 Lancet 3    glucose blood VI test strips (TRUE METRIX GLUCOSE TEST STRIP) strip Use to test blood sugar once daily. Dx.e11.9 100 Strip 11    Blood-Glucose Meter (TRUE METRIX AIR GLUCOSE METER) misc 1 Device by Does Not Apply route daily. Use to test blood sugars daily. dx.e11.9 1 Each 0    lancets (TRUEPLUS LANCETS) 28 gauge misc Use to test blood sugar once daily. Dx.e11.9 100 Lancet 11    aspirin delayed-release 81 mg tablet Take 81 mg by mouth daily.  albuterol (PROAIR HFA) 90 mcg/actuation inhaler Take 2 Puffs by inhalation every four (4) hours as needed for Wheezing or Shortness of Breath. 1 Inhaler 11    budesonide-formoterol (SYMBICORT) 160-4.5 mcg/Actuation HFA inhaler Take 2 Puffs by inhalation two (2) times a day. 10.2 Inhaler 11    lisinopril (PRINIVIL, ZESTRIL) 20 mg tablet Take 1 Tab by mouth daily. 90 Tab 3    guaiFENesin-codeine (VIRTUSSIN AC) 100-10 mg/5 mL solution Take 5 mL by mouth three (3) times daily as needed for Cough. Max Daily Amount: 15 mL. 140 mL 0    cloNIDine HCl (CATAPRES) 0.2 mg tablet Take 1 Tab by mouth two (2) times a day. 30 Tab 11    metFORMIN (GLUCOPHAGE) 500 mg tablet TAKE 1 TABLET BY MOUTH TWICE DAILY WITH MEALS 180 Tab 3    sitagliptin (JANUVIA) 100 mg tablet Take 1 tablet by mouth daily.  30 tablet 11     Past Medical History:   Diagnosis Date    Arthritis     Asthma     Diabetes ([address]     Hypertension  Other ill-defined conditions(799.89)     rt knee surgery    Stroke [facility])      Past Surgical History:   Procedure Laterality Date    HX COLONOSCOPY      HX GYN       Allergies   Allergen Reactions    Clonidine Other (comments)     Patient becomes incoherent         REVIEW OF SYSTEMS:  General: negative for - chills or fever  ENT: negative for - headaches, nasal congestion or tinnitus  Respiratory: negative for - cough, hemoptysis, shortness of breath or wheezing  Cardiovascular : negative for - chest pain, edema, palpitations or shortness of breath  Gastrointestinal: negative for - abdominal pain, blood in stools, heartburn or nausea/vomiting  Genito-Urinary: no dysuria, trouble voiding, or hematuria  Musculoskeletal: negative for - gait disturbance, joint pain, joint stiffness or joint swelling  Neurological: no TIA or stroke symptoms  Hematologic: no bruises, no bleeding, no swollen glands  Integument: no lumps, mole changes, nail changes or rash  Endocrine: no malaise/lethargy or unexpected weight changes      Social History     Socioeconomic History    Marital status:      Spouse name: Not on file    Number of children: 1    Years of education: Not on file    Highest education level: Not on file   Occupational History    Occupation: disabled--[employer]   Tobacco Use    Smoking status: Former Smoker    Smokeless tobacco: Never Used    Tobacco comment: 20+years ago   Substance and Sexual Activity    Alcohol use: No    Drug use: No    Sexual activity: Not Currently     History reviewed. No pertinent family history. OBJECTIVE:    Visit Vitals  /67   Pulse (!) 57   Temp 98.1 °F (36.7 °C) (Oral)   Resp 14   Ht 5' 2\" (1.575 m)   Wt 273 lb 8 oz (124.1 kg)   SpO2 96%   BMI 50.02 kg/m²     CONSTITUTIONAL: well , well nourished, appears age appropriate  EYES: perrla, eom intact  ENMT:moist mucous membranes, pharynx clear  NECK: supple.  Thyroid normal  RESPIRATORY: Chest: clear to ascultation and percussion   CARDIOVASCULAR: Heart: regular rate and rhythm  GASTROINTESTINAL: Abdomen: soft, bowel sounds active  HEMATOLOGIC: no pathological lymph nodes palpated  MUSCULOSKELETAL: Extremities: no edema, pulse 1+   INTEGUMENT: No unusual rashes or suspicious skin lesions noted. Nails appear normal.  NEUROLOGIC: non-focal exam   MENTAL STATUS: alert and oriented, appropriate affect      ASSESSMENT:  1. Venous insufficiency    2. Paroxysmal atrial fibrillation (HCC)    3. Type 2 diabetes with nephropathy ([facility] [address]    4. Seizure disorder ([facility] [address]    5. Intermittent asthma without complication, unspecified asthma severity    6. Primary osteoarthritis of left knee    7. Morbid obesity ([facility] [address]    8. Dyslipidemia    9. Physical deconditioning    10. Essential hypertension        PLAN:    1. The patient has swelling predominantly on the right leg, which is related to venous insufficiency. This indeed is relenting. 2. Her rhythm today is regular. She does have a history of paroxysmal a-fib. She remains on Warfarin. INR today is ________________. She will hold it today and resume it in the usual fashion thereafter. She will return in two weeks for repeat INR. 3. Diabetes historically has been doing well and I will await the results of her hemoglobin A1c.  4. From a seizure standpoint, Dilantin level will be obtained. 5. Her asthma is quite stable and she will continue her bronchodilators as prescribed. 6. She has severe osteoarthritis of the left knee, but there is nothing that can be done other than an emphasis on weight reduction. 7. Again, weight reduction can occur as a direct result of eating meals, eliminating snacks and avoiding the consumption of processed carbohydrates. 8. She will continue statin as prescribed. 9. She is physically deconditioned, which is aggravating her entire state and facilitates falling. 10. Blood pressure is 142/60. No adjustments are made.     .  Orders Placed This Encounter    METABOLIC PANEL, BASIC    HEMOGLOBIN A1C WITH EAG    PHENYTOIN, TOTAL & FREE    AMB POC PT/INR         Follow-up and Dispositions    · Return in about 3 months (around 10/22/2019), or RTO 3 weeks for INR.            Deion Irby MD

## 2019-07-23 NOTE — PROGRESS NOTES
Chief Complaint   Patient presents with    Coagulation disorder     Gianni is here for PT /INR check. Per Patient she is taking Warfarin 5 mg. Patient daughter stated that she is taking 1/2tab M,W,F and 1 tab on Tues,Thurs,Sat,and Sun. Results for orders placed or performed in visit on 06/24/19   AMB POC PT/INR   Result Value Ref Range    VALID INTERNAL CONTROL POC Yes     Prothrombin time (POC) 49.0 seconds    INR POC 4.1      Per Dr. Vanesa Schofield No Adjustments and  patient is to return in 2wks for another PT check.

## 2019-07-27 LAB
BUN SERPL-MCNC: 27 MG/DL (ref 8–27)
BUN/CREAT SERPL: 24 (ref 12–28)
CALCIUM SERPL-MCNC: 9.4 MG/DL (ref 8.7–10.3)
CHLORIDE SERPL-SCNC: 106 MMOL/L (ref 96–106)
CO2 SERPL-SCNC: 21 MMOL/L (ref 20–29)
CREAT SERPL-MCNC: 1.14 MG/DL (ref 0.57–1)
EST. AVERAGE GLUCOSE BLD GHB EST-MCNC: 137 MG/DL
GLUCOSE SERPL-MCNC: 105 MG/DL (ref 65–99)
HBA1C MFR BLD: 6.4 % (ref 4.8–5.6)
PHENYTOIN FREE SERPL-MCNC: ABNORMAL UG/ML
PHENYTOIN SERPL-MCNC: 30.3 UG/ML (ref 10–20)
POTASSIUM SERPL-SCNC: 5.3 MMOL/L (ref 3.5–5.2)
SODIUM SERPL-SCNC: 140 MMOL/L (ref 134–144)

## 2019-07-28 NOTE — PROGRESS NOTES
Change Dilantin to 3 capsules Monday and Thursday 4 capsules all other days. Return to the office in 2 weeks for Dilantin level  See me as soon as possible.

## 2019-08-12 ENCOUNTER — CLINICAL SUPPORT (OUTPATIENT)
Dept: INTERNAL MEDICINE CLINIC | Age: 74
End: 2019-08-12

## 2019-08-12 DIAGNOSIS — I48.0 PAROXYSMAL ATRIAL FIBRILLATION (HCC): Primary | ICD-10-CM

## 2019-08-12 DIAGNOSIS — G40.909 SEIZURE DISORDER (HCC): ICD-10-CM

## 2019-08-12 LAB
INR BLD: 2.4
PT POC: 29.1 SECONDS
VALID INTERNAL CONTROL?: YES

## 2019-08-12 NOTE — PROGRESS NOTES
Pt came in today for a PT/INR check. Pt caretaker states she is taking Coumadin 5 mg 1/2 tab PO Monday-Friday 1 tab PO Saturday and Sunday  PT- 29.1  INR-2.4  Per Dr. Romero Lopez pt is to continue taking coumadin with no changes return in 1 month for another PT/INR check.      Becca Young

## 2019-08-15 LAB
PHENYTOIN FREE SERPL-MCNC: 2.1 UG/ML (ref 1–2)
PHENYTOIN SERPL-MCNC: 24.9 UG/ML (ref 10–20)

## 2019-08-28 RX ORDER — LANCETS 28 GAUGE
EACH MISCELLANEOUS
Qty: 100 LANCET | Refills: 11 | Status: SHIPPED | OUTPATIENT
Start: 2019-08-28 | End: 2020-08-27

## 2019-09-09 ENCOUNTER — CLINICAL SUPPORT (OUTPATIENT)
Dept: INTERNAL MEDICINE CLINIC | Age: 74
End: 2019-09-09

## 2019-09-09 DIAGNOSIS — I48.0 PAROXYSMAL ATRIAL FIBRILLATION (HCC): Primary | ICD-10-CM

## 2019-09-09 LAB
INR BLD: 1.9
PT POC: 23.3 SECONDS
VALID INTERNAL CONTROL?: YES

## 2019-09-12 NOTE — PROGRESS NOTES
Chief Complaint   Patient presents with    Coagulation disorder     Patient is here for a PT INR check. Per Patient she is taking Warfarin 5mg  1/2 tab Monday throught Friday and 1 tab Saturday, Sunday     Results for orders placed or performed in visit on 09/09/19   AMB POC PT/INR   Result Value Ref Range    VALID INTERNAL CONTROL POC Yes     Prothrombin time (POC) 23.3 seconds    INR POC 1.9        Per Dr. Mario Byers patient is to return in 1 month for a PT check and No adjustments.

## 2019-10-14 ENCOUNTER — OFFICE VISIT (OUTPATIENT)
Dept: INTERNAL MEDICINE CLINIC | Age: 74
End: 2019-10-14

## 2019-10-14 VITALS
HEART RATE: 61 BPM | DIASTOLIC BLOOD PRESSURE: 79 MMHG | TEMPERATURE: 98.2 F | BODY MASS INDEX: 50.35 KG/M2 | SYSTOLIC BLOOD PRESSURE: 157 MMHG | WEIGHT: 273.6 LBS | RESPIRATION RATE: 14 BRPM | OXYGEN SATURATION: 90 % | HEIGHT: 62 IN

## 2019-10-14 DIAGNOSIS — I10 ESSENTIAL HYPERTENSION: ICD-10-CM

## 2019-10-14 DIAGNOSIS — Z00.00 WELLNESS EXAMINATION: ICD-10-CM

## 2019-10-14 DIAGNOSIS — E11.9 TYPE 2 DIABETES MELLITUS WITHOUT COMPLICATION, WITHOUT LONG-TERM CURRENT USE OF INSULIN (HCC): Chronic | ICD-10-CM

## 2019-10-14 DIAGNOSIS — Z13.39 SCREENING FOR ALCOHOLISM: ICD-10-CM

## 2019-10-14 DIAGNOSIS — G40.909 SEIZURE DISORDER (HCC): ICD-10-CM

## 2019-10-14 DIAGNOSIS — J45.20 INTERMITTENT ASTHMA WITHOUT COMPLICATION, UNSPECIFIED ASTHMA SEVERITY: ICD-10-CM

## 2019-10-14 DIAGNOSIS — E78.5 DYSLIPIDEMIA: ICD-10-CM

## 2019-10-14 DIAGNOSIS — E66.01 MORBID OBESITY (HCC): Chronic | ICD-10-CM

## 2019-10-14 DIAGNOSIS — M17.12 PRIMARY OSTEOARTHRITIS OF LEFT KNEE: ICD-10-CM

## 2019-10-14 DIAGNOSIS — Z13.31 SCREENING FOR DEPRESSION: ICD-10-CM

## 2019-10-14 DIAGNOSIS — I48.0 PAROXYSMAL ATRIAL FIBRILLATION (HCC): Primary | Chronic | ICD-10-CM

## 2019-10-14 DIAGNOSIS — R53.81 PHYSICAL DECONDITIONING: ICD-10-CM

## 2019-10-14 LAB
INR BLD: 2.5
PT POC: 29.8 SECONDS
VALID INTERNAL CONTROL?: YES

## 2019-10-14 NOTE — PROGRESS NOTES
[facility]  [facility] [place]  [address] [facility] [address]  Phone:  [phone]  Fax: [phone]       Chief Complaint   Patient presents with   [facility] Wellness Visit   . SUBJECTIVE:    Srinivasa Coliler is a 66 y.o. female Comes in for return visit accompanied by her daughter. Generally she is doing well. There has been no meaningful weight loss. She actually has a 7UP in her hand today. She has no appreciation for the need to lose weight. She remains seizure free. Her Dilantin was indeed decreased to three tablets Monday, Wednesday and Friday and four tablets all other days. She has had no adverse reactions from this. She remains on her Keppra also. She complains of pain in her legs, somewhat not unexpected given the fact that she has severe osteoarthritis involving a knee. She has a past history of primary hypertension and dyslipidemia. She remains on her Warfarin because of her history of recurrent DVT and atrial fib. Current Outpatient Medications   Medication Sig Dispense Refill    lancets (FREESTYLE LANCETS) 28 gauge misc USE TO TEST BLOOD SUGAR EVERY  Lancet 11    allopurinol (ZYLOPRIM) 300 mg tablet TAKE 1 TABLET BY MOUTH ONCE DAILY 90 Tab 3    felodipine (PLENDIL SR) 10 mg 24 hr tablet TAKE 1 TABLET BY MOUTH EVERY DAY 90 Tab 3    phenytoin ER (DILANTIN ER) 100 mg ER capsule TAKE 4 CAPSULES BY MOUTH EVERY NIGHT AT BEDTIME 360 Cap 3    spironolactone-hydrochlorothiazide (ALDACTAZIDE) 25-25 mg per tablet Take 1 Tab by mouth daily. 90 Tab 3    metoprolol tartrate (LOPRESSOR) 25 mg tablet TAKE 1 TABLET BY MOUTH TWICE DAILY AT 9 AM AND AT 9  Tab 3    levETIRAcetam 1,000 mg tablet Take 1 Tab by mouth two (2) times a day. 180 Tab 3    atorvastatin (LIPITOR) 80 mg tablet Take 1 Tab by mouth daily.  90 Tab 3    glucose blood VI test strips (FREESTYLE LITE STRIPS) strip Use to test blood sugar daily 50 Strip 11    warfarin (COUMADIN) 5 mg tablet TAKE 1 TABLET BY MOUTH MONDAY THROUGH FRIDAY AND 1& 1/2 TABLETS ON SATURDAY AND SUNDAY 103 Tab 3    tiotropium (SPIRIVA) 18 mcg inhalation capsule Take 1 Cap by inhalation daily. 30 Cap 11    albuterol (PROVENTIL VENTOLIN) 2.5 mg /3 mL (0.083 %) nebulizer solution USE 1 VIAL VIA NEBULIZER EVERY 6 HOURS AS NEEDED. DX: J45.909 120 Each 11    glucose blood VI test strips (TRUE METRIX GLUCOSE TEST STRIP) strip Use to test blood sugar once daily. Dx.e11.9 100 Strip 11    Blood-Glucose Meter (TRUE METRIX AIR GLUCOSE METER) misc 1 Device by Does Not Apply route daily. Use to test blood sugars daily. dx.e11.9 1 Each 0    lancets (TRUEPLUS LANCETS) 28 gauge misc Use to test blood sugar once daily. Dx.e11.9 100 Lancet 11    aspirin delayed-release 81 mg tablet Take 81 mg by mouth daily.  albuterol (PROAIR HFA) 90 mcg/actuation inhaler Take 2 Puffs by inhalation every four (4) hours as needed for Wheezing or Shortness of Breath. 1 Inhaler 11    budesonide-formoterol (SYMBICORT) 160-4.5 mcg/Actuation HFA inhaler Take 2 Puffs by inhalation two (2) times a day. 10.2 Inhaler 11    lisinopril (PRINIVIL, ZESTRIL) 20 mg tablet Take 1 Tab by mouth daily. 90 Tab 3    guaiFENesin-codeine (VIRTUSSIN AC) 100-10 mg/5 mL solution Take 5 mL by mouth three (3) times daily as needed for Cough. Max Daily Amount: 15 mL. 140 mL 0    cloNIDine HCl (CATAPRES) 0.2 mg tablet Take 1 Tab by mouth two (2) times a day. 30 Tab 11    metFORMIN (GLUCOPHAGE) 500 mg tablet TAKE 1 TABLET BY MOUTH TWICE DAILY WITH MEALS 180 Tab 3    sitagliptin (JANUVIA) 100 mg tablet Take 1 tablet by mouth daily.  30 tablet 11     Past Medical History:   Diagnosis Date    Arthritis     Asthma     Diabetes ([address]     Hypertension     Other ill-defined conditions(799.89)     rt knee surgery    Stroke [facility])      Past Surgical History:   Procedure Laterality Date    HX COLONOSCOPY      HX GYN       Allergies   Allergen Reactions    Clonidine Other (comments)     Patient becomes incoherent         REVIEW OF SYSTEMS:  General: negative for - chills or fever  ENT: negative for - headaches, nasal congestion or tinnitus  Respiratory: negative for - cough, hemoptysis, shortness of breath or wheezing  Cardiovascular : negative for - chest pain, edema, palpitations or shortness of breath  Gastrointestinal: negative for - abdominal pain, blood in stools, heartburn or nausea/vomiting  Genito-Urinary: no dysuria, trouble voiding, or hematuria  Musculoskeletal: negative for - gait disturbance, joint pain, joint stiffness or joint swelling  Neurological: no TIA or stroke symptoms  Hematologic: no bruises, no bleeding, no swollen glands  Integument: no lumps, mole changes, nail changes or rash  Endocrine: no malaise/lethargy or unexpected weight changes      Social History     Socioeconomic History    Marital status:      Spouse name: Not on file    Number of children: 1    Years of education: Not on file    Highest education level: Not on file   Occupational History    Occupation: disabled--[employer]   Tobacco Use    Smoking status: Former Smoker    Smokeless tobacco: Never Used    Tobacco comment: 20+years ago   Substance and Sexual Activity    Alcohol use: No    Drug use: No    Sexual activity: Not Currently     History reviewed. No pertinent family history. OBJECTIVE:    Visit Vitals  /79   Pulse 61   Temp 98.2 °F (36.8 °C) (Oral)   Resp 14   Ht 5' 2\" (1.575 m)   Wt 273 lb 9.6 oz (124.1 kg)   SpO2 90%   BMI 50.04 kg/m²     CONSTITUTIONAL: well , well nourished, appears age appropriate  EYES: perrla, eom intact  ENMT:moist mucous membranes, pharynx clear  NECK: supple.  Thyroid normal  RESPIRATORY: Chest: clear to ascultation and percussion   CARDIOVASCULAR: Heart: regular rate and rhythm  GASTROINTESTINAL: Abdomen: soft, bowel sounds active  HEMATOLOGIC: no pathological lymph nodes palpated  MUSCULOSKELETAL: Extremities: no edema, pulse 1+   INTEGUMENT: No unusual rashes or suspicious skin lesions noted. Nails appear normal.  NEUROLOGIC: non-focal exam   MENTAL STATUS: alert and oriented, appropriate affect      ASSESSMENT:  1. Paroxysmal atrial fibrillation (HCC)    2. Essential hypertension    3. Type 2 diabetes mellitus without complication, without long-term current use of insulin ([facility]    4. Seizure disorder ([facility]    5. Intermittent asthma without complication, unspecified asthma severity    6. Morbid obesity ([facility]    7. Physical deconditioning    8. Primary osteoarthritis of left knee    9. Dyslipidemia    10. Screening for alcoholism    11. Screening for depression        PLAN:    1. INR is acceptable today. She is taking a half tablet Monday through Thursday and a whole tablet Friday, Saturday and Sunday. No adjustments are made. 2. Dilantin total and free will be obtained on return visit. She will continue her anticonvulsant as prescribed. 3. As far as weight is concerned, I encouraged her to lose weight by reducing her consumption of processed carbohydrates, specifically simple sweets, white breads and white potatoes. 4. She will continue statin in view of her increased cardiovascular risk. 5. Blood pressure today is 144/70. I will make no adjustments for now. If the trend continues, then adjustments will need to be made. 6. I encouraged her to remain as physically active as possible. 7.   Her asthma has been reasonably stable. 8.   Her diabetes has been reasonably stable. Last hemoglobin A1c was excellent. Again, I emphasized the importance of reducing her consumption of processed carbohydrates. .  Orders Placed This Encounter    Depression Screen Annual    AMB POC PT/INR         Follow-up and Dispositions    · Return in about 3 months (around 1/14/2020), or monthly for INR.            Tamra Mcnair MD  This is the Subsequent Medicare Annual Wellness Exam, performed 12 months or more after the Initial AWV or the last Subsequent AWV    I have reviewed the patient's medical history in detail and updated the computerized patient record. History     Past Medical History:   Diagnosis Date    Arthritis     Asthma     Diabetes ([address]     Hypertension     Other ill-defined conditions(799.89)     rt knee surgery    Stroke [facility])       Past Surgical History:   Procedure Laterality Date    HX COLONOSCOPY      HX GYN       Current Outpatient Medications   Medication Sig Dispense Refill    lancets (FREESTYLE LANCETS) 28 gauge misc USE TO TEST BLOOD SUGAR EVERY  Lancet 11    allopurinol (ZYLOPRIM) 300 mg tablet TAKE 1 TABLET BY MOUTH ONCE DAILY 90 Tab 3    felodipine (PLENDIL SR) 10 mg 24 hr tablet TAKE 1 TABLET BY MOUTH EVERY DAY 90 Tab 3    phenytoin ER (DILANTIN ER) 100 mg ER capsule TAKE 4 CAPSULES BY MOUTH EVERY NIGHT AT BEDTIME 360 Cap 3    spironolactone-hydrochlorothiazide (ALDACTAZIDE) 25-25 mg per tablet Take 1 Tab by mouth daily. 90 Tab 3    metoprolol tartrate (LOPRESSOR) 25 mg tablet TAKE 1 TABLET BY MOUTH TWICE DAILY AT 9 AM AND AT 9  Tab 3    levETIRAcetam 1,000 mg tablet Take 1 Tab by mouth two (2) times a day. 180 Tab 3    atorvastatin (LIPITOR) 80 mg tablet Take 1 Tab by mouth daily. 90 Tab 3    glucose blood VI test strips (FREESTYLE LITE STRIPS) strip Use to test blood sugar daily 50 Strip 11    warfarin (COUMADIN) 5 mg tablet TAKE 1 TABLET BY MOUTH MONDAY THROUGH FRIDAY AND 1& 1/2 TABLETS ON SATURDAY AND SUNDAY 103 Tab 3    tiotropium (SPIRIVA) 18 mcg inhalation capsule Take 1 Cap by inhalation daily. 30 Cap 11    albuterol (PROVENTIL VENTOLIN) 2.5 mg /3 mL (0.083 %) nebulizer solution USE 1 VIAL VIA NEBULIZER EVERY 6 HOURS AS NEEDED. DX: J45.909 120 Each 11    glucose blood VI test strips (TRUE METRIX GLUCOSE TEST STRIP) strip Use to test blood sugar once daily.  Dx.e11.9 100 Strip 11    Blood-Glucose Meter (TRUE METRIX AIR GLUCOSE METER) misc 1 Device by Does Not Apply route daily. Use to test blood sugars daily. dx.e11.9 1 Each 0    lancets (TRUEPLUS LANCETS) 28 gauge misc Use to test blood sugar once daily. Dx.e11.9 100 Lancet 11    aspirin delayed-release 81 mg tablet Take 81 mg by mouth daily.  albuterol (PROAIR HFA) 90 mcg/actuation inhaler Take 2 Puffs by inhalation every four (4) hours as needed for Wheezing or Shortness of Breath. 1 Inhaler 11    budesonide-formoterol (SYMBICORT) 160-4.5 mcg/Actuation HFA inhaler Take 2 Puffs by inhalation two (2) times a day. 10.2 Inhaler 11    lisinopril (PRINIVIL, ZESTRIL) 20 mg tablet Take 1 Tab by mouth daily. 90 Tab 3    guaiFENesin-codeine (VIRTUSSIN AC) 100-10 mg/5 mL solution Take 5 mL by mouth three (3) times daily as needed for Cough. Max Daily Amount: 15 mL. 140 mL 0    cloNIDine HCl (CATAPRES) 0.2 mg tablet Take 1 Tab by mouth two (2) times a day. 30 Tab 11    metFORMIN (GLUCOPHAGE) 500 mg tablet TAKE 1 TABLET BY MOUTH TWICE DAILY WITH MEALS 180 Tab 3    sitagliptin (JANUVIA) 100 mg tablet Take 1 tablet by mouth daily. 30 tablet 11     Allergies   Allergen Reactions    Clonidine Other (comments)     Patient becomes incoherent     History reviewed. No pertinent family history.   Social History     Tobacco Use    Smoking status: Former Smoker    Smokeless tobacco: Never Used    Tobacco comment: 20+years ago   Substance Use Topics    Alcohol use: No     Patient Active Problem List   Diagnosis Code    Paroxysmal atrial fibrillation (HCC) I48.0    Morbid obesity (HCC) E66.01    DM type 2 (diabetes mellitus, type 2) ([facility] E11.9    Total knee replacement status Z96.659    Seizure disorder ([facility] G40.909    Asthma J45.909    Dyslipidemia E78.5    HTN (hypertension) I10    Primary osteoarthritis of left knee M17.12    Venous insufficiency I87.2    Proteinuria R80.9    Physical deconditioning R53.81    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 Functional Ability and Level of Safety:   Hearing Loss  Hearing is good. Activities of Daily Living  The home contains: grab bars  Patient needs help with:  transportation, shopping, preparing meals, laundry, housework and managing medications    Fall Risk  Fall Risk Assessment, last 12 mths 7/22/2019   Able to walk? Yes   Fall in past 12 months? Yes   Fall with injury? Yes   Number of falls in past 12 months 3   Fall Risk Score 4       Abuse Screen  Patient is not abused    Cognitive Screening   Evaluation of Cognitive Function:  Has your family/caregiver stated any concerns about your memory: no  Normal    Patient Care Team   Patient Care Team:  Ronaldo Mason MD as PCP - General (Internal Medicine)    Assessment/Plan   Education and counseling provided:  Are appropriate based on today's review and evaluation    Diagnoses and all orders for this visit:    1. Paroxysmal atrial fibrillation (HCC)  -     AMB POC PT/INR    2. Essential hypertension    3. Type 2 diabetes mellitus without complication, without long-term current use of insulin ([address]    4. Seizure disorder ([address]    5. Intermittent asthma without complication, unspecified asthma severity    6. Morbid obesity ([address]    7. Physical deconditioning    8. Primary osteoarthritis of left knee    9. Dyslipidemia    10. Screening for alcoholism  -     SD ANNUAL ALCOHOL SCREEN 15 MIN    11.  Screening for depression  -     [place] Maintenance Due   Topic Date Due    GLAUCOMA SCREENING Q2Y  04/13/2017    Influenza Age 5 to Adult  08/01/2019

## 2019-10-14 NOTE — PROGRESS NOTES
Chief Complaint   Patient presents with   [address] [place] [address] Annual Wellness Visit     1. Have you been to the ER, urgent care clinic since your last visit? Hospitalized since your last visit? No    2. Have you seen or consulted any other health care providers outside of the [address] [place] [address] since your last visit? Include any pap smears or colon screening.  No    Results for orders placed or performed in visit on 10/14/19   AMB POC PT/INR   Result Value Ref Range    VALID INTERNAL CONTROL POC Yes     Prothrombin time (POC) 29.8 seconds    INR POC 2.5

## 2019-10-20 NOTE — PATIENT INSTRUCTIONS
Medicare Wellness Visit, Female The best way to live healthy is to have a lifestyle where you eat a well-balanced diet, exercise regularly, limit alcohol use, and quit all forms of tobacco/nicotine, if applicable. Regular preventive services are another way to keep healthy. Preventive services (vaccines, screening tests, monitoring & exams) can help personalize your care plan, which helps you manage your own care. Screening tests can find health problems at the earliest stages, when they are easiest to treat. [facility] follows the current, evidence-based guidelines published by the [facility] ([facility]) when recommending preventive services for our patients. Because we follow these guidelines, sometimes recommendations change over time as research supports it. (For example, mammograms used to be recommended annually. Even though Medicare will still pay for an annual mammogram, the newer guidelines recommend a mammogram every two years for women of average risk.) Of course, you and your doctor may decide to screen more often for some diseases, based on your risk and your health status. Preventive services for you include: - Medicare offers their members a free annual wellness visit, which is time for you and your primary care provider to discuss and plan for your preventive service needs. Take advantage of this benefit every year! 
-All adults over the age of 72 should receive the recommended pneumonia vaccines. Current USPSTF guidelines recommend a series of two vaccines for the best pneumonia protection.  
-All adults should have a flu vaccine yearly and a tetanus vaccine every 10 years. All adults age 61 and older should receive a shingles vaccine once in their lifetime.   
-A bone mass density test is recommended when a woman turns 65 to screen for osteoporosis. This test is only recommended one time, as a screening. Some providers will use this same test as a disease monitoring tool if you already have osteoporosis. -All adults age 38-68 who are overweight should have a diabetes screening test once every three years.  
-Other screening tests and preventive services for persons with diabetes include: an eye exam to screen for diabetic retinopathy, a kidney function test, a foot exam, and stricter control over your cholesterol.  
-Cardiovascular screening for adults with routine risk involves an electrocardiogram (ECG) at intervals determined by your doctor.  
-Colorectal cancer screenings should be done for adults age 54-65 with no increased risk factors for colorectal cancer. There are a number of acceptable methods of screening for this type of cancer. Each test has its own benefits and drawbacks. Discuss with your doctor what is most appropriate for you during your annual wellness visit. The different tests include: colonoscopy (considered the best screening method), a fecal occult blood test, a fecal DNA test, and sigmoidoscopy. -Breast cancer screenings are recommended every other year for women of normal risk, age 54-69. 
-Cervical cancer screenings for women over age 72 are only recommended with certain risk factors.  
-All adults born between [facility] should be screened once for Hepatitis C. Here is a list of your current Health Maintenance items (your personalized list of preventive services) with a due date: 
Health Maintenance Due Topic Date Due  Glaucoma Screening   04/13/2017  Flu Vaccine  08/01/2019

## 2019-10-28 RX ORDER — ATORVASTATIN CALCIUM 80 MG/1
80 TABLET, FILM COATED ORAL DAILY
Qty: 90 TAB | Refills: 3 | Status: SHIPPED | OUTPATIENT
Start: 2019-10-28 | End: 2020-10-07

## 2019-11-19 RX ORDER — METOPROLOL TARTRATE 25 MG/1
TABLET, FILM COATED ORAL
Qty: 180 TAB | Refills: 3 | Status: SHIPPED | OUTPATIENT
Start: 2019-11-19 | End: 2021-02-11 | Stop reason: SDUPTHER

## 2019-11-19 RX ORDER — PHENYTOIN SODIUM 100 MG/1
CAPSULE, EXTENDED RELEASE ORAL
Qty: 360 CAP | Refills: 3 | Status: SHIPPED | OUTPATIENT
Start: 2019-11-19 | End: 2021-02-10

## 2019-12-09 ENCOUNTER — CLINICAL SUPPORT (OUTPATIENT)
Dept: INTERNAL MEDICINE CLINIC | Age: 74
End: 2019-12-09

## 2019-12-09 DIAGNOSIS — G40.909 SEIZURE DISORDER (HCC): Primary | ICD-10-CM

## 2019-12-09 NOTE — PROGRESS NOTES
Chief Complaint   Patient presents with    Coagulation disorder     Patient in office for pt/inr check. Patient states that she is taking Coumadin 5 mg tab; M-Th 1/2 tab, F, S, and S 1 tab. Results for orders placed or performed in visit on 10/14/19   AMB POC PT/INR   Result Value Ref Range    VALID INTERNAL CONTROL POC Yes     Prothrombin time (POC) 29.8 seconds    INR POC 2.5      Per Dr. Chery Grier, no changes and return to office in one month for pt/inr check.

## 2020-01-14 RX ORDER — WARFARIN SODIUM 5 MG/1
TABLET ORAL
Qty: 103 TAB | Refills: 3 | Status: SHIPPED | OUTPATIENT
Start: 2020-01-14 | End: 2020-01-14 | Stop reason: SDUPTHER

## 2020-01-14 RX ORDER — WARFARIN SODIUM 5 MG/1
TABLET ORAL
Qty: 103 TAB | Refills: 3 | Status: SHIPPED | OUTPATIENT
Start: 2020-01-14 | End: 2021-03-10 | Stop reason: SDUPTHER

## 2020-01-17 RX ORDER — SPIRONOLACTONE AND HYDROCHLOROTHIAZIDE 25; 25 MG/1; MG/1
1 TABLET ORAL DAILY
Qty: 90 TAB | Refills: 3 | Status: SHIPPED | OUTPATIENT
Start: 2020-01-17 | End: 2021-01-19 | Stop reason: SDUPTHER

## 2020-01-20 ENCOUNTER — OFFICE VISIT (OUTPATIENT)
Dept: INTERNAL MEDICINE CLINIC | Age: 75
End: 2020-01-20

## 2020-01-20 VITALS
SYSTOLIC BLOOD PRESSURE: 142 MMHG | TEMPERATURE: 97.9 F | RESPIRATION RATE: 14 BRPM | WEIGHT: 276.7 LBS | BODY MASS INDEX: 50.92 KG/M2 | HEIGHT: 62 IN | HEART RATE: 67 BPM | OXYGEN SATURATION: 97 % | DIASTOLIC BLOOD PRESSURE: 83 MMHG

## 2020-01-20 DIAGNOSIS — I87.2 VENOUS INSUFFICIENCY: ICD-10-CM

## 2020-01-20 DIAGNOSIS — M17.12 PRIMARY OSTEOARTHRITIS OF LEFT KNEE: ICD-10-CM

## 2020-01-20 DIAGNOSIS — E78.5 DYSLIPIDEMIA: ICD-10-CM

## 2020-01-20 DIAGNOSIS — R53.81 PHYSICAL DECONDITIONING: ICD-10-CM

## 2020-01-20 DIAGNOSIS — E66.01 MORBID OBESITY (HCC): Chronic | ICD-10-CM

## 2020-01-20 DIAGNOSIS — J45.20 INTERMITTENT ASTHMA WITHOUT COMPLICATION, UNSPECIFIED ASTHMA SEVERITY: ICD-10-CM

## 2020-01-20 DIAGNOSIS — E11.9 TYPE 2 DIABETES MELLITUS WITHOUT COMPLICATION, WITHOUT LONG-TERM CURRENT USE OF INSULIN (HCC): Chronic | ICD-10-CM

## 2020-01-20 DIAGNOSIS — I10 ESSENTIAL HYPERTENSION: ICD-10-CM

## 2020-01-20 DIAGNOSIS — I48.0 PAROXYSMAL ATRIAL FIBRILLATION (HCC): Primary | Chronic | ICD-10-CM

## 2020-01-20 DIAGNOSIS — G40.909 SEIZURE DISORDER (HCC): ICD-10-CM

## 2020-01-20 LAB
INR BLD: 2.5
PT POC: 29.8 SECONDS
VALID INTERNAL CONTROL?: YES

## 2020-01-20 NOTE — PROGRESS NOTES
Chief Complaint   Patient presents with    Irregular Heart Beat     3 month follow up      1. Have you been to the ER, urgent care clinic since your last visit? Hospitalized since your last visit? No    2. Have you seen or consulted any other health care providers outside of the [address] [place] [address] since your last visit? Include any pap smears or colon screening.  No    Results for orders placed or performed in visit on 01/20/20   AMB POC PT/INR   Result Value Ref Range    VALID INTERNAL CONTROL POC Yes     Prothrombin time (POC) 29.8 seconds    INR POC 2.5

## 2020-01-20 NOTE — PROGRESS NOTES
[facility]  [place] [address]  [address] [place] [id number]  Phone:  [phone]  Fax: [phone]       Chief Complaint   Patient presents with    Irregular Heart Beat     3 month follow up    . SUBJECTIVE:    Nia Reyes is a 66 y.o. female Comes in for return visit stating that she has done reasonably well. She continues to complain of pain in her legs, primarily her knees, because she has severe osteoarthritis involving her left knee. She has had a right total knee replacement. Her morbid obesity continues to be a major problem. She has chronic venous insufficiency leading to swelling of her lower extremities, which is orthostatic in nature. She has remained seizure free. She takes her Dilantin on a consistent basis. Her diabetes historically has been doing quite well. She has a history of atrial fibrillation and remains on Warfarin. INR today is excellent. She has a past history of dyslipidemia and diabetes. Current Outpatient Medications   Medication Sig Dispense Refill    spironolactone-hydrochlorothiazide (ALDACTAZIDE) 25-25 mg per tablet Take 1 Tab by mouth daily. 90 Tab 3    warfarin (COUMADIN) 5 mg tablet TAKE 1 TABLET BY MOUTH MONDAY THROUGH FRIDAY AND 1& 1/2 TABLETS ON SATURDAY AND SUNDAY 103 Tab 3    phenytoin ER (DILANTIN ER) 100 mg ER capsule TAKE 4 CAPSULES BY MOUTH EVERY NIGHT AT BEDTIME 360 Cap 3    metoprolol tartrate (LOPRESSOR) 25 mg tablet TAKE 1 TABLET BY MOUTH TWICE DAILY AT 9 AM AND AT 9  Tab 3    atorvastatin (LIPITOR) 80 mg tablet Take 1 Tab by mouth daily. 90 Tab 3    lancets (FREESTYLE LANCETS) 28 gauge misc USE TO TEST BLOOD SUGAR EVERY  Lancet 11    allopurinol (ZYLOPRIM) 300 mg tablet TAKE 1 TABLET BY MOUTH ONCE DAILY 90 Tab 3    felodipine (PLENDIL SR) 10 mg 24 hr tablet TAKE 1 TABLET BY MOUTH EVERY DAY 90 Tab 3    levETIRAcetam 1,000 mg tablet Take 1 Tab by mouth two (2) times a day.  [address] [place] [address] Tab 3    glucose blood VI test strips (FREESTYLE LITE STRIPS) strip Use to test blood sugar daily 50 Strip 11    tiotropium (SPIRIVA) 18 mcg inhalation capsule Take 1 Cap by inhalation daily. 30 Cap 11    albuterol (PROVENTIL VENTOLIN) 2.5 mg /3 mL (0.083 %) nebulizer solution USE 1 VIAL VIA NEBULIZER EVERY 6 HOURS AS NEEDED. DX: J45.909 120 Each 11    glucose blood VI test strips (TRUE METRIX GLUCOSE TEST STRIP) strip Use to test blood sugar once daily. Dx.e11.9 100 Strip 11    Blood-Glucose Meter (TRUE METRIX AIR GLUCOSE METER) [facility] 1 Device by Does Not Apply route daily. Use to test blood sugars daily. dx.e11.9 1 Each 0    lancets (TRUEPLUS LANCETS) 28 gauge misc Use to test blood sugar once daily. Dx.e11.9 100 Lancet 11    guaiFENesin-codeine (VIRTUSSIN AC) 100-10 mg/5 mL solution Take 5 mL by mouth three (3) times daily as needed for Cough. Max Daily Amount: 15 mL. 140 mL 0    aspirin delayed-release 81 mg tablet Take 81 mg by mouth daily.  albuterol (PROAIR HFA) 90 mcg/actuation inhaler Take 2 Puffs by inhalation every four (4) hours as needed for Wheezing or Shortness of Breath. 1 Inhaler 11    metFORMIN (GLUCOPHAGE) 500 mg tablet TAKE 1 TABLET BY MOUTH TWICE DAILY WITH MEALS 180 Tab 3    budesonide-formoterol (SYMBICORT) 160-4.5 mcg/Actuation HFA inhaler Take 2 Puffs by inhalation two (2) times a day. 10.2 Inhaler 11    lisinopril (PRINIVIL, ZESTRIL) 20 mg tablet Take 1 Tab by mouth daily. 90 Tab 3    sitagliptin (JANUVIA) 100 mg tablet Take 1 tablet by mouth daily.  30 tablet 11     Past Medical History:   Diagnosis Date    Arthritis     Asthma     Diabetes ([address]     Hypertension     Other ill-defined conditions(799.89)     rt knee surgery    Stroke [facility])      Past Surgical History:   Procedure Laterality Date    HX COLONOSCOPY      HX GYN       Allergies   Allergen Reactions    Clonidine Other (comments)     Patient becomes incoherent         REVIEW OF SYSTEMS:  General: negative for - chills or fever  ENT: negative for - headaches, nasal congestion or tinnitus  Respiratory: negative for - cough, hemoptysis, shortness of breath or wheezing  Cardiovascular : negative for - chest pain, edema, palpitations or shortness of breath  Gastrointestinal: negative for - abdominal pain, blood in stools, heartburn or nausea/vomiting  Genito-Urinary: no dysuria, trouble voiding, or hematuria  Musculoskeletal: negative for - gait disturbance, joint pain, joint stiffness or joint swelling  Neurological: no TIA or stroke symptoms  Hematologic: no bruises, no bleeding, no swollen glands  Integument: no lumps, mole changes, nail changes or rash  Endocrine: no malaise/lethargy or unexpected weight changes      Social History     Socioeconomic History    Marital status:      Spouse name: Not on file    Number of children: 1    Years of education: Not on file    Highest education level: Not on file   Occupational History    Occupation: disabled--[employer]   Tobacco Use    Smoking status: Former Smoker    Smokeless tobacco: Never Used    Tobacco comment: 20+years ago   Substance and Sexual Activity    Alcohol use: No    Drug use: No    Sexual activity: Not Currently     No family history on file. OBJECTIVE:    Visit Vitals  /83   Pulse 67   Temp 97.9 °F (36.6 °C) (Oral)   Resp 14   Ht 5' 2\" (1.575 m)   Wt 276 lb 11.2 oz (125.5 kg)   SpO2 97%   BMI 50.61 kg/m²     CONSTITUTIONAL: well , well nourished, appears age appropriate  EYES: perrla, eom intact  ENMT:moist mucous membranes, pharynx clear  NECK: supple.  Thyroid normal  RESPIRATORY: Chest: clear to ascultation and percussion   CARDIOVASCULAR: Heart: regular rate and rhythm  GASTROINTESTINAL: Abdomen: soft, bowel sounds active  HEMATOLOGIC: no pathological lymph nodes palpated  MUSCULOSKELETAL: Extremities: trace edema distally, pulse 1+, pain elicited to flexion and hyperextension both knees  INTEGUMENT: No unusual rashes or suspicious skin lesions noted. Nails appear normal.  NEUROLOGIC: non-focal exam   MENTAL STATUS: alert and oriented, appropriate affect      ASSESSMENT:  1. Paroxysmal atrial fibrillation (HCC)    2. Essential hypertension    3. Venous insufficiency    4. Type 2 diabetes mellitus without complication, without long-term current use of insulin ([facility] [address]    5. Intermittent asthma without complication, unspecified asthma severity    6. Primary osteoarthritis of left knee    7. Morbid obesity ([facility] [address]    8. Dyslipidemia    9. Physical deconditioning    10. Seizure disorder ([facility]        PLAN:    1. Her rhythm is irregular, but the ventricular rate is adequate. She will continue Warfarin as prescribed, which is a half a tablet Monday through Thursday and a whole tablet Friday, Saturday and Sunday. 2. BP is excellent today, no adjustments are made. 3. Her venous insufficiency is reasonably stable surprisingly. 4. Historically her diabetes has been doing well, but I will await the results of her hemoglobin A1c.  5. Her asthma is quite stable. I hear no wheezing today. She will continue her bronchodilators as prescribed. 6. Her osteoarthritic left knee is moderate to severe. It makes it difficult for her to stand for prolonged periods of time and extremely difficult to walk, although she transfer and walk short distances. 7. She needs to lose weight. This can be accomplished by eating meals, eliminating snacks and avoiding the consumption of processed carbohydrates. 8. She remains on a statin in view of her increased cardiovascular risk. Efficacy and compliance will be assessed. 9. She is physically deconditioned because of her inactive lifestyle. This will be almost impossible to address. 10. She will continue her Furantoin as prescribed with the current dose being three capsules Monday, Wednesday and Friday and four tablets all other days. A free and total level will be obtained.     .  Orders Placed This Encounter    HEMOGLOBIN A1C WITH EAG    APOLIPOPROTEIN B    CBC WITH AUTOMATED DIFF    LIPID PANEL    METABOLIC PANEL, COMPREHENSIVE    TSH 3RD GENERATION    URINALYSIS W/ RFLX MICROSCOPIC    PHENYTOIN, TOTAL & FREE    AMB POC PT/INR         Follow-up and Dispositions    · Return in about 3 months (around 4/20/2020), or monthly for INR.            Ladi Crawford MD

## 2020-01-21 LAB
ALBUMIN SERPL-MCNC: 4.1 G/DL (ref 3.7–4.7)
ALBUMIN/GLOB SERPL: 1.3 {RATIO} (ref 1.2–2.2)
ALP SERPL-CCNC: 159 IU/L (ref 39–117)
ALT SERPL-CCNC: 27 IU/L (ref 0–32)
APO B SERPL-MCNC: 81 MG/DL
AST SERPL-CCNC: 19 IU/L (ref 0–40)
BASOPHILS # BLD AUTO: 0 X10E3/UL (ref 0–0.2)
BASOPHILS NFR BLD AUTO: 0 %
BILIRUB SERPL-MCNC: <0.2 MG/DL (ref 0–1.2)
BUN SERPL-MCNC: 25 MG/DL (ref 8–27)
BUN/CREAT SERPL: 21 (ref 12–28)
CALCIUM SERPL-MCNC: 9.5 MG/DL (ref 8.7–10.3)
CHLORIDE SERPL-SCNC: 105 MMOL/L (ref 96–106)
CHOLEST SERPL-MCNC: 163 MG/DL (ref 100–199)
CO2 SERPL-SCNC: 21 MMOL/L (ref 20–29)
CREAT SERPL-MCNC: 1.19 MG/DL (ref 0.57–1)
EOSINOPHIL # BLD AUTO: 0.1 X10E3/UL (ref 0–0.4)
EOSINOPHIL NFR BLD AUTO: 2 %
ERYTHROCYTE [DISTWIDTH] IN BLOOD BY AUTOMATED COUNT: 13.7 % (ref 11.7–15.4)
EST. AVERAGE GLUCOSE BLD GHB EST-MCNC: 140 MG/DL
GLOBULIN SER CALC-MCNC: 3.2 G/DL (ref 1.5–4.5)
GLUCOSE SERPL-MCNC: 124 MG/DL (ref 65–99)
HBA1C MFR BLD: 6.5 % (ref 4.8–5.6)
HCT VFR BLD AUTO: 37.3 % (ref 34–46.6)
HDLC SERPL-MCNC: 65 MG/DL
HGB BLD-MCNC: 12.7 G/DL (ref 11.1–15.9)
IMM GRANULOCYTES # BLD AUTO: 0 X10E3/UL (ref 0–0.1)
IMM GRANULOCYTES NFR BLD AUTO: 0 %
LDLC SERPL CALC-MCNC: 84 MG/DL (ref 0–99)
LYMPHOCYTES # BLD AUTO: 1.9 X10E3/UL (ref 0.7–3.1)
LYMPHOCYTES NFR BLD AUTO: 39 %
MCH RBC QN AUTO: 29.3 PG (ref 26.6–33)
MCHC RBC AUTO-ENTMCNC: 34 G/DL (ref 31.5–35.7)
MCV RBC AUTO: 86 FL (ref 79–97)
MONOCYTES # BLD AUTO: 0.4 X10E3/UL (ref 0.1–0.9)
MONOCYTES NFR BLD AUTO: 9 %
NEUTROPHILS # BLD AUTO: 2.4 X10E3/UL (ref 1.4–7)
NEUTROPHILS NFR BLD AUTO: 50 %
PHENYTOIN FREE SERPL-MCNC: 1 UG/ML (ref 1–2)
PHENYTOIN SERPL-MCNC: 12.5 UG/ML (ref 10–20)
PLATELET # BLD AUTO: 217 X10E3/UL (ref 150–450)
POTASSIUM SERPL-SCNC: 4.9 MMOL/L (ref 3.5–5.2)
PROT SERPL-MCNC: 7.3 G/DL (ref 6–8.5)
RBC # BLD AUTO: 4.33 X10E6/UL (ref 3.77–5.28)
SODIUM SERPL-SCNC: 140 MMOL/L (ref 134–144)
TRIGL SERPL-MCNC: 70 MG/DL (ref 0–149)
TSH SERPL DL<=0.005 MIU/L-ACNC: 3.02 UIU/ML (ref 0.45–4.5)
VLDLC SERPL CALC-MCNC: 14 MG/DL (ref 5–40)
WBC # BLD AUTO: 4.9 X10E3/UL (ref 3.4–10.8)

## 2020-01-24 NOTE — PROGRESS NOTES
Increase Dilantin------ 3 capsules Monday and Friday-----4 capsules all other days--------- speak to daughter's

## 2020-01-24 NOTE — PROGRESS NOTES
Spoke with patient's grand daughter, advised of increase in dilantin per Dr. Moscoso [place]. Full understanding verbalized.

## 2020-02-17 RX ORDER — LEVETIRACETAM 1000 MG/1
1000 TABLET ORAL 2 TIMES DAILY
Qty: 180 TAB | Refills: 3 | Status: SHIPPED | OUTPATIENT
Start: 2020-02-17 | End: 2020-02-25 | Stop reason: SDUPTHER

## 2020-02-24 ENCOUNTER — CLINICAL SUPPORT (OUTPATIENT)
Dept: INTERNAL MEDICINE CLINIC | Age: 75
End: 2020-02-24

## 2020-02-24 DIAGNOSIS — I48.0 PAROXYSMAL ATRIAL FIBRILLATION (HCC): Primary | Chronic | ICD-10-CM

## 2020-02-24 LAB
INR BLD: 2.5
PT POC: 30.3 SECONDS
VALID INTERNAL CONTROL?: YES

## 2020-02-24 NOTE — PROGRESS NOTES
Chief Complaint   Patient presents with    Coagulation disorder     Patient in office for pt/inr check. Patient states that she is taking Coumadin 5 mg; M-Th 1/2 tab F, Sat, and Sun 1 tab. Results for orders placed or performed in visit on 02/24/20   AMB POC PT/INR   Result Value Ref Range    VALID INTERNAL CONTROL POC Yes     Prothrombin time (POC) 30.3 seconds    INR POC 2.5      Per Dr. Moscoso [place], no change and return to office in one month for pt/inr check.

## 2020-02-25 RX ORDER — LEVETIRACETAM 1000 MG/1
1000 TABLET ORAL 2 TIMES DAILY
Qty: 180 TAB | Refills: 3 | Status: SHIPPED | OUTPATIENT
Start: 2020-02-25 | End: 2021-04-18

## 2020-04-17 RX ORDER — FELODIPINE 10 MG/1
TABLET, EXTENDED RELEASE ORAL
Qty: 90 TAB | Refills: 3 | Status: SHIPPED | OUTPATIENT
Start: 2020-04-17 | End: 2021-04-19 | Stop reason: SDUPTHER

## 2020-04-22 RX ORDER — ALLOPURINOL 300 MG/1
TABLET ORAL
Qty: 90 TAB | Refills: 3 | Status: SHIPPED | OUTPATIENT
Start: 2020-04-22 | End: 2021-04-19 | Stop reason: SDUPTHER

## 2020-04-29 ENCOUNTER — TELEPHONE (OUTPATIENT)
Dept: INTERNAL MEDICINE CLINIC | Age: 75
End: 2020-04-29

## 2020-04-29 DIAGNOSIS — I48.0 PAROXYSMAL ATRIAL FIBRILLATION (HCC): Primary | Chronic | ICD-10-CM

## 2020-04-29 NOTE — TELEPHONE ENCOUNTER
Patient had a INR DONE [facility]OUGH [facility]  INR 2.6 TAKING 5MG 1/2 TAB MON-Thursday AND 5MG FRI, SAT, SUN. REPEAT I N 1 MONTH.

## 2020-05-18 ENCOUNTER — CLINICAL SUPPORT (OUTPATIENT)
Dept: INTERNAL MEDICINE CLINIC | Age: 75
End: 2020-05-18

## 2020-05-18 DIAGNOSIS — I48.0 PAROXYSMAL ATRIAL FIBRILLATION (HCC): Primary | Chronic | ICD-10-CM

## 2020-05-18 LAB
INR BLD: 2.1
PT POC: 25.5 SECONDS
VALID INTERNAL CONTROL?: YES

## 2020-05-18 NOTE — PROGRESS NOTES
Chief Complaint   Patient presents with    Coagulation disorder     Patient states that she is taking coumadin 5 mg Th, F, Sa, [place], and 2.5 mg all other days. Results for orders placed or performed in visit on 05/18/20   AMB POC PT/INR   Result Value Ref Range    VALID INTERNAL CONTROL POC Yes     Prothrombin time (POC) 25.5 seconds    INR POC 2.1      Per Dr. Anupam Dunbar, no changes and return to office in one month for pt/inr check.

## 2020-06-22 ENCOUNTER — CLINICAL SUPPORT (OUTPATIENT)
Dept: INTERNAL MEDICINE CLINIC | Age: 75
End: 2020-06-22

## 2020-06-22 DIAGNOSIS — R56.9 SEIZURE (HCC): ICD-10-CM

## 2020-06-22 DIAGNOSIS — I10 ESSENTIAL HYPERTENSION: ICD-10-CM

## 2020-06-22 DIAGNOSIS — E11.21 TYPE 2 DIABETES WITH NEPHROPATHY (HCC): ICD-10-CM

## 2020-06-22 DIAGNOSIS — I48.0 PAROXYSMAL ATRIAL FIBRILLATION (HCC): Primary | Chronic | ICD-10-CM

## 2020-06-22 LAB
INR BLD: 2.2
PT POC: 26.4 SECONDS
VALID INTERNAL CONTROL?: YES

## 2020-06-22 NOTE — PROGRESS NOTES
Chief Complaint   Patient presents with    Coagulation disorder     Patient in office for pt/inr check. Patient states that she is taking Coumadin 5 mg F-Sun and 2.5 mg M-Th. Results for orders placed or performed in visit on 06/22/20   AMB POC PT/INR   Result Value Ref Range    VALID INTERNAL CONTROL POC Yes     Prothrombin time (POC) 26.4 seconds    INR POC 2.2      Per Dr. Roseline Perera, no changes and return to office in one month for pt/inr check.

## 2020-06-23 LAB
BUN SERPL-MCNC: 43 MG/DL (ref 8–27)
BUN/CREAT SERPL: 34 (ref 12–28)
CALCIUM SERPL-MCNC: 9.4 MG/DL (ref 8.7–10.3)
CHLORIDE SERPL-SCNC: 108 MMOL/L (ref 96–106)
CO2 SERPL-SCNC: 18 MMOL/L (ref 20–29)
CREAT SERPL-MCNC: 1.25 MG/DL (ref 0.57–1)
EST. AVERAGE GLUCOSE BLD GHB EST-MCNC: 140 MG/DL
GLUCOSE SERPL-MCNC: 109 MG/DL (ref 65–99)
HBA1C MFR BLD: 6.5 % (ref 4.8–5.6)
PHENYTOIN SERPL-MCNC: NORMAL UG/ML
POTASSIUM SERPL-SCNC: 5.2 MMOL/L (ref 3.5–5.2)
SODIUM SERPL-SCNC: 141 MMOL/L (ref 134–144)

## 2020-06-30 NOTE — PROGRESS NOTES
Spoke with patient's daughter Bryn Higginbotham), advised that patient was dehydrated and needs to drink more liquids, full understanding verbalized.

## 2020-07-20 ENCOUNTER — CLINICAL SUPPORT (OUTPATIENT)
Dept: INTERNAL MEDICINE CLINIC | Age: 75
End: 2020-07-20

## 2020-07-20 DIAGNOSIS — G40.909 SEIZURE DISORDER (HCC): ICD-10-CM

## 2020-07-20 DIAGNOSIS — I48.0 PAROXYSMAL ATRIAL FIBRILLATION (HCC): Primary | Chronic | ICD-10-CM

## 2020-07-20 LAB
INR BLD: 2.9
PT POC: 34.6 SECONDS
VALID INTERNAL CONTROL?: YES

## 2020-07-20 NOTE — PROGRESS NOTES
Chief Complaint   Patient presents with    Coagulation disorder     Patient in office for pt/inr check. Patient states that she is taking Coumadin 5 mg F-Sun and 2.5 mg M-Th. Results for orders placed or performed in visit on 07/20/20   AMB POC PT/INR   Result Value Ref Range    VALID INTERNAL CONTROL POC Yes     Prothrombin time (POC) 34.6 seconds    INR POC 2.9      Per Dr. Santos Ruby, no changes and return to office in one month for pt/inr check. Phenytoin drawn per Dr. Heather Atkins verbal orders.

## 2020-07-21 LAB — PHENYTOIN SERPL-MCNC: 16.5 UG/ML (ref 10–20)

## 2020-08-24 ENCOUNTER — CLINICAL SUPPORT (OUTPATIENT)
Dept: INTERNAL MEDICINE CLINIC | Age: 75
End: 2020-08-24
Payer: MEDICARE

## 2020-08-24 DIAGNOSIS — I48.0 PAROXYSMAL ATRIAL FIBRILLATION (HCC): Primary | Chronic | ICD-10-CM

## 2020-08-24 LAB
INR BLD: 2.2
PT POC: 26.8 SECONDS
VALID INTERNAL CONTROL?: YES

## 2020-08-24 PROCEDURE — 85610 PROTHROMBIN TIME: CPT | Performed by: INTERNAL MEDICINE

## 2020-08-24 NOTE — PROGRESS NOTES
Chief Complaint   Patient presents with    Coagulation disorder     Patient in office for pt/inr check. Patient states that she is taking coumadin 5 mg F-Sun and 2.5 mg M-Th. Results for orders placed or performed in visit on 08/24/20   AMB POC PT/INR   Result Value Ref Range    VALID INTERNAL CONTROL POC Yes     Prothrombin time (POC) 26.8 seconds    INR POC 2.2      Per Dr. Serge Wing, no changes and return to office in one month for pt/inr check.

## 2020-08-27 RX ORDER — LANCETS 28 GAUGE
EACH MISCELLANEOUS
Qty: 100 LANCET | Refills: 11 | Status: SHIPPED | OUTPATIENT
Start: 2020-08-27 | End: 2021-01-08 | Stop reason: SDUPTHER

## 2020-09-19 RX ORDER — TOBRAMYCIN 3 MG/ML
SOLUTION/ DROPS OPHTHALMIC
Qty: 1 BOTTLE | Refills: 0 | Status: SHIPPED | OUTPATIENT
Start: 2020-09-19 | End: 2022-03-28

## 2020-10-07 RX ORDER — ATORVASTATIN CALCIUM 80 MG/1
TABLET, FILM COATED ORAL
Qty: 90 TAB | Refills: 3 | Status: SHIPPED | OUTPATIENT
Start: 2020-10-07 | End: 2021-10-08

## 2020-10-07 RX ORDER — ALBUTEROL SULFATE 90 UG/1
2 AEROSOL, METERED RESPIRATORY (INHALATION)
Qty: 1 INHALER | Refills: 11 | Status: SHIPPED | OUTPATIENT
Start: 2020-10-07 | End: 2021-06-04 | Stop reason: SDUPTHER

## 2020-10-07 RX ORDER — ALBUTEROL SULFATE 0.83 MG/ML
SOLUTION RESPIRATORY (INHALATION)
Qty: 120 EACH | Refills: 11 | Status: SHIPPED | OUTPATIENT
Start: 2020-10-07 | End: 2022-03-22

## 2020-10-29 RX ORDER — BLOOD-GLUCOSE METER
KIT MISCELLANEOUS
Qty: 50 STRIP | Refills: 11 | Status: ON HOLD | OUTPATIENT
Start: 2020-10-29

## 2021-01-04 ENCOUNTER — VIRTUAL VISIT (OUTPATIENT)
Dept: INTERNAL MEDICINE CLINIC | Age: 76
End: 2021-01-04
Payer: MEDICARE

## 2021-01-04 DIAGNOSIS — I10 ESSENTIAL HYPERTENSION: ICD-10-CM

## 2021-01-04 DIAGNOSIS — E11.9 TYPE 2 DIABETES MELLITUS WITHOUT COMPLICATION, WITHOUT LONG-TERM CURRENT USE OF INSULIN (HCC): Chronic | ICD-10-CM

## 2021-01-04 DIAGNOSIS — I48.0 PAROXYSMAL ATRIAL FIBRILLATION (HCC): Chronic | ICD-10-CM

## 2021-01-04 DIAGNOSIS — G40.909 SEIZURE DISORDER (HCC): ICD-10-CM

## 2021-01-04 DIAGNOSIS — R53.81 PHYSICAL DECONDITIONING: ICD-10-CM

## 2021-01-04 DIAGNOSIS — E66.01 MORBID OBESITY (HCC): Chronic | ICD-10-CM

## 2021-01-04 DIAGNOSIS — M17.12 PRIMARY OSTEOARTHRITIS OF LEFT KNEE: Primary | ICD-10-CM

## 2021-01-04 PROCEDURE — 99215 OFFICE O/P EST HI 40 MIN: CPT | Performed by: INTERNAL MEDICINE

## 2021-01-05 NOTE — PROGRESS NOTES
Vanessa Gordillo is a 66 y.o. female who was seen by synchronous (real-time) audio-video technology on 1/4/2021 for No chief complaint on file. Assessment & Plan:   Diagnoses and all orders for this visit:    1. Primary osteoarthritis of left knee    2. Paroxysmal atrial fibrillation (HCC)    3. Essential hypertension    4. Type 2 diabetes mellitus without complication, without long-term current use of insulin ([address]    5. Seizure disorder ([facility] [address]    6. Morbid obesity ([facility] [address]    7. Physical deconditioning      1. The patient inadvertently fell, striking presumably her left knee. Since then she has not been able to walk distances, which precludes her from coming to the office according to the daughter. She has severe osteoarthritis involving the left knee, and it is unclear if the fall injured intrinsic structures of her knee, such as her meniscus. In any event, she is no worse than she has been, and it appears she is getting somewhat better. 2. The patient has a history of paroxysmal atrial fibrillation and remains on Warfarin. Her INR needs to be checked, but she cannot come to the office. I will attempt to get home health nurse to come by for this. 3. Historically her blood pressure has been normal.  4. She does have diabetes, and her last hemoglobin A1c's have been excellent. 5. She remains seizure free with her anticonvulsants. 6. She needs to lose weight as I have discussed repetitively. Unfortunately, the likelihood of this with a significant reduction is extremely low because she is not motivated to do so. Again, this can be accomplished by eating meals, eliminating snacks and avoiding the consumption of processed carbohydrates. 7. She is severely physically deconditioned related to her rather significant physical inactivity. Subjective: The patient is seen on a televisit today because she cannot come to the office.   The reason for her inability to come to the office is because of an inadvertent fall, striking her left knee and presumably injuring it. Because of this, she has been somewhat limited in her ability to walk distances. INR has not been checked within the last month. She does have paroxysmal atrial fibrillation. She has a past history of primary hypertension, diabetes, seizure disorder and morbid obesity. Prior to Admission medications    Medication Sig Start Date End Date Taking? Authorizing Provider   glucose blood VI test strips (FreeStyle Lite Strips) strip Use to test blood sugar daily 10/29/20   Alta Latham MD   atorvastatin (LIPITOR) 80 mg tablet TAKE 1 TABLET BY MOUTH DAILY 10/7/20   Alta Latham MD   albuterol (ProAir HFA) 90 mcg/actuation inhaler Take 2 Puffs by inhalation every four (4) hours as needed for Wheezing or Shortness of Breath. 10/7/20   Alta Latham MD   albuterol (PROVENTIL VENTOLIN) 2.5 mg /3 mL (0.083 %) nebu USE 1 VIAL VIA NEBULIZER EVERY 6 HOURS AS NEEDED. DX: X26.492 10/7/20   Alta Latham MD   tobramycin (TOBREX) 0.3 % ophthalmic solution One drop four times a day in involved eye 9/19/20   Alta Latham MD   lancets (FreeStyle Lancets) 28 gauge misc USE TO TEST BLOOD SUGAR EVERY DAY 8/27/20   Uzma Garcia MD   allopurinoL (ZYLOPRIM) 300 mg tablet TAKE 1 TABLET BY MOUTH ONCE DAILY 4/22/20   Alta Latham MD   felodipine (PLENDIL SR) 10 mg 24 hr tablet TAKE 1 TABLET BY MOUTH EVERY DAY 4/17/20   Alta Latham MD   levETIRAcetam 1,000 mg tablet Take 1 Tab by mouth two (2) times a day. 2/25/20   Alta Latham MD   spironolactone-hydrochlorothiazide (ALDACTAZIDE) 25-25 mg per tablet Take 1 Tab by mouth daily.  1/17/20   Alta Latham MD   warfarin (COUMADIN) 5 mg tablet TAKE 1 TABLET BY MOUTH MONDAY THROUGH FRIDAY AND 1& 1/2 TABLETS ON SATURDAY AND SUNDAY 1/14/20   Alta Latham MD   phenytoin ER (DILANTIN ER) 100 mg ER capsule TAKE 4 CAPSULES BY MOUTH EVERY NIGHT AT BEDTIME 11/19/19   Alta Latham MD   metoprolol tartrate (LOPRESSOR) 25 mg tablet TAKE 1 TABLET BY MOUTH TWICE DAILY AT 9 AM AND AT 9 PM 11/19/19   Jacoby Connors MD   tiotropium [facility]) 18 mcg inhalation capsule Take 1 Cap by inhalation daily. 9/10/18   Jacoby Connors MD   lisinopril (PRINIVIL, ZESTRIL) 20 mg tablet Take 1 Tab by mouth daily. 6/3/18   Jacoby Connors MD   glucose blood VI test strips (TRUE METRIX GLUCOSE TEST STRIP) strip Use to test blood sugar once daily. Dx.e11.9 1/22/18   Jacoby Connors MD   Blood-Glucose Meter (TRUE METRIX AIR GLUCOSE METER) misc 1 Device by Does Not Apply route daily. Use to test blood sugars daily. dx.e11.9 6/20/17   Jacoby Connors MD   lancets (TRUEPLUS LANCETS) 28 gauge misc Use to test blood sugar once daily. Dx.e11.9 6/20/17   Jacoby Connors MD   guaiFENesin-codeine [facility]) 100-10 mg/5 mL solution Take 5 mL by mouth three (3) times daily as needed for Cough. Max Daily Amount: 15 mL. 5/12/17   Jacoby Connors MD   aspirin delayed-release 81 mg tablet Take 81 mg by mouth daily. Provider, Historical   metFORMIN (GLUCOPHAGE) 500 mg tablet TAKE 1 TABLET BY MOUTH TWICE DAILY WITH MEALS 5/23/16   Jacoby Connors MD   sitagliptin (JANUVIA) 100 mg tablet Take 1 tablet by mouth daily. 9/16/14   Jacoby Connors MD   budesonide-formoterol [facility]) 160-4.5 mcg/Actuation HFA inhaler Take 2 Puffs by inhalation two (2) times a day. 6/2/10   Jacoby Connors MD     Current Outpatient Medications   Medication Sig Dispense Refill    glucose blood VI test strips (FreeStyle Lite Strips) strip Use to test blood sugar daily 50 Strip 11    atorvastatin (LIPITOR) 80 mg tablet TAKE 1 TABLET BY MOUTH DAILY 90 Tab 3    albuterol (ProAir HFA) 90 mcg/actuation inhaler Take 2 Puffs by inhalation every four (4) hours as needed for Wheezing or Shortness of Breath. 1 Inhaler 11    albuterol (PROVENTIL VENTOLIN) 2.5 mg /3 mL (0.083 %) nebu USE 1 VIAL VIA NEBULIZER EVERY 6 HOURS AS NEEDED.  DX: M68.893 081 Each 11    tobramycin (TOBREX) 0.3 % ophthalmic solution One drop four times a day in involved eye 1 Bottle 0    lancets (FreeStyle Lancets) 28 gauge misc USE TO TEST BLOOD SUGAR EVERY  Lancet 11    allopurinoL (ZYLOPRIM) 300 mg tablet TAKE 1 TABLET BY MOUTH ONCE DAILY 90 Tab 3    felodipine (PLENDIL SR) 10 mg 24 hr tablet TAKE 1 TABLET BY MOUTH EVERY DAY 90 Tab 3    levETIRAcetam 1,000 mg tablet Take 1 Tab by mouth two (2) times a day. 180 Tab 3    spironolactone-hydrochlorothiazide (ALDACTAZIDE) 25-25 mg per tablet Take 1 Tab by mouth daily. 90 Tab 3    warfarin (COUMADIN) 5 mg tablet TAKE 1 TABLET BY MOUTH MONDAY THROUGH FRIDAY AND 1& 1/2 TABLETS ON SATURDAY AND SUNDAY 103 Tab 3    phenytoin ER (DILANTIN ER) 100 mg ER capsule TAKE 4 CAPSULES BY MOUTH EVERY NIGHT AT BEDTIME 360 Cap 3    metoprolol tartrate (LOPRESSOR) 25 mg tablet TAKE 1 TABLET BY MOUTH TWICE DAILY AT 9 AM AND AT 9  Tab 3    tiotropium (SPIRIVA) 18 mcg inhalation capsule Take 1 Cap by inhalation daily. 30 Cap 11    lisinopril (PRINIVIL, ZESTRIL) 20 mg tablet Take 1 Tab by mouth daily. 90 Tab 3    glucose blood VI test strips (TRUE METRIX GLUCOSE TEST STRIP) strip Use to test blood sugar once daily. Dx.e11.9 100 Strip 11    Blood-Glucose Meter (TRUE METRIX AIR GLUCOSE METER) [facility] 1 Device by Does Not Apply route daily. Use to test blood sugars daily. dx.e11.9 1 Each 0    lancets (TRUEPLUS LANCETS) 28 gauge misc Use to test blood sugar once daily. Dx.e11.9 100 Lancet 11    guaiFENesin-codeine (VIRTUSSIN AC) 100-10 mg/5 mL solution Take 5 mL by mouth three (3) times daily as needed for Cough. Max Daily Amount: 15 mL. 140 mL 0    aspirin delayed-release 81 mg tablet Take 81 mg by mouth daily.  metFORMIN (GLUCOPHAGE) 500 mg tablet TAKE 1 TABLET BY MOUTH TWICE DAILY WITH MEALS 180 Tab 3    sitagliptin (JANUVIA) 100 mg tablet Take 1 tablet by mouth daily.  30 tablet 11    budesonide-formoterol (SYMBICORT) 160-4.5 mcg/Actuation HFA inhaler Take 2 Puffs by inhalation two (2) times a day. 10.2 Inhaler 11     Allergies   Allergen Reactions    Clonidine Other (comments)     Patient becomes incoherent     Past Medical History:   Diagnosis Date    Arthritis     Asthma     Diabetes ([address]     Hypertension     Other ill-defined conditions(799.89)     rt knee surgery    Stroke [facility])      Past Surgical History:   Procedure Laterality Date    HX COLONOSCOPY      HX GYN       No family history on file. ROS:14 point ROS neg. Objective:   No flowsheet data found.      [INSTRUCTIONS:  \"[x]\" Indicates a positive item  \"[]\" Indicates a negative item  -- DELETE ALL ITEMS NOT EXAMINED]    Constitutional: [x] Appears well-developed and well-nourished [x] No apparent distress      [] Abnormal -     Mental status: [x] Alert and awake  [x] Oriented to person/place/time [x] Able to follow commands    [] Abnormal -     Eyes:   EOM    [x]  Normal    [] Abnormal -   Sclera  [x]  Normal    [] Abnormal -          Discharge [x]  None visible   [] Abnormal -     HENT: [x] Normocephalic, atraumatic  [] Abnormal -   [x] Mouth/Throat: Mucous membranes are moist    External Ears [x] Normal  [] Abnormal -    Neck: [x] No visualized mass [] Abnormal -     Pulmonary/Chest: [x] Respiratory effort normal   [x] No visualized signs of difficulty breathing or respiratory distress        [] Abnormal -      Musculoskeletal:   [x] Normal gait with no signs of ataxia         [x] Normal range of motion of neck        [] Abnormal -     Neurological:        [x] No Facial Asymmetry (Cranial nerve 7 motor function) (limited exam due to video visit)          [x] No gaze palsy        [] Abnormal -          Skin:        [x] No significant exanthematous lesions or discoloration noted on facial skin         [] Abnormal -            Psychiatric:       [x] Normal Affect [] Abnormal -        [x] No Hallucinations    Other pertinent observable physical exam findings:-        We discussed the expected course, resolution and complications of the diagnosis(es) in detail. Medication risks, benefits, costs, interactions, and alternatives were discussed as indicated. I advised her to contact the office if her condition worsens, changes or fails to improve as anticipated. She expressed understanding with the diagnosis(es) and plan. Lisa Morris, who was evaluated through a patient-initiated, synchronous (real-time) audio-video encounter, and/or her healthcare decision maker, is aware that it is a billable service, with coverage as determined by her insurance carrier. She provided verbal consent to proceed: Yes, and patient identification was verified. It was conducted pursuant to the emergency declaration under the [address] [place] [address] authority and the [employer] Act. A caregiver was present when appropriate. Ability to conduct physical exam was limited. I was at home. The patient was at home   Please note that this dictation was completed with [employer], the computer voice recognition software. Quite often unanticipated grammatical, syntax, homophones, and other interpretive errors are inadvertently transcribed by the computer software. Please disregard these errors. Please excuse any errors that have escaped final proofreading. Thank you. Usman Martinez MD

## 2021-01-09 RX ORDER — LANCETS 28 GAUGE
EACH MISCELLANEOUS
Qty: 100 LANCET | Refills: 11 | Status: ON HOLD | OUTPATIENT
Start: 2021-01-09

## 2021-01-12 RX ORDER — BLOOD SUGAR DIAGNOSTIC
STRIP MISCELLANEOUS
Qty: 50 STRIP | Refills: 11 | Status: SHIPPED | OUTPATIENT
Start: 2021-01-12 | End: 2021-06-04 | Stop reason: SDUPTHER

## 2021-01-12 RX ORDER — BLOOD-GLUCOSE METER
EACH MISCELLANEOUS
Qty: 1 EACH | Refills: 0 | Status: ON HOLD | OUTPATIENT
Start: 2021-01-12

## 2021-01-12 RX ORDER — LANCETS 33 GAUGE
EACH MISCELLANEOUS
Qty: 50 LANCET | Refills: 11 | Status: SHIPPED | OUTPATIENT
Start: 2021-01-12 | End: 2022-03-22

## 2021-01-19 RX ORDER — SPIRONOLACTONE AND HYDROCHLOROTHIAZIDE 25; 25 MG/1; MG/1
1 TABLET ORAL DAILY
Qty: 90 TAB | Refills: 3 | Status: SHIPPED | OUTPATIENT
Start: 2021-01-19 | End: 2021-10-08

## 2021-02-10 RX ORDER — PHENYTOIN SODIUM 100 MG/1
CAPSULE, EXTENDED RELEASE ORAL
Qty: 360 CAP | Refills: 3 | Status: SHIPPED | OUTPATIENT
Start: 2021-02-10 | End: 2022-03-28

## 2021-02-12 RX ORDER — METOPROLOL TARTRATE 25 MG/1
TABLET, FILM COATED ORAL
Qty: 180 TAB | Refills: 3 | Status: SHIPPED | OUTPATIENT
Start: 2021-02-12 | End: 2022-03-22

## 2021-03-04 ENCOUNTER — OFFICE VISIT (OUTPATIENT)
Dept: INTERNAL MEDICINE CLINIC | Age: 76
End: 2021-03-04
Payer: MEDICARE

## 2021-03-04 VITALS
SYSTOLIC BLOOD PRESSURE: 152 MMHG | TEMPERATURE: 98.2 F | HEART RATE: 79 BPM | BODY MASS INDEX: 50.61 KG/M2 | HEIGHT: 62 IN | OXYGEN SATURATION: 96 % | RESPIRATION RATE: 14 BRPM | DIASTOLIC BLOOD PRESSURE: 80 MMHG

## 2021-03-04 DIAGNOSIS — E66.01 MORBID OBESITY (HCC): Chronic | ICD-10-CM

## 2021-03-04 DIAGNOSIS — I48.0 PAROXYSMAL ATRIAL FIBRILLATION (HCC): Primary | Chronic | ICD-10-CM

## 2021-03-04 DIAGNOSIS — I87.2 VENOUS INSUFFICIENCY: ICD-10-CM

## 2021-03-04 DIAGNOSIS — Z13.31 SCREENING FOR DEPRESSION: ICD-10-CM

## 2021-03-04 DIAGNOSIS — E11.9 TYPE 2 DIABETES MELLITUS WITHOUT COMPLICATION, WITHOUT LONG-TERM CURRENT USE OF INSULIN (HCC): Chronic | ICD-10-CM

## 2021-03-04 DIAGNOSIS — Z00.00 WELLNESS EXAMINATION: ICD-10-CM

## 2021-03-04 DIAGNOSIS — I10 ESSENTIAL HYPERTENSION: ICD-10-CM

## 2021-03-04 DIAGNOSIS — J45.20 INTERMITTENT ASTHMA WITHOUT COMPLICATION, UNSPECIFIED ASTHMA SEVERITY: ICD-10-CM

## 2021-03-04 DIAGNOSIS — E78.5 DYSLIPIDEMIA: ICD-10-CM

## 2021-03-04 DIAGNOSIS — G40.909 SEIZURE DISORDER (HCC): ICD-10-CM

## 2021-03-04 DIAGNOSIS — M17.12 PRIMARY OSTEOARTHRITIS OF LEFT KNEE: ICD-10-CM

## 2021-03-04 LAB
INR BLD: 1.5
PT POC: 18.5 SECONDS
VALID INTERNAL CONTROL?: YES

## 2021-03-04 PROCEDURE — G8753 SYS BP > OR = 140: HCPCS | Performed by: INTERNAL MEDICINE

## 2021-03-04 PROCEDURE — G8427 DOCREV CUR MEDS BY ELIG CLIN: HCPCS | Performed by: INTERNAL MEDICINE

## 2021-03-04 PROCEDURE — 85610 PROTHROMBIN TIME: CPT | Performed by: INTERNAL MEDICINE

## 2021-03-04 PROCEDURE — 99215 OFFICE O/P EST HI 40 MIN: CPT | Performed by: INTERNAL MEDICINE

## 2021-03-04 PROCEDURE — 1101F PT FALLS ASSESS-DOCD LE1/YR: CPT | Performed by: INTERNAL MEDICINE

## 2021-03-04 PROCEDURE — 1090F PRES/ABSN URINE INCON ASSESS: CPT | Performed by: INTERNAL MEDICINE

## 2021-03-04 PROCEDURE — G0439 PPPS, SUBSEQ VISIT: HCPCS | Performed by: INTERNAL MEDICINE

## 2021-03-04 PROCEDURE — G8754 DIAS BP LESS 90: HCPCS | Performed by: INTERNAL MEDICINE

## 2021-03-04 PROCEDURE — G8536 NO DOC ELDER MAL SCRN: HCPCS | Performed by: INTERNAL MEDICINE

## 2021-03-04 PROCEDURE — G8417 CALC BMI ABV UP PARAM F/U: HCPCS | Performed by: INTERNAL MEDICINE

## 2021-03-04 PROCEDURE — G8510 SCR DEP NEG, NO PLAN REQD: HCPCS | Performed by: INTERNAL MEDICINE

## 2021-03-04 PROCEDURE — G8399 PT W/DXA RESULTS DOCUMENT: HCPCS | Performed by: INTERNAL MEDICINE

## 2021-03-04 NOTE — PROGRESS NOTES
Chief Complaint   Patient presents with    Annual Wellness Visit    Coagulation disorder     Patient states that she is taking coumadin 5 mg Sat and Sun and 2.5 mg M-F.         1. Have you been to the ER, urgent care clinic since your last visit? Hospitalized since your last visit? No    2. Have you seen or consulted any other health care providers outside of the [address] [place] [address] since your last visit? Include any pap smears or colon screening.  No      Results for orders placed or performed in visit on 03/04/21   AMB POC PT/INR   Result Value Ref Range    VALID INTERNAL CONTROL POC Yes     Prothrombin time (POC) 18.5 seconds    INR POC 1.5

## 2021-03-04 NOTE — PROGRESS NOTES
[facility]  [address] [place] [address]  [address]  [place] [address]  Phone:  [phone]  Fax: [phone]       Chief Complaint   Patient presents with    Annual Wellness Visit    Coagulation disorder     Patient states that she is taking coumadin 5 mg Sat and Sun and 2.5 mg M-F.   . SUBJECTIVE:    Catalino Chua is a 64 y.o. female comes in for a return visit accompanied by her daughters. Apparently, she inadvertently fell several months ago. It was difficult for her to maneuver, specifically to transfer to bed. Therefore, she has been sleeping in a recliner. As a direct result of this, she began to swell in her legs more than usual.  She has a history of venous insufficiency, but this was exacerbated by her sleeping in the recliner. Her gait is markedly impaired because of severe osteoarthritis involving her left knee. She has had a right total knee replacement. The biggest problem is her morbid obesity and there has been no meaningful change in this area. She has had no seizures. She has a past history of primary hypertension, dyslipidemia, and diabetes mellitus. Current Outpatient Medications   Medication Sig Dispense Refill    metoprolol tartrate (LOPRESSOR) 25 mg tablet TAKE 1 TABLET BY MOUTH TWICE DAILY AT 9 AM AND AT 9 PM (Patient taking differently: Take 12.5 mg by mouth two (2) times a day. TAKE 1 TABLET BY MOUTH TWICE DAILY AT 9 AM AND AT 9 PM) 180 Tab 3    phenytoin ER (DILANTIN ER) 100 mg ER capsule TAKE 4 CAPSULES BY MOUTH EVERY NIGHT AT BEDTIME (Patient taking differently: TAKE 4 CAPSULES ALL OTHER DAYS BY MOUTH EVERY NIGHT AT BEDTIME  3 tabs Tues and Thurs) 360 Cap 3    spironolactone-hydrochlorothiazide (ALDACTAZIDE) 25-25 mg per tablet Take 1 Tab by mouth daily. 90 Tab 3    Blood-Glucose Meter (OneTouch Verio Meter) misc Use to test blood sugar daily.  Dx.e11.9 1 Each 0    lancets (FreeStyle Lancets) 28 gauge misc USE TO TEST BLOOD SUGAR EVERY  Lancet 11    glucose blood VI test strips (FreeStyle Lite Strips) strip Use to test blood sugar daily 50 Strip 11    atorvastatin (LIPITOR) 80 mg tablet TAKE 1 TABLET BY MOUTH DAILY 90 Tab 3    albuterol (ProAir HFA) 90 mcg/actuation inhaler Take 2 Puffs by inhalation every four (4) hours as needed for Wheezing or Shortness of Breath. 1 Inhaler 11    albuterol (PROVENTIL VENTOLIN) 2.5 mg /3 mL (0.083 %) nebu USE 1 VIAL VIA NEBULIZER EVERY 6 HOURS AS NEEDED. DX: J45.909 120 Each 11    allopurinoL (ZYLOPRIM) 300 mg tablet TAKE 1 TABLET BY MOUTH ONCE DAILY 90 Tab 3    felodipine (PLENDIL SR) 10 mg 24 hr tablet TAKE 1 TABLET BY MOUTH EVERY DAY 90 Tab 3    levETIRAcetam 1,000 mg tablet Take 1 Tab by mouth two (2) times a day. 180 Tab 3    Blood-Glucose Meter (TRUE METRIX AIR GLUCOSE METER) mis 1 Device by Does Not Apply route daily. Use to test blood sugars daily. dx.e11.9 1 Each 0    aspirin delayed-release 81 mg tablet Take 81 mg by mouth daily.  warfarin (COUMADIN) 5 mg tablet TAKE 1 TABLET BY MOUTH MONDAY THROUGH FRIDAY AND 1& 1/2 TABLETS ON SATURDAY AND SUNDAY 103 Tab 3    glucose blood VI test strips (OneTouch Verio test strips) strip Use to test blood sugar once daily. Dx.e11.9 50 Strip 11    lancets (One Touch Delica) 33 gauge misc Use to test blood sugar once daily. dx.e11.9 50 Lancet 11    tobramycin (TOBREX) 0.3 % ophthalmic solution One drop four times a day in involved eye 1 Bottle 0    tiotropium (SPIRIVA) 18 mcg inhalation capsule Take 1 Cap by inhalation daily. 30 Cap 11    lisinopril (PRINIVIL, ZESTRIL) 20 mg tablet Take 1 Tab by mouth daily. 90 Tab 3    glucose blood VI test strips (TRUE METRIX GLUCOSE TEST STRIP) strip Use to test blood sugar once daily. Dx.e11.9 100 Strip 11    lancets (TRUEPLUS LANCETS) 28 gauge misc Use to test blood sugar once daily.  Dx.e11.9 100 Lancet 11    guaiFENesin-codeine (VIRTUSSIN AC) 100-10 mg/5 mL solution Take 5 mL by mouth three (3) times daily as needed for Cough. Max Daily Amount: 15 mL. 140 mL 0    metFORMIN (GLUCOPHAGE) 500 mg tablet TAKE 1 TABLET BY MOUTH TWICE DAILY WITH MEALS 180 Tab 3    sitagliptin (JANUVIA) 100 mg tablet Take 1 tablet by mouth daily. 30 tablet 11    budesonide-formoterol (SYMBICORT) 160-4.5 mcg/Actuation HFA inhaler Take 2 Puffs by inhalation two (2) times a day.  10.2 Inhaler 11     Past Medical History:   Diagnosis Date    Arthritis     Asthma     Diabetes ([facility]     Hypertension     Other ill-defined conditions(799.89)     rt knee surgery    Stroke [facility])      Past Surgical History:   Procedure Laterality Date    HX COLONOSCOPY      HX GYN       Allergies   Allergen Reactions    Clonidine Other (comments)     Patient becomes incoherent         REVIEW OF SYSTEMS:  General: negative for - chills or fever  ENT: negative for - headaches, nasal congestion or tinnitus  Respiratory: negative for - cough, hemoptysis, shortness of breath or wheezing  Cardiovascular : negative for - chest pain, edema, palpitations or shortness of breath  Gastrointestinal: negative for - abdominal pain, blood in stools, heartburn or nausea/vomiting  Genito-Urinary: no dysuria, trouble voiding, or hematuria  Musculoskeletal: negative for - gait disturbance, joint pain, joint stiffness or joint swelling  Neurological: no TIA or stroke symptoms  Hematologic: no bruises, no bleeding, no swollen glands  Integument: no lumps, mole changes, nail changes or rash  Endocrine: no malaise/lethargy or unexpected weight changes      Social History     Socioeconomic History    Marital status:      Spouse name: Not on file    Number of children: 1    Years of education: Not on file    Highest education level: Not on file   Occupational History    Occupation: disabled--[employer]   Tobacco Use    Smoking status: Former Smoker    Smokeless tobacco: Never Used    Tobacco comment: 20+years ago   Substance and Sexual Activity    Alcohol use: No    Drug use: No    Sexual activity: Not Currently     History reviewed. No pertinent family history. OBJECTIVE:    Visit Vitals  BP (!) 152/80   Pulse 79   Temp 98.2 °F (36.8 °C) (Oral)   Resp 14   Ht 5' 2\" (1.575 m)   SpO2 96%   BMI 50.61 kg/m²     CONSTITUTIONAL: well , well nourished, appears age appropriate  EYES: perrla, eom intact  ENMT:moist mucous membranes, pharynx clear  NECK: supple. Thyroid normal  RESPIRATORY: Chest: clear to ascultation and percussion   CARDIOVASCULAR: Heart: regular rate and rhythm  GASTROINTESTINAL: Abdomen: soft, bowel sounds active  HEMATOLOGIC: no pathological lymph nodes palpated  MUSCULOSKELETAL: Extremities: 1+ edema distally, pulse 1+, degenerative changes noted both knees  INTEGUMENT: No unusual rashes or suspicious skin lesions noted. Nails appear normal.  NEUROLOGIC: non-focal exam   MENTAL STATUS: alert and oriented, appropriate affect      ASSESSMENT:  1. Paroxysmal atrial fibrillation (HCC)    2. Essential hypertension    3. Venous insufficiency    4. Type 2 diabetes mellitus without complication, without long-term current use of insulin ([address]    5. Seizure disorder ([address]    6. Intermittent asthma without complication, unspecified asthma severity    7. Primary osteoarthritis of left knee    8. Morbid obesity ([address]    9. Dyslipidemia        PLAN:  1. The patient has a history of atrial fibrillation and remains on warfarin. INR today is a bit low, but compliance has not been the greatest.  I will make no adjustment on this visit, but we will do so on return visit if this value is not therapeutic. 2. Blood pressure is reasonable today. She has a significant increased cardiovascular risk. She has multiple comorbidities that are being addressed. 3. She has chronic edema of both lower extremities which she has had for years. This is related to venous insufficiency. Compression stockings are a bit difficult but not impossible to use.   Ideally, weight loss is the most important thing. 4. She does indeed have diabetes, and historically, this has been excellent. I will await results of her hemoglobin A1c. I emphasized the importance of eating meals and preferably eating at the same time every day. 5. She does have a seizure disorder and does see a neurologist.  Reviewing his records, it appears that she has been seizure free since taking the current regimen. No changes are therefore made. 6. She has a history of asthma, chronic, and this has been stable. She is using her inhalers on a regular basis. She does not see a pulmonologist because it is not necessary. 7. She has progressive osteoarthritis, predominantly of her left knee. Options are quite limited given her age as well as her weight. She is not an operative candidate. 8. Her overall functional status as well as her cardiometabolic status is aggravated by her morbid obesity. This is causing dysmetabolic state creating insulin resistance which is a major contributing factor above and beyond its proatherogenic state but the associated comorbidities such as dyslipidemia and primary hypertension. 9. From a musculoskeletal aspect, this is limiting her mobility as well as contributing to her gait disturbance. Ultimately, she is significantly impaired as a direct result of this. Most important thing that she can do is eat meals, eliminate snacks, and avoid the consumption of processed carbohydrates. 10. One of the contributing factors is her cardiometabolic status, which will indeed ultimately shorten her life if things do not improve. She is to continue her statin therapy. 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       Orders Placed This Encounter    HEMOGLOBIN A1C WITH EAG    APOLIPOPROTEIN B    CBC WITH AUTOMATED DIFF    LIPID PANEL    METABOLIC PANEL, COMPREHENSIVE    TSH 3RD GENERATION    PHENYTOIN    AMB POC PT/INR               Murali Devlin MD  This is the Subsequent Medicare Annual Wellness Exam, performed 12 months or more after the Initial AWV or the last Subsequent AWV    I have reviewed the patient's medical history in detail and updated the computerized patient record. Depression Risk Factor Screening:     3 most recent PHQ Screens 3/4/2021   Little interest or pleasure in doing things Not at all   Feeling down, depressed, irritable, or hopeless Not at all   Total Score PHQ 2 0       Alcohol Risk Screen    Do you average more than 1 drink per night or more than 7 drinks a week:  No    On any one occasion in the past three months have you have had more than 3 drinks containing alcohol:  No        Functional Ability and Level of Safety:    Hearing: Hearing is good. Activities of Daily Living: The home contains: no safety equipment. Patient needs help with:  transportation, shopping, preparing meals, laundry, housework, managing medications, dressing, bathing and walking      Ambulation: with difficulty, can't walk further than 3 feet feet     Fall Risk:  Fall Risk Assessment, last 12 mths 3/4/2021   Able to walk? Yes   Fall in past 12 months? 1   Do you feel unsteady? 1   Are you worried about falling 0   Number of falls in past 12 months -   Fall with injury? -      Abuse Screen:  Patient is not abused       Cognitive Screening    Has your family/caregiver stated any concerns about your memory: no     Cognitive Screening: Not clinically pertinent    Assessment/Plan   Education and counseling provided:  Are appropriate based on today's review and evaluation    Diagnoses and all orders for this visit:    1. Paroxysmal atrial fibrillation (HCC)  -     AMB POC PT/INR    2. Essential hypertension  -     CBC WITH AUTOMATED DIFF; Future  -     COLLECTION VENOUS BLOOD,VENIPUNCTURE  -     METABOLIC PANEL, COMPREHENSIVE; Future    3. Venous insufficiency    4.  Type 2 diabetes mellitus without complication, without long-term current use of insulin ([facility])  -      DIABETES FOOT EXAM  -     HEMOGLOBIN A1C WITH EAG; Future    5. Seizure disorder ([facility] [address]  -     PHENYTOIN; Future    6. Intermittent asthma without complication, unspecified asthma severity    7. Primary osteoarthritis of left knee    8. Morbid obesity ([facility] [address]    9. Dyslipidemia  -     APOLIPOPROTEIN B; Future  -     LIPID PANEL; Future  -     TSH 3RD GENERATION; Future        Health Maintenance Due     Health Maintenance Due   Topic Date Due    Hepatitis C Screening  Never done    COVID-19 Vaccine (1) Never done    Shingrix Vaccine Age 50> (1 of 2) Never done    Eye Exam Retinal or Dilated  04/13/2017    MICROALBUMIN Q1  04/15/2020       Patient Care Team   Patient Care Team:  Meka Hu MD as PCP - General (Internal Medicine)  Meka Hu MD as PCP [facility] Provider    History     Patient Active Problem List   Diagnosis Code    Paroxysmal atrial fibrillation (HCC) I48.0    Morbid obesity ([facility] E66.01    DM type 2 (diabetes mellitus, type 2) ([facility] [facility] E11.9    Total knee replacement status Z96.659    Seizure disorder ([facility] [facility] G40.909    Asthma J45.909    Dyslipidemia E78.5    HTN (hypertension) I10    Primary osteoarthritis of left knee M17.12    Venous insufficiency I87.2    Proteinuria R80.9    Physical deconditioning R53.81    Type 2 diabetes with nephropathy ([facility] [facility] E11.21     Past Medical History:   Diagnosis Date    Arthritis     Asthma     Diabetes ([facility] [address] [facility]     Hypertension     Other ill-defined conditions(799.89)     rt knee surgery    Stroke [facility])       Past Surgical History:   Procedure Laterality Date    HX COLONOSCOPY      HX GYN       Current Outpatient Medications   Medication Sig Dispense Refill    metoprolol tartrate (LOPRESSOR) 25 mg tablet TAKE 1 TABLET BY MOUTH TWICE DAILY AT 9 AM AND AT 9 PM (Patient taking differently: Take 12.5 mg by mouth two (2) times a day.  TAKE 1 TABLET BY MOUTH TWICE DAILY AT 9 AM AND AT 9 PM) 180 Tab 3    phenytoin ER (DILANTIN ER) 100 mg ER capsule TAKE 4 CAPSULES BY MOUTH EVERY NIGHT AT BEDTIME (Patient taking differently: TAKE 4 CAPSULES ALL OTHER DAYS BY MOUTH EVERY NIGHT AT BEDTIME  3 tabs Tues and Thurs) 360 Cap 3    spironolactone-hydrochlorothiazide (ALDACTAZIDE) 25-25 mg per tablet Take 1 Tab by mouth daily. 90 Tab 3    Blood-Glucose Meter (OneTouch Verio Meter) misc Use to test blood sugar daily. Dx.e11.9 1 Each 0    lancets (FreeStyle Lancets) 28 gauge misc USE TO TEST BLOOD SUGAR EVERY  Lancet 11    glucose blood VI test strips (FreeStyle Lite Strips) strip Use to test blood sugar daily 50 Strip 11    atorvastatin (LIPITOR) 80 mg tablet TAKE 1 TABLET BY MOUTH DAILY 90 Tab 3    albuterol (ProAir HFA) 90 mcg/actuation inhaler Take 2 Puffs by inhalation every four (4) hours as needed for Wheezing or Shortness of Breath. 1 Inhaler 11    albuterol (PROVENTIL VENTOLIN) 2.5 mg /3 mL (0.083 %) nebu USE 1 VIAL VIA NEBULIZER EVERY 6 HOURS AS NEEDED. DX: J45.909 120 Each 11    allopurinoL (ZYLOPRIM) 300 mg tablet TAKE 1 TABLET BY MOUTH ONCE DAILY 90 Tab 3    felodipine (PLENDIL SR) 10 mg 24 hr tablet TAKE 1 TABLET BY MOUTH EVERY DAY 90 Tab 3    levETIRAcetam 1,000 mg tablet Take 1 Tab by mouth two (2) times a day. 180 Tab 3    Blood-Glucose Meter (TRUE METRIX AIR GLUCOSE METER) [facility] 1 Device by Does Not Apply route daily. Use to test blood sugars daily. dx.e11.9 1 Each 0    aspirin delayed-release 81 mg tablet Take 81 mg by mouth daily.  warfarin (COUMADIN) 5 mg tablet TAKE 1 TABLET BY MOUTH MONDAY THROUGH FRIDAY AND 1& 1/2 TABLETS ON SATURDAY AND SUNDAY 103 Tab 3    glucose blood VI test strips (OneTouch Verio test strips) strip Use to test blood sugar once daily. Dx.e11.9 50 Strip 11    lancets (One Touch Delica) 33 gauge misc Use to test blood sugar once daily. dx.e11.9 50 Lancet 11    tobramycin (TOBREX) 0.3 % ophthalmic solution One drop four times a day in involved eye 1 Bottle 0    tiotropium (SPIRIVA) 18 mcg inhalation capsule Take 1 Cap by inhalation daily. 30 Cap 11    lisinopril (PRINIVIL, ZESTRIL) 20 mg tablet Take 1 Tab by mouth daily. 90 Tab 3    glucose blood VI test strips (TRUE METRIX GLUCOSE TEST STRIP) strip Use to test blood sugar once daily. Dx.e11.9 100 Strip 11    lancets (TRUEPLUS LANCETS) 28 gauge misc Use to test blood sugar once daily. Dx.e11.9 100 Lancet 11    guaiFENesin-codeine (VIRTUSSIN AC) 100-10 mg/5 mL solution Take 5 mL by mouth three (3) times daily as needed for Cough. Max Daily Amount: 15 mL. 140 mL 0    metFORMIN (GLUCOPHAGE) 500 mg tablet TAKE 1 TABLET BY MOUTH TWICE DAILY WITH MEALS 180 Tab 3    sitagliptin (JANUVIA) 100 mg tablet Take 1 tablet by mouth daily. 30 tablet 11    budesonide-formoterol (SYMBICORT) 160-4.5 mcg/Actuation HFA inhaler Take 2 Puffs by inhalation two (2) times a day. 10.2 Inhaler 11     Allergies   Allergen Reactions    Clonidine Other (comments)     Patient becomes incoherent       History reviewed. No pertinent family history. Social History     Tobacco Use    Smoking status: Former Smoker    Smokeless tobacco: Never Used    Tobacco comment: 20+years ago   Substance Use Topics    Alcohol use:  No

## 2021-03-05 LAB
ALBUMIN SERPL-MCNC: 3.5 G/DL (ref 3.5–5)
ALBUMIN/GLOB SERPL: 0.8 {RATIO} (ref 1.1–2.2)
ALP SERPL-CCNC: 120 U/L (ref 45–117)
ALT SERPL-CCNC: 21 U/L (ref 12–78)
ANION GAP SERPL CALC-SCNC: 4 MMOL/L (ref 5–15)
AST SERPL-CCNC: 14 U/L (ref 15–37)
BASOPHILS # BLD: 0 K/UL (ref 0–0.1)
BASOPHILS NFR BLD: 1 % (ref 0–1)
BILIRUB SERPL-MCNC: 0.2 MG/DL (ref 0.2–1)
BUN SERPL-MCNC: 24 MG/DL (ref 6–20)
BUN/CREAT SERPL: 20 (ref 12–20)
CALCIUM SERPL-MCNC: 9 MG/DL (ref 8.5–10.1)
CHLORIDE SERPL-SCNC: 107 MMOL/L (ref 97–108)
CHOLEST SERPL-MCNC: 177 MG/DL
CO2 SERPL-SCNC: 29 MMOL/L (ref 21–32)
CREAT SERPL-MCNC: 1.2 MG/DL (ref 0.55–1.02)
DIFFERENTIAL METHOD BLD: ABNORMAL
EOSINOPHIL # BLD: 0.2 K/UL (ref 0–0.4)
EOSINOPHIL NFR BLD: 4 % (ref 0–7)
ERYTHROCYTE [DISTWIDTH] IN BLOOD BY AUTOMATED COUNT: 16 % (ref 11.5–14.5)
EST. AVERAGE GLUCOSE BLD GHB EST-MCNC: 131 MG/DL
GLOBULIN SER CALC-MCNC: 4.2 G/DL (ref 2–4)
GLUCOSE SERPL-MCNC: 123 MG/DL (ref 65–100)
HBA1C MFR BLD: 6.2 % (ref 4–5.6)
HCT VFR BLD AUTO: 37.8 % (ref 35–47)
HDLC SERPL-MCNC: 82 MG/DL
HDLC SERPL: 2.2 {RATIO} (ref 0–5)
HGB BLD-MCNC: 11.3 G/DL (ref 11.5–16)
IMM GRANULOCYTES # BLD AUTO: 0 K/UL (ref 0–0.04)
IMM GRANULOCYTES NFR BLD AUTO: 1 % (ref 0–0.5)
LDLC SERPL CALC-MCNC: 80.8 MG/DL (ref 0–100)
LIPID PROFILE,FLP: NORMAL
LYMPHOCYTES # BLD: 1.4 K/UL (ref 0.8–3.5)
LYMPHOCYTES NFR BLD: 30 % (ref 12–49)
MCH RBC QN AUTO: 28.9 PG (ref 26–34)
MCHC RBC AUTO-ENTMCNC: 29.9 G/DL (ref 30–36.5)
MCV RBC AUTO: 96.7 FL (ref 80–99)
MONOCYTES # BLD: 0.5 K/UL (ref 0–1)
MONOCYTES NFR BLD: 11 % (ref 5–13)
NEUTS SEG # BLD: 2.5 K/UL (ref 1.8–8)
NEUTS SEG NFR BLD: 53 % (ref 32–75)
NRBC # BLD: 0 K/UL (ref 0–0.01)
NRBC BLD-RTO: 0 PER 100 WBC
PHENYTOIN SERPL-MCNC: 21.8 UG/ML (ref 10–20)
PLATELET # BLD AUTO: 227 K/UL (ref 150–400)
PMV BLD AUTO: 10.5 FL (ref 8.9–12.9)
POTASSIUM SERPL-SCNC: 4 MMOL/L (ref 3.5–5.1)
PROT SERPL-MCNC: 7.7 G/DL (ref 6.4–8.2)
RBC # BLD AUTO: 3.91 M/UL (ref 3.8–5.2)
SODIUM SERPL-SCNC: 140 MMOL/L (ref 136–145)
TRIGL SERPL-MCNC: 71 MG/DL (ref ?–150)
TSH SERPL DL<=0.05 MIU/L-ACNC: 3.46 UIU/ML (ref 0.36–3.74)
VLDLC SERPL CALC-MCNC: 14.2 MG/DL
WBC # BLD AUTO: 4.8 K/UL (ref 3.6–11)

## 2021-03-06 LAB — APO B SERPL-MCNC: 85 MG/DL

## 2021-03-11 ENCOUNTER — TELEPHONE (OUTPATIENT)
Dept: INTERNAL MEDICINE CLINIC | Age: 76
End: 2021-03-11

## 2021-03-11 RX ORDER — WARFARIN SODIUM 5 MG/1
TABLET ORAL
Qty: 103 TAB | Refills: 3 | Status: SHIPPED | OUTPATIENT
Start: 2021-03-11 | End: 2021-12-22

## 2021-03-11 NOTE — TELEPHONE ENCOUNTER
Placed call, spoke with patient's daughter. Advised her that Dr. Bo Jones wants patient to hold coumadin until Monday, start back on Monday with 1 tab M-F and 1/2 tab Sat and Sun, and call back in two weeks with readings. Patient's daughter verbalized understanding.

## 2021-03-11 NOTE — TELEPHONE ENCOUNTER
----- Message from [address] [place] [address] sent at 3/11/2021 11:48 AM EST -----  Regarding: FW: Dr. Sabrina Ferro    ----- Message -----  From: Bismark Mae  Sent: 3/11/2021  11:12 AM EST  To: [facility] Front Office  Subject: Dr. Colton Washington first and last name: Lorie Be      Reason for call: pt/inr check      Callback required yes/no and why:      Best contact number(s): [phone]      Details to clarify the request: caller would like a callback from Julio Cesar Sanchez on what to do next pertaining to pt/inr check -inr is 8.0 pt is 81.7/caller states that numbers have never been that high      [employer]

## 2021-03-28 NOTE — PATIENT INSTRUCTIONS
Medicare Wellness Visit, Female The best way to live healthy is to have a lifestyle where you eat a well-balanced diet, exercise regularly, limit alcohol use, and quit all forms of tobacco/nicotine, if applicable. Regular preventive services are another way to keep healthy. Preventive services (vaccines, screening tests, monitoring & exams) can help personalize your care plan, which helps you manage your own care. Screening tests can find health problems at the earliest stages, when they are easiest to treat. [facility] follows the current, evidence-based guidelines published by the [facility] Aubrey Liu ([facility]) when recommending preventive services for our patients. Because we follow these guidelines, sometimes recommendations change over time as research supports it. (For example, mammograms used to be recommended annually. Even though Medicare will still pay for an annual mammogram, the newer guidelines recommend a mammogram every two years for women of average risk). Of course, you and your doctor may decide to screen more often for some diseases, based on your risk and your co-morbidities (chronic disease you are already diagnosed with). Preventive services for you include: - Medicare offers their members a free annual wellness visit, which is time for you and your primary care provider to discuss and plan for your preventive service needs. Take advantage of this benefit every year! 
-All adults over the age of 72 should receive the recommended pneumonia vaccines. Current USPSTF guidelines recommend a series of two vaccines for the best pneumonia protection.  
-All adults should have a flu vaccine yearly and a tetanus vaccine every 10 years.  
-All adults age 48 and older should receive the shingles vaccines (series of two vaccines).      
-All adults age 38-68 who are overweight should have a diabetes screening test once every three years.  
-All adults born between [birth year] and 1967 should be screened once for Hepatitis C. 
-Other screening tests and preventive services for persons with diabetes include: an eye exam to screen for diabetic retinopathy, a kidney function test, a foot exam, and stricter control over your cholesterol.  
-Cardiovascular screening for adults with routine risk involves an electrocardiogram (ECG) at intervals determined by your doctor.  
-Colorectal cancer screenings should be done for adults age 54-65 with no increased risk factors for colorectal cancer. There are a number of acceptable methods of screening for this type of cancer. Each test has its own benefits and drawbacks. Discuss with your doctor what is most appropriate for you during your annual wellness visit. The different tests include: colonoscopy (considered the best screening method), a fecal occult blood test, a fecal DNA test, and sigmoidoscopy. 
 
-A bone mass density test is recommended when a woman turns 65 to screen for osteoporosis. This test is only recommended one time, as a screening. Some providers will use this same test as a disease monitoring tool if you already have osteoporosis. -Breast cancer screenings are recommended every other year for women of normal risk, age 54-69. 
-Cervical cancer screenings for women over age 72 are only recommended with certain risk factors. Here is a list of your current Health Maintenance items (your personalized list of preventive services) with a due date: 
Health Maintenance Due Topic Date Due  
 Hepatitis C Test  Never done  COVID-19 Vaccine (1) Never done  Shingles Vaccine (1 of 2) Never done Parnell Sicard Eye Exam  04/13/2017  Albumin Urine Test  04/15/2020

## 2021-04-18 RX ORDER — LEVETIRACETAM 1000 MG/1
TABLET ORAL
Qty: 180 TAB | Refills: 3 | Status: SHIPPED | OUTPATIENT
Start: 2021-04-18 | End: 2022-03-22

## 2021-04-19 RX ORDER — ALLOPURINOL 300 MG/1
TABLET ORAL
Qty: 90 TAB | Refills: 3 | Status: SHIPPED | OUTPATIENT
Start: 2021-04-19 | End: 2022-03-22

## 2021-04-19 RX ORDER — FELODIPINE 10 MG/1
TABLET, EXTENDED RELEASE ORAL
Qty: 90 TAB | Refills: 3 | Status: SHIPPED | OUTPATIENT
Start: 2021-04-19 | End: 2022-03-22

## 2021-06-03 ENCOUNTER — TELEPHONE (OUTPATIENT)
Dept: INTERNAL MEDICINE CLINIC | Age: 76
End: 2021-06-03

## 2021-06-04 ENCOUNTER — OFFICE VISIT (OUTPATIENT)
Dept: INTERNAL MEDICINE CLINIC | Age: 76
End: 2021-06-04
Payer: MEDICARE

## 2021-06-04 VITALS
HEART RATE: 64 BPM | DIASTOLIC BLOOD PRESSURE: 82 MMHG | BODY MASS INDEX: 50.96 KG/M2 | TEMPERATURE: 97.9 F | RESPIRATION RATE: 14 BRPM | HEIGHT: 62 IN | WEIGHT: 276.9 LBS | SYSTOLIC BLOOD PRESSURE: 143 MMHG | OXYGEN SATURATION: 97 %

## 2021-06-04 DIAGNOSIS — M17.12 PRIMARY OSTEOARTHRITIS OF LEFT KNEE: ICD-10-CM

## 2021-06-04 DIAGNOSIS — I10 ESSENTIAL HYPERTENSION: ICD-10-CM

## 2021-06-04 DIAGNOSIS — E11.9 TYPE 2 DIABETES MELLITUS WITHOUT COMPLICATION, WITHOUT LONG-TERM CURRENT USE OF INSULIN (HCC): Primary | ICD-10-CM

## 2021-06-04 DIAGNOSIS — I87.2 VENOUS INSUFFICIENCY: ICD-10-CM

## 2021-06-04 DIAGNOSIS — G40.909 SEIZURE DISORDER (HCC): ICD-10-CM

## 2021-06-04 DIAGNOSIS — J45.20 INTERMITTENT ASTHMA WITHOUT COMPLICATION, UNSPECIFIED ASTHMA SEVERITY: ICD-10-CM

## 2021-06-04 DIAGNOSIS — E66.01 MORBID OBESITY (HCC): Chronic | ICD-10-CM

## 2021-06-04 DIAGNOSIS — E78.5 DYSLIPIDEMIA: ICD-10-CM

## 2021-06-04 LAB
ANION GAP SERPL CALC-SCNC: 7 MMOL/L (ref 5–15)
BUN SERPL-MCNC: 30 MG/DL (ref 6–20)
BUN/CREAT SERPL: 25 (ref 12–20)
CALCIUM SERPL-MCNC: 9.4 MG/DL (ref 8.5–10.1)
CHLORIDE SERPL-SCNC: 111 MMOL/L (ref 97–108)
CO2 SERPL-SCNC: 24 MMOL/L (ref 21–32)
CREAT SERPL-MCNC: 1.2 MG/DL (ref 0.55–1.02)
EST. AVERAGE GLUCOSE BLD GHB EST-MCNC: 143 MG/DL
GLUCOSE SERPL-MCNC: 96 MG/DL (ref 65–100)
HBA1C MFR BLD: 6.6 % (ref 4–5.6)
POTASSIUM SERPL-SCNC: 4.4 MMOL/L (ref 3.5–5.1)
SODIUM SERPL-SCNC: 142 MMOL/L (ref 136–145)

## 2021-06-04 PROCEDURE — 99214 OFFICE O/P EST MOD 30 MIN: CPT | Performed by: INTERNAL MEDICINE

## 2021-06-04 PROCEDURE — 1101F PT FALLS ASSESS-DOCD LE1/YR: CPT | Performed by: INTERNAL MEDICINE

## 2021-06-04 PROCEDURE — G8754 DIAS BP LESS 90: HCPCS | Performed by: INTERNAL MEDICINE

## 2021-06-04 PROCEDURE — G8427 DOCREV CUR MEDS BY ELIG CLIN: HCPCS | Performed by: INTERNAL MEDICINE

## 2021-06-04 PROCEDURE — 1090F PRES/ABSN URINE INCON ASSESS: CPT | Performed by: INTERNAL MEDICINE

## 2021-06-04 PROCEDURE — G8432 DEP SCR NOT DOC, RNG: HCPCS | Performed by: INTERNAL MEDICINE

## 2021-06-04 PROCEDURE — G8417 CALC BMI ABV UP PARAM F/U: HCPCS | Performed by: INTERNAL MEDICINE

## 2021-06-04 PROCEDURE — G8399 PT W/DXA RESULTS DOCUMENT: HCPCS | Performed by: INTERNAL MEDICINE

## 2021-06-04 PROCEDURE — G8753 SYS BP > OR = 140: HCPCS | Performed by: INTERNAL MEDICINE

## 2021-06-04 PROCEDURE — G8536 NO DOC ELDER MAL SCRN: HCPCS | Performed by: INTERNAL MEDICINE

## 2021-06-04 NOTE — PROGRESS NOTES
Chief Complaint   Patient presents with    Hypertension     3 month follow up          1. Have you been to the ER, urgent care clinic since your last visit? Hospitalized since your last visit? No    2. Have you seen or consulted any other health care providers outside of the [address] [place] [address] since your last visit? Include any pap smears or colon screening.  No

## 2021-06-05 RX ORDER — ALBUTEROL SULFATE 90 UG/1
2 AEROSOL, METERED RESPIRATORY (INHALATION)
Qty: 1 INHALER | Refills: 11 | Status: SHIPPED | OUTPATIENT
Start: 2021-06-05 | End: 2022-03-22

## 2021-06-05 RX ORDER — BLOOD SUGAR DIAGNOSTIC
STRIP MISCELLANEOUS
Qty: 50 STRIP | Refills: 11 | Status: SHIPPED | OUTPATIENT
Start: 2021-06-05 | End: 2022-02-23 | Stop reason: SDUPTHER

## 2021-06-05 NOTE — PROGRESS NOTES
[address] [facility]  [place] [address]  [address] [facility] [address]  Phone:  [phone]  Fax: [phone]       Chief Complaint   Patient presents with    Hypertension     3 month follow up    . SUBJECTIVE:    Lorena Hernández is a 64 y.o. female comes in for followup. She is accompanied by two of her daughters. They take great care of her. She continues to complain of chronic swelling of her lower extremities related to her venous insufficiency. Options are quite limited because of her logistical difficulty utilizing compression stockings. Her asthma has been reasonably stable, and she uses her bronchodilators as needed. She has had no meaningful weight loss since I last saw her. She remains seizure free on her phenytoin 40 mg q.h.s. She has a past history of primary hypertension, dyslipidemia, diabetes mellitus and history of recurrent pulmonary emboli requiring warfarin therapy. Because of her severe osteoarthritic left knee and morbid obesity, walking is extremely difficult. She is barely able to transfer at this point. Fortunately, she does sleep in her bed at night. Current Outpatient Medications   Medication Sig Dispense Refill    allopurinoL (ZYLOPRIM) 300 mg tablet TAKE 1 TABLET BY MOUTH ONCE DAILY 90 Tab 3    felodipine (PLENDIL SR) 10 mg 24 hr tablet TAKE 1 TABLET BY MOUTH EVERY DAY 90 Tab 3    levETIRAcetam 1,000 mg tablet TAKE 1 TABLET BY MOUTH TWICE DAILY 180 Tab 3    warfarin (COUMADIN) 5 mg tablet TAKE 1 TABLET BY MOUTH MONDAY THROUGH FRIDAY AND 1& 1/2 TABLETS ON SATURDAY AND SUNDAY 103 Tab 3    metoprolol tartrate (LOPRESSOR) 25 mg tablet TAKE 1 TABLET BY MOUTH TWICE DAILY AT 9 AM AND AT 9 PM (Patient taking differently: Take 12.5 mg by mouth two (2) times a day.  TAKE 1 TABLET BY MOUTH TWICE DAILY AT 9 AM AND AT 9 PM) 180 Tab 3    phenytoin ER (DILANTIN ER) 100 mg ER capsule TAKE 4 CAPSULES BY MOUTH EVERY NIGHT AT BEDTIME (Patient taking differently: TAKE 4 CAPSULES ALL OTHER DAYS BY MOUTH EVERY NIGHT AT BEDTIME  3 tabs Tues and Thurs) 360 Cap 3    spironolactone-hydrochlorothiazide (ALDACTAZIDE) 25-25 mg per tablet Take 1 Tab by mouth daily. 90 Tab 3    Blood-Glucose Meter (OneTouch Verio Meter) misc Use to test blood sugar daily. Dx.e11.9 1 Each 0    glucose blood VI test strips (OneTouch Verio test strips) strip Use to test blood sugar once daily. Dx.e11.9 50 Strip 11    lancets (One Touch Delica) 33 gauge misc Use to test blood sugar once daily. dx.e11.9 50 Lancet 11    atorvastatin (LIPITOR) 80 mg tablet TAKE 1 TABLET BY MOUTH DAILY 90 Tab 3    albuterol (ProAir HFA) 90 mcg/actuation inhaler Take 2 Puffs by inhalation every four (4) hours as needed for Wheezing or Shortness of Breath. 1 Inhaler 11    albuterol (PROVENTIL VENTOLIN) 2.5 mg /3 mL (0.083 %) nebu USE 1 VIAL VIA NEBULIZER EVERY 6 HOURS AS NEEDED. DX: J45.909 120 Each 11    tobramycin (TOBREX) 0.3 % ophthalmic solution One drop four times a day in involved eye 1 Bottle 0    tiotropium (SPIRIVA) 18 mcg inhalation capsule Take 1 Cap by inhalation daily. 30 Cap 11    lisinopril (PRINIVIL, ZESTRIL) 20 mg tablet Take 1 Tab by mouth daily. 90 Tab 3    lancets (TRUEPLUS LANCETS) 28 gauge misc Use to test blood sugar once daily. Dx.e11.9 100 Lancet 11    guaiFENesin-codeine (VIRTUSSIN AC) 100-10 mg/5 mL solution Take 5 mL by mouth three (3) times daily as needed for Cough. Max Daily Amount: 15 mL. 140 mL 0    aspirin delayed-release 81 mg tablet Take 81 mg by mouth daily.  metFORMIN (GLUCOPHAGE) 500 mg tablet TAKE 1 TABLET BY MOUTH TWICE DAILY WITH MEALS 180 Tab 3    sitagliptin (JANUVIA) 100 mg tablet Take 1 tablet by mouth daily. 30 tablet 11    budesonide-formoterol (SYMBICORT) 160-4.5 mcg/Actuation HFA inhaler Take 2 Puffs by inhalation two (2) times a day.  10.2 Inhaler 11    lancets (FreeStyle Lancets) 28 gauge misc USE TO TEST BLOOD SUGAR EVERY  Lancet 11    glucose blood VI test strips (FreeStyle Lite Strips) strip Use to test blood sugar daily (Patient not taking: Reported on 6/4/2021) 50 Strip 11    glucose blood VI test strips (TRUE METRIX GLUCOSE TEST STRIP) strip Use to test blood sugar once daily. Dx.e11.9 (Patient not taking: Reported on 6/4/2021) 100 Strip 11    Blood-Glucose Meter (TRUE METRIX AIR GLUCOSE METER) misc 1 Device by Does Not Apply route daily. Use to test blood sugars daily. dx.e11.9 (Patient not taking: Reported on 6/4/2021) 1 Each 0     Past Medical History:   Diagnosis Date    Arthritis     Asthma     Diabetes ([facility] [address]     Hypertension     Other ill-defined conditions(799.89)     rt knee surgery    Stroke [facility])      Past Surgical History:   Procedure Laterality Date    HX COLONOSCOPY      HX GYN       Allergies   Allergen Reactions    Clonidine Other (comments)     Patient becomes incoherent         REVIEW OF SYSTEMS:  General: negative for - chills or fever  ENT: negative for - headaches, nasal congestion or tinnitus  Respiratory: negative for - cough, hemoptysis, shortness of breath or wheezing  Cardiovascular : negative for - chest pain, edema, palpitations or shortness of breath  Gastrointestinal: negative for - abdominal pain, blood in stools, heartburn or nausea/vomiting  Genito-Urinary: no dysuria, trouble voiding, or hematuria  Musculoskeletal: negative for - gait disturbance, joint pain, joint stiffness or joint swelling  Neurological: no TIA or stroke symptoms  Hematologic: no bruises, no bleeding, no swollen glands  Integument: no lumps, mole changes, nail changes or rash  Endocrine: no malaise/lethargy or unexpected weight changes      Social History     Socioeconomic History    Marital status:      Spouse name: Not on file    Number of children: 1    Years of education: Not on file    Highest education level: Not on file   Occupational History    Occupation: disabled--[employer]   Tobacco Use    Smoking status: Former Smoker    Smokeless tobacco: Never Used    Tobacco comment: 20+years ago   Vaping Use    Vaping Use: Never used   Substance and Sexual Activity    Alcohol use: No    Drug use: No    Sexual activity: Not Currently     Social Determinants of Health     Financial Resource Strain:     Difficulty of Paying Living Expenses:    Food Insecurity:     Worried About Running Out of Food in the Last Year:     Ran Out of Food in the Last Year:    Transportation Needs:     Lack of Transportation (Medical):  Lack of Transportation (Non-Medical):    Physical Activity:     Days of Exercise per Week:     Minutes of Exercise per Session:    Stress:     Feeling of Stress :    Social Connections:     Frequency of Communication with Friends and Family:     Frequency of Social Gatherings with Friends and Family:     Attends [employer] Services:     Active Member of Clubs or Organizations:     Attends Club or Organization Meetings:     Marital Status:      History reviewed. No pertinent family history. OBJECTIVE:    Visit Vitals  BP (!) 143/82   Pulse 64   Temp 97.9 °F (36.6 °C) (Oral)   Resp 14   Ht 5' 2\" (1.575 m)   Wt 276 lb 14.4 oz (125.6 kg)   SpO2 97%   BMI 50.65 kg/m²     CONSTITUTIONAL: well , well nourished, appears age appropriate  EYES: perrla, eom intact  ENMT:moist mucous membranes, pharynx clear  NECK: supple. Thyroid normal  RESPIRATORY: Chest: clear to ascultation and percussion   CARDIOVASCULAR: Heart: regular rate and rhythm  GASTROINTESTINAL: Abdomen: soft, bowel sounds active  HEMATOLOGIC: no pathological lymph nodes palpated  MUSCULOSKELETAL: Extremities: no edema, pulse 1+   INTEGUMENT: No unusual rashes or suspicious skin lesions noted. Nails appear normal.  NEUROLOGIC: non-focal exam   MENTAL STATUS: alert and oriented, appropriate affect      ASSESSMENT:  1. Type 2 diabetes mellitus without complication, without long-term current use of insulin (HCC)    2. Seizure disorder ([facility] [address]    3. Essential hypertension    4. Venous insufficiency    5. Intermittent asthma without complication, unspecified asthma severity    6. Primary osteoarthritis of left knee    7. Morbid obesity ([address]    8. Dyslipidemia        PLAN:  1. Hopefully, her diabetes is doing well, but I will await the results of her hemoglobin A1c.  2. She remains seizure free. She is on Dilantin 40 mg at bedtime, and I will await the results of her total and free Dilantin levels. 3. Blood pressure is reasonable. No adjustments are made. 4. She has chronic venous insufficiency with 2 to 3+ edema in both distal lower extremities. The left is somewhat greater than the right. Nothing can be done at this point. She is not a great candidate for compression stockings. 5. She has reactive airway disease, which is mild to moderate. She is reasonably stable today. 6. She has severe osteoarthritis involving her left knee. She has had a total right knee replacement. This makes walking extremely difficult if not nearly impossible. Weight loss is the most important thing that will benefit this particular situation because she is not an operative candidate. 7. She needs to lose weight. This can be accomplished by eating meals, eliminating snacks, and avoiding the consumption of processed carbohydrates. This would help majority of her medical problems significantly. 8. She has an increased cardiovascular risk and remains on a statin. Her last particle count was reasonable. She is reasonably well protected as relates to her lipids. Her INR today is acceptable. She remains anticoagulated because of her history of recurrent DVT and pulmonary emboli. .  Orders Placed This Encounter    PHENYTOIN, TOTAL & FREE    HEMOGLOBIN A1C WITH EAG    METABOLIC PANEL, BASIC    PHENYTOIN, TOTAL & FREE         Follow-up and Dispositions    · Return in about 3 months (around 9/4/2021).            Ольга Whelan MD

## 2021-06-17 ENCOUNTER — TELEPHONE (OUTPATIENT)
Dept: INTERNAL MEDICINE CLINIC | Age: 76
End: 2021-06-17

## 2021-07-01 ENCOUNTER — TELEPHONE (OUTPATIENT)
Dept: INTERNAL MEDICINE CLINIC | Age: 76
End: 2021-07-01

## 2021-07-01 LAB — INR, EXTERNAL: 2.6 (ref 2–3)

## 2021-07-29 ENCOUNTER — TELEPHONE (OUTPATIENT)
Dept: INTERNAL MEDICINE CLINIC | Age: 76
End: 2021-07-29

## 2021-08-05 ENCOUNTER — TELEPHONE (OUTPATIENT)
Dept: INTERNAL MEDICINE CLINIC | Age: 76
End: 2021-08-05

## 2021-08-12 ENCOUNTER — TELEPHONE (OUTPATIENT)
Dept: INTERNAL MEDICINE CLINIC | Age: 76
End: 2021-08-12

## 2021-08-12 NOTE — TELEPHONE ENCOUNTER
Patient granddaughter called stating PT 33.5 and INR 3.0. PER Dr. Sheriff Kill patient to hold x 2 days, then restart at regular dose.

## 2021-08-19 ENCOUNTER — TELEPHONE (OUTPATIENT)
Dept: INTERNAL MEDICINE CLINIC | Age: 76
End: 2021-08-19

## 2021-08-19 NOTE — TELEPHONE ENCOUNTER
Patient cara call in PT 19.4 and INR 1.7. WE MAKE NO ADJUSTMENTS AND ASK THAT SHE CALL AGAIN ON Thursday.

## 2021-08-26 ENCOUNTER — TELEPHONE (OUTPATIENT)
Dept: INTERNAL MEDICINE CLINIC | Age: 76
End: 2021-08-26

## 2021-08-26 NOTE — TELEPHONE ENCOUNTER
[facility] and INR 2.0 TAKING 1 TAB MON- FRI. And 1/2 tab sat and sun. no change call in 1 week

## 2021-09-03 ENCOUNTER — TELEPHONE (OUTPATIENT)
Dept: INTERNAL MEDICINE CLINIC | Age: 76
End: 2021-09-03

## 2021-09-08 ENCOUNTER — OFFICE VISIT (OUTPATIENT)
Dept: INTERNAL MEDICINE CLINIC | Age: 76
End: 2021-09-08
Payer: MEDICARE

## 2021-09-08 VITALS
WEIGHT: 272.4 LBS | DIASTOLIC BLOOD PRESSURE: 70 MMHG | HEIGHT: 62 IN | RESPIRATION RATE: 14 BRPM | HEART RATE: 66 BPM | TEMPERATURE: 98.8 F | BODY MASS INDEX: 50.13 KG/M2 | OXYGEN SATURATION: 95 % | SYSTOLIC BLOOD PRESSURE: 136 MMHG

## 2021-09-08 DIAGNOSIS — I48.0 PAROXYSMAL ATRIAL FIBRILLATION (HCC): Primary | ICD-10-CM

## 2021-09-08 DIAGNOSIS — E78.5 DYSLIPIDEMIA: ICD-10-CM

## 2021-09-08 DIAGNOSIS — J45.20 INTERMITTENT ASTHMA WITHOUT COMPLICATION, UNSPECIFIED ASTHMA SEVERITY: ICD-10-CM

## 2021-09-08 DIAGNOSIS — E11.9 TYPE 2 DIABETES MELLITUS WITHOUT COMPLICATION, WITHOUT LONG-TERM CURRENT USE OF INSULIN (HCC): ICD-10-CM

## 2021-09-08 DIAGNOSIS — G40.909 SEIZURE DISORDER (HCC): ICD-10-CM

## 2021-09-08 DIAGNOSIS — I10 ESSENTIAL HYPERTENSION: ICD-10-CM

## 2021-09-08 DIAGNOSIS — E66.01 MORBID OBESITY (HCC): Chronic | ICD-10-CM

## 2021-09-08 LAB
INR BLD: 2.5
PT POC: 30.1 SECONDS
VALID INTERNAL CONTROL?: YES

## 2021-09-08 PROCEDURE — G8754 DIAS BP LESS 90: HCPCS | Performed by: INTERNAL MEDICINE

## 2021-09-08 PROCEDURE — G8427 DOCREV CUR MEDS BY ELIG CLIN: HCPCS | Performed by: INTERNAL MEDICINE

## 2021-09-08 PROCEDURE — 1101F PT FALLS ASSESS-DOCD LE1/YR: CPT | Performed by: INTERNAL MEDICINE

## 2021-09-08 PROCEDURE — 99214 OFFICE O/P EST MOD 30 MIN: CPT | Performed by: INTERNAL MEDICINE

## 2021-09-08 PROCEDURE — G8417 CALC BMI ABV UP PARAM F/U: HCPCS | Performed by: INTERNAL MEDICINE

## 2021-09-08 PROCEDURE — G8432 DEP SCR NOT DOC, RNG: HCPCS | Performed by: INTERNAL MEDICINE

## 2021-09-08 PROCEDURE — G8536 NO DOC ELDER MAL SCRN: HCPCS | Performed by: INTERNAL MEDICINE

## 2021-09-08 PROCEDURE — G8752 SYS BP LESS 140: HCPCS | Performed by: INTERNAL MEDICINE

## 2021-09-08 PROCEDURE — 85610 PROTHROMBIN TIME: CPT | Performed by: INTERNAL MEDICINE

## 2021-09-08 PROCEDURE — G8399 PT W/DXA RESULTS DOCUMENT: HCPCS | Performed by: INTERNAL MEDICINE

## 2021-09-08 PROCEDURE — 1090F PRES/ABSN URINE INCON ASSESS: CPT | Performed by: INTERNAL MEDICINE

## 2021-09-08 NOTE — PROGRESS NOTES
Chief Complaint   Patient presents with    Diabetes     3 month follow up          1. Have you been to the ER, urgent care clinic since your last visit? Hospitalized since your last visit? No    2. Have you seen or consulted any other health care providers outside of the [address] [place] [address] since your last visit? Include any pap smears or colon screening.  No       Results for orders placed or performed in visit on 09/08/21   AMB POC PT/INR   Result Value Ref Range    VALID INTERNAL CONTROL POC Yes     Prothrombin time (POC) 30.1 seconds    INR POC 2.5

## 2021-09-09 LAB
EST. AVERAGE GLUCOSE BLD GHB EST-MCNC: 140 MG/DL
HBA1C MFR BLD: 6.5 % (ref 4–5.6)

## 2021-09-16 ENCOUNTER — HOSPITAL ENCOUNTER (EMERGENCY)
Age: 76
Discharge: HOME | End: 2021-09-16
Attending: STUDENT IN AN ORGANIZED HEALTH CARE EDUCATION/TRAINING PROGRAM
Payer: MEDICARE

## 2021-09-16 ENCOUNTER — APPOINTMENT (OUTPATIENT)
Dept: GENERAL RADIOLOGY | Age: 76
End: 2021-09-16
Attending: STUDENT IN AN ORGANIZED HEALTH CARE EDUCATION/TRAINING PROGRAM
Payer: MEDICARE

## 2021-09-16 ENCOUNTER — APPOINTMENT (OUTPATIENT)
Dept: CT IMAGING | Age: 76
End: 2021-09-16
Attending: STUDENT IN AN ORGANIZED HEALTH CARE EDUCATION/TRAINING PROGRAM
Payer: MEDICARE

## 2021-09-16 ENCOUNTER — TELEPHONE (OUTPATIENT)
Dept: INTERNAL MEDICINE CLINIC | Age: 76
End: 2021-09-16

## 2021-09-16 VITALS
RESPIRATION RATE: 14 BRPM | TEMPERATURE: 97.8 F | HEART RATE: 62 BPM | WEIGHT: 272 LBS | BODY MASS INDEX: 50.05 KG/M2 | HEIGHT: 62 IN | DIASTOLIC BLOOD PRESSURE: 95 MMHG | SYSTOLIC BLOOD PRESSURE: 193 MMHG | OXYGEN SATURATION: 100 %

## 2021-09-16 DIAGNOSIS — S09.90XA CLOSED HEAD INJURY, INITIAL ENCOUNTER: Primary | ICD-10-CM

## 2021-09-16 LAB
ALBUMIN SERPL-MCNC: 3.2 G/DL (ref 3.5–5)
ALBUMIN/GLOB SERPL: 0.6 {RATIO} (ref 1.1–2.2)
ALP SERPL-CCNC: 117 U/L (ref 45–117)
ALT SERPL-CCNC: 22 U/L (ref 12–78)
ANION GAP SERPL CALC-SCNC: 1 MMOL/L (ref 5–15)
AST SERPL-CCNC: 22 U/L (ref 15–37)
ATRIAL RATE: 55 BPM
BASOPHILS # BLD: 0 K/UL (ref 0–0.1)
BASOPHILS NFR BLD: 0 % (ref 0–1)
BILIRUB SERPL-MCNC: 0.4 MG/DL (ref 0.2–1)
BUN SERPL-MCNC: 29 MG/DL (ref 6–20)
BUN/CREAT SERPL: 25 (ref 12–20)
CALCIUM SERPL-MCNC: 9 MG/DL (ref 8.5–10.1)
CALCULATED P AXIS, ECG09: 39 DEGREES
CALCULATED R AXIS, ECG10: 24 DEGREES
CALCULATED T AXIS, ECG11: 101 DEGREES
CHLORIDE SERPL-SCNC: 107 MMOL/L (ref 97–108)
CO2 SERPL-SCNC: 29 MMOL/L (ref 21–32)
CREAT SERPL-MCNC: 1.17 MG/DL (ref 0.55–1.02)
DIAGNOSIS, 93000: NORMAL
DIFFERENTIAL METHOD BLD: ABNORMAL
EOSINOPHIL # BLD: 0.2 K/UL (ref 0–0.4)
EOSINOPHIL NFR BLD: 4 % (ref 0–7)
ERYTHROCYTE [DISTWIDTH] IN BLOOD BY AUTOMATED COUNT: 15.8 % (ref 11.5–14.5)
GLOBULIN SER CALC-MCNC: 5 G/DL (ref 2–4)
GLUCOSE SERPL-MCNC: 84 MG/DL (ref 65–100)
HCT VFR BLD AUTO: 39.7 % (ref 35–47)
HGB BLD-MCNC: 12.1 G/DL (ref 11.5–16)
IMM GRANULOCYTES # BLD AUTO: 0 K/UL (ref 0–0.04)
IMM GRANULOCYTES NFR BLD AUTO: 0 % (ref 0–0.5)
INR PPP: 2.3 (ref 0.9–1.1)
LYMPHOCYTES # BLD: 2.3 K/UL (ref 0.8–3.5)
LYMPHOCYTES NFR BLD: 44 % (ref 12–49)
MCH RBC QN AUTO: 29.7 PG (ref 26–34)
MCHC RBC AUTO-ENTMCNC: 30.5 G/DL (ref 30–36.5)
MCV RBC AUTO: 97.3 FL (ref 80–99)
MONOCYTES # BLD: 0.7 K/UL (ref 0–1)
MONOCYTES NFR BLD: 13 % (ref 5–13)
NEUTS SEG # BLD: 2.1 K/UL (ref 1.8–8)
NEUTS SEG NFR BLD: 39 % (ref 32–75)
NRBC # BLD: 0 K/UL (ref 0–0.01)
NRBC BLD-RTO: 0 PER 100 WBC
P-R INTERVAL, ECG05: 170 MS
PHENYTOIN SERPL-MCNC: 19.5 UG/ML (ref 10–20)
PLATELET # BLD AUTO: 189 K/UL (ref 150–400)
PMV BLD AUTO: 10.8 FL (ref 8.9–12.9)
POTASSIUM SERPL-SCNC: 5 MMOL/L (ref 3.5–5.1)
PROT SERPL-MCNC: 8.2 G/DL (ref 6.4–8.2)
PROTHROMBIN TIME: 23.2 SEC (ref 9–11.1)
Q-T INTERVAL, ECG07: 414 MS
QRS DURATION, ECG06: 68 MS
QTC CALCULATION (BEZET), ECG08: 396 MS
RBC # BLD AUTO: 4.08 M/UL (ref 3.8–5.2)
SODIUM SERPL-SCNC: 137 MMOL/L (ref 136–145)
VENTRICULAR RATE, ECG03: 55 BPM
WBC # BLD AUTO: 5.3 K/UL (ref 3.6–11)

## 2021-09-16 PROCEDURE — 99285 EMERGENCY DEPT VISIT HI MDM: CPT

## 2021-09-16 PROCEDURE — 85610 PROTHROMBIN TIME: CPT

## 2021-09-16 PROCEDURE — 85025 COMPLETE CBC W/AUTO DIFF WBC: CPT

## 2021-09-16 PROCEDURE — 73560 X-RAY EXAM OF KNEE 1 OR 2: CPT

## 2021-09-16 PROCEDURE — 80185 ASSAY OF PHENYTOIN TOTAL: CPT

## 2021-09-16 PROCEDURE — 36415 COLL VENOUS BLD VENIPUNCTURE: CPT

## 2021-09-16 PROCEDURE — 93005 ELECTROCARDIOGRAM TRACING: CPT

## 2021-09-16 PROCEDURE — 70450 CT HEAD/BRAIN W/O DYE: CPT

## 2021-09-16 PROCEDURE — 80053 COMPREHEN METABOLIC PANEL: CPT

## 2021-09-16 NOTE — ED NOTES
Assumed care of pt via EMS stretcher. Pt is A&O x 4 and reports CC of GLF this morning around 0800 today. Pt reports she fell to her knees then forward hitting her head. Pt reports she started getting a HA shortly after. Pt BG was 146 for EMS. Pt reports she thinks she got dizzy before fall. Pt reports she called her PCP who referred pt to ED due to blood thinner usage. Pt reports she uses a walker and had had multiple falls at home recently. Dr Christianson at bedside evaluating pt.

## 2021-09-16 NOTE — TELEPHONE ENCOUNTER
Patient granddaughter called in her pt 29.7 inr 2.7 no change. She states her grandmother fell and hit her head this morning no bruising or knot did have a headache.  We advise her to take patient to the er because she takes the coumadin and get checked out

## 2021-09-16 NOTE — ED PROVIDER NOTES
EMERGENCY DEPARTMENT HISTORY AND PHYSICAL EXAM      Date: 9/16/2021  Patient Name: Sarah Echeverria    History of Presenting Illness     Chief Complaint   Patient presents with    Fall         HPI: Sarah Echeverria, 64 y.o. female presents to the ED with cc of ground-level fall. This occurred around 6 AM when she woke up to go to the bathroom. She fell initially to her knees, then did hit her head. She denies any loss of consciousness. She reports some bilateral knee pain, states that she has chronic knee pain but has been slightly worse since the fall. She also reports a mild diffuse headache. She denies any new neck or back pain. No nausea or vomiting since the incident. She denies any chest pain or shortness of breath prior to the fall. She denies any recent illnesses, no fevers, coughing, abdominal pain, vomiting or diarrhea, no dysuria or hematuria. She takes warfarin, has been compliant with all of her doses. There are no other complaints, changes, or physical findings at this time. PCP: Emery Dupree MD    No current facility-administered medications on file prior to encounter. Current Outpatient Medications on File Prior to Encounter   Medication Sig Dispense Refill    glucose blood VI test strips (OneTouch Verio test strips) strip Use to test blood sugar once daily. Dx.e11.9 50 Strip 11    albuterol (ProAir HFA) 90 mcg/actuation inhaler Take 2 Puffs by inhalation every four (4) hours as needed for Wheezing or Shortness of Breath. 1 Inhaler 11    tiotropium (SPIRIVA) 18 mcg inhalation capsule Take 1 Capsule by inhalation daily.  30 Capsule 11    allopurinoL (ZYLOPRIM) 300 mg tablet TAKE 1 TABLET BY MOUTH ONCE DAILY 90 Tab 3    felodipine (PLENDIL SR) 10 mg 24 hr tablet TAKE 1 TABLET BY MOUTH EVERY DAY 90 Tab 3    levETIRAcetam 1,000 mg tablet TAKE 1 TABLET BY MOUTH TWICE DAILY 180 Tab 3    warfarin (COUMADIN) 5 mg tablet TAKE 1 TABLET BY MOUTH MONDAY THROUGH FRIDAY AND 1& 1/2 TABLETS ON SATURDAY AND SUNDAY 103 Tab 3    metoprolol tartrate (LOPRESSOR) 25 mg tablet TAKE 1 TABLET BY MOUTH TWICE DAILY AT 9 AM AND AT 9 PM (Patient taking differently: Take 12.5 mg by mouth two (2) times a day. TAKE 1 TABLET BY MOUTH TWICE DAILY AT 9 AM AND AT 9 PM) 180 Tab 3    phenytoin ER (DILANTIN ER) 100 mg ER capsule TAKE 4 CAPSULES BY MOUTH EVERY NIGHT AT BEDTIME (Patient taking differently: TAKE 4 CAPSULES ALL OTHER DAYS BY MOUTH EVERY NIGHT AT BEDTIME  3 tabs Tues and Thurs) 360 Cap 3    spironolactone-hydrochlorothiazide (ALDACTAZIDE) 25-25 mg per tablet Take 1 Tab by mouth daily. (Patient not taking: Reported on 9/8/2021) 90 Tab 3    Blood-Glucose Meter (OneTouch Verio Meter) misc Use to test blood sugar daily. Dx.e11.9 1 Each 0    lancets (One Touch Delica) 33 gauge misc Use to test blood sugar once daily. dx.e11.9 50 Lancet 11    lancets (FreeStyle Lancets) 28 gauge misc USE TO TEST BLOOD SUGAR EVERY  Lancet 11    glucose blood VI test strips (FreeStyle Lite Strips) strip Use to test blood sugar daily (Patient not taking: Reported on 6/4/2021) 50 Strip 11    atorvastatin (LIPITOR) 80 mg tablet TAKE 1 TABLET BY MOUTH DAILY 90 Tab 3    albuterol (PROVENTIL VENTOLIN) 2.5 mg /3 mL (0.083 %) nebu USE 1 VIAL VIA NEBULIZER EVERY 6 HOURS AS NEEDED. DX: J45.909 120 Each 11    tobramycin (TOBREX) 0.3 % ophthalmic solution One drop four times a day in involved eye 1 Bottle 0    lisinopril (PRINIVIL, ZESTRIL) 20 mg tablet Take 1 Tab by mouth daily. (Patient not taking: Reported on 9/8/2021) 90 Tab 3    glucose blood VI test strips (TRUE METRIX GLUCOSE TEST STRIP) strip Use to test blood sugar once daily. Dx.e11.9 (Patient not taking: Reported on 6/4/2021) 100 Strip 11    Blood-Glucose Meter (TRUE METRIX AIR GLUCOSE METER) misc 1 Device by Does Not Apply route daily. Use to test blood sugars daily. dx.e11.9 (Patient not taking: Reported on 6/4/2021) 1 Each 0    lancets (TRUEPLUS LANCETS) 28 gauge misc Use to test blood sugar once daily. Dx.e11.9 100 Lancet 11    guaiFENesin-codeine (VIRTUSSIN AC) 100-10 mg/5 mL solution Take 5 mL by mouth three (3) times daily as needed for Cough. Max Daily Amount: 15 mL. 140 mL 0    aspirin delayed-release 81 mg tablet Take 81 mg by mouth daily.  metFORMIN (GLUCOPHAGE) 500 mg tablet TAKE 1 TABLET BY MOUTH TWICE DAILY WITH MEALS (Patient not taking: Reported on 9/8/2021) 180 Tab 3    sitagliptin (JANUVIA) 100 mg tablet Take 1 tablet by mouth daily. (Patient not taking: Reported on 9/8/2021) 30 tablet 11    budesonide-formoterol (SYMBICORT) 160-4.5 mcg/Actuation HFA inhaler Take 2 Puffs by inhalation two (2) times a day. 10.2 Inhaler 11       Past History     Past Medical History:  Past Medical History:   Diagnosis Date    Arthritis     Asthma     Diabetes ([facility] [address]     Hypertension     Other ill-defined conditions(799.89)     rt knee surgery    Stroke [facility])        Past Surgical History:  Past Surgical History:   Procedure Laterality Date    HX COLONOSCOPY      HX GYN         Family History:  History reviewed. No pertinent family history. Social History:  Social History     Tobacco Use    Smoking status: Former Smoker    Smokeless tobacco: Never Used    Tobacco comment: 20+years ago   Vaping Use    Vaping Use: Never used   Substance Use Topics    Alcohol use: No    Drug use: No       Allergies: Allergies   Allergen Reactions    Clonidine Other (comments)     Patient becomes incoherent    Metformin Unknown (comments)         Review of Systems   no fever  No ear pain  Reports mild headache  no shortness of breath  no chest pain  no abdominal pain  no dysuria  Reports bilateral knee pain  No rash  No lymphadenopathy  No weight loss    Physical Exam   Physical Exam  Constitutional:       General: She is not in acute distress. Appearance: She is not toxic-appearing. HENT:      Head: Normocephalic and atraumatic.       Comments: Slight tenderness in the occipital region without swelling or deformity     Nose: Nose normal.      Mouth/Throat:      Mouth: Mucous membranes are moist.   Eyes:      Extraocular Movements: Extraocular movements intact. Pupils: Pupils are equal, round, and reactive to light. Neck:      Comments: No midline tenderness  Cardiovascular:      Rate and Rhythm: Normal rate and regular rhythm. Pulmonary:      Effort: Pulmonary effort is normal.      Breath sounds: Normal breath sounds. Abdominal:      Palpations: Abdomen is soft. Tenderness: There is no abdominal tenderness. Musculoskeletal:      Cervical back: Neck supple. Comments: No tenderness with rocking of the pelvis, slight diffuse tenderness to palpation of bilateral knees without point bony tenderness, no erythema, swelling or deformity of the knees, full range of motion of both joints. She has a chronic appearing slightly pitting bilateral symmetric lower extremity edema without erythema or warmth. No other bony tenderness over the extremities. No midline spinal tenderness. Neurological:      General: No focal deficit present. Mental Status: She is alert and oriented to person, place, and time. Comments: Symmetric facial expressions, midline tongue protrusion, extraocular movements intact, strong and symmetric  strength bilaterally, 5 out of 5 strength with bicep extension bilaterally, 5 out of 5 strength with ankle laterally, sensation to light intact over upper and lower extremities bilaterally.    Psychiatric:         Mood and Affect: Mood normal.         Diagnostic Study Results     Labs -     Recent Results (from the past 24 hour(s))   PROTHROMBIN TIME + INR    Collection Time: 09/16/21  1:57 PM   Result Value Ref Range    INR 2.3 (H) 0.9 - 1.1      Prothrombin time 23.2 (H) 9.0 - 63.2 sec   METABOLIC PANEL, COMPREHENSIVE    Collection Time: 09/16/21  1:57 PM   Result Value Ref Range    Sodium 137 136 - 145 mmol/L    Potassium 5.0 3.5 - 5.1 mmol/L    Chloride 107 97 - 108 mmol/L    CO2 29 21 - 32 mmol/L    Anion gap 1 (L) 5 - 15 mmol/L    Glucose 84 65 - 100 mg/dL    BUN 29 (H) 6 - 20 MG/DL    Creatinine 1.17 (H) 0.55 - 1.02 MG/DL    BUN/Creatinine ratio 25 (H) 12 - 20      GFR est AA 54 (L) >60 ml/min/1.73m2    GFR est non-AA 45 (L) >60 ml/min/1.73m2    Calcium 9.0 8.5 - 10.1 MG/DL    Bilirubin, total 0.4 0.2 - 1.0 MG/DL    ALT (SGPT) 22 12 - 78 U/L    AST (SGOT) 22 15 - 37 U/L    Alk. phosphatase 117 45 - 117 U/L    Protein, total 8.2 6.4 - 8.2 g/dL    Albumin 3.2 (L) 3.5 - 5.0 g/dL    Globulin 5.0 (H) 2.0 - 4.0 g/dL    A-G Ratio 0.6 (L) 1.1 - 2.2     CBC WITH AUTOMATED DIFF    Collection Time: 09/16/21  1:57 PM   Result Value Ref Range    WBC 5.3 3.6 - 11.0 K/uL    RBC 4.08 3.80 - 5.20 M/uL    HGB 12.1 11.5 - 16.0 g/dL    HCT 39.7 35.0 - 47.0 %    MCV 97.3 80.0 - 99.0 FL    MCH 29.7 26.0 - 34.0 PG    MCHC 30.5 30.0 - 36.5 g/dL    RDW 15.8 (H) 11.5 - 14.5 %    PLATELET 843 948 - 590 K/uL    MPV 10.8 8.9 - 12.9 FL    NRBC 0.0 0  WBC    ABSOLUTE NRBC 0.00 0.00 - 0.01 K/uL    NEUTROPHILS 39 32 - 75 %    LYMPHOCYTES 44 12 - 49 %    MONOCYTES 13 5 - 13 %    EOSINOPHILS 4 0 - 7 %    BASOPHILS 0 0 - 1 %    IMMATURE GRANULOCYTES 0 0.0 - 0.5 %    ABS. NEUTROPHILS 2.1 1.8 - 8.0 K/UL    ABS. LYMPHOCYTES 2.3 0.8 - 3.5 K/UL    ABS. MONOCYTES 0.7 0.0 - 1.0 K/UL    ABS. EOSINOPHILS 0.2 0.0 - 0.4 K/UL    ABS. BASOPHILS 0.0 0.0 - 0.1 K/UL    ABS. IMM.  GRANS. 0.0 0.00 - 0.04 K/UL    DF AUTOMATED     EKG, 12 LEAD, INITIAL    Collection Time: 09/16/21  2:07 PM   Result Value Ref Range    Ventricular Rate 55 BPM    Atrial Rate 55 BPM    P-R Interval 170 ms    QRS Duration 68 ms    Q-T Interval 414 ms    QTC Calculation (Bezet) 396 ms    Calculated P Axis 39 degrees    Calculated R Axis 24 degrees    Calculated T Axis 101 degrees    Diagnosis       Sinus bradycardia  T wave abnormality, consider lateral ischemia  When compared with ECG of 22-MAY-2010 22:14,  No significant change was found  Confirmed by Aimee Chappell ([id number]) on 9/16/2021 2:52:16 PM     PHENYTOIN    Collection Time: 09/16/21  5:33 PM   Result Value Ref Range    Phenytoin 19.5 10.0 - 20.0 ug/mL       Radiologic Studies -   CT HEAD WO CONT   Final Result   Stable old left MCA infarct. No acute intracranial process identified. XR KNEE RT MAX 2 VWS   Final Result      Right total knee replacement   Left knee tricompartmental osteoarthritis without acute fracture      XR KNEE LT MAX 2 VWS   Final Result      Right total knee replacement   Left knee tricompartmental osteoarthritis without acute fracture        CT Results  (Last 48 hours)               09/16/21 1434  CT HEAD WO CONT Final result    Impression:  Stable old left MCA infarct. No acute intracranial process identified. Narrative:  EXAM: CT HEAD WO CONT       INDICATION: head trauma, headache       COMPARISON: 5/23/2010. CONTRAST: None. TECHNIQUE: Unenhanced CT of the head was performed using 5 mm images. Brain and   bone windows were generated. Coronal and sagittal reformats. CT dose reduction   was achieved through use of a standardized protocol tailored for this   examination and automatic exposure control for dose modulation. FINDINGS:   The ventricles and sulci are stable in size, shape and configuration. . There is   no acute white matter disease. The old left MCA infarct has remained stable. There is no intracranial hemorrhage, extra-axial collection, or mass effect. The   basilar cisterns are open. No CT evidence of acute infarct. The bone windows demonstrate no abnormalities. The visualized portions of the   paranasal sinuses and mastoid air cells are clear. CXR Results  (Last 48 hours)    None            Medical Decision Making   I am the first provider for this patient.     I reviewed the vital signs, available nursing notes, past medical history, past surgical history, family history and social history. Vital Signs-Reviewed the patient's vital signs. Patient Vitals for the past 24 hrs:   Temp Pulse Resp BP SpO2   09/16/21 1736  62 14  100 %   09/16/21 1403 97.8 °F (36.6 °C) 62 19 (!) 193/95 96 %   09/16/21 1358  62 18 (!) 193/95          Provider Notes (Medical Decision Making):     64-year-old female presenting with bilateral knee pain and headache after ground-level fall. Given that she is on warfarin, CT head to assess for any acute intracranial injury. Will obtain x-rays of her knees, likely strain or sprain or contusion. No other injuries evident on exam.  She otherwise denies any complaints prior to the fall, however given her age and comorbidities, will assess for any electrolyte or metabolic abnormalities or renal dysfunction. ED Course:     Initial assessment performed. The patients presenting problems have been discussed, and they are in agreement with the care plan formulated and outlined with them. I have encouraged them to ask questions as they arise throughout their visit. ED Course as of Sep 17 0717   Thu Sep 16, 2021   1722 EKG is performed at 14: 07, shows sinus bradycardia at a rate of 55, , QRS 68, QTc 396, axis upright, no ST segment elevation or depression concerning for ACS, this is interpreted as sinus bradycardia. [CM]   1845 Phenytoin within normal limits. Family at bedside comfortable with patient being discharged home. Will discharge home at this time per Dr. Radha Moralez      ED Course User Index  [AK] Jennifer Navarrete MD  [CM] Marina Christianson MD      CT head shows no acute intracranial process. Laboratory work reviewed as above. Her daughter is now at bedside, spoken with her, she states that the patient had a fall because of her small dog that sleeps at the bedside. The patient is now eating chips, denies any complaints, vitals unremarkable other than stable elevated blood pressure.     Critical Care Time: Disposition:  Home    PLAN:  1. Discharge Medication List as of 9/16/2021  7:11 PM        2.    Follow-up Information     Follow up With Specialties Details Why Contact Info    Jannet Parikh MD Internal Medicine Call in 1 day  [facility]  [address]  [phone] [address] EMERGENCY DEPT Emergency Medicine  As needed, If symptoms worsen [address]  [address]  [phone]        Return to ED if worse     Diagnosis     Clinical Impression: Acute closed head injury, acute on chronic bilateral knee pain

## 2021-09-16 NOTE — DISCHARGE INSTRUCTIONS
It was a pleasure taking care of you at [facility] today. We know that when you come to [facility], you are entrusting us with your health, comfort, and safety. Our physicians and nurses honor that trust, and we truly appreciate the opportunity to care for you and your loved ones. We also value your feedback. If you receive a survey about your Emergency Department experience today, please fill it out. We care about our patients' feedback, and we listen to what you have to say. Thank you!

## 2021-09-30 ENCOUNTER — TELEPHONE (OUTPATIENT)
Dept: INTERNAL MEDICINE CLINIC | Age: 76
End: 2021-09-30

## 2021-10-07 ENCOUNTER — TELEPHONE (OUTPATIENT)
Dept: INTERNAL MEDICINE CLINIC | Age: 76
End: 2021-10-07

## 2021-10-08 RX ORDER — SPIRONOLACTONE AND HYDROCHLOROTHIAZIDE 25; 25 MG/1; MG/1
TABLET ORAL
Qty: 90 TABLET | Refills: 3 | Status: ON HOLD | OUTPATIENT
Start: 2021-10-08

## 2021-10-08 RX ORDER — ATORVASTATIN CALCIUM 80 MG/1
TABLET, FILM COATED ORAL
Qty: 90 TABLET | Refills: 3 | Status: SHIPPED | OUTPATIENT
Start: 2021-10-08 | End: 2022-09-05

## 2021-10-14 ENCOUNTER — TELEPHONE (OUTPATIENT)
Dept: INTERNAL MEDICINE CLINIC | Age: 76
End: 2021-10-14

## 2021-10-14 NOTE — TELEPHONE ENCOUNTER
Patient [facility] called in PT 27.5 and INR 2.4 NO CHANGE PER DR. Clover Calero

## 2021-10-19 NOTE — PROGRESS NOTES
[address] [facility]  [place] [address]  [address]  [place] [address]  Phone:  [phone]  Fax: [phone]       Chief Complaint   Patient presents with    Diabetes     3 month follow up     Anticoagulation     Patient's family states that patient is taking coumadin 5 mg M-F and 2.5 mg Sat and Sun. .      SUBJECTIVE:    Abdias Avendaño is a 64 y.o. female comes in for return visit stating that she has done reasonably well. She is accompanied by her daughters. Her asthma has been reasonably stable. Unfortunately she has not lost any meaningful weight. Because of this she literally cannot walk distances. She can indeed stand up long enough to transfer. This is because of her severe osteoarthritis involving her knees. She has a past history of primary hypertension, dyslipidemia, reactive airway disease and a seizure disorder. Fortunately she has remained seizure free. Current Outpatient Medications   Medication Sig Dispense Refill    glucose blood VI test strips (OneTouch Verio test strips) strip Use to test blood sugar once daily. Dx.e11.9 50 Strip 11    tiotropium (SPIRIVA) 18 mcg inhalation capsule Take 1 Capsule by inhalation daily. 30 Capsule 11    allopurinoL (ZYLOPRIM) 300 mg tablet TAKE 1 TABLET BY MOUTH ONCE DAILY 90 Tab 3    felodipine (PLENDIL SR) 10 mg 24 hr tablet TAKE 1 TABLET BY MOUTH EVERY DAY 90 Tab 3    levETIRAcetam 1,000 mg tablet TAKE 1 TABLET BY MOUTH TWICE DAILY 180 Tab 3    warfarin (COUMADIN) 5 mg tablet TAKE 1 TABLET BY MOUTH MONDAY THROUGH FRIDAY AND 1& 1/2 TABLETS ON SATURDAY AND SUNDAY 103 Tab 3    metoprolol tartrate (LOPRESSOR) 25 mg tablet TAKE 1 TABLET BY MOUTH TWICE DAILY AT 9 AM AND AT 9 PM (Patient taking differently: Take 12.5 mg by mouth two (2) times a day.  TAKE 1 TABLET BY MOUTH TWICE DAILY AT 9 AM AND AT 9 PM) 180 Tab 3    phenytoin ER (DILANTIN ER) 100 mg ER capsule TAKE 4 CAPSULES BY MOUTH EVERY NIGHT AT BEDTIME (Patient taking differently: TAKE 4 CAPSULES ALL OTHER DAYS BY MOUTH EVERY NIGHT AT BEDTIME  3 tabs Tues and Thurs) 360 Cap 3    Blood-Glucose Meter (OneTouch Verio Meter) misc Use to test blood sugar daily. Dx.e11.9 1 Each 0    lancets (One Touch Delica) 33 gauge misc Use to test blood sugar once daily. dx.e11.9 50 Lancet 11    lancets (FreeStyle Lancets) 28 gauge misc USE TO TEST BLOOD SUGAR EVERY  Lancet 11    tobramycin (TOBREX) 0.3 % ophthalmic solution One drop four times a day in involved eye 1 Bottle 0    lancets (TRUEPLUS LANCETS) 28 gauge misc Use to test blood sugar once daily. Dx.e11.9 100 Lancet 11    guaiFENesin-codeine (VIRTUSSIN AC) 100-10 mg/5 mL solution Take 5 mL by mouth three (3) times daily as needed for Cough. Max Daily Amount: 15 mL. 140 mL 0    aspirin delayed-release 81 mg tablet Take 81 mg by mouth daily.  budesonide-formoterol (SYMBICORT) 160-4.5 mcg/Actuation HFA inhaler Take 2 Puffs by inhalation two (2) times a day. 10.2 Inhaler 11    atorvastatin (LIPITOR) 80 mg tablet TAKE 1 TABLET BY MOUTH DAILY 90 Tablet 3    spironolactone-hydrochlorothiazide (ALDACTAZIDE) 25-25 mg per tablet TAKE 1 TABLET BY MOUTH DAILY 90 Tablet 3    albuterol (ProAir HFA) 90 mcg/actuation inhaler Take 2 Puffs by inhalation every four (4) hours as needed for Wheezing or Shortness of Breath. 1 Inhaler 11    glucose blood VI test strips (FreeStyle Lite Strips) strip Use to test blood sugar daily (Patient not taking: Reported on 6/4/2021) 50 Strip 11    albuterol (PROVENTIL VENTOLIN) 2.5 mg /3 mL (0.083 %) nebu USE 1 VIAL VIA NEBULIZER EVERY 6 HOURS AS NEEDED. DX: J45.909 120 Each 11    lisinopril (PRINIVIL, ZESTRIL) 20 mg tablet Take 1 Tab by mouth daily. (Patient not taking: Reported on 9/8/2021) 90 Tab 3    glucose blood VI test strips (TRUE METRIX GLUCOSE TEST STRIP) strip Use to test blood sugar once daily.  Dx.e11.9 (Patient not taking: Reported on 6/4/2021) 100 Strip 11    Blood-Glucose Meter (TRUE METRIX AIR GLUCOSE METER) misc 1 Device by Does Not Apply route daily. Use to test blood sugars daily. dx.e11.9 (Patient not taking: Reported on 6/4/2021) 1 Each 0    metFORMIN (GLUCOPHAGE) 500 mg tablet TAKE 1 TABLET BY MOUTH TWICE DAILY WITH MEALS (Patient not taking: Reported on 9/8/2021) 180 Tab 3    sitagliptin (JANUVIA) 100 mg tablet Take 1 tablet by mouth daily.  (Patient not taking: Reported on 9/8/2021) 30 tablet 11     Past Medical History:   Diagnosis Date    Arthritis     Asthma     Diabetes ([facility] [address]     Hypertension     Other ill-defined conditions(799.89)     rt knee surgery    Stroke [facility])      Past Surgical History:   Procedure Laterality Date    HX COLONOSCOPY      HX GYN       Allergies   Allergen Reactions    Clonidine Other (comments)     Patient becomes incoherent    Metformin Unknown (comments)         REVIEW OF SYSTEMS:  General: negative for - chills or fever  ENT: negative for - headaches, nasal congestion or tinnitus  Respiratory: negative for - cough, hemoptysis, shortness of breath or wheezing  Cardiovascular : negative for - chest pain, edema, palpitations or shortness of breath  Gastrointestinal: negative for - abdominal pain, blood in stools, heartburn or nausea/vomiting  Genito-Urinary: no dysuria, trouble voiding, or hematuria  Musculoskeletal: negative for - gait disturbance, joint pain, joint stiffness or joint swelling  Neurological: no TIA or stroke symptoms  Hematologic: no bruises, no bleeding, no swollen glands  Integument: no lumps, mole changes, nail changes or rash  Endocrine: no malaise/lethargy or unexpected weight changes      Social History     Socioeconomic History    Marital status:      Spouse name: Not on file    Number of children: 1    Years of education: Not on file    Highest education level: Not on file   Occupational History    Occupation: disabled--[employer]   Tobacco Use    Smoking status: Former Smoker    Smokeless tobacco: Never Used    Tobacco comment: 20+years ago   Vaping Use    Vaping Use: Never used   Substance and Sexual Activity    Alcohol use: No    Drug use: No    Sexual activity: Not Currently     Social Determinants of Health     Financial Resource Strain:     Difficulty of Paying Living Expenses:    Food Insecurity:     Worried About Running Out of Food in the Last Year:     Ran Out of Food in the Last Year:    Transportation Needs:     Lack of Transportation (Medical):  Lack of Transportation (Non-Medical):    Physical Activity:     Days of Exercise per Week:     Minutes of Exercise per Session:    Stress:     Feeling of Stress :    Social Connections:     Frequency of Communication with Friends and Family:     Frequency of Social Gatherings with Friends and Family:     Attends Yazdanism Services:     Active Member of Clubs or Organizations:     Attends Club or Organization Meetings:     Marital Status:      History reviewed. No pertinent family history. OBJECTIVE:    Visit Vitals  /70   Pulse 66   Temp 98.8 °F (37.1 °C) (Oral)   Resp 14   Ht 5' 2\" (1.575 m)   Wt 272 lb 6.4 oz (123.6 kg)   SpO2 95%   BMI 49.82 kg/m²     CONSTITUTIONAL: well , well nourished, appears age appropriate  EYES: perrla, eom intact  ENMT:moist mucous membranes, pharynx clear  NECK: supple. Thyroid normal  RESPIRATORY: Chest: clear to ascultation and percussion   CARDIOVASCULAR: Heart: regular rate and rhythm  GASTROINTESTINAL: Abdomen: soft, bowel sounds active  HEMATOLOGIC: no pathological lymph nodes palpated  MUSCULOSKELETAL: Extremities: 1+ edema distally, pulse 1+   INTEGUMENT: No unusual rashes or suspicious skin lesions noted. Nails appear normal.  NEUROLOGIC: non-focal exam   MENTAL STATUS: alert and oriented, appropriate affect      ASSESSMENT:  1. Paroxysmal atrial fibrillation (HCC)    2. Type 2 diabetes mellitus without complication, without long-term current use of insulin ([address]    3.  Essential hypertension    4. Dyslipidemia    5. Seizure disorder ([facility] [address]    6. Morbid obesity ([facility] [address]    7. Intermittent asthma without complication, unspecified asthma severity        PLAN:  1. She does have paroxysmal atrial fibrillation and her ventricular rate is quite stable. She remains on warfarin. INR is acceptable today. 2. Her diabetes is hopefully doing well, but I will await the results of her hemoglobin A1c.  3. Blood pressure is excellent, no adjustments are made. 4. She will continue her statin as prescribed. 5. She remains on her Dilantin and total and free levels will be obtained today. 6. She needs to lose weight. This can be accomplished by eating meals, eliminating snacks, and avoiding the consumption of processed carbohydrates. 7. Her asthma has been quite stable. .  Orders Placed This Encounter    HEMOGLOBIN A1C WITH EAG    PHENYTOIN, TOTAL & FREE    PHENYTOIN, TOTAL & FREE    AMB POC PT/INR         Follow-up and Dispositions    · Return in about 3 months (around 12/8/2021).            Juan Peacock MD

## 2021-10-22 ENCOUNTER — TELEPHONE (OUTPATIENT)
Dept: INTERNAL MEDICINE CLINIC | Age: 76
End: 2021-10-22

## 2021-10-22 NOTE — TELEPHONE ENCOUNTER
Spoke with patient daughter of INR 3.3, per Dr. Tika Lamb patient to hold coumadin x 2 days then restart at regular dose and repeat next week

## 2021-11-04 ENCOUNTER — TELEPHONE (OUTPATIENT)
Dept: INTERNAL MEDICINE CLINIC | Age: 76
End: 2021-11-04

## 2021-11-04 NOTE — TELEPHONE ENCOUNTER
Patient [facility] call with PT 27.4 and INR 2.5 NO CHANGE PER DR. Mindy Bertrand

## 2021-11-11 ENCOUNTER — TELEPHONE (OUTPATIENT)
Dept: INTERNAL MEDICINE CLINIC | Age: 76
End: 2021-11-11

## 2021-11-11 NOTE — TELEPHONE ENCOUNTER
Patient daughter called in INR 3.5 TAKING 5-1/2- 5 TOLD TO HOLD X 2 DAYS PER DR. KELLY AND RESTART AT REGULAR DOSE.

## 2021-11-19 ENCOUNTER — TELEPHONE (OUTPATIENT)
Dept: INTERNAL MEDICINE CLINIC | Age: 76
End: 2021-11-19

## 2021-11-19 NOTE — TELEPHONE ENCOUNTER
daughter called  in PT 20.9 and INR 1.8 [id number] MON- FRI AND 1/2 ON SAT AND SUN.  NO CHANGE CALL AGAIN NEXT WEEK

## 2021-11-23 ENCOUNTER — TELEPHONE (OUTPATIENT)
Dept: INTERNAL MEDICINE CLINIC | Age: 76
End: 2021-11-23

## 2021-12-03 ENCOUNTER — TELEPHONE (OUTPATIENT)
Dept: INTERNAL MEDICINE CLINIC | Age: 76
End: 2021-12-03

## 2021-12-03 NOTE — TELEPHONE ENCOUNTER
Patient cara called in PT42.8 and INR 3.8, TAKING 5MG M-F AND 1/2 SAT AND SUN. PATIENT TO HOLD X 2 DAYS RESTART 1/2 SAT AND SUN AND 1 ON Monday RECHECK ON TUES.

## 2021-12-08 ENCOUNTER — TELEPHONE (OUTPATIENT)
Dept: INTERNAL MEDICINE CLINIC | Age: 76
End: 2021-12-08

## 2021-12-08 NOTE — TELEPHONE ENCOUNTER
granddaughter call in PT 21.4 and INR 1.2 per Dr. Yarelis Montoya no changes, we will repeat on Thursday.

## 2021-12-13 ENCOUNTER — OFFICE VISIT (OUTPATIENT)
Dept: INTERNAL MEDICINE CLINIC | Age: 76
End: 2021-12-13
Payer: MEDICARE

## 2021-12-13 VITALS
HEART RATE: 56 BPM | WEIGHT: 277.3 LBS | SYSTOLIC BLOOD PRESSURE: 150 MMHG | RESPIRATION RATE: 16 BRPM | BODY MASS INDEX: 51.03 KG/M2 | DIASTOLIC BLOOD PRESSURE: 80 MMHG | TEMPERATURE: 97.4 F | HEIGHT: 62 IN | OXYGEN SATURATION: 94 %

## 2021-12-13 DIAGNOSIS — E66.01 MORBID OBESITY (HCC): Chronic | ICD-10-CM

## 2021-12-13 DIAGNOSIS — I50.9 CONGESTIVE HEART FAILURE, UNSPECIFIED HF CHRONICITY, UNSPECIFIED HEART FAILURE TYPE (HCC): ICD-10-CM

## 2021-12-13 DIAGNOSIS — I10 PRIMARY HYPERTENSION: ICD-10-CM

## 2021-12-13 DIAGNOSIS — E11.9 TYPE 2 DIABETES MELLITUS WITHOUT COMPLICATION, WITHOUT LONG-TERM CURRENT USE OF INSULIN (HCC): ICD-10-CM

## 2021-12-13 DIAGNOSIS — G40.909 SEIZURE DISORDER (HCC): ICD-10-CM

## 2021-12-13 DIAGNOSIS — I48.0 PAROXYSMAL ATRIAL FIBRILLATION (HCC): Primary | ICD-10-CM

## 2021-12-13 DIAGNOSIS — I87.2 VENOUS INSUFFICIENCY: ICD-10-CM

## 2021-12-13 PROBLEM — E11.22 TYPE 2 DIABETES MELLITUS WITH CHRONIC KIDNEY DISEASE (HCC): Status: ACTIVE | Noted: 2021-12-13

## 2021-12-13 LAB
ANION GAP SERPL CALC-SCNC: 5 MMOL/L (ref 5–15)
BNP SERPL-MCNC: 641 PG/ML
BUN SERPL-MCNC: 34 MG/DL (ref 6–20)
BUN/CREAT SERPL: 29 (ref 12–20)
CALCIUM SERPL-MCNC: 9.7 MG/DL (ref 8.5–10.1)
CHLORIDE SERPL-SCNC: 108 MMOL/L (ref 97–108)
CO2 SERPL-SCNC: 25 MMOL/L (ref 21–32)
CREAT SERPL-MCNC: 1.18 MG/DL (ref 0.55–1.02)
EST. AVERAGE GLUCOSE BLD GHB EST-MCNC: 131 MG/DL
GLUCOSE SERPL-MCNC: 78 MG/DL (ref 65–100)
HBA1C MFR BLD: 6.2 % (ref 4–5.6)
POTASSIUM SERPL-SCNC: 5.8 MMOL/L (ref 3.5–5.1)
SODIUM SERPL-SCNC: 138 MMOL/L (ref 136–145)

## 2021-12-13 PROCEDURE — G8536 NO DOC ELDER MAL SCRN: HCPCS | Performed by: INTERNAL MEDICINE

## 2021-12-13 PROCEDURE — 99214 OFFICE O/P EST MOD 30 MIN: CPT | Performed by: INTERNAL MEDICINE

## 2021-12-13 PROCEDURE — G8510 SCR DEP NEG, NO PLAN REQD: HCPCS | Performed by: INTERNAL MEDICINE

## 2021-12-13 PROCEDURE — G8753 SYS BP > OR = 140: HCPCS | Performed by: INTERNAL MEDICINE

## 2021-12-13 PROCEDURE — 1090F PRES/ABSN URINE INCON ASSESS: CPT | Performed by: INTERNAL MEDICINE

## 2021-12-13 PROCEDURE — G8417 CALC BMI ABV UP PARAM F/U: HCPCS | Performed by: INTERNAL MEDICINE

## 2021-12-13 PROCEDURE — 1101F PT FALLS ASSESS-DOCD LE1/YR: CPT | Performed by: INTERNAL MEDICINE

## 2021-12-13 PROCEDURE — G8399 PT W/DXA RESULTS DOCUMENT: HCPCS | Performed by: INTERNAL MEDICINE

## 2021-12-13 PROCEDURE — G8427 DOCREV CUR MEDS BY ELIG CLIN: HCPCS | Performed by: INTERNAL MEDICINE

## 2021-12-13 PROCEDURE — G8754 DIAS BP LESS 90: HCPCS | Performed by: INTERNAL MEDICINE

## 2021-12-13 NOTE — PROGRESS NOTES
1. Have you been to the ER, urgent care clinic since your last visit? Hospitalized since your last visit? No    2. Have you seen or consulted any other health care providers outside of the [address] [place] [address] since your last visit? Include any pap smears or colon screening.  No     Wants to discuss new Dx.

## 2021-12-13 NOTE — PROGRESS NOTES
[facility]  [place] [address]  [address] [place] [address]  Phone:  [phone]  Fax: [phone]       Chief Complaint   Patient presents with    Diabetes   . SUBJECTIVE:    Ritu Muro is a 64 y.o. female comes in for return visit stating that she has done reasonably well. She is concerned about swelling of her lower extremities, but this is a chronic problem. It does not appear to have changed. Her breathing is excellent. She has not had any major bronchospastic flares of late. She does use her nebulizer on a regular basis. She has had no meaningful weight loss either. She has been seizure free taking her phenytoin on a regular basis. She has a past history of primary hypertension, dyslipidemia and diabetes mellitus. Historically her diabetes has also been doing quite well. Current Outpatient Medications   Medication Sig Dispense Refill    atorvastatin (LIPITOR) 80 mg tablet TAKE 1 TABLET BY MOUTH DAILY 90 Tablet 3    spironolactone-hydrochlorothiazide (ALDACTAZIDE) 25-25 mg per tablet TAKE 1 TABLET BY MOUTH DAILY 90 Tablet 3    glucose blood VI test strips (OneTouch Verio test strips) strip Use to test blood sugar once daily. Dx.e11.9 50 Strip 11    albuterol (ProAir HFA) 90 mcg/actuation inhaler Take 2 Puffs by inhalation every four (4) hours as needed for Wheezing or Shortness of Breath. 1 Inhaler 11    tiotropium (SPIRIVA) 18 mcg inhalation capsule Take 1 Capsule by inhalation daily.  30 Capsule 11    allopurinoL (ZYLOPRIM) 300 mg tablet TAKE 1 TABLET BY MOUTH ONCE DAILY 90 Tab 3    felodipine (PLENDIL SR) 10 mg 24 hr tablet TAKE 1 TABLET BY MOUTH EVERY DAY 90 Tab 3    levETIRAcetam 1,000 mg tablet TAKE 1 TABLET BY MOUTH TWICE DAILY 180 Tab 3    warfarin (COUMADIN) 5 mg tablet TAKE 1 TABLET BY MOUTH MONDAY THROUGH FRIDAY AND 1& 1/2 TABLETS ON SATURDAY AND SUNDAY 103 Tab 3    metoprolol tartrate (LOPRESSOR) 25 mg tablet TAKE 1 TABLET BY MOUTH TWICE DAILY AT 9 AM AND AT 9 PM (Patient taking differently: Take 12.5 mg by mouth two (2) times a day. TAKE 1 TABLET BY MOUTH TWICE DAILY AT 9 AM AND AT 9 PM) 180 Tab 3    phenytoin ER (DILANTIN ER) 100 mg ER capsule TAKE 4 CAPSULES BY MOUTH EVERY NIGHT AT BEDTIME (Patient taking differently: TAKE 4 CAPSULES ALL OTHER DAYS BY MOUTH EVERY NIGHT AT BEDTIME  3 tabs Tues and Thurs) 360 Cap 3    Blood-Glucose Meter (OneTouch Verio Meter) misc Use to test blood sugar daily. Dx.e11.9 1 Each 0    lancets (One Touch Delica) 33 gauge misc Use to test blood sugar once daily. dx.e11.9 50 Lancet 11    lancets (FreeStyle Lancets) 28 gauge misc USE TO TEST BLOOD SUGAR EVERY  Lancet 11    glucose blood VI test strips (FreeStyle Lite Strips) strip Use to test blood sugar daily 50 Strip 11    albuterol (PROVENTIL VENTOLIN) 2.5 mg /3 mL (0.083 %) nebu USE 1 VIAL VIA NEBULIZER EVERY 6 HOURS AS NEEDED. DX: J45.909 120 Each 11    glucose blood VI test strips (TRUE METRIX GLUCOSE TEST STRIP) strip Use to test blood sugar once daily. Dx.e11.9 100 Strip 11    Blood-Glucose Meter (TRUE METRIX AIR GLUCOSE METER) [facility] 1 Device by Does Not Apply route daily. Use to test blood sugars daily. dx.e11.9 1 Each 0    lancets (TRUEPLUS LANCETS) 28 gauge misc Use to test blood sugar once daily. Dx.e11.9 100 Lancet 11    aspirin delayed-release 81 mg tablet Take 81 mg by mouth daily.  budesonide-formoterol (SYMBICORT) 160-4.5 mcg/Actuation HFA inhaler Take 2 Puffs by inhalation two (2) times a day. 10.2 Inhaler 11    tobramycin (TOBREX) 0.3 % ophthalmic solution One drop four times a day in involved eye (Patient not taking: Reported on 12/13/2021) 1 Bottle 0    lisinopril (PRINIVIL, ZESTRIL) 20 mg tablet Take 1 Tab by mouth daily. (Patient not taking: Reported on 12/13/2021) 90 Tab 3    guaiFENesin-codeine (VIRTUSSIN AC) 100-10 mg/5 mL solution Take 5 mL by mouth three (3) times daily as needed for Cough.  Max Daily Amount: 15 mL. (Patient not taking: Reported on 12/13/2021) 140 mL 0    metFORMIN (GLUCOPHAGE) 500 mg tablet TAKE 1 TABLET BY MOUTH TWICE DAILY WITH MEALS (Patient not taking: Reported on 9/8/2021) 180 Tab 3    sitagliptin (JANUVIA) 100 mg tablet Take 1 tablet by mouth daily.  (Patient not taking: Reported on 12/13/2021) 30 tablet 11     Past Medical History:   Diagnosis Date    Arthritis     Asthma     Diabetes ([facility] [address]     Glaucoma     Hypertension     Other ill-defined conditions(799.89)     rt knee surgery    Stroke [facility])      Past Surgical History:   Procedure Laterality Date    HX COLONOSCOPY      HX GYN       Allergies   Allergen Reactions    Clonidine Other (comments)     Patient becomes incoherent    Metformin Unknown (comments)         REVIEW OF SYSTEMS:  General: negative for - chills or fever  ENT: negative for - headaches, nasal congestion or tinnitus  Respiratory: negative for - cough, hemoptysis, shortness of breath or wheezing  Cardiovascular : negative for - chest pain, edema, palpitations or shortness of breath  Gastrointestinal: negative for - abdominal pain, blood in stools, heartburn or nausea/vomiting  Genito-Urinary: no dysuria, trouble voiding, or hematuria  Musculoskeletal: negative for - gait disturbance, joint pain, joint stiffness or joint swelling  Neurological: no TIA or stroke symptoms  Hematologic: no bruises, no bleeding, no swollen glands  Integument: no lumps, mole changes, nail changes or rash  Endocrine: no malaise/lethargy or unexpected weight changes      Social History     Socioeconomic History    Marital status:     Number of children: 1   Occupational History    Occupation: disabled--[employer]   Tobacco Use    Smoking status: Former Smoker    Smokeless tobacco: Never Used    Tobacco comment: 20+years ago   Vaping Use    Vaping Use: Never used   Substance and Sexual Activity    Alcohol use: No    Drug use: No    Sexual activity: Not Currently     History reviewed. No pertinent family history. OBJECTIVE:    Visit Vitals  BP (!) 150/80   Pulse (!) 56   Temp 97.4 °F (36.3 °C) (Oral)   Resp 16   Ht 5' 2\" (1.575 m)   Wt 277 lb 4.8 oz (125.8 kg)   SpO2 94%   BMI 50.72 kg/m²     CONSTITUTIONAL: well , well nourished, appears age appropriate  EYES: perrla, eom intact  ENMT:moist mucous membranes, pharynx clear  NECK: supple. Thyroid normal, no JVD  RESPIRATORY: Chest: clear to ascultation and percussion   CARDIOVASCULAR: Heart: regular rate and rhythm  GASTROINTESTINAL: Abdomen: soft, bowel sounds active  HEMATOLOGIC: no pathological lymph nodes palpated  MUSCULOSKELETAL: Extremities: 2+ edema distally, pulse 1+   INTEGUMENT: No unusual rashes or suspicious skin lesions noted. Nails appear normal.  NEUROLOGIC: non-focal exam   MENTAL STATUS: alert and oriented, appropriate affect      ASSESSMENT:  1. Paroxysmal atrial fibrillation (HCC)    2. Type 2 diabetes mellitus without complication, without long-term current use of insulin ([address]    3. Primary hypertension    4. Seizure disorder ([address]    5. Venous insufficiency    6. Morbid obesity ([address]    7. Congestive heart failure, unspecified HF chronicity, unspecified heart failure type ([address]        PLAN:  1. The patient's ventricular rate is stable. She will continue her warfarin as prescribed. 2. Diabetes is doing reasonably well, but I will await the results of her hemoglobin A1c. She does have a tendency to have dietary indiscretion frequently. 3. Blood pressure is stable, no adjustments are made. 4. She has not had any seizures. Dilantin level will be obtained. 5. As far as her edema is concerned which I suspect is related to venous insufficiency there is nothing more that can be done frankly. I will however check a pro-BNP to ensure that there is no cardiac contribution. 6. I again encouraged the patient to lose weight.   This can be accomplished by eating meals, eliminating snacks, and avoiding the consumption of processed carbohydrates. .  Orders Placed This Encounter    METABOLIC PANEL, BASIC    PHENYTOIN, TOTAL & FREE    HEMOGLOBIN A1C WITH EAG    NT-PRO BNP         Follow-up and Dispositions    · Return in about 3 months (around 3/13/2022).            Charley Santiago MD

## 2021-12-16 ENCOUNTER — TELEPHONE (OUTPATIENT)
Dept: INTERNAL MEDICINE CLINIC | Age: 76
End: 2021-12-16

## 2021-12-16 NOTE — TELEPHONE ENCOUNTER
PATIENT ALEC CALLED IN PT36.5 INR 3.2 TAKING 5MG  MON-FRI AND 1/2 SAT AND SUN. . PER DR. KELLY PATIENT TO HOLD FOR 1 DAYTHEN RESTART SAME DOSE.

## 2021-12-22 ENCOUNTER — TELEPHONE (OUTPATIENT)
Dept: INTERNAL MEDICINE CLINIC | Age: 76
End: 2021-12-22

## 2021-12-22 RX ORDER — WARFARIN SODIUM 5 MG/1
TABLET ORAL
Qty: 103 TABLET | Refills: 3 | Status: ON HOLD | OUTPATIENT
Start: 2021-12-22

## 2021-12-28 ENCOUNTER — TELEPHONE (OUTPATIENT)
Dept: INTERNAL MEDICINE CLINIC | Age: 76
End: 2021-12-28

## 2021-12-28 NOTE — TELEPHONE ENCOUNTER
cara call in PT36.2 and INR 3.2TAKING 5MG DAILY AND 1/2 ON SAT AND SUN.  Per Dr. Rancho Ureña patient to hold x 2 days then resume dose call on monday

## 2022-01-06 ENCOUNTER — TELEPHONE (OUTPATIENT)
Dept: INTERNAL MEDICINE CLINIC | Age: 77
End: 2022-01-06

## 2022-01-11 ENCOUNTER — TELEPHONE (OUTPATIENT)
Dept: INTERNAL MEDICINE CLINIC | Age: 77
End: 2022-01-11

## 2022-01-11 NOTE — TELEPHONE ENCOUNTER
Patient daughter called in INR 4.0 . Per Dr. Stiven Vila patient to hold coumadin x 2 days then restart at current dose and repeat on Monday.

## 2022-01-17 ENCOUNTER — TELEPHONE (OUTPATIENT)
Dept: INTERNAL MEDICINE CLINIC | Age: 77
End: 2022-01-17

## 2022-01-17 NOTE — TELEPHONE ENCOUNTER
cara called in INR 4.5 TAKING COUMADIN 5MG MON-FRI. AND 1/2 TAB SAT AND SUN. PER DR. KELLY PATIENT TO HOLD COUMADIN UNTIL THURS AND CALL WITH INR ADJUSTMENT WILL BE MADE.

## 2022-01-20 ENCOUNTER — TELEPHONE (OUTPATIENT)
Dept: INTERNAL MEDICINE CLINIC | Age: 77
End: 2022-01-20

## 2022-01-20 NOTE — TELEPHONE ENCOUNTER
cara called in PT 21.8 and INR 1.8   PATIENT IS TO RESTART HER COUMADIN AT 5MG DAILY AND 1/2 SAT AND SUN.  [facility]

## 2022-01-25 ENCOUNTER — TELEPHONE (OUTPATIENT)
Dept: INTERNAL MEDICINE CLINIC | Age: 77
End: 2022-01-25

## 2022-02-01 ENCOUNTER — TELEPHONE (OUTPATIENT)
Dept: INTERNAL MEDICINE CLINIC | Age: 77
End: 2022-02-01

## 2022-02-01 NOTE — TELEPHONE ENCOUNTER
Patient granddaughter call in PT 41.9 AND INR 3.7, PER DR. KELLY PATIENT TO HOLD X 3 DAYS THEN RESTART AND CALL ON TUESDAY

## 2022-02-09 ENCOUNTER — TELEPHONE (OUTPATIENT)
Dept: INTERNAL MEDICINE CLINIC | Age: 77
End: 2022-02-09

## 2022-02-09 NOTE — TELEPHONE ENCOUNTER
Patient granddaughter called in PT 34.7 and INR 3.0, PER DR. KELLY PATIENT TO HOLD COUMADIN X 1 DAY THEN RESTART DOSE AND CALL ON TUESDAY

## 2022-02-15 ENCOUNTER — TELEPHONE (OUTPATIENT)
Dept: INTERNAL MEDICINE CLINIC | Age: 77
End: 2022-02-15

## 2022-02-22 ENCOUNTER — TELEPHONE (OUTPATIENT)
Dept: INTERNAL MEDICINE CLINIC | Age: 77
End: 2022-02-22

## 2022-02-22 NOTE — TELEPHONE ENCOUNTER
Granddaughter called in PT41.1 and INR 3.7 TAKING 5MG MON-FRI, AND 1/2 TAB SAT AND SUN. PER DR. KELLY PATIENT TO HOLD X 3 DAYS THEN RESTART REGULAR DOSE

## 2022-02-23 DIAGNOSIS — E11.9 TYPE 2 DIABETES MELLITUS WITHOUT COMPLICATION, WITHOUT LONG-TERM CURRENT USE OF INSULIN (HCC): ICD-10-CM

## 2022-02-23 RX ORDER — BLOOD SUGAR DIAGNOSTIC
STRIP MISCELLANEOUS
Qty: 50 STRIP | Refills: 11 | Status: ON HOLD | OUTPATIENT
Start: 2022-02-23

## 2022-03-01 ENCOUNTER — TELEPHONE (OUTPATIENT)
Dept: INTERNAL MEDICINE CLINIC | Age: 77
End: 2022-03-01

## 2022-03-08 ENCOUNTER — TELEPHONE (OUTPATIENT)
Dept: INTERNAL MEDICINE CLINIC | Age: 77
End: 2022-03-08

## 2022-03-08 NOTE — TELEPHONE ENCOUNTER
Patient granddaughter called PT 46.2 and INR 4.2  Taking 5mg mon-fri- and 1/2 tab sat and sun. Patient to hold for 4 days then restart at same dose repeat in a week.

## 2022-03-16 ENCOUNTER — TELEPHONE (OUTPATIENT)
Dept: INTERNAL MEDICINE CLINIC | Age: 77
End: 2022-03-16

## 2022-03-16 NOTE — TELEPHONE ENCOUNTER
Patient granddaughter call in inr 2.0 just discharged from hospital on no meds per Dr. Blanka Griggs patient to start coumadin back on Wednesday at 5mg mon-fri, and 1/2 tab sat and sun.  Call office on friday

## 2022-03-19 PROBLEM — E11.22 TYPE 2 DIABETES MELLITUS WITH CHRONIC KIDNEY DISEASE (HCC): Status: ACTIVE | Noted: 2021-12-13

## 2022-03-19 PROBLEM — E11.21 TYPE 2 DIABETES WITH NEPHROPATHY (HCC): Status: ACTIVE | Noted: 2019-07-22

## 2022-03-20 PROBLEM — R53.81 PHYSICAL DECONDITIONING: Status: ACTIVE | Noted: 2018-10-21

## 2022-03-21 ENCOUNTER — TELEPHONE (OUTPATIENT)
Dept: INTERNAL MEDICINE CLINIC | Age: 77
End: 2022-03-21

## 2022-03-28 ENCOUNTER — OFFICE VISIT (OUTPATIENT)
Dept: INTERNAL MEDICINE CLINIC | Age: 77
End: 2022-03-28
Payer: MEDICARE

## 2022-03-28 VITALS
DIASTOLIC BLOOD PRESSURE: 77 MMHG | BODY MASS INDEX: 50.81 KG/M2 | WEIGHT: 276.1 LBS | RESPIRATION RATE: 16 BRPM | HEART RATE: 66 BPM | OXYGEN SATURATION: 95 % | SYSTOLIC BLOOD PRESSURE: 161 MMHG | HEIGHT: 62 IN

## 2022-03-28 DIAGNOSIS — G40.909 SEIZURE DISORDER (HCC): Primary | ICD-10-CM

## 2022-03-28 DIAGNOSIS — I48.0 PAROXYSMAL ATRIAL FIBRILLATION (HCC): Chronic | ICD-10-CM

## 2022-03-28 DIAGNOSIS — R60.9 EDEMA, UNSPECIFIED TYPE: ICD-10-CM

## 2022-03-28 DIAGNOSIS — I10 PRIMARY HYPERTENSION: ICD-10-CM

## 2022-03-28 DIAGNOSIS — E78.5 DYSLIPIDEMIA: ICD-10-CM

## 2022-03-28 DIAGNOSIS — I87.2 VENOUS INSUFFICIENCY: ICD-10-CM

## 2022-03-28 DIAGNOSIS — E66.01 MORBID OBESITY (HCC): Chronic | ICD-10-CM

## 2022-03-28 DIAGNOSIS — E11.9 TYPE 2 DIABETES MELLITUS WITHOUT COMPLICATION, WITHOUT LONG-TERM CURRENT USE OF INSULIN (HCC): Chronic | ICD-10-CM

## 2022-03-28 DIAGNOSIS — J45.20 INTERMITTENT ASTHMA WITHOUT COMPLICATION, UNSPECIFIED ASTHMA SEVERITY: ICD-10-CM

## 2022-03-28 PROCEDURE — G8753 SYS BP > OR = 140: HCPCS | Performed by: INTERNAL MEDICINE

## 2022-03-28 PROCEDURE — G8432 DEP SCR NOT DOC, RNG: HCPCS | Performed by: INTERNAL MEDICINE

## 2022-03-28 PROCEDURE — G8399 PT W/DXA RESULTS DOCUMENT: HCPCS | Performed by: INTERNAL MEDICINE

## 2022-03-28 PROCEDURE — G8417 CALC BMI ABV UP PARAM F/U: HCPCS | Performed by: INTERNAL MEDICINE

## 2022-03-28 PROCEDURE — 1101F PT FALLS ASSESS-DOCD LE1/YR: CPT | Performed by: INTERNAL MEDICINE

## 2022-03-28 PROCEDURE — 1090F PRES/ABSN URINE INCON ASSESS: CPT | Performed by: INTERNAL MEDICINE

## 2022-03-28 PROCEDURE — G8536 NO DOC ELDER MAL SCRN: HCPCS | Performed by: INTERNAL MEDICINE

## 2022-03-28 PROCEDURE — 99215 OFFICE O/P EST HI 40 MIN: CPT | Performed by: INTERNAL MEDICINE

## 2022-03-28 PROCEDURE — G8427 DOCREV CUR MEDS BY ELIG CLIN: HCPCS | Performed by: INTERNAL MEDICINE

## 2022-03-28 PROCEDURE — G8754 DIAS BP LESS 90: HCPCS | Performed by: INTERNAL MEDICINE

## 2022-03-28 NOTE — PROGRESS NOTES
Chief Complaint   Patient presents with   [facility] [place]     Patient is here for a hospital follow up      1. Have you been to the ER, urgent care clinic since your last visit? Hospitalized since your last visit? Yes Reason for visit: weakness     2. Have you seen or consulted any other health care providers outside of the [address] [place] [address] since your last visit? Include any pap smears or colon screening.  Yes Where: [facility]

## 2022-03-29 ENCOUNTER — TELEPHONE (OUTPATIENT)
Dept: INTERNAL MEDICINE CLINIC | Age: 77
End: 2022-03-29

## 2022-03-29 LAB
ANION GAP SERPL CALC-SCNC: 6 MMOL/L (ref 5–15)
BUN SERPL-MCNC: 31 MG/DL (ref 6–20)
BUN/CREAT SERPL: 24 (ref 12–20)
CALCIUM SERPL-MCNC: 9.6 MG/DL (ref 8.5–10.1)
CHLORIDE SERPL-SCNC: 106 MMOL/L (ref 97–108)
CO2 SERPL-SCNC: 25 MMOL/L (ref 21–32)
CREAT SERPL-MCNC: 1.27 MG/DL (ref 0.55–1.02)
GLUCOSE SERPL-MCNC: 98 MG/DL (ref 65–100)
POTASSIUM SERPL-SCNC: 4.7 MMOL/L (ref 3.5–5.1)
SODIUM SERPL-SCNC: 137 MMOL/L (ref 136–145)

## 2022-03-29 NOTE — PROGRESS NOTES
[address] [place] [address] and Primary Care  [place] [address]  [address] [place] [address]  Phone:  [phone]  Fax: [phone]       Chief Complaint   Patient presents with   [facility] Follow Up     Patient is here for a hospital follow up    . SUBJECTIVE:    Cheryle Banco is a 76 y.o. female comes in for a return visit accompanied by her daughter. She was hospitalized most recently at [address] [place] [address]. It is unclear as to the origin of her altered mental status but in any event her seizure medicine was changed. She is now on Vimpat at 50 mg b.i.d. and Keppra 1000 mg b.i.d. with Dilantin being discontinued. She has chronic venous insufficiency and because of the edema they decided to change her spironolactone/HCTZ to furosemide 40 mg q.d. Her beta-blocker was discontinued which is metoprolol and was changed to carvedilol 3.125 mg b.i.d.  I am not entirely sure, if this maneuver was done because they felt she was in heart failure. Unfortunately, I have no history of the hospitalization. She has a past history of primary hypertension, dyslipidemia, morbid obesity, and asthma. Current Outpatient Medications   Medication Sig Dispense Refill    albuterol (PROVENTIL HFA, VENTOLIN HFA, PROAIR HFA) 90 mcg/actuation inhaler Take 2 Puffs by inhalation every four (4) hours as needed for Wheezing.  20.1 g 3    allopurinoL (ZYLOPRIM) 300 mg tablet TAKE ONE TABLET BY MOUTH DAILY 90 Tablet 3    aspirin delayed-release 81 mg tablet TAKE ONE TABLET BY MOUTH EVERY DAY 90 Tablet 3    carvediloL (COREG) 12.5 mg tablet TAKE ONE TABLET BY MOUTH TWICE DAILY 180 Tablet 3    felodipine (PLENDIL SR) 10 mg 24 hr tablet TAKE ONE TABLET BY MOUTH AT BEDTIME 90 Tablet 3    furosemide (LASIX) 40 mg tablet TAKE ONE TABLET BY MOUTH DAILY 90 Tablet 3    levETIRAcetam 1,000 mg tablet TAKE ONE TABLET BY MOUTH TWICE DAILY 180 Tablet 3    albuterol (PROVENTIL VENTOLIN) 2.5 mg /3 mL (0.083 %) nebu USE ONE vial PER nebulizer EVERY 6 HOURS AS NEEDED FOR WHEEZING OR SHORTNESS OF BREATH 300 mL 3    OneTouch Delica Plus Lancet 33 gauge misc USE TO check BLOOD SUGAR ONCE DAILY 100 Lancet 3    Vimpat 50 mg tab tablet TAKE ONE TABLET BY MOUTH TWICE DAILY 180 Tablet 3    glucose blood VI test strips (OneTouch Verio test strips) strip Use to test blood sugar once daily. Dx.e11.9 50 Strip 11    warfarin (COUMADIN) 5 mg tablet TAKE 1 TABLET BY MOUTH MONDAY THROUGH FRIDAY, TAKE 1 AND 1/2 TABLETS BY MOUTH ON SATURDAY AND SUNDAY 103 Tablet 3    atorvastatin (LIPITOR) 80 mg tablet TAKE 1 TABLET BY MOUTH DAILY 90 Tablet 3    tiotropium (SPIRIVA) 18 mcg inhalation capsule Take 1 Capsule by inhalation daily. 30 Capsule 11    Blood-Glucose Meter (OneTouch Verio Meter) misc Use to test blood sugar daily. Dx.e11.9 1 Each 0    lancets (FreeStyle Lancets) 28 gauge misc USE TO TEST BLOOD SUGAR EVERY  Lancet 11    glucose blood VI test strips (FreeStyle Lite Strips) strip Use to test blood sugar daily 50 Strip 11    lisinopril (PRINIVIL, ZESTRIL) 20 mg tablet Take 1 Tab by mouth daily. 90 Tab 3    glucose blood VI test strips (TRUE METRIX GLUCOSE TEST STRIP) strip Use to test blood sugar once daily. Dx.e11.9 100 Strip 11    Blood-Glucose Meter (TRUE METRIX AIR GLUCOSE METER) [facility] 1 Device by Does Not Apply route daily. Use to test blood sugars daily. dx.e11.9 1 Each 0    lancets (TRUEPLUS LANCETS) 28 gauge misc Use to test blood sugar once daily. Dx.e11.9 100 Lancet 11    guaiFENesin-codeine (VIRTUSSIN AC) 100-10 mg/5 mL solution Take 5 mL by mouth three (3) times daily as needed for Cough. Max Daily Amount: 15 mL. 140 mL 0    metFORMIN (GLUCOPHAGE) 500 mg tablet TAKE 1 TABLET BY MOUTH TWICE DAILY WITH MEALS 180 Tab 3    sitagliptin (JANUVIA) 100 mg tablet Take 1 tablet by mouth daily. 30 tablet 11    budesonide-formoterol (SYMBICORT) 160-4.5 mcg/Actuation HFA inhaler Take 2 Puffs by inhalation two (2) times a day.  10.2 Inhaler 11    spironolactone-hydrochlorothiazide (ALDACTAZIDE) 25-25 mg per tablet TAKE 1 TABLET BY MOUTH DAILY (Patient not taking: Reported on 3/28/2022) 90 Tablet 3     Past Medical History:   Diagnosis Date    Arthritis     Asthma     Diabetes ([facility]     Glaucoma     Hypertension     Other ill-defined conditions(799.89)     rt knee surgery    Stroke [facility])      Past Surgical History:   Procedure Laterality Date    HX COLONOSCOPY      HX GYN       Allergies   Allergen Reactions    Clonidine Other (comments)     Patient becomes incoherent    Metformin Unknown (comments)         REVIEW OF SYSTEMS:  General: negative for - chills or fever  ENT: negative for - headaches, nasal congestion or tinnitus  Respiratory: negative for - cough, hemoptysis, shortness of breath or wheezing  Cardiovascular : negative for - chest pain, edema, palpitations or shortness of breath  Gastrointestinal: negative for - abdominal pain, blood in stools, heartburn or nausea/vomiting  Genito-Urinary: no dysuria, trouble voiding, or hematuria  Musculoskeletal: negative for - gait disturbance, joint pain, joint stiffness or joint swelling  Neurological: no TIA or stroke symptoms  Hematologic: no bruises, no bleeding, no swollen glands  Integument: no lumps, mole changes, nail changes or rash  Endocrine: no malaise/lethargy or unexpected weight changes      Social History     Socioeconomic History    Marital status:     Number of children: 1   Occupational History    Occupation: disabled--[employer]   Tobacco Use    Smoking status: Former Smoker    Smokeless tobacco: Never Used    Tobacco comment: 20+years ago   Vaping Use    Vaping Use: Never used   Substance and Sexual Activity    Alcohol use: No    Drug use: No    Sexual activity: Not Currently     No family history on file.     OBJECTIVE:    Visit Vitals  BP (!) 161/77   Pulse 66   Resp 16   Ht 5' 2\" (1.575 m)   Wt 276 lb 1.6 oz (125.2 kg)   SpO2 95%   BMI 50.50 kg/m²     CONSTITUTIONAL: well , well nourished, appears age appropriate  EYES: perrla, eom intact  ENMT:moist mucous membranes, pharynx clear  NECK: supple. Thyroid normal  RESPIRATORY: Chest: clear to ascultation and percussion   CARDIOVASCULAR: Heart: regular rate and rhythm  GASTROINTESTINAL: Abdomen: soft, bowel sounds active  HEMATOLOGIC: no pathological lymph nodes palpated  MUSCULOSKELETAL: Extremities: 2+ edema distally, pulse 1+   INTEGUMENT: No unusual rashes or suspicious skin lesions noted. Nails appear normal.  NEUROLOGIC: non-focal exam   MENTAL STATUS: alert and oriented, appropriate affect      ASSESSMENT:  1. Seizure disorder ([address]    2. Venous insufficiency    3. Edema, unspecified type    4. Paroxysmal atrial fibrillation (HCC)    5. Primary hypertension    6. Type 2 diabetes mellitus without complication, without long-term current use of insulin (HCC)    7. Intermittent asthma without complication, unspecified asthma severity    8. Morbid obesity ([facility]    9. Dyslipidemia        PLAN:  1. The patient's seizure disorder was addressed with a change in her anticonvulsants. 2. She has chronic venous insufficiency, which has been a problems for years, switching to a loop diuretics will not lead to significant mobilization of pleural space fluid, but will cause definite prerenal azotemia. I will await the results of her BMP since she has been on it for two weeks. Ultimately, I think she will have to go back to the spironolactone/HCTZ versus an another long-acting diuretic preparation. 3. She has paroxysmal atrial fibrillation. Her ventricular rate is quite stable. Her last INR was two days ago and was acceptable. 4. She has primary hypertension and her blood pressure is slightly elevated today but no adjustment is made. 5. Her diabetes has been doing reasonably well. She remains on the current oral agents. 6. Her asthma has also been quite stable.   7. She needs to lose weight as we have discussed repetitively for decades. This can be accomplished by eating meals, eliminating snacks, and avoiding the consumption of processed carbohydrates. 8. She does have an increased cardiovascular risk and remains on a statin. .  Orders Placed This Encounter    METABOLIC PANEL, BASIC         Follow-up and Dispositions    · Return in about 4 weeks (around 4/25/2022).            Mague Dubose MD

## 2022-03-29 NOTE — TELEPHONE ENCOUNTER
Patient daughter called in PT 24.2 and INR 2.1 TAKING 5MG DAILY AND 1/2 TAB SAT - SUN REPEAT ON FRIDAY

## 2022-03-30 RX ORDER — FELODIPINE 10 MG/1
TABLET, EXTENDED RELEASE ORAL
Qty: 90 TABLET | Refills: 3 | Status: ON HOLD | OUTPATIENT
Start: 2022-03-30

## 2022-03-30 RX ORDER — ALLOPURINOL 300 MG/1
TABLET ORAL
Qty: 90 TABLET | Refills: 3 | Status: ON HOLD | OUTPATIENT
Start: 2022-03-30

## 2022-03-30 RX ORDER — LEVETIRACETAM 1000 MG/1
TABLET ORAL
Qty: 180 TABLET | Refills: 3 | Status: ON HOLD | OUTPATIENT
Start: 2022-03-30

## 2022-04-01 ENCOUNTER — TELEPHONE (OUTPATIENT)
Dept: INTERNAL MEDICINE CLINIC | Age: 77
End: 2022-04-01

## 2022-04-01 NOTE — TELEPHONE ENCOUNTER
Granddaughter called in PT 17.0 and INR 1.4 and 5mg mon-fri-and 1/2 tab  Sat and sun patient to take 1 tab only this sat and sun then resume the dose.

## 2022-04-06 ENCOUNTER — TELEPHONE (OUTPATIENT)
Dept: INTERNAL MEDICINE CLINIC | Age: 77
End: 2022-04-06

## 2022-04-06 NOTE — TELEPHONE ENCOUNTER
Daughter called in INR 1.3 TAKING 5MG DAILY.  PER Dr. Cornejo Felice patient to take 1-1/2 tab on Monday only and call on friday

## 2022-04-13 ENCOUNTER — TELEPHONE (OUTPATIENT)
Dept: INTERNAL MEDICINE CLINIC | Age: 77
End: 2022-04-13

## 2022-04-19 ENCOUNTER — TELEPHONE (OUTPATIENT)
Dept: INTERNAL MEDICINE CLINIC | Age: 77
End: 2022-04-19

## 2022-04-25 ENCOUNTER — TELEPHONE (OUTPATIENT)
Dept: INTERNAL MEDICINE CLINIC | Age: 77
End: 2022-04-25

## 2022-04-29 ENCOUNTER — TELEPHONE (OUTPATIENT)
Dept: INTERNAL MEDICINE CLINIC | Age: 77
End: 2022-04-29

## 2022-05-02 ENCOUNTER — TELEPHONE (OUTPATIENT)
Dept: INTERNAL MEDICINE CLINIC | Age: 77
End: 2022-05-02

## 2022-05-02 NOTE — TELEPHONE ENCOUNTER
Patient PT 28.2and INR 2.4 taking 1 1/2 tabs coumadin daily.  No change , patient coming in office on friday

## 2022-05-06 ENCOUNTER — OFFICE VISIT (OUTPATIENT)
Dept: INTERNAL MEDICINE CLINIC | Age: 77
End: 2022-05-06
Payer: MEDICARE

## 2022-05-06 VITALS
TEMPERATURE: 97.5 F | DIASTOLIC BLOOD PRESSURE: 76 MMHG | HEART RATE: 65 BPM | BODY MASS INDEX: 50.79 KG/M2 | HEIGHT: 62 IN | WEIGHT: 276 LBS | OXYGEN SATURATION: 92 % | SYSTOLIC BLOOD PRESSURE: 140 MMHG | RESPIRATION RATE: 18 BRPM

## 2022-05-06 DIAGNOSIS — E78.5 DYSLIPIDEMIA: ICD-10-CM

## 2022-05-06 DIAGNOSIS — G40.909 SEIZURE DISORDER (HCC): ICD-10-CM

## 2022-05-06 DIAGNOSIS — J45.20 INTERMITTENT ASTHMA WITHOUT COMPLICATION, UNSPECIFIED ASTHMA SEVERITY: ICD-10-CM

## 2022-05-06 DIAGNOSIS — E11.9 TYPE 2 DIABETES MELLITUS WITHOUT COMPLICATION, WITHOUT LONG-TERM CURRENT USE OF INSULIN (HCC): Primary | Chronic | ICD-10-CM

## 2022-05-06 DIAGNOSIS — I10 PRIMARY HYPERTENSION: ICD-10-CM

## 2022-05-06 DIAGNOSIS — I87.2 VENOUS INSUFFICIENCY: ICD-10-CM

## 2022-05-06 DIAGNOSIS — Z13.39 SCREENING FOR ALCOHOLISM: ICD-10-CM

## 2022-05-06 DIAGNOSIS — Z00.00 MEDICARE ANNUAL WELLNESS VISIT, SUBSEQUENT: ICD-10-CM

## 2022-05-06 DIAGNOSIS — E66.01 MORBID OBESITY (HCC): Chronic | ICD-10-CM

## 2022-05-06 DIAGNOSIS — Z13.31 SCREENING FOR DEPRESSION: ICD-10-CM

## 2022-05-06 PROBLEM — E11.22 TYPE 2 DIABETES MELLITUS WITH CHRONIC KIDNEY DISEASE (HCC): Status: ACTIVE | Noted: 2022-05-06

## 2022-05-06 PROCEDURE — G8399 PT W/DXA RESULTS DOCUMENT: HCPCS | Performed by: INTERNAL MEDICINE

## 2022-05-06 PROCEDURE — G0439 PPPS, SUBSEQ VISIT: HCPCS | Performed by: INTERNAL MEDICINE

## 2022-05-06 PROCEDURE — G8427 DOCREV CUR MEDS BY ELIG CLIN: HCPCS | Performed by: INTERNAL MEDICINE

## 2022-05-06 PROCEDURE — G8417 CALC BMI ABV UP PARAM F/U: HCPCS | Performed by: INTERNAL MEDICINE

## 2022-05-06 PROCEDURE — 99214 OFFICE O/P EST MOD 30 MIN: CPT | Performed by: INTERNAL MEDICINE

## 2022-05-06 PROCEDURE — G8753 SYS BP > OR = 140: HCPCS | Performed by: INTERNAL MEDICINE

## 2022-05-06 PROCEDURE — G8536 NO DOC ELDER MAL SCRN: HCPCS | Performed by: INTERNAL MEDICINE

## 2022-05-06 PROCEDURE — 1090F PRES/ABSN URINE INCON ASSESS: CPT | Performed by: INTERNAL MEDICINE

## 2022-05-06 PROCEDURE — 3044F HG A1C LEVEL LT 7.0%: CPT | Performed by: INTERNAL MEDICINE

## 2022-05-06 PROCEDURE — 1101F PT FALLS ASSESS-DOCD LE1/YR: CPT | Performed by: INTERNAL MEDICINE

## 2022-05-06 PROCEDURE — G8754 DIAS BP LESS 90: HCPCS | Performed by: INTERNAL MEDICINE

## 2022-05-06 PROCEDURE — G8510 SCR DEP NEG, NO PLAN REQD: HCPCS | Performed by: INTERNAL MEDICINE

## 2022-05-06 RX ORDER — BENZONATATE 200 MG/1
200 CAPSULE ORAL
Qty: 40 CAPSULE | Refills: 0 | Status: SHIPPED | OUTPATIENT
Start: 2022-05-06 | End: 2022-05-13

## 2022-05-06 NOTE — PROGRESS NOTES
Rm    Chief Complaint   Patient presents with    Diabetes     f/u 1 month        Visit Vitals  BP (!) 140/76 (BP 1 Location: Left upper arm, BP Patient Position: Sitting, BP Cuff Size: Large adult)   Pulse 65   Temp 97.5 °F (36.4 °C) (Oral)   Resp 18   Ht 5' 2\" (1.575 m)   Wt 276 lb (125.2 kg)   SpO2 92%   BMI 50.48 kg/m²        1. Have you been to the ER, urgent care clinic since your last visit? Hospitalized since your last visit? No    2. Have you seen or consulted any other health care providers outside of the [address] [place] [address] since your last visit? Include any pap smears or colon screening. No     Health Maintenance Due   Topic Date Due    Hepatitis C Screening  Never done    MICROALBUMIN Q1  04/15/2020    COVID-19 Vaccine (3 - Booster for Pfizer series) 11/26/2021    Foot Exam Q1  03/04/2022    Medicare Yearly Exam  03/05/2022        3 most recent PHQ Screens 5/6/2022   Little interest or pleasure in doing things Not at all   Feeling down, depressed, irritable, or hopeless Not at all   Total Score PHQ 2 0        Fall Risk Assessment, last 12 mths 5/6/2022   Able to walk? No   Fall in past 12 months? -   Do you feel unsteady? -   Are you worried about falling -   Number of falls in past 12 months -   Fall with injury? -       Learning Assessment 1/10/2019   PRIMARY LEARNER Patient   HIGHEST LEVEL OF EDUCATION - PRIMARY LEARNER  GRADUATED HIGH SCHOOL OR GED   BARRIERS PRIMARY LEARNER NONE   CO-LEARNER CAREGIVER No   PRIMARY LANGUAGE ENGLISH   LEARNER PREFERENCE PRIMARY DEMONSTRATION   ANSWERED BY patient   RELATIONSHIP SELF                   This is the Subsequent Medicare Annual Wellness Exam, performed 12 months or more after the Initial AWV or the last Subsequent AWV    I have reviewed the patient's medical history in detail and updated the computerized patient record.        Assessment/Plan   Education and counseling provided:  Are appropriate based on today's review and evaluation      Depression Risk Factor Screening     3 most recent PHQ Screens 5/6/2022   Little interest or pleasure in doing things Not at all   Feeling down, depressed, irritable, or hopeless Not at all   Total Score PHQ 2 0       Alcohol & Drug Abuse Risk Screen    Do you average more than 1 drink per night or more than 7 drinks a week:  No    On any one occasion in the past three months have you have had more than 3 drinks containing alcohol:  No          Functional Ability and Level of Safety    Hearing: Hearing is good. Activities of Daily Living: The home contains: no safety equipment. Patient needs help with:  phone      Ambulation: wheelchair bound     Fall Risk:  Fall Risk Assessment, last 12 mths 5/6/2022   Able to walk? No   Fall in past 12 months? -   Do you feel unsteady? -   Are you worried about falling -   Number of falls in past 12 months -   Fall with injury?  -      Abuse Screen:  Patient is not abused       Cognitive Screening    Has your family/caregiver stated any concerns about your memory: yes - Some memory loss     Cognitive Screening: Normal - Verbal Fluency Test    Health Maintenance Due     Health Maintenance Due   Topic Date Due    Hepatitis C Screening  Never done    MICROALBUMIN Q1  04/15/2020    COVID-19 Vaccine (3 - Booster for Santiago Peter series) 11/26/2021    Foot Exam Q1  03/04/2022    Medicare Yearly Exam  03/05/2022       Patient Care Team   Patient Care Team:  Johan Tillman MD as PCP - General (Internal Medicine Physician)  Johan Tillman MD as PCP - [facility] Provider    History     Patient Active Problem List   Diagnosis Code    Paroxysmal atrial fibrillation (HCC) I48.0    Morbid obesity ([facility] E66.01    DM type 2 (diabetes mellitus, type 2) ([facility] E11.9    Total knee replacement status Z96.659    Seizure disorder ([facility] G40.909    Asthma J45.909    Dyslipidemia E78.5    HTN (hypertension) I10    Primary osteoarthritis of left knee M17.12    Venous insufficiency I87.2    Proteinuria R80.9    Physical deconditioning R53.81     Past Medical History:   Diagnosis Date    Arthritis     Asthma     Diabetes ([facility]     Glaucoma     Hypertension     Other ill-defined conditions(799.89)     rt knee surgery    Stroke [facility])       Past Surgical History:   Procedure Laterality Date    HX COLONOSCOPY      HX GYN       Current Outpatient Medications   Medication Sig Dispense Refill    levETIRAcetam 1,000 mg tablet TAKE 1 TABLET BY MOUTH TWICE DAILY 180 Tablet 3    allopurinoL (ZYLOPRIM) 300 mg tablet TAKE 1 TABLET BY MOUTH EVERY DAY 90 Tablet 3    felodipine (PLENDIL SR) 10 mg 24 hr tablet TAKE 1 TABLET BY MOUTH EVERY DAY 90 Tablet 3    albuterol (PROVENTIL HFA, VENTOLIN HFA, PROAIR HFA) 90 mcg/actuation inhaler Take 2 Puffs by inhalation every four (4) hours as needed for Wheezing. 20.1 g 3    aspirin delayed-release 81 mg tablet TAKE ONE TABLET BY MOUTH EVERY DAY 90 Tablet 3    carvediloL (COREG) 12.5 mg tablet TAKE ONE TABLET BY MOUTH TWICE DAILY 180 Tablet 3    furosemide (LASIX) 40 mg tablet TAKE ONE TABLET BY MOUTH DAILY 90 Tablet 3    albuterol (PROVENTIL VENTOLIN) 2.5 mg /3 mL (0.083 %) nebu USE ONE vial PER nebulizer EVERY 6 HOURS AS NEEDED FOR WHEEZING OR SHORTNESS OF BREATH 300 mL 3    OneTouch Delica Plus Lancet 33 gauge misc USE TO check BLOOD SUGAR ONCE DAILY 100 Lancet 3    Vimpat 50 mg tab tablet TAKE ONE TABLET BY MOUTH TWICE DAILY 180 Tablet 3    glucose blood VI test strips (OneTouch Verio test strips) strip Use to test blood sugar once daily. Dx.e11.9 50 Strip 11    warfarin (COUMADIN) 5 mg tablet TAKE 1 TABLET BY MOUTH MONDAY THROUGH FRIDAY, TAKE 1 AND 1/2 TABLETS BY MOUTH ON SATURDAY AND SUNDAY 103 Tablet 3    atorvastatin (LIPITOR) 80 mg tablet TAKE 1 TABLET BY MOUTH DAILY 90 Tablet 3    tiotropium (SPIRIVA) 18 mcg inhalation capsule Take 1 Capsule by inhalation daily.  30 Capsule 11    Blood-Glucose Meter (OneTouch Verio Meter) misc Use to test blood sugar daily. Dx.e11.9 1 Each 0    lancets (FreeStyle Lancets) 28 gauge misc USE TO TEST BLOOD SUGAR EVERY  Lancet 11    glucose blood VI test strips (FreeStyle Lite Strips) strip Use to test blood sugar daily 50 Strip 11    lisinopril (PRINIVIL, ZESTRIL) 20 mg tablet Take 1 Tab by mouth daily. 90 Tab 3    glucose blood VI test strips (TRUE METRIX GLUCOSE TEST STRIP) strip Use to test blood sugar once daily. Dx.e11.9 100 Strip 11    Blood-Glucose Meter (TRUE METRIX AIR GLUCOSE METER) mis 1 Device by Does Not Apply route daily. Use to test blood sugars daily. dx.e11.9 1 Each 0    lancets (TRUEPLUS LANCETS) 28 gauge misc Use to test blood sugar once daily. Dx.e11.9 100 Lancet 11    guaiFENesin-codeine (VIRTUSSIN AC) 100-10 mg/5 mL solution Take 5 mL by mouth three (3) times daily as needed for Cough. Max Daily Amount: 15 mL. 140 mL 0    metFORMIN (GLUCOPHAGE) 500 mg tablet TAKE 1 TABLET BY MOUTH TWICE DAILY WITH MEALS 180 Tab 3    sitagliptin (JANUVIA) 100 mg tablet Take 1 tablet by mouth daily. 30 tablet 11    budesonide-formoterol (SYMBICORT) 160-4.5 mcg/Actuation HFA inhaler Take 2 Puffs by inhalation two (2) times a day. 10.2 Inhaler 11    spironolactone-hydrochlorothiazide (ALDACTAZIDE) 25-25 mg per tablet TAKE 1 TABLET BY MOUTH DAILY (Patient not taking: Reported on 3/28/2022) 90 Tablet 3     Allergies   Allergen Reactions    Clonidine Other (comments)     Patient becomes incoherent    Metformin Unknown (comments)       History reviewed. No pertinent family history.   Social History     Tobacco Use    Smoking status: Former Smoker    Smokeless tobacco: Never Used    Tobacco comment: 20+years ago   Substance Use Topics    Alcohol use: No         Molly Tabares

## 2022-05-06 NOTE — PROGRESS NOTES
[facility]  [place] [address]  [address] [place] [address]  Phone:  [phone]  Fax: [phone]       Chief Complaint   Patient presents with    Diabetes     f/u 1 month   . SUBJECTIVE:    Hunter Ramírez is a 76 y.o. female comes in for return visit stating that she has done reasonably well other than coughing a bit more. One of the daughters has upper respiratory infection and it appears that her mother has the same. She was most recently checked for COVID and she was negative. Her cough is nonproductive. Cough is somewhat worse at night, as would be expected given the fact that her bronchospasm has flared up. She does have a nebulizer, but the use of this need to increase. She has remained seizure free and her anticonvulsant now is Keppra and Vimpat. She is having no adverse effects from that. She has a past history of primary hypertension, dyslipidemia and diabetes mellitus. Current Outpatient Medications   Medication Sig Dispense Refill    benzonatate (TESSALON) 200 mg capsule Take 1 Capsule by mouth three (3) times daily as needed for Cough for up to 7 days. 40 Capsule 0    levETIRAcetam 1,000 mg tablet TAKE 1 TABLET BY MOUTH TWICE DAILY 180 Tablet 3    allopurinoL (ZYLOPRIM) 300 mg tablet TAKE 1 TABLET BY MOUTH EVERY DAY 90 Tablet 3    felodipine (PLENDIL SR) 10 mg 24 hr tablet TAKE 1 TABLET BY MOUTH EVERY DAY 90 Tablet 3    albuterol (PROVENTIL HFA, VENTOLIN HFA, PROAIR HFA) 90 mcg/actuation inhaler Take 2 Puffs by inhalation every four (4) hours as needed for Wheezing.  20.1 g 3    aspirin delayed-release 81 mg tablet TAKE ONE TABLET BY MOUTH EVERY DAY 90 Tablet 3    carvediloL (COREG) 12.5 mg tablet TAKE ONE TABLET BY MOUTH TWICE DAILY 180 Tablet 3    furosemide (LASIX) 40 mg tablet TAKE ONE TABLET BY MOUTH DAILY 90 Tablet 3    albuterol (PROVENTIL VENTOLIN) 2.5 mg /3 mL (0.083 %) nebu USE ONE vial PER nebulizer EVERY 6 HOURS AS NEEDED FOR WHEEZING OR SHORTNESS OF BREATH 300 mL 3    OneTouch Delica Plus Lancet 33 gauge misc USE TO check BLOOD SUGAR ONCE DAILY 100 Lancet 3    Vimpat 50 mg tab tablet TAKE ONE TABLET BY MOUTH TWICE DAILY 180 Tablet 3    glucose blood VI test strips (OneTouch Verio test strips) strip Use to test blood sugar once daily. Dx.e11.9 50 Strip 11    warfarin (COUMADIN) 5 mg tablet TAKE 1 TABLET BY MOUTH MONDAY THROUGH FRIDAY, TAKE 1 AND 1/2 TABLETS BY MOUTH ON SATURDAY AND SUNDAY 103 Tablet 3    atorvastatin (LIPITOR) 80 mg tablet TAKE 1 TABLET BY MOUTH DAILY 90 Tablet 3    tiotropium (SPIRIVA) 18 mcg inhalation capsule Take 1 Capsule by inhalation daily. 30 Capsule 11    Blood-Glucose Meter (OneTouch Verio Meter) misc Use to test blood sugar daily. Dx.e11.9 1 Each 0    lancets (FreeStyle Lancets) 28 gauge misc USE TO TEST BLOOD SUGAR EVERY  Lancet 11    glucose blood VI test strips (FreeStyle Lite Strips) strip Use to test blood sugar daily 50 Strip 11    lisinopril (PRINIVIL, ZESTRIL) 20 mg tablet Take 1 Tab by mouth daily. 90 Tab 3    glucose blood VI test strips (TRUE METRIX GLUCOSE TEST STRIP) strip Use to test blood sugar once daily. Dx.e11.9 100 Strip 11    Blood-Glucose Meter (TRUE METRIX AIR GLUCOSE METER) [facility] 1 Device by Does Not Apply route daily. Use to test blood sugars daily. dx.e11.9 1 Each 0    lancets (TRUEPLUS LANCETS) 28 gauge misc Use to test blood sugar once daily. Dx.e11.9 100 Lancet 11    guaiFENesin-codeine (VIRTUSSIN AC) 100-10 mg/5 mL solution Take 5 mL by mouth three (3) times daily as needed for Cough. Max Daily Amount: 15 mL. 140 mL 0    metFORMIN (GLUCOPHAGE) 500 mg tablet TAKE 1 TABLET BY MOUTH TWICE DAILY WITH MEALS 180 Tab 3    sitagliptin (JANUVIA) 100 mg tablet Take 1 tablet by mouth daily. 30 tablet 11    budesonide-formoterol (SYMBICORT) 160-4.5 mcg/Actuation HFA inhaler Take 2 Puffs by inhalation two (2) times a day.  10.2 Inhaler 11    spironolactone-hydrochlorothiazide (ALDACTAZIDE) 25-25 mg per tablet TAKE 1 TABLET BY MOUTH DAILY (Patient not taking: Reported on 3/28/2022) 90 Tablet 3     Past Medical History:   Diagnosis Date    Arthritis     Asthma     Diabetes ([facility]     Glaucoma     Hypertension     Other ill-defined conditions(799.89)     rt knee surgery    Stroke [facility])      Past Surgical History:   Procedure Laterality Date    HX COLONOSCOPY      HX GYN       Allergies   Allergen Reactions    Clonidine Other (comments)     Patient becomes incoherent    Metformin Unknown (comments)         REVIEW OF SYSTEMS:  General: negative for - chills or fever  ENT: negative for - headaches, nasal congestion or tinnitus  Respiratory: negative for - cough, hemoptysis, shortness of breath or wheezing  Cardiovascular : negative for - chest pain, edema, palpitations or shortness of breath  Gastrointestinal: negative for - abdominal pain, blood in stools, heartburn or nausea/vomiting  Genito-Urinary: no dysuria, trouble voiding, or hematuria  Musculoskeletal: negative for - gait disturbance, joint pain, joint stiffness or joint swelling  Neurological: no TIA or stroke symptoms  Hematologic: no bruises, no bleeding, no swollen glands  Integument: no lumps, mole changes, nail changes or rash  Endocrine: no malaise/lethargy or unexpected weight changes      Social History     Socioeconomic History    Marital status:     Number of children: 1   Occupational History    Occupation: disabled--[employer]   Tobacco Use    Smoking status: Former Smoker    Smokeless tobacco: Never Used    Tobacco comment: 20+years ago   Vaping Use    Vaping Use: Never used   Substance and Sexual Activity    Alcohol use: No    Drug use: No    Sexual activity: Not Currently     History reviewed. No pertinent family history.     OBJECTIVE:    Visit Vitals  BP (!) 140/76 (BP 1 Location: Left upper arm, BP Patient Position: Sitting, BP Cuff Size: Large adult)   Pulse 65 Temp 97.5 °F (36.4 °C) (Oral)   Resp 18   Ht 5' 2\" (1.575 m)   Wt 276 lb (125.2 kg)   SpO2 92%   BMI 50.48 kg/m²     CONSTITUTIONAL: well , well nourished, appears age appropriate  EYES: perrla, eom intact  ENMT:moist mucous membranes, pharynx clear  NECK: supple. Thyroid normal  RESPIRATORY: Chest: clear to ascultation and percussion   CARDIOVASCULAR: Heart: regular rate and rhythm  GASTROINTESTINAL: Abdomen: soft, bowel sounds active  HEMATOLOGIC: no pathological lymph nodes palpated  MUSCULOSKELETAL: Extremities: no edema, pulse 1+   INTEGUMENT: No unusual rashes or suspicious skin lesions noted. Nails appear normal.  NEUROLOGIC: non-focal exam   MENTAL STATUS: alert and oriented, appropriate affect      ASSESSMENT:  1. Type 2 diabetes mellitus without complication, without long-term current use of insulin ([facility]    2. Medicare annual wellness visit, subsequent    3. Screening for alcoholism    4. Seiz[facility] disorder ([facility]    5. Intermittent asthma without complication, unspecified asthma severity    6. Dyslipidemia    7. Morbid obesity ([facility]    8. Venous insufficiency    9. Primary hypertension        PLAN:  1. Hopefully, her diabetes is doing well, but I will await the results of her hemoglobin A1c.  2. Her seizures have been reasonably stable since changing from her previous Dilantin to Vimpat and Keppra. She has had no side effects from the medication either. 3. Her asthma has been doing quite well, although she has flared up a bit in the last three to four days. I suspect this is related to an upper respiratory infection that she has acquired from one of her daughters. 4. She has an increased cardiovascular risk and remains on a statin. 5. She needs to lose weight, which is of her biggest problems, which is creating her dysmetabolic status as well as the arthritic aspect. Weight reduction can be accomplished by eating meals, eliminating snacks, and avoiding the consumption of processed carbohydrates.   6. She has swelling of her lower extremities, which is orthostatic in nature, but this is no worse than usual.  7. Her blood pressure is reasonable today. No adjustments are made. .  Orders Placed This Encounter    AMB SUPPLY ORDER    HEMOGLOBIN A1C WITH EAG    METABOLIC PANEL, BASIC    REFERRAL TO PODIATRY    benzonatate (TESSALON) 200 mg capsule               Zechariah Burch MD  This is the Subsequent Medicare Annual Wellness Exam, performed 12 months or more after the Initial AWV or the last Subsequent AWV    I have reviewed the patient's medical history in detail and updated the computerized patient record. Assessment/Plan   Education and counseling provided:  Are appropriate based on today's review and evaluation    1. Type 2 diabetes mellitus without complication, without long-term current use of insulin (HCC)  -     HEMOGLOBIN A1C WITH EAG; Future  -     AMB SUPPLY ORDER  -     REFERRAL TO PODIATRY  2. Medicare annual wellness visit, subsequent  -     [place] ANNUAL ALCOHOL SCREEN 15 MIN  3. Screening for alcoholism  4. Seizure disorder ([facility]  5. Intermittent asthma without complication, unspecified asthma severity  6. Dyslipidemia  7. Morbid obesity ([facility]  8. Venous insufficiency  9. Primary hypertension  -     METABOLIC PANEL, BASIC; Future       Depression Risk Factor Screening     3 most recent PHQ Screens 5/6/2022   Little interest or pleasure in doing things Not at all   Feeling down, depressed, irritable, or hopeless Not at all   Total Score PHQ 2 0       Alcohol & Drug Abuse Risk Screen    Do you average more than 1 drink per night or more than 7 drinks a week:  No    On any one occasion in the past three months have you have had more than 3 drinks containing alcohol:  No          Functional Ability and Level of Safety    Hearing: Hearing is good. Activities of Daily Living: The home contains: no safety equipment.   Patient needs help with:  transportation, shopping, preparing meals, laundry, housework, managing medications, dressing, bathroom needs and walking      Ambulation: with difficulty, uses a Rollator     Fall Risk:  Fall Risk Assessment, last 12 mths 5/6/2022   Able to walk? No   Fall in past 12 months? -   Do you feel unsteady? -   Are you worried about falling -   Number of falls in past 12 months -   Fall with injury?  -      Abuse Screen:  Patient is not abused       Cognitive Screening    Has your family/caregiver stated any concerns about your memory: no     Cognitive Screening: Not applicable    Health Maintenance Due     Health Maintenance Due   Topic Date Due    Hepatitis C Screening  Never done    MICROALBUMIN Q1  04/15/2020    COVID-19 Vaccine (3 - Booster for Santiago Peter series) 11/26/2021    Foot Exam Q1  03/04/2022       Patient Care Team   Patient Care Team:  Negrito Metzger MD as PCP - General (Internal Medicine Physician)  Negrito Metzger MD as PCP - [facility] Empaneled Provider    History     Patient Active Problem List   Diagnosis Code    Paroxysmal atrial fibrillation ([facility] I48.0    Morbid obesity ([address] E66.01    DM type 2 (diabetes mellitus, type 2) ([address] E11.9    Total knee replacement status Z96.659    Seizure disorder ([address] G40.909    Asthma J45.909    Dyslipidemia E78.5    HTN (hypertension) I10    Primary osteoarthritis of left knee M17.12    Venous insufficiency I87.2    Proteinuria R80.9    Physical deconditioning R53.81    Type 2 diabetes mellitus with chronic kidney disease ([address] E11.22     Past Medical History:   Diagnosis Date    Arthritis     Asthma     Diabetes ([address]     Glaucoma     Hypertension     Other ill-defined conditions(799.89)     rt knee surgery    Stroke [facility])       Past Surgical History:   Procedure Laterality Date    HX COLONOSCOPY      HX GYN       Current Outpatient Medications   Medication Sig Dispense Refill    benzonatate (TESSALON) 200 mg capsule Take 1 Capsule by mouth three (3) times daily as needed for Cough for up to 7 days. 40 Capsule 0    levETIRAcetam 1,000 mg tablet TAKE 1 TABLET BY MOUTH TWICE DAILY 180 Tablet 3    allopurinoL (ZYLOPRIM) 300 mg tablet TAKE 1 TABLET BY MOUTH EVERY DAY 90 Tablet 3    felodipine (PLENDIL SR) 10 mg 24 hr tablet TAKE 1 TABLET BY MOUTH EVERY DAY 90 Tablet 3    albuterol (PROVENTIL HFA, VENTOLIN HFA, PROAIR HFA) 90 mcg/actuation inhaler Take 2 Puffs by inhalation every four (4) hours as needed for Wheezing. 20.1 g 3    aspirin delayed-release 81 mg tablet TAKE ONE TABLET BY MOUTH EVERY DAY 90 Tablet 3    carvediloL (COREG) 12.5 mg tablet TAKE ONE TABLET BY MOUTH TWICE DAILY 180 Tablet 3    furosemide (LASIX) 40 mg tablet TAKE ONE TABLET BY MOUTH DAILY 90 Tablet 3    albuterol (PROVENTIL VENTOLIN) 2.5 mg /3 mL (0.083 %) nebu USE ONE vial PER nebulizer EVERY 6 HOURS AS NEEDED FOR WHEEZING OR SHORTNESS OF BREATH 300 mL 3    OneTouch Delica Plus Lancet 33 gauge misc USE TO check BLOOD SUGAR ONCE DAILY 100 Lancet 3    Vimpat 50 mg tab tablet TAKE ONE TABLET BY MOUTH TWICE DAILY 180 Tablet 3    glucose blood VI test strips (OneTouch Verio test strips) strip Use to test blood sugar once daily. Dx.e11.9 50 Strip 11    warfarin (COUMADIN) 5 mg tablet TAKE 1 TABLET BY MOUTH MONDAY THROUGH FRIDAY, TAKE 1 AND 1/2 TABLETS BY MOUTH ON SATURDAY AND SUNDAY 103 Tablet 3    atorvastatin (LIPITOR) 80 mg tablet TAKE 1 TABLET BY MOUTH DAILY 90 Tablet 3    tiotropium (SPIRIVA) 18 mcg inhalation capsule Take 1 Capsule by inhalation daily. 30 Capsule 11    Blood-Glucose Meter (OneTouch Verio Meter) misc Use to test blood sugar daily. Dx.e11.9 1 Each 0    lancets (FreeStyle Lancets) 28 gauge misc USE TO TEST BLOOD SUGAR EVERY  Lancet 11    glucose blood VI test strips (FreeStyle Lite Strips) strip Use to test blood sugar daily 50 Strip 11    lisinopril (PRINIVIL, ZESTRIL) 20 mg tablet Take 1 Tab by mouth daily.  90 Tab 3    glucose blood VI test strips (TRUE METRIX GLUCOSE TEST STRIP) strip Use to test blood sugar once daily. Dx.e11.9 100 Strip 11    Blood-Glucose Meter (TRUE METRIX AIR GLUCOSE METER) [facility] 1 Device by Does Not Apply route daily. Use to test blood sugars daily. dx.e11.9 1 Each 0    lancets (TRUEPLUS LANCETS) 28 gauge misc Use to test blood sugar once daily. Dx.e11.9 100 Lancet 11    guaiFENesin-codeine (VIRTUSSIN AC) 100-10 mg/5 mL solution Take 5 mL by mouth three (3) times daily as needed for Cough. Max Daily Amount: 15 mL. 140 mL 0    metFORMIN (GLUCOPHAGE) 500 mg tablet TAKE 1 TABLET BY MOUTH TWICE DAILY WITH MEALS 180 Tab 3    sitagliptin (JANUVIA) 100 mg tablet Take 1 tablet by mouth daily. 30 tablet 11    budesonide-formoterol (SYMBICORT) 160-4.5 mcg/Actuation HFA inhaler Take 2 Puffs by inhalation two (2) times a day. 10.2 Inhaler 11    spironolactone-hydrochlorothiazide (ALDACTAZIDE) 25-25 mg per tablet TAKE 1 TABLET BY MOUTH DAILY (Patient not taking: Reported on 3/28/2022) 90 Tablet 3     Allergies   Allergen Reactions    Clonidine Other (comments)     Patient becomes incoherent    Metformin Unknown (comments)       History reviewed. No pertinent family history.   Social History     Tobacco Use    Smoking status: Former Smoker    Smokeless tobacco: Never Used    Tobacco comment: 20+years ago   Substance Use Topics    Alcohol use: No         Mariana Drake MD

## 2022-05-06 NOTE — PATIENT INSTRUCTIONS
Medicare Wellness Visit, Female     The best way to live healthy is to have a lifestyle where you eat a well-balanced diet, exercise regularly, limit alcohol use, and quit all forms of tobacco/nicotine, if applicable. Regular preventive services are another way to keep healthy. Preventive services (vaccines, screening tests, monitoring & exams) can help personalize your care plan, which helps you manage your own care. Screening tests can find health problems at the earliest stages, when they are easiest to treat. [facility] follows the current, evidence-based guidelines published by the [facility]rey Liu ([facility]) when recommending preventive services for our patients. Because we follow these guidelines, sometimes recommendations change over time as research supports it. (For example, mammograms used to be recommended annually. Even though Medicare will still pay for an annual mammogram, the newer guidelines recommend a mammogram every two years for women of average risk). Of course, you and your doctor may decide to screen more often for some diseases, based on your risk and your co-morbidities (chronic disease you are already diagnosed with). Preventive services for you include:  - Medicare offers their members a free annual wellness visit, which is time for you and your primary care provider to discuss and plan for your preventive service needs. Take advantage of this benefit every year!  -All adults over the age of 72 should receive the recommended pneumonia vaccines. Current USPSTF guidelines recommend a series of two vaccines for the best pneumonia protection.   -All adults should have a flu vaccine yearly and a tetanus vaccine every 10 years.   -All adults age 48 and older should receive the shingles vaccines (series of two vaccines).       -All adults age 38-68 who are overweight should have a diabetes screening test once every three years.   -All adults born between 80 and 1965 should be screened once for Hepatitis C.  -Other screening tests and preventive services for persons with diabetes include: an eye exam to screen for diabetic retinopathy, a kidney function test, a foot exam, and stricter control over your cholesterol.   -Cardiovascular screening for adults with routine risk involves an electrocardiogram (ECG) at intervals determined by your doctor.   -Colorectal cancer screenings should be done for adults age 54-65 with no increased risk factors for colorectal cancer. There are a number of acceptable methods of screening for this type of cancer. Each test has its own benefits and drawbacks. Discuss with your doctor what is most appropriate for you during your annual wellness visit. The different tests include: colonoscopy (considered the best screening method), a fecal occult blood test, a fecal DNA test, and sigmoidoscopy.    -A bone mass density test is recommended when a woman turns 65 to screen for osteoporosis. This test is only recommended one time, as a screening. Some providers will use this same test as a disease monitoring tool if you already have osteoporosis. -Breast cancer screenings are recommended every other year for women of normal risk, age 54-69.  -Cervical cancer screenings for women over age 72 are only recommended with certain risk factors. Here is a list of your current Health Maintenance items (your personalized list of preventive services) with a due date:  Health Maintenance Due   Topic Date Due    Hepatitis C Test  Never done    Albumin Urine Test  04/15/2020    COVID-19 Vaccine (3 - Booster for Pfizer series) 11/26/2021    Diabetic Foot Care  03/04/2022         Medicare Wellness Visit, Female     The best way to live healthy is to have a lifestyle where you eat a well-balanced diet, exercise regularly, limit alcohol use, and quit all forms of tobacco/nicotine, if applicable.      Regular preventive services are another way to keep healthy. Preventive services (vaccines, screening tests, monitoring & exams) can help personalize your care plan, which helps you manage your own care. Screening tests can find health problems at the earliest stages, when they are easiest to treat. [facility] follows the current, evidence-based guidelines published by the [facility] Liu ([facility]) when recommending preventive services for our patients. Because we follow these guidelines, sometimes recommendations change over time as research supports it. (For example, mammograms used to be recommended annually. Even though Medicare will still pay for an annual mammogram, the newer guidelines recommend a mammogram every two years for women of average risk). Of course, you and your doctor may decide to screen more often for some diseases, based on your risk and your co-morbidities (chronic disease you are already diagnosed with). Preventive services for you include:  - Medicare offers their members a free annual wellness visit, which is time for you and your primary care provider to discuss and plan for your preventive service needs. Take advantage of this benefit every year!  -All adults over the age of 72 should receive the recommended pneumonia vaccines. Current USPSTF guidelines recommend a series of two vaccines for the best pneumonia protection.   -All adults should have a flu vaccine yearly and a tetanus vaccine every 10 years.   -All adults age 48 and older should receive the shingles vaccines (series of two vaccines).       -All adults age 38-68 who are overweight should have a diabetes screening test once every three years.   -All adults born between 80 and 1965 should be screened once for Hepatitis C.  -Other screening tests and preventive services for persons with diabetes include: an eye exam to screen for diabetic retinopathy, a kidney function test, a foot exam, and stricter control over your cholesterol.   -Cardiovascular screening for adults with routine risk involves an electrocardiogram (ECG) at intervals determined by your doctor.   -Colorectal cancer screenings should be done for adults age 54-65 with no increased risk factors for colorectal cancer. There are a number of acceptable methods of screening for this type of cancer. Each test has its own benefits and drawbacks. Discuss with your doctor what is most appropriate for you during your annual wellness visit. The different tests include: colonoscopy (considered the best screening method), a fecal occult blood test, a fecal DNA test, and sigmoidoscopy.    -A bone mass density test is recommended when a woman turns 65 to screen for osteoporosis. This test is only recommended one time, as a screening. Some providers will use this same test as a disease monitoring tool if you already have osteoporosis. -Breast cancer screenings are recommended every other year for women of normal risk, age 54-69.  -Cervical cancer screenings for women over age 72 are only recommended with certain risk factors.      Here is a list of your current Health Maintenance items (your personalized list of preventive services) with a due date:  Health Maintenance Due   Topic Date Due    Hepatitis C Test  Never done    Albumin Urine Test  04/15/2020    COVID-19 Vaccine (3 - Booster for [employer] series) 11/26/2021    Diabetic Foot Care  03/04/2022

## 2022-05-07 LAB
ANION GAP SERPL CALC-SCNC: 5 MMOL/L (ref 5–15)
BUN SERPL-MCNC: 34 MG/DL (ref 6–20)
BUN/CREAT SERPL: 24 (ref 12–20)
CALCIUM SERPL-MCNC: 9.5 MG/DL (ref 8.5–10.1)
CHLORIDE SERPL-SCNC: 105 MMOL/L (ref 97–108)
CO2 SERPL-SCNC: 27 MMOL/L (ref 21–32)
CREAT SERPL-MCNC: 1.43 MG/DL (ref 0.55–1.02)
EST. AVERAGE GLUCOSE BLD GHB EST-MCNC: 126 MG/DL
GLUCOSE SERPL-MCNC: 105 MG/DL (ref 65–100)
HBA1C MFR BLD: 6 % (ref 4–5.6)
POTASSIUM SERPL-SCNC: 5.1 MMOL/L (ref 3.5–5.1)
SODIUM SERPL-SCNC: 137 MMOL/L (ref 136–145)

## 2022-05-10 ENCOUNTER — TELEPHONE (OUTPATIENT)
Dept: INTERNAL MEDICINE CLINIC | Age: 77
End: 2022-05-10

## 2022-05-10 NOTE — TELEPHONE ENCOUNTER
Nika Patrick called in PT 32.9 and INR 2.9 taking 1-1/2 tabs coumadin daily.  No change call on friday

## 2022-05-25 ENCOUNTER — TELEPHONE (OUTPATIENT)
Dept: INTERNAL MEDICINE CLINIC | Age: 77
End: 2022-05-25

## 2022-06-03 ENCOUNTER — TELEPHONE (OUTPATIENT)
Dept: INTERNAL MEDICINE CLINIC | Age: 77
End: 2022-06-03

## 2022-06-06 ENCOUNTER — TELEPHONE (OUTPATIENT)
Dept: INTERNAL MEDICINE CLINIC | Age: 77
End: 2022-06-06

## 2022-06-10 ENCOUNTER — TELEPHONE (OUTPATIENT)
Dept: INTERNAL MEDICINE CLINIC | Age: 77
End: 2022-06-10

## 2022-06-10 NOTE — TELEPHONE ENCOUNTER
Patient cara called in PT 42.2 and INR 3.8 taking 1-1/2 tabs coumadin daily.  She will hold and restart on monday

## 2022-06-27 ENCOUNTER — TELEPHONE (OUTPATIENT)
Dept: INTERNAL MEDICINE CLINIC | Age: 77
End: 2022-06-27

## 2022-06-27 NOTE — TELEPHONE ENCOUNTER
Patient INR 3.2 TAKING 1-1/2 TABLETS DAILY, PER DR. KELLY PATIENT TO HOLD X 2 DAYS , THEN RESTART CALL NEXT WEEK

## 2022-06-28 RX ORDER — LANCETS 33 GAUGE
EACH MISCELLANEOUS
Qty: 100 LANCET | Refills: 3 | Status: ON HOLD | OUTPATIENT
Start: 2022-06-28

## 2022-07-01 ENCOUNTER — TELEPHONE (OUTPATIENT)
Dept: INTERNAL MEDICINE CLINIC | Age: 77
End: 2022-07-01

## 2022-07-07 ENCOUNTER — TELEPHONE (OUTPATIENT)
Dept: INTERNAL MEDICINE CLINIC | Age: 77
End: 2022-07-07

## 2022-07-11 ENCOUNTER — TELEPHONE (OUTPATIENT)
Dept: INTERNAL MEDICINE CLINIC | Age: 77
End: 2022-07-11

## 2022-07-15 ENCOUNTER — TELEPHONE (OUTPATIENT)
Dept: INTERNAL MEDICINE CLINIC | Age: 77
End: 2022-07-15

## 2022-07-19 ENCOUNTER — TELEPHONE (OUTPATIENT)
Dept: INTERNAL MEDICINE CLINIC | Age: 77
End: 2022-07-19

## 2022-07-29 ENCOUNTER — TELEPHONE (OUTPATIENT)
Dept: INTERNAL MEDICINE CLINIC | Age: 77
End: 2022-07-29

## 2022-08-04 ENCOUNTER — TELEPHONE (OUTPATIENT)
Dept: INTERNAL MEDICINE CLINIC | Age: 77
End: 2022-08-04

## 2022-08-05 NOTE — TELEPHONE ENCOUNTER
Placed call to pt's daughter. Two pt identifiers confirmed. Informed pt's daughter that [facility] has been [facility]. Pt's daughter states that it is too late for the medication because pt has had COVID for more than 5 days now.

## 2022-08-15 ENCOUNTER — TELEPHONE (OUTPATIENT)
Dept: INTERNAL MEDICINE CLINIC | Age: 77
End: 2022-08-15

## 2022-08-15 NOTE — TELEPHONE ENCOUNTER
[facility] PT [facility].7 INR 6.3, she is instructed to hold until Monday today, per Dr. Rigo Franklin check inr on Tuesday.

## 2022-08-16 ENCOUNTER — TELEPHONE (OUTPATIENT)
Dept: INTERNAL MEDICINE CLINIC | Age: 77
End: 2022-08-16

## 2022-08-16 NOTE — TELEPHONE ENCOUNTER
Ryan Connolly called in PT 29.4 and INR 2.5 she has been off the coumadin x 4 days because she was 4, patient to restart coumadin at 1-1/2 tabs mon-fri and 1 tab sat and sun

## 2022-08-23 ENCOUNTER — TELEPHONE (OUTPATIENT)
Dept: INTERNAL MEDICINE CLINIC | Age: 77
End: 2022-08-23

## 2022-08-23 NOTE — TELEPHONE ENCOUNTER
Patient cara called PT 38.7 and INR 3.4 TAKING 1-1/2 TABS MON-Friday and 1 tab sat and sun.  Patient to hold coumadin x 2 days then restart and call in1 week

## 2022-09-27 ENCOUNTER — TELEPHONE (OUTPATIENT)
Dept: INTERNAL MEDICINE CLINIC | Age: 77
End: 2022-09-27

## 2022-10-03 ENCOUNTER — OFFICE VISIT (OUTPATIENT)
Dept: INTERNAL MEDICINE CLINIC | Age: 77
End: 2022-10-03
Payer: MEDICARE

## 2022-10-03 VITALS
HEIGHT: 62 IN | HEART RATE: 57 BPM | SYSTOLIC BLOOD PRESSURE: 148 MMHG | TEMPERATURE: 97.5 F | BODY MASS INDEX: 52.23 KG/M2 | DIASTOLIC BLOOD PRESSURE: 76 MMHG | WEIGHT: 283.8 LBS | OXYGEN SATURATION: 91 % | RESPIRATION RATE: 20 BRPM

## 2022-10-03 DIAGNOSIS — M17.12 PRIMARY OSTEOARTHRITIS OF LEFT KNEE: ICD-10-CM

## 2022-10-03 DIAGNOSIS — I50.9 CONGESTIVE HEART FAILURE, UNSPECIFIED HF CHRONICITY, UNSPECIFIED HEART FAILURE TYPE (HCC): ICD-10-CM

## 2022-10-03 DIAGNOSIS — G40.909 SEIZURE DISORDER (HCC): ICD-10-CM

## 2022-10-03 DIAGNOSIS — E11.9 TYPE 2 DIABETES MELLITUS WITHOUT COMPLICATION, WITHOUT LONG-TERM CURRENT USE OF INSULIN (HCC): Chronic | ICD-10-CM

## 2022-10-03 DIAGNOSIS — I10 PRIMARY HYPERTENSION: ICD-10-CM

## 2022-10-03 DIAGNOSIS — Z23 ENCOUNTER FOR IMMUNIZATION: ICD-10-CM

## 2022-10-03 DIAGNOSIS — I48.0 PAROXYSMAL ATRIAL FIBRILLATION (HCC): Primary | ICD-10-CM

## 2022-10-03 DIAGNOSIS — I87.2 VENOUS INSUFFICIENCY: ICD-10-CM

## 2022-10-03 DIAGNOSIS — E66.01 MORBID OBESITY (HCC): Chronic | ICD-10-CM

## 2022-10-03 DIAGNOSIS — E78.5 DYSLIPIDEMIA: ICD-10-CM

## 2022-10-03 PROBLEM — N18.30 CHRONIC RENAL DISEASE, STAGE III (HCC): Status: ACTIVE | Noted: 2022-10-03

## 2022-10-03 LAB
INR BLD: 3
PT POC: 36.2 SECONDS
VALID INTERNAL CONTROL?: YES

## 2022-10-03 PROCEDURE — 1101F PT FALLS ASSESS-DOCD LE1/YR: CPT | Performed by: INTERNAL MEDICINE

## 2022-10-03 PROCEDURE — G8536 NO DOC ELDER MAL SCRN: HCPCS | Performed by: INTERNAL MEDICINE

## 2022-10-03 PROCEDURE — G8427 DOCREV CUR MEDS BY ELIG CLIN: HCPCS | Performed by: INTERNAL MEDICINE

## 2022-10-03 PROCEDURE — G0008 ADMIN INFLUENZA VIRUS VAC: HCPCS | Performed by: INTERNAL MEDICINE

## 2022-10-03 PROCEDURE — G8399 PT W/DXA RESULTS DOCUMENT: HCPCS | Performed by: INTERNAL MEDICINE

## 2022-10-03 PROCEDURE — 36415 COLL VENOUS BLD VENIPUNCTURE: CPT | Performed by: INTERNAL MEDICINE

## 2022-10-03 PROCEDURE — 1090F PRES/ABSN URINE INCON ASSESS: CPT | Performed by: INTERNAL MEDICINE

## 2022-10-03 PROCEDURE — G8510 SCR DEP NEG, NO PLAN REQD: HCPCS | Performed by: INTERNAL MEDICINE

## 2022-10-03 PROCEDURE — G8754 DIAS BP LESS 90: HCPCS | Performed by: INTERNAL MEDICINE

## 2022-10-03 PROCEDURE — 3044F HG A1C LEVEL LT 7.0%: CPT | Performed by: INTERNAL MEDICINE

## 2022-10-03 PROCEDURE — 85610 PROTHROMBIN TIME: CPT | Performed by: INTERNAL MEDICINE

## 2022-10-03 PROCEDURE — G8753 SYS BP > OR = 140: HCPCS | Performed by: INTERNAL MEDICINE

## 2022-10-03 PROCEDURE — G8417 CALC BMI ABV UP PARAM F/U: HCPCS | Performed by: INTERNAL MEDICINE

## 2022-10-03 PROCEDURE — 90694 VACC AIIV4 NO PRSRV 0.5ML IM: CPT | Performed by: INTERNAL MEDICINE

## 2022-10-03 PROCEDURE — 99214 OFFICE O/P EST MOD 30 MIN: CPT | Performed by: INTERNAL MEDICINE

## 2022-10-03 PROCEDURE — 1123F ACP DISCUSS/DSCN MKR DOCD: CPT | Performed by: INTERNAL MEDICINE

## 2022-10-03 NOTE — PROGRESS NOTES
Ro Vuong is a 76 y.o. female  Chief Complaint   Patient presents with    Follow-up    Hypertension    Diabetes     Patient here for follow up high blood pressure and diabetes. Patient reports taking Warfarin 5 mg 1 1/2 pill every day. Results for orders placed or performed in visit on 10/03/22   AMB POC PT/INR   Result Value Ref Range    VALID INTERNAL CONTROL POC Yes     Prothrombin time (POC) 36.2 seconds    INR POC 3.0      Patient will see Dr. Kiana Tirado today to discuss medication management. 1. Have you been to the ER, urgent care clinic since your last visit? Hospitalized since your last visit? No    2. Have you seen or consulted any other health care providers outside of the [address] [place] [address] since your last visit? Include any pap smears or colon screening.  No

## 2022-10-03 NOTE — PROGRESS NOTES
[address] [place] [address] and Primary Care  [place] [address]  [address] [place] [address]  Phone:  [phone]  Fax: [phone]       Chief Complaint   Patient presents with    Follow-up    Hypertension    Diabetes     Patient here for follow up high blood pressure and diabetes. Patient reports taking Warfarin 5 mg 1 1/2 pill every day. .      SUBJECTIVE:    Rosalba Veras is a 76 y.o. female comes in for return visit stating that she has done quite well. She continues to sit up most days and nights. She has gotten her nights and days reversed. This is one of the reasons she ends up sitting in the chair most nights. This will cause swelling of her lower extremities more than usual.  She does have existing history of chronic venous insufficiency. Blood sugars have been excellent. INR levels have been quite reasonable. She has a past history of asthma, primary hypertension, dyslipidemia, and a seizure disorder. Her new anticonvulsive is Keppra at 1000 mg b.i.d. She was formerly on phenytoin which was quite effective. Current Outpatient Medications   Medication Sig Dispense Refill    atorvastatin (LIPITOR) 80 mg tablet TAKE ONE TABLET BY MOUTH DAILY 90 Tablet 3    lancets (OneTouch Delica Plus Lancet) 33 gauge misc USE TO check BLOOD SUGAR ONCE DAILY 100 Lancet 3    Spiriva with HandiHaler 18 mcg inhalation capsule INHALE THE CONTENTS OF ONE CAPSULE BY MOUTH DAILY, USING HANDIHALER 30 Capsule 11    levETIRAcetam 1,000 mg tablet TAKE 1 TABLET BY MOUTH TWICE DAILY 180 Tablet 3    allopurinoL (ZYLOPRIM) 300 mg tablet TAKE 1 TABLET BY MOUTH EVERY DAY 90 Tablet 3    felodipine (PLENDIL SR) 10 mg 24 hr tablet TAKE 1 TABLET BY MOUTH EVERY DAY 90 Tablet 3    albuterol (PROVENTIL HFA, VENTOLIN HFA, PROAIR HFA) 90 mcg/actuation inhaler Take 2 Puffs by inhalation every four (4) hours as needed for Wheezing.  20.1 g 3    aspirin delayed-release 81 mg tablet TAKE ONE TABLET BY MOUTH EVERY DAY 90 Tablet 3    carvediloL (COREG) 12.5 mg tablet TAKE ONE TABLET BY MOUTH TWICE DAILY 180 Tablet 3    furosemide (LASIX) 40 mg tablet TAKE ONE TABLET BY MOUTH DAILY 90 Tablet 3    albuterol (PROVENTIL VENTOLIN) 2.5 mg /3 mL (0.083 %) nebu USE ONE vial PER nebulizer EVERY 6 HOURS AS NEEDED FOR WHEEZING OR SHORTNESS OF BREATH 300 mL 3    glucose blood VI test strips (OneTouch Verio test strips) strip Use to test blood sugar once daily. Dx.e11.9 50 Strip 11    Blood-Glucose Meter (OneTouch Verio Meter) misc Use to test blood sugar daily. Dx.e11.9 1 Each 0    lancets (FreeStyle Lancets) 28 gauge misc USE TO TEST BLOOD SUGAR EVERY  Lancet 11    glucose blood VI test strips (FreeStyle Lite Strips) strip Use to test blood sugar daily 50 Strip 11    glucose blood VI test strips (TRUE METRIX GLUCOSE TEST STRIP) strip Use to test blood sugar once daily. Dx.e11.9 100 Strip 11    Blood-Glucose Meter (TRUE METRIX AIR GLUCOSE METER) [facility] Device by Does Not Apply route daily. Use to test blood sugars daily. dx.e11.9 1 Each 0    lancets (TRUEPLUS LANCETS) 28 gauge misc Use to test blood sugar once daily. Dx.e11.9 100 Lancet 11    sitagliptin (JANUVIA) 100 mg tablet Take 1 tablet by mouth daily. 30 tablet 11    budesonide-formoterol (SYMBICORT) 160-4.5 mcg/Actuation HFA inhaler Take 2 Puffs by inhalation two (2) times a day.  10.2 Inhaler 11    lacosamide (VIMPAT) 50 mg tab tablet TAKE ONE TABLET BY MOUTH TWICE DAILY 180 Tablet 3    nirmatrelvir-ritonavir (Paxlovid, EUA,) 300 mg (150 mg x 2)-100 mg tablet As directed (Patient not taking: Reported on 10/3/2022) 1 Box 0    warfarin (COUMADIN) 5 mg tablet TAKE 1 TABLET BY MOUTH MONDAY THROUGH FRIDAY, TAKE 1 AND 1/2 TABLETS BY MOUTH ON SATURDAY AND SUNDAY (Patient not taking: Reported on 10/3/2022) 103 Tablet 3    spironolactone-hydrochlorothiazide (ALDACTAZIDE) 25-25 mg per tablet TAKE 1 TABLET BY MOUTH DAILY (Patient not taking: No sig reported) 90 Tablet 3    lisinopril (PRINIVIL, ZESTRIL) 20 mg tablet Take 1 Tab by mouth daily. (Patient not taking: Reported on 10/3/2022) 90 Tab 3    guaiFENesin-codeine (VIRTUSSIN AC) 100-10 mg/5 mL solution Take 5 mL by mouth three (3) times daily as needed for Cough. Max Daily Amount: 15 mL.  (Patient not taking: Reported on 10/3/2022) 140 mL 0    metFORMIN (GLUCOPHAGE) 500 mg tablet TAKE 1 TABLET BY MOUTH TWICE DAILY WITH MEALS (Patient not taking: Reported on 10/3/2022) 180 Tab 3     Past Medical History:   Diagnosis Date    Arthritis     Asthma     Diabetes ([facility] [address]     Glaucoma     Hypertension     Other ill-defined conditions(799.89)     rt knee surgery    Stroke [facility])      Past Surgical History:   Procedure Laterality Date    HX COLONOSCOPY      HX GYN       Allergies   Allergen Reactions    Clonidine Other (comments)     Patient becomes incoherent    Metformin Unknown (comments)         REVIEW OF SYSTEMS:  General: negative for - chills or fever  ENT: negative for - headaches, nasal congestion or tinnitus  Respiratory: negative for - cough, hemoptysis, shortness of breath or wheezing  Cardiovascular : negative for - chest pain, edema, palpitations or shortness of breath  Gastrointestinal: negative for - abdominal pain, blood in stools, heartburn or nausea/vomiting  Genito-Urinary: no dysuria, trouble voiding, or hematuria  Musculoskeletal: negative for - gait disturbance, joint pain, joint stiffness or joint swelling  Neurological: no TIA or stroke symptoms  Hematologic: no bruises, no bleeding, no swollen glands  Integument: no lumps, mole changes, nail changes or rash  Endocrine: no malaise/lethargy or unexpected weight changes      Social History     Socioeconomic History    Marital status:     Number of children: 1   Occupational History    Occupation: disabled--[employer]   Tobacco Use    Smoking status: Former    Smokeless tobacco: Never    Tobacco comments:     20+years ago   Vaping Use    Vaping Use: Never used   Substance and Sexual Activity    Alcohol use: No    Drug use: No    Sexual activity: Not Currently     History reviewed. No pertinent family history. OBJECTIVE:    Visit Vitals  BP (!) 148/76   Pulse (!) 57   Temp 97.5 °F (36.4 °C) (Oral)   Resp 20   Ht 5' 2\" (1.575 m)   Wt 283 lb 12.8 oz (128.7 kg)   SpO2 91%   BMI 51.91 kg/m²     CONSTITUTIONAL: well , well nourished, appears age appropriate  EYES: perrla, eom intact  ENMT:moist mucous membranes, pharynx clear  NECK: supple. Thyroid normal  RESPIRATORY: Chest: clear to ascultation and percussion   CARDIOVASCULAR: Heart: regular rate and rhythm  GASTROINTESTINAL: Abdomen: soft, bowel sounds active  HEMATOLOGIC: no pathological lymph nodes palpated  MUSCULOSKELETAL: Extremities: no edema, pulse 1+   INTEGUMENT: No unusual rashes or suspicious skin lesions noted. Nails appear normal.  NEUROLOGIC: non-focal exam   MENTAL STATUS: alert and oriented, appropriate affect      ASSESSMENT:  1. Paroxysmal atrial fibrillation (HCC)    2. Encounter for immunization    3. Primary hypertension    4. Venous insufficiency    5. Type 2 diabetes mellitus without complication, without long-term current use of insulin ([address]    6. Seizure disorder ([address]    7. Primary osteoarthritis of left knee    8. Dyslipidemia    9. Morbid obesity ([address]    10. Congestive heart failure, unspecified HF chronicity, unspecified heart failure type ([address]        PLAN:  1. The patient has paroxysmal atrial fibrillation, which is the reason she is on warfarin. INR today is acceptable. 2. Blood pressure is excellent. No adjustments are made. 3. She does have a history of venous insufficiency as I alluded to earlier. Clearly there is nothing more than can be done other than preferably reversing her day and night and sleeping in the bed at night preferably. This will reduce the swelling.   4. Diabetes has historically been doing quite well, but I will wait the results of her hemoglobin A1c.  5. She is seizure free and remains on her Keppra. 6. Her osteoarthritis interferes with her ability to walk primarily involving the left knee. 7. She has an increased cardiovascular disease and remains on a statin. 8. She needs to lose weight. This is accomplished by eating meals, eliminating snacks, and avoiding the consumption of processed carbohydrates. .  Orders Placed This Encounter    Influenza, FLUAD, (age 72 y+), IM, PF, 0.5 mL    HEMOGLOBIN A1C WITH EAG    APOLIPOPROTEIN B    CBC WITH AUTOMATED DIFF    CRP, HIGH SENSITIVITY    TSH 3RD GENERATION    METABOLIC PANEL, COMPREHENSIVE    LIPID PANEL    NT-PRO BNP    AMB POC PT/INR         Follow-up and Dispositions    Return in about 3 months (around 1/3/2023).            Judy Garza MD

## 2022-10-04 LAB
ALBUMIN SERPL-MCNC: 3.8 G/DL (ref 3.5–5)
ALBUMIN/GLOB SERPL: 0.9 {RATIO} (ref 1.1–2.2)
ALP SERPL-CCNC: 81 U/L (ref 45–117)
ALT SERPL-CCNC: 19 U/L (ref 12–78)
ANION GAP SERPL CALC-SCNC: 4 MMOL/L (ref 5–15)
AST SERPL-CCNC: 9 U/L (ref 15–37)
BASOPHILS # BLD: 0 K/UL (ref 0–0.1)
BASOPHILS NFR BLD: 0 % (ref 0–1)
BILIRUB SERPL-MCNC: 0.3 MG/DL (ref 0.2–1)
BNP SERPL-MCNC: 1061 PG/ML
BUN SERPL-MCNC: 38 MG/DL (ref 6–20)
BUN/CREAT SERPL: 23 (ref 12–20)
CALCIUM SERPL-MCNC: 9.4 MG/DL (ref 8.5–10.1)
CHLORIDE SERPL-SCNC: 106 MMOL/L (ref 97–108)
CHOLEST SERPL-MCNC: 138 MG/DL
CO2 SERPL-SCNC: 29 MMOL/L (ref 21–32)
CREAT SERPL-MCNC: 1.65 MG/DL (ref 0.55–1.02)
CRP SERPL HS-MCNC: 8.6 MG/L
DIFFERENTIAL METHOD BLD: ABNORMAL
EOSINOPHIL # BLD: 0.2 K/UL (ref 0–0.4)
EOSINOPHIL NFR BLD: 5 % (ref 0–7)
ERYTHROCYTE [DISTWIDTH] IN BLOOD BY AUTOMATED COUNT: 17.4 % (ref 11.5–14.5)
EST. AVERAGE GLUCOSE BLD GHB EST-MCNC: 137 MG/DL
GLOBULIN SER CALC-MCNC: 4.1 G/DL (ref 2–4)
GLUCOSE SERPL-MCNC: 92 MG/DL (ref 65–100)
HBA1C MFR BLD: 6.4 % (ref 4–5.6)
HCT VFR BLD AUTO: 38.1 % (ref 35–47)
HDLC SERPL-MCNC: 55 MG/DL
HDLC SERPL: 2.5 {RATIO} (ref 0–5)
HGB BLD-MCNC: 11.1 G/DL (ref 11.5–16)
IMM GRANULOCYTES # BLD AUTO: 0 K/UL (ref 0–0.04)
IMM GRANULOCYTES NFR BLD AUTO: 0 % (ref 0–0.5)
LDLC SERPL CALC-MCNC: 70 MG/DL (ref 0–100)
LYMPHOCYTES # BLD: 1.6 K/UL (ref 0.8–3.5)
LYMPHOCYTES NFR BLD: 32 % (ref 12–49)
MCH RBC QN AUTO: 27.5 PG (ref 26–34)
MCHC RBC AUTO-ENTMCNC: 29.1 G/DL (ref 30–36.5)
MCV RBC AUTO: 94.3 FL (ref 80–99)
MONOCYTES # BLD: 0.5 K/UL (ref 0–1)
MONOCYTES NFR BLD: 11 % (ref 5–13)
NEUTS SEG # BLD: 2.5 K/UL (ref 1.8–8)
NEUTS SEG NFR BLD: 52 % (ref 32–75)
NRBC # BLD: 0 K/UL (ref 0–0.01)
NRBC BLD-RTO: 0 PER 100 WBC
PLATELET # BLD AUTO: 198 K/UL (ref 150–400)
PMV BLD AUTO: 11.3 FL (ref 8.9–12.9)
POTASSIUM SERPL-SCNC: 4.6 MMOL/L (ref 3.5–5.1)
PROT SERPL-MCNC: 7.9 G/DL (ref 6.4–8.2)
RBC # BLD AUTO: 4.04 M/UL (ref 3.8–5.2)
SODIUM SERPL-SCNC: 139 MMOL/L (ref 136–145)
TRIGL SERPL-MCNC: 65 MG/DL (ref ?–150)
TSH SERPL DL<=0.05 MIU/L-ACNC: 2.12 UIU/ML (ref 0.36–3.74)
VLDLC SERPL CALC-MCNC: 13 MG/DL
WBC # BLD AUTO: 4.8 K/UL (ref 3.6–11)

## 2022-10-06 LAB — APO B SERPL-MCNC: 72 MG/DL

## 2022-10-12 ENCOUNTER — TELEPHONE (OUTPATIENT)
Dept: INTERNAL MEDICINE CLINIC | Age: 77
End: 2022-10-12

## 2022-10-12 NOTE — TELEPHONE ENCOUNTER
Patient granddaughter called in PT 3.6, PER DR. Jimenes patient to hold coumadin x 2 days, then restart regular dose

## 2022-10-13 DIAGNOSIS — G40.909 SEIZURE DISORDER (HCC): ICD-10-CM

## 2022-10-13 RX ORDER — LACOSAMIDE 50 MG/1
TABLET ORAL
Qty: 180 TABLET | Refills: 3 | Status: ON HOLD | OUTPATIENT
Start: 2022-10-13

## 2022-10-18 ENCOUNTER — TELEPHONE (OUTPATIENT)
Dept: INTERNAL MEDICINE CLINIC | Age: 77
End: 2022-10-18

## 2022-10-18 NOTE — TELEPHONE ENCOUNTER
Chitra Hernandez called in INR 3.2 TAKING 1 1/2 TABS MON-FR AND 1 SAT AND SUN. PER DR. KELLY PATIENT TO HOLD X 2 DAYS AND RESTART.

## 2022-10-21 ENCOUNTER — TELEPHONE (OUTPATIENT)
Dept: INTERNAL MEDICINE CLINIC | Age: 77
End: 2022-10-21

## 2022-10-21 NOTE — TELEPHONE ENCOUNTER
Patient [facility] notified to give patientcoumadin 1-1/2 Monday through Thursday and 1 tab Friday, Saturday and sunday

## 2022-10-26 ENCOUNTER — TELEPHONE (OUTPATIENT)
Dept: INTERNAL MEDICINE CLINIC | Age: 77
End: 2022-10-26

## 2022-10-26 NOTE — TELEPHONE ENCOUNTER
Derek Meng called in PT 29.8 and INR 2.8 taking 1-1/2 tabs mon thru thurs and 1 tab fri,sat and sun.  No change per Dr. Ilya Hester

## 2022-10-27 ENCOUNTER — CLINICAL SUPPORT (OUTPATIENT)
Dept: INTERNAL MEDICINE CLINIC | Age: 77
End: 2022-10-27

## 2022-10-27 DIAGNOSIS — I10 PRIMARY HYPERTENSION: Primary | ICD-10-CM

## 2022-10-28 LAB
ANION GAP SERPL CALC-SCNC: 3 MMOL/L (ref 5–15)
BUN SERPL-MCNC: 39 MG/DL (ref 6–20)
BUN/CREAT SERPL: 21 (ref 12–20)
CALCIUM SERPL-MCNC: 9.7 MG/DL (ref 8.5–10.1)
CHLORIDE SERPL-SCNC: 109 MMOL/L (ref 97–108)
CO2 SERPL-SCNC: 28 MMOL/L (ref 21–32)
CREAT SERPL-MCNC: 1.83 MG/DL (ref 0.55–1.02)
GLUCOSE SERPL-MCNC: 88 MG/DL (ref 65–100)
POTASSIUM SERPL-SCNC: 5 MMOL/L (ref 3.5–5.1)
SODIUM SERPL-SCNC: 140 MMOL/L (ref 136–145)

## 2022-10-31 ENCOUNTER — APPOINTMENT (OUTPATIENT)
Dept: GENERAL RADIOLOGY | Age: 77
DRG: 189 | End: 2022-10-31
Attending: STUDENT IN AN ORGANIZED HEALTH CARE EDUCATION/TRAINING PROGRAM
Payer: MEDICARE

## 2022-10-31 ENCOUNTER — APPOINTMENT (OUTPATIENT)
Dept: CT IMAGING | Age: 77
DRG: 189 | End: 2022-10-31
Attending: FAMILY MEDICINE
Payer: MEDICARE

## 2022-10-31 ENCOUNTER — HOSPITAL ENCOUNTER (INPATIENT)
Age: 77
LOS: 8 days | Discharge: HOME HEALTH | DRG: 189 | End: 2022-11-08
Attending: STUDENT IN AN ORGANIZED HEALTH CARE EDUCATION/TRAINING PROGRAM | Admitting: FAMILY MEDICINE
Payer: MEDICARE

## 2022-10-31 DIAGNOSIS — J96.21 ACUTE ON CHRONIC RESPIRATORY FAILURE WITH HYPOXIA AND HYPERCAPNIA (HCC): ICD-10-CM

## 2022-10-31 DIAGNOSIS — I48.0 PAROXYSMAL ATRIAL FIBRILLATION (HCC): Chronic | ICD-10-CM

## 2022-10-31 DIAGNOSIS — I10 PRIMARY HYPERTENSION: ICD-10-CM

## 2022-10-31 DIAGNOSIS — R53.81 PHYSICAL DECONDITIONING: ICD-10-CM

## 2022-10-31 DIAGNOSIS — J45.901 ACUTE EXACERBATION OF COPD WITH ASTHMA (HCC): ICD-10-CM

## 2022-10-31 DIAGNOSIS — N17.9 ACUTE KIDNEY INJURY SUPERIMPOSED ON CHRONIC KIDNEY DISEASE (HCC): ICD-10-CM

## 2022-10-31 DIAGNOSIS — J96.22 ACUTE ON CHRONIC RESPIRATORY FAILURE WITH HYPOXIA AND HYPERCAPNIA (HCC): ICD-10-CM

## 2022-10-31 DIAGNOSIS — J44.1 ACUTE EXACERBATION OF COPD WITH ASTHMA (HCC): ICD-10-CM

## 2022-10-31 DIAGNOSIS — E66.01 MORBID OBESITY (HCC): Chronic | ICD-10-CM

## 2022-10-31 DIAGNOSIS — E78.5 DYSLIPIDEMIA: ICD-10-CM

## 2022-10-31 DIAGNOSIS — J81.0 ACUTE PULMONARY EDEMA (HCC): ICD-10-CM

## 2022-10-31 DIAGNOSIS — N18.9 ACUTE KIDNEY INJURY SUPERIMPOSED ON CHRONIC KIDNEY DISEASE (HCC): ICD-10-CM

## 2022-10-31 DIAGNOSIS — I50.9 ACUTE ON CHRONIC CONGESTIVE HEART FAILURE, UNSPECIFIED HEART FAILURE TYPE (HCC): Primary | ICD-10-CM

## 2022-10-31 DIAGNOSIS — G47.33 OBSTRUCTIVE SLEEP APNEA SYNDROME: ICD-10-CM

## 2022-10-31 DIAGNOSIS — R60.0 BILATERAL LEG EDEMA: ICD-10-CM

## 2022-10-31 LAB
ALBUMIN SERPL-MCNC: 2.8 G/DL (ref 3.5–5)
ALBUMIN/GLOB SERPL: 0.5 {RATIO} (ref 1.1–2.2)
ALP SERPL-CCNC: ABNORMAL U/L (ref 45–117)
ALT SERPL-CCNC: ABNORMAL U/L (ref 12–78)
ANION GAP SERPL CALC-SCNC: ABNORMAL MMOL/L (ref 5–15)
AST SERPL-CCNC: ABNORMAL U/L (ref 15–37)
ATRIAL RATE: 61 BPM
BASOPHILS # BLD: 0 K/UL (ref 0–0.1)
BASOPHILS NFR BLD: 0 % (ref 0–1)
BILIRUB SERPL-MCNC: <0.1 MG/DL (ref 0.2–1)
BNP SERPL-MCNC: 1350 PG/ML
BUN SERPL-MCNC: 43 MG/DL (ref 6–20)
BUN/CREAT SERPL: 22 (ref 12–20)
CALCIUM SERPL-MCNC: 8.3 MG/DL (ref 8.5–10.1)
CALCULATED P AXIS, ECG09: 45 DEGREES
CALCULATED R AXIS, ECG10: 18 DEGREES
CALCULATED T AXIS, ECG11: 99 DEGREES
CHLORIDE SERPL-SCNC: 106 MMOL/L (ref 97–108)
CO2 SERPL-SCNC: 29 MMOL/L (ref 21–32)
CREAT SERPL-MCNC: 1.97 MG/DL (ref 0.55–1.02)
DIAGNOSIS, 93000: NORMAL
DIFFERENTIAL METHOD BLD: ABNORMAL
EOSINOPHIL # BLD: 0.2 K/UL (ref 0–0.4)
EOSINOPHIL NFR BLD: 4 % (ref 0–7)
ERYTHROCYTE [DISTWIDTH] IN BLOOD BY AUTOMATED COUNT: 20.6 % (ref 11.5–14.5)
GLOBULIN SER CALC-MCNC: 5.6 G/DL (ref 2–4)
GLUCOSE SERPL-MCNC: 90 MG/DL (ref 65–100)
HCT VFR BLD AUTO: 33.2 % (ref 35–47)
HGB BLD-MCNC: 9.9 G/DL (ref 11.5–16)
IMM GRANULOCYTES # BLD AUTO: 0 K/UL (ref 0–0.04)
IMM GRANULOCYTES NFR BLD AUTO: 0 % (ref 0–0.5)
INR PPP: 5.5 (ref 0.9–1.1)
LACTATE SERPL-SCNC: 0.4 MMOL/L (ref 0.4–2)
LYMPHOCYTES # BLD: 1.4 K/UL (ref 0.8–3.5)
LYMPHOCYTES NFR BLD: 28 % (ref 12–49)
MAGNESIUM SERPL-MCNC: NORMAL MG/DL (ref 1.6–2.4)
MCH RBC QN AUTO: 28.4 PG (ref 26–34)
MCHC RBC AUTO-ENTMCNC: 29.8 G/DL (ref 30–36.5)
MCV RBC AUTO: 95.1 FL (ref 80–99)
MONOCYTES # BLD: 0.6 K/UL (ref 0–1)
MONOCYTES NFR BLD: 12 % (ref 5–13)
NEUTS SEG # BLD: 2.7 K/UL (ref 1.8–8)
NEUTS SEG NFR BLD: 56 % (ref 32–75)
NRBC # BLD: 0 K/UL (ref 0–0.01)
NRBC BLD-RTO: 0 PER 100 WBC
P-R INTERVAL, ECG05: 160 MS
PLATELET # BLD AUTO: 183 K/UL (ref 150–400)
PMV BLD AUTO: 10.8 FL (ref 8.9–12.9)
POTASSIUM SERPL-SCNC: ABNORMAL MMOL/L (ref 3.5–5.1)
PROCALCITONIN SERPL-MCNC: 0.05 NG/ML
PROT SERPL-MCNC: 8.4 G/DL (ref 6.4–8.2)
PROTHROMBIN TIME: 51.7 SEC (ref 9–11.1)
Q-T INTERVAL, ECG07: 406 MS
QRS DURATION, ECG06: 68 MS
QTC CALCULATION (BEZET), ECG08: 408 MS
RBC # BLD AUTO: 3.49 M/UL (ref 3.8–5.2)
RBC MORPH BLD: ABNORMAL
RBC MORPH BLD: ABNORMAL
SODIUM SERPL-SCNC: 129 MMOL/L (ref 136–145)
TROPONIN-HIGH SENSITIVITY: 7 NG/L (ref 0–51)
VENTRICULAR RATE, ECG03: 61 BPM
WBC # BLD AUTO: 4.9 K/UL (ref 3.6–11)

## 2022-10-31 PROCEDURE — 96374 THER/PROPH/DIAG INJ IV PUSH: CPT

## 2022-10-31 PROCEDURE — 70450 CT HEAD/BRAIN W/O DYE: CPT

## 2022-10-31 PROCEDURE — 99285 EMERGENCY DEPT VISIT HI MDM: CPT

## 2022-10-31 PROCEDURE — 83880 ASSAY OF NATRIURETIC PEPTIDE: CPT

## 2022-10-31 PROCEDURE — 84145 PROCALCITONIN (PCT): CPT

## 2022-10-31 PROCEDURE — 74011250636 HC RX REV CODE- 250/636: Performed by: STUDENT IN AN ORGANIZED HEALTH CARE EDUCATION/TRAINING PROGRAM

## 2022-10-31 PROCEDURE — 80053 COMPREHEN METABOLIC PANEL: CPT

## 2022-10-31 PROCEDURE — 80235 DRUG ASSAY LACOSAMIDE: CPT

## 2022-10-31 PROCEDURE — 65270000046 HC RM TELEMETRY

## 2022-10-31 PROCEDURE — 36415 COLL VENOUS BLD VENIPUNCTURE: CPT

## 2022-10-31 PROCEDURE — 85610 PROTHROMBIN TIME: CPT

## 2022-10-31 PROCEDURE — 71045 X-RAY EXAM CHEST 1 VIEW: CPT

## 2022-10-31 PROCEDURE — 93005 ELECTROCARDIOGRAM TRACING: CPT

## 2022-10-31 PROCEDURE — 83605 ASSAY OF LACTIC ACID: CPT

## 2022-10-31 PROCEDURE — 85025 COMPLETE CBC W/AUTO DIFF WBC: CPT

## 2022-10-31 PROCEDURE — 87040 BLOOD CULTURE FOR BACTERIA: CPT

## 2022-10-31 PROCEDURE — 74176 CT ABD & PELVIS W/O CONTRAST: CPT

## 2022-10-31 PROCEDURE — 83735 ASSAY OF MAGNESIUM: CPT

## 2022-10-31 PROCEDURE — 84484 ASSAY OF TROPONIN QUANT: CPT

## 2022-10-31 RX ORDER — IPRATROPIUM BROMIDE AND ALBUTEROL SULFATE 2.5; .5 MG/3ML; MG/3ML
3 SOLUTION RESPIRATORY (INHALATION)
Status: DISCONTINUED | OUTPATIENT
Start: 2022-10-31 | End: 2022-11-08 | Stop reason: HOSPADM

## 2022-10-31 RX ORDER — FUROSEMIDE 10 MG/ML
40 INJECTION INTRAMUSCULAR; INTRAVENOUS ONCE
Status: COMPLETED | OUTPATIENT
Start: 2022-10-31 | End: 2022-10-31

## 2022-10-31 RX ORDER — LACOSAMIDE 50 MG/1
50 TABLET ORAL 2 TIMES DAILY
Status: DISCONTINUED | OUTPATIENT
Start: 2022-10-31 | End: 2022-11-08 | Stop reason: HOSPADM

## 2022-10-31 RX ADMIN — FUROSEMIDE 40 MG: 10 INJECTION, SOLUTION INTRAVENOUS at 16:25

## 2022-10-31 NOTE — ED TRIAGE NOTES
Pt arrives via EMS from home for shortness of breath. SOB started yesterday, RA sats 74% at home today.  Pt took inhaler prior to EMS arrival.  RA at baseline, currently on 4L O2    Hx: CHF, asthma

## 2022-10-31 NOTE — ED PROVIDER NOTES
Bob Gatica is a 76 y.o. female with past medical history notable for diabetes, CHF, edema, arthritis presenting with shortness of breath. She was satting 74% on room air this morning. She is not on oxygen at baseline. He has had gradually worsening edema and weight gain of late. Denies fevers, chills, productive cough. She has had cough with recumbency. No pain complaints at present. Past Medical History:   Diagnosis Date    Arthritis     Asthma     Diabetes ([address]     Glaucoma     Hypertension     Other ill-defined conditions(799.89)     rt knee surgery    Stroke [facility])        Past Surgical History:   Procedure Laterality Date    HX COLONOSCOPY      HX GYN           No family history on file. Social History     Socioeconomic History    Marital status:      Spouse name: Not on file    Number of children: 1    Years of education: Not on file    Highest education level: Not on file   Occupational History    Occupation: disabled--[employer]   Tobacco Use    Smoking status: Former    Smokeless tobacco: Never    Tobacco comments:     20+years ago   Vaping Use    Vaping Use: Never used   Substance and Sexual Activity    Alcohol use: No    Drug use: No    Sexual activity: Not Currently   Other Topics Concern    Not on file   Social History Narrative    Not on file     Social Determinants of Health     Financial Resource Strain: Not on file   Food Insecurity: Not on file   Transportation Needs: Not on file   Physical Activity: Not on file   Stress: Not on file   Social Connections: Not on file   Intimate Partner Violence: Not on file   Housing Stability: Not on file         ALLERGIES: Clonidine and Metformin    Review of Systems   Constitutional:  Negative for chills and fever. Eyes:  Negative for photophobia. Respiratory:  Negative for shortness of breath. Cardiovascular:  Negative for chest pain. Gastrointestinal:  Negative for abdominal pain, nausea and vomiting.    Genitourinary:  Negative for dysuria and flank pain. Musculoskeletal:  Negative for back pain. Neurological:  Negative for syncope, light-headedness and headaches. Psychiatric/Behavioral:  Negative for confusion. All other systems reviewed and are negative. Vitals:    10/31/22 1349 10/31/22 1359   BP: 139/71    Pulse: 62    Resp: 12    Temp: 98 °F (36.7 °C)    SpO2: 93% 93%   Weight: 136.5 kg (301 lb)    Height: 5' 2\" (1.575 m)             Physical Exam  Constitutional:       General: She is not in acute distress. Appearance: She is obese. She is not toxic-appearing. HENT:      Head: Normocephalic and atraumatic. Mouth/Throat:      Mouth: Mucous membranes are moist.   Eyes:      Extraocular Movements: Extraocular movements intact. Neck:      Vascular: No JVD. Cardiovascular:      Rate and Rhythm: Normal rate and regular rhythm. Heart sounds: Normal heart sounds. Pulmonary:      Effort: Pulmonary effort is normal. No respiratory distress. Breath sounds: Examination of the right-lower field reveals decreased breath sounds. Examination of the left-lower field reveals decreased breath sounds. Decreased breath sounds present. Abdominal:      Palpations: Abdomen is soft. Tenderness: There is no abdominal tenderness. Musculoskeletal:      Cervical back: Normal range of motion. Right lower leg: Edema present. Left lower leg: Edema present. Skin:     Capillary Refill: Capillary refill takes less than 2 seconds. Neurological:      General: No focal deficit present. Mental Status: She is alert and oriented to person, place, and time. Psychiatric:         Mood and Affect: Mood normal.        MDM     Amount and/or Complexity of Data Reviewed  Decide to obtain previous medical records or to obtain history from someone other than the patient: yes      ED Course as of 10/31/22 1816   Mon Oct 31, 2022   1806 EK:54 PM    Normal sinus rhythm, ventricular rate 61, normal axis, ST elevation. [NS]      ED Course User Index  [NS] Abdi Freeman MD       Procedures    MEDICAL DECISION MAKIN y.o. female presents with Shortness of Breath    Differential diagnosis includes but not limited to: CHF, pneumonia, pleural effusions, renal failure    LABORATORY TESTS:  Labs Reviewed   CBC WITH AUTOMATED DIFF - Abnormal; Notable for the following components:       Result Value    RBC 3.49 (*)     HGB 9.9 (*)     HCT 33.2 (*)     MCHC 29.8 (*)     RDW 20.6 (*)     All other components within normal limits   METABOLIC PANEL, COMPREHENSIVE - Abnormal; Notable for the following components:    Sodium 129 (*)     BUN 43 (*)     Creatinine 1.97 (*)     BUN/Creatinine ratio 22 (*)     eGFR 25 (*)     Calcium 8.3 (*)     Bilirubin, total <0.1 (*)     Protein, total 8.4 (*)     Albumin 2.8 (*)     Globulin 5.6 (*)     A-G Ratio 0.5 (*)     All other components within normal limits   NT-PRO BNP - Abnormal; Notable for the following components:    NT pro-BNP 1,350 (*)     All other components within normal limits   CULTURE, BLOOD   CULTURE, BLOOD   MAGNESIUM   TROPONIN-HIGH SENSITIVITY   PROCALCITONIN   LACTIC ACID   SAMPLES BEING HELD       IMAGING RESULTS:  XR CHEST PORT   Final Result      Cardiomegaly and mild pulmonary edema which is increased from the prior study. MEDICATIONS GIVEN:  Medications   furosemide (LASIX) injection 40 mg (40 mg IntraVENous Given 10/31/22 8415)       PROGRESS NOTE:   6:15 PM Patient's symptoms have improved after treatment    EKG:  Reviewed     CONSULTS:  Hospitalist Consult: [facility] for Admission  6:15 PM    ED Room Number: ER14/14  Patient Name and age:  Juana Gandara 76 y.o.  female  Working Diagnosis:   1.  Acute on chronic congestive heart failure, unspecified heart failure type ([facility] [address]        COVID-19 Suspicion:  no  Sepsis present:  no  Reassessment needed: no  Code Status:  Full Code  Readmission: no  Isolation Requirements:  no  Recommended Level of Care: telemetry  Department:[facility] Adult ED - [phone]  Other: Worsening edema, weight gain and dyspnea now requiring 2 to 3 L of oxygen, satting in the 70s on room air. IMPRESSION:  1. Acute on chronic congestive heart failure, unspecified heart failure type ([address]        PLAN:  - Admit to hospitalist    Lakshmi Mahmood MD      Please note that this dictation was completed with [employer], the computer voice recognition software. Quite often unanticipated grammatical, syntax, homophones, and other interpretive errors are inadvertently transcribed by the computer software. Please disregard these errors. Please excuse any errors that have escaped final proofreading.

## 2022-11-01 ENCOUNTER — APPOINTMENT (OUTPATIENT)
Dept: NON INVASIVE DIAGNOSTICS | Age: 77
DRG: 189 | End: 2022-11-01
Attending: STUDENT IN AN ORGANIZED HEALTH CARE EDUCATION/TRAINING PROGRAM
Payer: MEDICARE

## 2022-11-01 LAB
ALBUMIN SERPL-MCNC: 3.4 G/DL (ref 3.5–5)
ALBUMIN/GLOB SERPL: 0.7 {RATIO} (ref 1.1–2.2)
ALP SERPL-CCNC: 70 U/L (ref 45–117)
ALT SERPL-CCNC: 21 U/L (ref 12–78)
ANION GAP SERPL CALC-SCNC: 4 MMOL/L (ref 5–15)
APTT PPP: 48.5 SEC (ref 22.1–31)
ARTERIAL PATENCY WRIST A: POSITIVE
ARTERIAL PATENCY WRIST A: POSITIVE
AST SERPL-CCNC: 11 U/L (ref 15–37)
B PERT DNA SPEC QL NAA+PROBE: NOT DETECTED
BASE DEFICIT BLD-SCNC: 0.2 MMOL/L
BASE DEFICIT BLD-SCNC: 1.1 MMOL/L
BASE EXCESS BLDV CALC-SCNC: 0 MMOL/L
BASOPHILS # BLD: 0 K/UL (ref 0–0.1)
BASOPHILS NFR BLD: 0 % (ref 0–1)
BDY SITE: ABNORMAL
BILIRUB SERPL-MCNC: 0.3 MG/DL (ref 0.2–1)
BORDETELLA PARAPERTUSSIS PCR, BORPAR: NOT DETECTED
BUN SERPL-MCNC: 40 MG/DL (ref 6–20)
BUN/CREAT SERPL: 22 (ref 12–20)
C PNEUM DNA SPEC QL NAA+PROBE: NOT DETECTED
CALCIUM SERPL-MCNC: 9.4 MG/DL (ref 8.5–10.1)
CHLORIDE SERPL-SCNC: 106 MMOL/L (ref 97–108)
CO2 SERPL-SCNC: 29 MMOL/L (ref 21–32)
CREAT SERPL-MCNC: 1.84 MG/DL (ref 0.55–1.02)
DIFFERENTIAL METHOD BLD: ABNORMAL
ECHO AV PEAK GRADIENT: 11 MMHG
ECHO AV PEAK VELOCITY: 1.7 M/S
ECHO AV VELOCITY RATIO: 0.88
ECHO EST RA PRESSURE: 10 MMHG
ECHO LVOT PEAK GRADIENT: 8 MMHG
ECHO LVOT PEAK VELOCITY: 1.5 M/S
ECHO MV A VELOCITY: 0.9 M/S
ECHO MV AREA PHT: 2.6 CM2
ECHO MV E DECELERATION TIME (DT): 289.1 MS
ECHO MV E VELOCITY: 1.03 M/S
ECHO MV E/A RATIO: 1.14
ECHO MV PRESSURE HALF TIME (PHT): 83.8 MS
ECHO PV MAX VELOCITY: 0.7 M/S
ECHO PV PEAK GRADIENT: 2 MMHG
ECHO RIGHT VENTRICULAR SYSTOLIC PRESSURE (RVSP): 30 MMHG
ECHO TV REGURGITANT MAX VELOCITY: 2.25 M/S
ECHO TV REGURGITANT PEAK GRADIENT: 20 MMHG
EOSINOPHIL # BLD: 0.2 K/UL (ref 0–0.4)
EOSINOPHIL NFR BLD: 4 % (ref 0–7)
ERYTHROCYTE [DISTWIDTH] IN BLOOD BY AUTOMATED COUNT: 18.2 % (ref 11.5–14.5)
EST. AVERAGE GLUCOSE BLD GHB EST-MCNC: 126 MG/DL
FLUAV H1 2009 PAND RNA SPEC QL NAA+PROBE: NOT DETECTED
FLUAV H1 RNA SPEC QL NAA+PROBE: NOT DETECTED
FLUAV H3 RNA SPEC QL NAA+PROBE: NOT DETECTED
FLUAV SUBTYP SPEC NAA+PROBE: NOT DETECTED
FLUBV RNA SPEC QL NAA+PROBE: NOT DETECTED
GAS FLOW.O2 O2 DELIVERY SYS: ABNORMAL L/MIN
GLOBULIN SER CALC-MCNC: 5 G/DL (ref 2–4)
GLUCOSE BLD STRIP.AUTO-MCNC: 123 MG/DL (ref 65–117)
GLUCOSE BLD STRIP.AUTO-MCNC: 127 MG/DL (ref 65–117)
GLUCOSE BLD STRIP.AUTO-MCNC: 143 MG/DL (ref 65–117)
GLUCOSE BLD STRIP.AUTO-MCNC: 162 MG/DL (ref 65–117)
GLUCOSE SERPL-MCNC: 122 MG/DL (ref 65–100)
HADV DNA SPEC QL NAA+PROBE: NOT DETECTED
HBA1C MFR BLD: 6 % (ref 4–5.6)
HCO3 BLD-SCNC: 26.9 MMOL/L (ref 22–26)
HCO3 BLD-SCNC: 27.4 MMOL/L (ref 22–26)
HCO3 BLDV-SCNC: 29.4 MMOL/L (ref 23–28)
HCOV 229E RNA SPEC QL NAA+PROBE: NOT DETECTED
HCOV HKU1 RNA SPEC QL NAA+PROBE: NOT DETECTED
HCOV NL63 RNA SPEC QL NAA+PROBE: NOT DETECTED
HCOV OC43 RNA SPEC QL NAA+PROBE: NOT DETECTED
HCT VFR BLD AUTO: 36.2 % (ref 35–47)
HGB BLD-MCNC: 10.7 G/DL (ref 11.5–16)
HMPV RNA SPEC QL NAA+PROBE: NOT DETECTED
HPIV1 RNA SPEC QL NAA+PROBE: NOT DETECTED
HPIV2 RNA SPEC QL NAA+PROBE: NOT DETECTED
HPIV3 RNA SPEC QL NAA+PROBE: NOT DETECTED
HPIV4 RNA SPEC QL NAA+PROBE: NOT DETECTED
IMM GRANULOCYTES # BLD AUTO: 0 K/UL (ref 0–0.04)
IMM GRANULOCYTES NFR BLD AUTO: 1 % (ref 0–0.5)
INR PPP: 4.8 (ref 0.9–1.1)
LYMPHOCYTES # BLD: 1 K/UL (ref 0.8–3.5)
LYMPHOCYTES NFR BLD: 24 % (ref 12–49)
M PNEUMO DNA SPEC QL NAA+PROBE: NOT DETECTED
MAGNESIUM SERPL-MCNC: 2 MG/DL (ref 1.6–2.4)
MCH RBC QN AUTO: 28.2 PG (ref 26–34)
MCHC RBC AUTO-ENTMCNC: 29.6 G/DL (ref 30–36.5)
MCV RBC AUTO: 95.3 FL (ref 80–99)
MONOCYTES # BLD: 0.2 K/UL (ref 0–1)
MONOCYTES NFR BLD: 6 % (ref 5–13)
NEUTS SEG # BLD: 2.8 K/UL (ref 1.8–8)
NEUTS SEG NFR BLD: 65 % (ref 32–75)
NRBC # BLD: 0 K/UL (ref 0–0.01)
NRBC BLD-RTO: 0 PER 100 WBC
O2/TOTAL GAS SETTING VFR VENT: 35 %
PCO2 BLD: 57.2 MMHG (ref 35–45)
PCO2 BLD: 59.3 MMHG (ref 35–45)
PCO2 BLDV: 70.1 MMHG (ref 41–51)
PEEP RESPIRATORY: 7 CMH2O
PEEP RESPIRATORY: 7 CMH2O
PH BLD: 7.26 [PH] (ref 7.35–7.45)
PH BLD: 7.29 [PH] (ref 7.35–7.45)
PH BLDV: 7.23 [PH] (ref 7.32–7.42)
PLATELET # BLD AUTO: 180 K/UL (ref 150–400)
PMV BLD AUTO: 10.7 FL (ref 8.9–12.9)
PO2 BLD: 25 MMHG (ref 80–100)
PO2 BLD: 74 MMHG (ref 80–100)
PO2 BLDV: 23 MMHG (ref 25–40)
POTASSIUM SERPL-SCNC: 4.9 MMOL/L (ref 3.5–5.1)
PRESSURE SUPPORT SETTING VENT: 7 CMH2O
PROT SERPL-MCNC: 8.4 G/DL (ref 6.4–8.2)
PROTHROMBIN TIME: 45.5 SEC (ref 9–11.1)
RBC # BLD AUTO: 3.8 M/UL (ref 3.8–5.2)
RSV RNA SPEC QL NAA+PROBE: NOT DETECTED
RV+EV RNA SPEC QL NAA+PROBE: NOT DETECTED
SAO2 % BLD: 36.4 % (ref 92–97)
SAO2 % BLD: 91.9 % (ref 92–97)
SAO2 % BLDV: 28.8 % (ref 65–88)
SARS-COV-2 PCR, COVPCR: NOT DETECTED
SERVICE CMNT-IMP: ABNORMAL
SODIUM SERPL-SCNC: 139 MMOL/L (ref 136–145)
SPECIMEN TYPE: ABNORMAL
THERAPEUTIC RANGE,PTTT: ABNORMAL SECS (ref 58–77)
VENTILATION MODE VENT: ABNORMAL
WBC # BLD AUTO: 4.2 K/UL (ref 3.6–11)

## 2022-11-01 PROCEDURE — 74011000250 HC RX REV CODE- 250: Performed by: FAMILY MEDICINE

## 2022-11-01 PROCEDURE — 0202U NFCT DS 22 TRGT SARS-COV-2: CPT

## 2022-11-01 PROCEDURE — 83735 ASSAY OF MAGNESIUM: CPT

## 2022-11-01 PROCEDURE — C8929 TTE W OR WO FOL WCON,DOPPLER: HCPCS

## 2022-11-01 PROCEDURE — 36600 WITHDRAWAL OF ARTERIAL BLOOD: CPT

## 2022-11-01 PROCEDURE — 85730 THROMBOPLASTIN TIME PARTIAL: CPT

## 2022-11-01 PROCEDURE — 94640 AIRWAY INHALATION TREATMENT: CPT

## 2022-11-01 PROCEDURE — 94660 CPAP INITIATION&MGMT: CPT

## 2022-11-01 PROCEDURE — 74011250636 HC RX REV CODE- 250/636: Performed by: FAMILY MEDICINE

## 2022-11-01 PROCEDURE — 85025 COMPLETE CBC W/AUTO DIFF WBC: CPT

## 2022-11-01 PROCEDURE — 80053 COMPREHEN METABOLIC PANEL: CPT

## 2022-11-01 PROCEDURE — 77030029684 HC NEB SM VOL KT MONA -A

## 2022-11-01 PROCEDURE — 82803 BLOOD GASES ANY COMBINATION: CPT

## 2022-11-01 PROCEDURE — 36415 COLL VENOUS BLD VENIPUNCTURE: CPT

## 2022-11-01 PROCEDURE — 85610 PROTHROMBIN TIME: CPT

## 2022-11-01 PROCEDURE — 83036 HEMOGLOBIN GLYCOSYLATED A1C: CPT

## 2022-11-01 PROCEDURE — 74011636637 HC RX REV CODE- 636/637: Performed by: FAMILY MEDICINE

## 2022-11-01 PROCEDURE — 5A09557 ASSISTANCE WITH RESPIRATORY VENTILATION, GREATER THAN 96 CONSECUTIVE HOURS, CONTINUOUS POSITIVE AIRWAY PRESSURE: ICD-10-PCS | Performed by: INTERNAL MEDICINE

## 2022-11-01 PROCEDURE — 74011250637 HC RX REV CODE- 250/637: Performed by: FAMILY MEDICINE

## 2022-11-01 PROCEDURE — 65660000001 HC RM ICU INTERMED STEPDOWN

## 2022-11-01 PROCEDURE — 82962 GLUCOSE BLOOD TEST: CPT

## 2022-11-01 RX ORDER — ATORVASTATIN CALCIUM 40 MG/1
80 TABLET, FILM COATED ORAL DAILY
Status: DISCONTINUED | OUTPATIENT
Start: 2022-11-01 | End: 2022-11-08 | Stop reason: HOSPADM

## 2022-11-01 RX ORDER — ONDANSETRON 4 MG/1
4 TABLET, ORALLY DISINTEGRATING ORAL
Status: DISCONTINUED | OUTPATIENT
Start: 2022-11-01 | End: 2022-11-08 | Stop reason: HOSPADM

## 2022-11-01 RX ORDER — SODIUM CHLORIDE 0.9 % (FLUSH) 0.9 %
5-40 SYRINGE (ML) INJECTION AS NEEDED
Status: DISCONTINUED | OUTPATIENT
Start: 2022-11-01 | End: 2022-11-08 | Stop reason: HOSPADM

## 2022-11-01 RX ORDER — BUDESONIDE 0.5 MG/2ML
500 INHALANT ORAL
Status: DISCONTINUED | OUTPATIENT
Start: 2022-11-01 | End: 2022-11-08 | Stop reason: HOSPADM

## 2022-11-01 RX ORDER — ASPIRIN 81 MG/1
81 TABLET ORAL DAILY
Status: DISCONTINUED | OUTPATIENT
Start: 2022-11-01 | End: 2022-11-08 | Stop reason: HOSPADM

## 2022-11-01 RX ORDER — POLYETHYLENE GLYCOL 3350 17 G/17G
17 POWDER, FOR SOLUTION ORAL DAILY PRN
Status: DISCONTINUED | OUTPATIENT
Start: 2022-11-01 | End: 2022-11-08 | Stop reason: HOSPADM

## 2022-11-01 RX ORDER — CARVEDILOL 12.5 MG/1
12.5 TABLET ORAL 2 TIMES DAILY
Status: DISCONTINUED | OUTPATIENT
Start: 2022-11-01 | End: 2022-11-08 | Stop reason: HOSPADM

## 2022-11-01 RX ORDER — INSULIN LISPRO 100 [IU]/ML
INJECTION, SOLUTION INTRAVENOUS; SUBCUTANEOUS
Status: DISCONTINUED | OUTPATIENT
Start: 2022-11-01 | End: 2022-11-08 | Stop reason: HOSPADM

## 2022-11-01 RX ORDER — ACETAMINOPHEN 650 MG/1
650 SUPPOSITORY RECTAL
Status: DISCONTINUED | OUTPATIENT
Start: 2022-11-01 | End: 2022-11-08 | Stop reason: HOSPADM

## 2022-11-01 RX ORDER — ARFORMOTEROL TARTRATE 15 UG/2ML
15 SOLUTION RESPIRATORY (INHALATION)
Status: DISCONTINUED | OUTPATIENT
Start: 2022-11-01 | End: 2022-11-08 | Stop reason: HOSPADM

## 2022-11-01 RX ORDER — ACETAMINOPHEN 325 MG/1
650 TABLET ORAL
Status: DISCONTINUED | OUTPATIENT
Start: 2022-11-01 | End: 2022-11-08 | Stop reason: HOSPADM

## 2022-11-01 RX ORDER — AMLODIPINE BESYLATE 5 MG/1
10 TABLET ORAL DAILY
Status: DISCONTINUED | OUTPATIENT
Start: 2022-11-01 | End: 2022-11-08 | Stop reason: HOSPADM

## 2022-11-01 RX ORDER — SODIUM CHLORIDE 0.9 % (FLUSH) 0.9 %
5-40 SYRINGE (ML) INJECTION EVERY 8 HOURS
Status: DISCONTINUED | OUTPATIENT
Start: 2022-11-01 | End: 2022-11-08 | Stop reason: HOSPADM

## 2022-11-01 RX ORDER — BUDESONIDE AND FORMOTEROL FUMARATE DIHYDRATE 160; 4.5 UG/1; UG/1
2 AEROSOL RESPIRATORY (INHALATION) 2 TIMES DAILY
Status: DISCONTINUED | OUTPATIENT
Start: 2022-11-01 | End: 2022-11-01 | Stop reason: CLARIF

## 2022-11-01 RX ORDER — MAGNESIUM SULFATE 100 %
4 CRYSTALS MISCELLANEOUS AS NEEDED
Status: DISCONTINUED | OUTPATIENT
Start: 2022-11-01 | End: 2022-11-08 | Stop reason: HOSPADM

## 2022-11-01 RX ORDER — ONDANSETRON 2 MG/ML
4 INJECTION INTRAMUSCULAR; INTRAVENOUS
Status: DISCONTINUED | OUTPATIENT
Start: 2022-11-01 | End: 2022-11-08 | Stop reason: HOSPADM

## 2022-11-01 RX ADMIN — AMLODIPINE BESYLATE 10 MG: 5 TABLET ORAL at 09:08

## 2022-11-01 RX ADMIN — Medication 10 ML: at 21:13

## 2022-11-01 RX ADMIN — BUDESONIDE 500 MCG: 0.5 INHALANT RESPIRATORY (INHALATION) at 08:14

## 2022-11-01 RX ADMIN — ARFORMOTEROL TARTRATE 15 MCG: 15 SOLUTION RESPIRATORY (INHALATION) at 20:11

## 2022-11-01 RX ADMIN — LEVETIRACETAM 1000 MG: 100 SOLUTION ORAL at 08:47

## 2022-11-01 RX ADMIN — ARFORMOTEROL TARTRATE 15 MCG: 15 SOLUTION RESPIRATORY (INHALATION) at 08:13

## 2022-11-01 RX ADMIN — Medication 10 ML: at 14:21

## 2022-11-01 RX ADMIN — ATORVASTATIN CALCIUM 80 MG: 40 TABLET, FILM COATED ORAL at 08:47

## 2022-11-01 RX ADMIN — LACOSAMIDE 50 MG: 50 TABLET, FILM COATED ORAL at 19:57

## 2022-11-01 RX ADMIN — Medication 10 ML: at 02:20

## 2022-11-01 RX ADMIN — METHYLPREDNISOLONE SODIUM SUCCINATE 40 MG: 40 INJECTION, POWDER, FOR SOLUTION INTRAMUSCULAR; INTRAVENOUS at 14:20

## 2022-11-01 RX ADMIN — Medication 10 ML: at 06:00

## 2022-11-01 RX ADMIN — CARVEDILOL 12.5 MG: 12.5 TABLET, FILM COATED ORAL at 19:57

## 2022-11-01 RX ADMIN — ASPIRIN 81 MG: 81 TABLET, COATED ORAL at 08:47

## 2022-11-01 RX ADMIN — LACOSAMIDE 50 MG: 50 TABLET, FILM COATED ORAL at 08:47

## 2022-11-01 RX ADMIN — LEVETIRACETAM 1000 MG: 100 SOLUTION ORAL at 02:19

## 2022-11-01 RX ADMIN — LACOSAMIDE 50 MG: 50 TABLET, FILM COATED ORAL at 02:19

## 2022-11-01 RX ADMIN — Medication 2 UNITS: at 12:49

## 2022-11-01 RX ADMIN — BUDESONIDE 500 MCG: 0.5 INHALANT RESPIRATORY (INHALATION) at 20:11

## 2022-11-01 RX ADMIN — METHYLPREDNISOLONE SODIUM SUCCINATE 40 MG: 40 INJECTION, POWDER, FOR SOLUTION INTRAMUSCULAR; INTRAVENOUS at 19:57

## 2022-11-01 RX ADMIN — METHYLPREDNISOLONE SODIUM SUCCINATE 125 MG: 125 INJECTION, POWDER, FOR SOLUTION INTRAMUSCULAR; INTRAVENOUS at 02:19

## 2022-11-01 RX ADMIN — LEVETIRACETAM 1000 MG: 100 SOLUTION ORAL at 21:13

## 2022-11-01 NOTE — PROGRESS NOTES
[facility]  Hospitalist Group                                                                                          Hospitalist Progress Note  Julieta Bauer MD  Answering service: [phone] from in house phone        Date of Service:  2022  NAME:  Carmen Gutierrez  :  1945  MRN:  [id number]      Admission Summary:   Carmen Gutierrez is a 76 y.o. female with past medical history of arthritis, asthma, sleep apnea, type 2 diabetes mellitus, glaucoma, hypertension, stroke, class III obesity presented to the emergency department via EMS from home accompanied by her daughter with chief complaint of shortness of breath. Patient is a limited historian. Her daughter provides additional history. Remainder of history was obtained per review of ED electronic medical records. Today, patient reported was short of breath, remain constant, severe, without specific alleviating factors, exacerbated with movement. Her daughter notes that patient's oxygen saturations may be on occasion into the 60s on first awakening from sleep. She has a history of sleep apnea but does not use CPAP Pap machine for several years as it is broke. EMS was called to the scene. Patient reportedly was hypoxic with O2 saturations at 74%. Patient has been taken inhalers prior to arrival.  She was placed on 4 L oxygen via nasal cannula. On arrival to the emergency department, initial recorded vital signs temperature 98.0 °F, /71, heart rate 64, respirate 12, O2 saturation 93% on 4 L oxygen via nasal cannula. Abnormal labs included hemoglobin 9.9, sodium 129, BUN 43, creatinine 1.97, GFR 25, calcium 8.3, albumin 2.3, proBNP 1350. Chest reportable showed cardiomegaly and mild pulmonary edema. Patient was given Lasix 40 mg IV x1 dose. Patient is now seen for admission to the hospital service with continued evaluation and treatments. Patient has occasional cough wheezing.   She also in addition take Spiriva along with albuterol. Interval history / Subjective:   Patient seen and examined along with patient's family at bedside  Follow-up for CHF and acute hypoxic respiratory failure on CPAP  Blood gas reviewed acidotic with low oxygenation reviewed and discussed with RN placed back on CPAP again  Dr. Mainor Tesfaye is the primary care physician will handover the patient to Dr. Robson Sweet:     Acute on chronic CHF systolic versus diastolic echo requested  -chest xray showing cardiomegaly and pulmonary edema  -given Lasix 40 mg IV x 1 dose  -Continue monitor I's and O's Daily weight echocardiogram requested  -Consulted cardiology  -already on Coreg; on ACEI  -May hold lisinopril for elevated creatinine  --Elevated PROBNP     2. Acute hypoxic hypercapnic respiratory failure due to COPD asthma exacerbation  -Repeat ABG continue CPAP and supplemental oxygen  --Continue nebs and steroid  -Resume back on CPAP with supplemental oxygen tonight  -Consult pulmonologist in a.m.  -order respiratory viral panel/ COVID/ influenza testing     3. LYDIA on CKD 3  -BUN 43, creatinine 1.97, GFR 25  -Repeat renal panel in a.m.  -Consider for nephrology consultation    4  Acute hyponatremia  --Hypervolemic hyponatremia continue diuresis    5  Normocytic anemia  -Hemoglobin 9.9; versus prior hemoglobin 11.1 on 10/3/2022  --Check iron profile B12 folate    6. Type 2 diabetes mellitus  -Continue SSI hemoglobin A1c 6    7. As abdominal pain  -CT abdomen without contrast normal no acute findings    8  Seizure disorder  -Check Lacosamide and Levetiracetam levels  -order home dose of Lacosamide 50 mg BID and levetiracetam 1000 mg BID     9. Paroxysmal atrial fibrillation  -Currently in normal sinus rhythm  -Continue Warfarin (pharmacy to dose)  -PT/INR/PTT pending     10. Class III obesity  -BMI 55.05 K2/M2  -Would encourage eventual weight loss, heart healthy diet, lifestyle modification    11.   Essential hypertension  -Monitor blood pressure closely provide hypertensive medication as needed    12. Hyperlipidemia  -continue Atorvastatin    History of ground-level fall PT OT Case management consult when able to do so  Currently on CPAP high risk for deterioration chronically ill patient will handover to Dr. Neil Kat tomorrow morning        Code status: Full code  DVT prophylaxis: SCD    Care Plan discussed with: Patient/Family and Nurse  Anticipated Disposition: SNF/LTC  Anticipated Discharge: 24 hours to 48 hours    CRITICAL CARE ATTESTATION:  I had a face to face encounter with the patient, reviewed and interpreted patient data including clinical events, labs, images, vital signs, I/O's, and examined patient. I have discussed the case and the plan and management of the patient's care with the consulting services, the bedside nurses and necessary ancillary providers. NOTE OF PERSONAL INVOLVEMENT IN CARE   This patient has a high probability of imminent, clinically significant deterioration, which requires the highest level of preparedness to intervene urgently. I participated in the decision-making and personally managed or directed the management of the following life and organ supporting interventions that required my frequent assessment to treat or prevent imminent deterioration. I personally spent 45 minutes of critical care time. This is time spent at this critically ill patient's bedside actively involved in patient care as well as the coordination of care and discussions with the patient's family. This does not include any procedural time which has been billed separately. Hospital Problems  Date Reviewed: 5/6/2022            Codes Class Noted POA    CHF (congestive heart failure) ([facility] [address] ICD-10-CM: I50.9  ICD-9-CM: 428.0  10/31/2022 Unknown             Review of Systems:   A comprehensive review of systems was negative. Vital Signs:    Last 24hrs VS reviewed since prior progress note. Most recent are:  Visit Vitals  BP (!) 140/64   Pulse 64   Temp 98 °F (36.7 °C)   Resp 16   Ht 5' 2\" (1.575 m)   Wt 136.5 kg (301 lb)   SpO2 94%   BMI 55.05 kg/m²       No intake or output data in the 24 hours ending 11/01/22 1147     Physical Examination:     I had a face to face encounter with this patient and independently examined them on 11/1/2022 as outlined below:          General : alert x 3, awake, no acute distress, resting in bed, pleasant   HEENT: PEERL, EOMI, moist mucus membrane, TM clear  Neck: supple, no JVD, no meningeal signs  Chest: Clear to auscultation bilaterally   CVS: S1 S2 heard, Capillary refill less than 2 seconds  Abd: soft/ Non tender, non distended, BS physiological,   Ext: no clubbing, no cyanosis, no edema, brisk 2+ DP pulses  Neuro/Psych: pleasant mood and affect, CN 2-12 grossly intact  Skin: warm     Data Review:    I personally reviewed  Image and labs      Labs:     Recent Labs     11/01/22  0357 10/31/22  1434   WBC 4.2 4.9   HGB 10.7* 9.9*   HCT 36.2 33.2*    183     Recent Labs     11/01/22  0357 10/31/22  1434    129*   K 4.9 SPECIMEN HEMOLYZED, RESULTS MAY BE AFFECTED    106   CO2 29 29   BUN 40* 43*   CREA 1.84* 1.97*   * 90   CA 9.4 8.3*   MG 2.0 SPECIMEN HEMOLYZED, RESULTS MAY BE AFFECTED     Recent Labs     11/01/22  0357 10/31/22  1434   ALT 21 SPECIMEN HEMOLYZED, RESULTS MAY BE AFFECTED   AP 70 SPECIMEN HEMOLYZED, RESULTS MAY BE AFFECTED   TBILI 0.3 <0.1*   TP 8.4* 8.4*   ALB 3.4* 2.8*   GLOB 5.0* 5.6*     Recent Labs     11/01/22  0357 10/31/22  2121   INR 4.8* 5.5*   PTP 45.5* 51.7*   APTT 48.5*  --       No results for input(s): FE, TIBC, PSAT, FERR in the last 72 hours. No results found for: FOL, RBCF   No results for input(s): PH, PCO2, PO2 in the last 72 hours. No results for input(s): CPK, CKNDX, TROIQ in the last 72 hours.     No lab exists for component: CPKMB  Lab Results   Component Value Date/Time    Cholesterol, total 138 10/03/2022 10:19 AM    HDL Cholesterol 55 10/03/2022 10:19 AM    LDL, calculated 70 10/03/2022 10:19 AM    Triglyceride 65 10/03/2022 10:19 AM    CHOL/HDL Ratio 2.5 10/03/2022 10:19 AM     Lab Results   Component Value Date/Time    Glucose (POC) 127 (H) 11/01/2022 08:54 AM    Glucose (POC) 108 (H) 06/02/2010 07:45 AM    Glucose (POC) 91 06/01/2010 04:24 PM    Glucose (POC) 116 (H) 06/01/2010 11:34 AM    Glucose (POC) 110 (H) 06/01/2010 08:01 AM    Glucose  02/05/2016 09:47 AM    Glucose  04/13/2015 09:25 AM     Lab Results   Component Value Date/Time    Color Yellow 07/28/2015 10:41 AM    Appearance Clear 07/28/2015 10:41 AM    Specific gravity 1.017 05/23/2010 12:41 AM    pH (UA) 6.0 07/28/2015 10:41 AM    Protein NEGATIVE 05/23/2010 12:41 AM    Glucose NEGATIVE 05/23/2010 12:41 AM    Ketone Negative 07/28/2015 10:41 AM    Bilirubin Negative 07/28/2015 10:41 AM    Urobilinogen 0.2 05/23/2010 12:41 AM    Nitrites Negative 07/28/2015 10:41 AM    Leukocyte Esterase Negative 07/28/2015 10:41 AM    Epithelial cells 0-5 05/23/2010 12:41 AM    Bacteria Few 07/28/2015 10:41 AM    WBC 0-5 07/28/2015 10:41 AM    RBC None seen 07/28/2015 10:41 AM         Medications Reviewed:     Current Facility-Administered Medications   Medication Dose Route Frequency    aspirin delayed-release tablet 81 mg  81 mg Oral DAILY    atorvastatin (LIPITOR) tablet 80 mg  80 mg Oral DAILY    amLODIPine (NORVASC) tablet 10 mg  10 mg Oral DAILY    carvediloL (COREG) tablet 12.5 mg  12.5 mg Oral BID    sodium chloride (NS) flush 5-40 mL  5-40 mL IntraVENous Q8H    sodium chloride (NS) flush 5-40 mL  5-40 mL IntraVENous PRN    acetaminophen (TYLENOL) tablet 650 mg  650 mg Oral Q6H PRN    Or    acetaminophen (TYLENOL) suppository 650 mg  650 mg Rectal Q6H PRN    polyethylene glycol (MIRALAX) packet 17 g  17 g Oral DAILY PRN    ondansetron (ZOFRAN ODT) tablet 4 mg  4 mg Oral Q8H PRN    Or    ondansetron (ZOFRAN) injection 4 mg  4 mg IntraVENous Q6H PRN    Warfarin Pharmacy Dosing (COUMADIN)   Other Rx Dosing/Monitoring    arformoteroL (BROVANA) neb solution 15 mcg  15 mcg Nebulization BID RT    And    budesonide (PULMICORT) 500 mcg/2 ml nebulizer suspension  500 mcg Nebulization BID RT    methylPREDNISolone (PF) (SOLU-MEDROL) injection 40 mg  40 mg IntraVENous Q6H    glucose chewable tablet 16 g  4 Tablet Oral PRN    glucagon (GLUCAGEN) injection 1 mg  1 mg IntraMUSCular PRN    dextrose 10 % infusion 0-250 mL  0-250 mL IntraVENous PRN    insulin lispro (HUMALOG) injection   SubCUTAneous AC&HS    perflutren lipid microspheres (DEFINITY) in NS bolus IV  1.5 mL IntraVENous RAD ONCE    lacosamide (VIMPAT) tablet 50 mg  50 mg Oral BID    levETIRAcetam (KEPPRA) oral solution 1,000 mg  1,000 mg Oral BID    albuterol-ipratropium (DUO-NEB) 2.5 MG-0.5 MG/3 ML  3 mL Nebulization Q6H PRN     Current Outpatient Medications   Medication Sig    lacosamide (VIMPAT) 50 mg tab tablet TAKE ONE TABLET BY MOUTH TWICE DAILY    atorvastatin (LIPITOR) 80 mg tablet TAKE ONE TABLET BY MOUTH DAILY    nirmatrelvir-ritonavir (Paxlovid, EUA,) 300 mg (150 mg x 2)-100 mg tablet As directed (Patient not taking: Reported on 10/3/2022)    lancets (OneTouch Delica Plus Lancet) 33 gauge misc USE TO check BLOOD SUGAR ONCE DAILY    Spiriva with HandiHaler 18 mcg inhalation capsule INHALE THE CONTENTS OF ONE CAPSULE BY MOUTH DAILY, USING HANDIHALER    levETIRAcetam 1,000 mg tablet TAKE 1 TABLET BY MOUTH TWICE DAILY    allopurinoL (ZYLOPRIM) 300 mg tablet TAKE 1 TABLET BY MOUTH EVERY DAY    felodipine (PLENDIL SR) 10 mg 24 hr tablet TAKE 1 TABLET BY MOUTH EVERY DAY    albuterol (PROVENTIL HFA, VENTOLIN HFA, PROAIR HFA) 90 mcg/actuation inhaler Take 2 Puffs by inhalation every four (4) hours as needed for Wheezing.     aspirin delayed-release 81 mg tablet TAKE ONE TABLET BY MOUTH EVERY DAY    carvediloL (COREG) 12.5 mg tablet TAKE ONE TABLET BY MOUTH TWICE DAILY    furosemide (LASIX) 40 mg tablet TAKE ONE TABLET BY MOUTH DAILY    albuterol (PROVENTIL VENTOLIN) 2.5 mg /3 mL (0.083 %) nebu USE ONE vial PER nebulizer EVERY 6 HOURS AS NEEDED FOR WHEEZING OR SHORTNESS OF BREATH    glucose blood VI test strips (OneTouch Verio test strips) strip Use to test blood sugar once daily. Dx.e11.9    warfarin (COUMADIN) 5 mg tablet TAKE 1 TABLET BY MOUTH MONDAY THROUGH FRIDAY, TAKE 1 AND 1/2 TABLETS BY MOUTH ON SATURDAY AND SUNDAY (Patient not taking: Reported on 10/3/2022)    spironolactone-hydrochlorothiazide (ALDACTAZIDE) 25-25 mg per tablet TAKE 1 TABLET BY MOUTH DAILY (Patient not taking: No sig reported)    Blood-Glucose Meter (OneTouch Verio Meter) misc Use to test blood sugar daily. Dx.e11.9    lancets (FreeStyle Lancets) 28 gauge misc USE TO TEST BLOOD SUGAR EVERY DAY    glucose blood VI test strips (FreeStyle Lite Strips) strip Use to test blood sugar daily    lisinopril (PRINIVIL, ZESTRIL) 20 mg tablet Take 1 Tab by mouth daily. (Patient not taking: Reported on 10/3/2022)    glucose blood VI test strips (TRUE METRIX GLUCOSE TEST STRIP) strip Use to test blood sugar once daily. Dx.e11.9    Blood-Glucose Meter (TRUE METRIX AIR GLUCOSE METER) misc 1 Device by Does Not Apply route daily. Use to test blood sugars daily. dx.e11.9    lancets (TRUEPLUS LANCETS) 28 gauge misc Use to test blood sugar once daily. Dx.e11.9    guaiFENesin-codeine (VIRTUSSIN AC) 100-10 mg/5 mL solution Take 5 mL by mouth three (3) times daily as needed for Cough. Max Daily Amount: 15 mL. (Patient not taking: Reported on 10/3/2022)    metFORMIN (GLUCOPHAGE) 500 mg tablet TAKE 1 TABLET BY MOUTH TWICE DAILY WITH MEALS (Patient not taking: Reported on 10/3/2022)    sitagliptin (JANUVIA) 100 mg tablet Take 1 tablet by mouth daily.     budesonide-formoterol (SYMBICORT) 160-4.5 mcg/Actuation HFA inhaler Take 2 Puffs by inhalation two (2) times a day.     ______________________________________________________________________  EXPECTED LENGTH OF STAY: - - -  ACTUAL LENGTH OF STAY:          1      Please note that this dictation was completed with [id number], the computer voice recognition software. Quite often unanticipated grammatical, syntax, homophones, and other interpretive errors are inadvertently transcribed by the computer software. Please disregard these errors. Please excuse any errors that have escaped final proofreading.                 Cecile Stern MD

## 2022-11-01 NOTE — CONSULTS
Pulmonary Consultation    Assessment / Plan:    Acute on chronic hypoxemic and hypercarbic resp failure - former smoker with h/o asthma / NENO - not on cpap / parox afib on coumadin and CXR suggestive of pulmonary edema - on NIPPV and supplemental O2    --standard treatment for copd / asthma exacerbation  --diuresis  --echo ordered  --cardiology to see  --outpatient pulmonary follow up with PFT and sleep eval for currently untreated NENO / OHS (has seen Dr Bernardo Chisholm in the distant past - 2015)    Patient encounter is associated with high complexity decision making. This is based on the nature and degree of medical problems, the review of extensive data, amount of data, and high risk of complications from comorbidities associated with the patient's condition as well as risks due to management options. Serious medical condition that poses a threat to life /  bodily function    History / Subjective:  Reason:  Acute hypoxemic respiratory failure  Requesting Provider:  Dr Vikki Crowe reviewed / labs and tests reviewed / pertinent images independently viewed    Salvatore Banks is a 76 y.o.  female admitted 10/31/2022 with shortness of breath. 77 yo female with a h/o asthma, Sz disorder, untreated NENO, DM, CHF and paroxysmal afib presented to the ER with a 1 day h/o sob. CXR - CM, pulm edema    Has been placed on O2 and bipap, nebs, and solumedrol for presumed asthma / copd exacerbation    She is on chronic coumadin for afib    She is a former smoker      Allergies   Allergen Reactions    Clonidine Other (comments)     Patient becomes incoherent    Metformin Unknown (comments)     Past Medical History:   Diagnosis Date    Arthritis     Asthma     Diabetes ([facility] [address]     Glaucoma     Hypertension     Other ill-defined conditions(799.89)     rt knee surgery    Stroke [facility])       Past Surgical History:   Procedure Laterality Date    HX COLONOSCOPY      HX GYN        No family history on file.   Social History     Tobacco Use Smoking status: Former    Smokeless tobacco: Never    Tobacco comments:     20+years ago   Substance Use Topics    Alcohol use: No      ROS:  Pertinent items are noted in HPI. Objective:  Patient Vitals for the past 4 hrs:   BP Pulse Resp SpO2   22 0506 (!) 140/64 64 16 --   22 0406 (!) 155/86 72 19 --   22 0345 -- 67 22 --   22 0315 -- 64 18 92 %   22 0245 -- 65 15 96 %   22 0215 -- 60 15 93 %     Temp (24hrs), Av °F (36.7 °C), Min:98 °F (36.7 °C), Max:98 °F (36.7 °C)    No intake or output data in the 24 hours ending 22 0612    Exam:  Exam  General:  Well appearing. No distress, overweight. On BiPAP  HEENT:  Sclera anicteric, EOMI, MMM  Neck:  Trachea midline.   No accessory muscle use  Chest:  Symmetrical in expansion, no thoraco-abd dyssynchrony  Lungs: Diminished breath sounds at bases  Heart:  RRR  Skin:  Warm and dry  Extr:  No cyanosis, clubbing, 1+  edema      Data:     CXR - CM, pulm edema  Lab:  Recent Labs     22  0357 10/31/22  2121 10/31/22  1434 10/31/22  1431   WBC 4.2  --  4.9  --    HGB 10.7*  --  9.9*  --      --  183  --      --  129*  --    K 4.9  --  SPECIMEN HEMOLYZED, RESULTS MAY BE AFFECTED  --      --  106  --    CO2 29  --  29  --    BUN 40*  --  43*  --    CREA 1.84*  --  1.97*  --    *  --  90  --    CA 9.4  --  8.3*  --    MG 2.0  --  SPECIMEN HEMOLYZED, RESULTS MAY BE AFFECTED  --    INR 4.8* 5.5*  --   --    TROPHS  --   --   --  7   BNPNT  --   --  1,350*  --    TBILI 0.3  --  <0.1*  --    LAC  --   --   --  0.4       Recent Labs     10/31/22  1434   PCT 0.05     ABG:  Recent Labs     22  0544 22  0312 22  0302   PHI 7.26*  --  7.29*   PCO2I 59.3*  --  57.2*   PO2I 74*  --  25*   HCO3I 26.9*  --  27.4*   SO2I 91.9*  --  36.4*   FIO2I 35 35 35           Charly Sanders MD

## 2022-11-01 NOTE — DISCHARGE INSTRUCTIONS
Download the Heart Failure [place] Gisselle: Search in your [employer] (Android) or [employer] Gisselle Store (Zeensharephone): Search for- HF [place] Gisselle.    [employer] (e-Business) Services    HF [place] is a brand-new phone gisselle that helps you track daily symptoms, vitals, mood, energy level and more. You can even add your heart failure care team members to view your data and monitor your condition at home.     [facility] [place] lets you:  Track symptoms, medications and more  Share health information with your health care team  Connect with others living with heart failure    Patient Discharge Instructions    Nila Mata / [id number] : 1945    Admitted 10/31/2022 Discharged: 2022     Patient Active Problem List   Diagnosis Code    Paroxysmal atrial fibrillation (HCC) I48.0    Morbid obesity ([facility] [address] E66.01    DM type 2 (diabetes mellitus, type 2) ([facility] E11.9    Total knee replacement status Z96.659    Seizure disorder ([facility] G40.909    Asthma J45.909    Dyslipidemia E78.5    HTN (hypertension) I10    Primary osteoarthritis of left knee M17.12    Venous insufficiency I87.2    Proteinuria R80.9    Physical deconditioning R53.81    Type 2 diabetes mellitus with chronic kidney disease (HCC) E11.22    Congestive heart failure, unspecified HF chronicity, unspecified heart failure type ([facility] [address] I50.9    Chronic renal disease, stage III N18.30    CHF (congestive heart failure) ([facility]) I50.9    Normocytic anemia D64.9    Generalized abdominal pain R10.84    Acute kidney injury superimposed on chronic kidney disease (HCC) N17.9, N18.9    Acute exacerbation of COPD with asthma ([facility] [address] J44.1, J45.901    Acute pulmonary edema (HCC) J81.0    Acute on chronic respiratory failure with hypoxia and hypercapnia (HCC) J96.21, J96.22       Take Home Medications     Current Discharge Medication List        START taking these medications    Details   polyethylene glycol (MIRALAX) 17 gram packet Take 1 Packet by mouth two (2) times daily as needed for Constipation. Qty: 60 Each, Refills: 11      predniSONE (DELTASONE) 20 mg tablet Take 2 Tablets by mouth daily (with breakfast). Qty: 20 Tablet, Refills: 0           CONTINUE these medications which have NOT CHANGED    Details   lacosamide (VIMPAT) 50 mg tab tablet TAKE ONE TABLET BY MOUTH TWICE DAILY  Qty: 180 Tablet, Refills: 3    Associated Diagnoses: Seizure disorder (HCC)      atorvastatin (LIPITOR) 80 mg tablet TAKE ONE TABLET BY MOUTH DAILY  Qty: 90 Tablet, Refills: 3    Comments: This prescription was filled on 8/22/2022. Any refills authorized will be placed on file. !! lancets (OneTouch Delica Plus Lancet) 33 gauge misc USE TO check BLOOD SUGAR ONCE DAILY  Qty: 100 Lancet, Refills: 3    Comments: Hello! We are your patient's new home-delivery pharmacy. Please send renewal for the following medications as appropriate. This request is for current medication per [facility] discharge. Call us with any questions! Spiriva with HandiHaler 18 mcg inhalation capsule INHALE THE CONTENTS OF ONE CAPSULE BY MOUTH DAILY, USING HANDIHALER  Qty: 30 Capsule, Refills: 11    Associated Diagnoses: Intermittent asthma without complication, unspecified asthma severity      levETIRAcetam 1,000 mg tablet TAKE 1 TABLET BY MOUTH TWICE DAILY  Qty: 180 Tablet, Refills: 3      allopurinoL (ZYLOPRIM) 300 mg tablet TAKE 1 TABLET BY MOUTH EVERY DAY  Qty: 90 Tablet, Refills: 3      felodipine (PLENDIL SR) 10 mg 24 hr tablet TAKE 1 TABLET BY MOUTH EVERY DAY  Qty: 90 Tablet, Refills: 3      albuterol (PROVENTIL HFA, VENTOLIN HFA, PROAIR HFA) 90 mcg/actuation inhaler Take 2 Puffs by inhalation every four (4) hours as needed for Wheezing. Qty: 20.1 g, Refills: 3    Comments: Hello! We are your patient's new home-delivery pharmacy. Please send renewal for the following medications as appropriate. This request is for current medication per [facility] discharge.  Call us with any questions! Associated Diagnoses: Intermittent asthma without complication, unspecified asthma severity      carvediloL (COREG) 12.5 mg tablet TAKE ONE TABLET BY MOUTH TWICE DAILY  Qty: 180 Tablet, Refills: 3    Comments: Hello! We are your patient's new home-delivery pharmacy. Please send renewal for the following medications as appropriate. This request is for current medication per [facility] discharge. Call us with any questions!      furosemide (LASIX) 40 mg tablet TAKE ONE TABLET BY MOUTH DAILY  Qty: 90 Tablet, Refills: 3    Comments: Hello! We are your patient's new home-delivery pharmacy. Please send renewal for the following medications as appropriate. This request is for current medication per [facility] discharge. Call us with any questions! albuterol (PROVENTIL VENTOLIN) 2.5 mg /3 mL (0.083 %) nebu USE ONE vial PER nebulizer EVERY 6 HOURS AS NEEDED FOR WHEEZING OR SHORTNESS OF BREATH  Qty: 300 mL, Refills: 3    Comments: Hello! We are your patient's new home-delivery pharmacy. Please send renewal for the following medications as appropriate. This request is for current medication per [facility] discharge. Call us with any questions!      !! glucose blood VI test strips (OneTouch Verio test strips) strip Use to test blood sugar once daily. Dx.e11.9  Qty: 50 Strip, Refills: 11    Associated Diagnoses: Type 2 diabetes mellitus without complication, without long-term current use of insulin (HCC)      warfarin (COUMADIN) 5 mg tablet TAKE 1 TABLET BY MOUTH MONDAY THROUGH FRIDAY, TAKE 1 AND 1/2 TABLETS BY MOUTH ON SATURDAY AND SUNDAY  Qty: 103 Tablet, Refills: 3      !! Blood-Glucose Meter (OneTouch Verio Meter) misc Use to test blood sugar daily. Dx.e11.9  Qty: 1 Each, Refills: 0      !! glucose blood VI test strips (FreeStyle Lite Strips) strip Use to test blood sugar daily  Qty: 50 Strip, Refills: 11      !! glucose blood VI test strips (TRUE METRIX GLUCOSE TEST STRIP) strip Use to test blood sugar once daily. Dx.e11.9  Qty: 100 Strip, Refills: 11      !! Blood-Glucose Meter (TRUE METRIX AIR GLUCOSE METER) misc 1 Device by Does Not Apply route daily. Use to test blood sugars daily. dx.e11.9  Qty: 1 Each, Refills: 0      !! lancets (TRUEPLUS LANCETS) 28 gauge misc Use to test blood sugar once daily. Dx.e11.9  Qty: 100 Lancet, Refills: 11      sitagliptin (JANUVIA) 100 mg tablet Take 1 tablet by mouth daily. Qty: 30 tablet, Refills: 11      budesonide-formoterol (SYMBICORT) 160-4.5 mcg/Actuation HFA inhaler Take 2 Puffs by inhalation two (2) times a day. Qty: 10.2 Inhaler, Refills: 11       !! - Potential duplicate medications found. Please discuss with provider. STOP taking these medications       nirmatrelvir-ritonavir (Paxlovid, EUA,) 300 mg (150 mg x 2)-100 mg tablet Comments:   Reason for Stopping:         aspirin delayed-release 81 mg tablet Comments:   Reason for Stopping:         spironolactone-hydrochlorothiazide (ALDACTAZIDE) 25-25 mg per tablet Comments:   Reason for Stopping:         lisinopril (PRINIVIL, ZESTRIL) 20 mg tablet Comments:   Reason for Stopping:         guaiFENesin-codeine (VIRTUSSIN AC) 100-10 mg/5 mL solution Comments:   Reason for Stopping:         metFORMIN (GLUCOPHAGE) 500 mg tablet Comments:   Reason for Stopping: It is important that you take the medication exactly as they are prescribed. Keep your medication in the bottles provided by the pharmacist and keep a list of the medication names, dosages, and times to be taken in your wallet. Do not take other medications without consulting your doctor. What to do at Home    Recommended diet: Cardiac Diet and Diabetic Diet,   Recommended activity: Activity as tolerated,     Follow-up with ROMY KELLY MD  in 3 day    I personally saw the patient today and spent less than or equal to 30 minutes  on counseling and coordination of care in discharging this patient. [id number]     Information obtained by :  I understand that if any problems occur once I am at home I am to contact my physician. I understand and acknowledge receipt of the instructions indicated above.                                                                                                                                            Physician's or R.N.'s Signature                                                                  Date/Time                                                                                                                                              Patient or Representative Signature                                                          Date/Time

## 2022-11-01 NOTE — H&P
[address] [facility] Adult  Hospitalist Group  History and Physical    Date of Service:  10/31/2022  Primary Care Provider: Serge Mejía MD  Source of information: The patient, Chart review, and Spouse/family member    Chief Complaint: Shortness of Breath      History of Presenting Illness:   Shahida Patiño is a 76 y.o. female with past medical history of arthritis, asthma, sleep apnea, type 2 diabetes mellitus, glaucoma, hypertension, stroke, class III obesity presented to the emergency department via EMS from home accompanied by her daughter with chief complaint of shortness of breath. Patient is a limited historian. Her daughter provides additional history. Remainder of history was obtained per review of ED electronic medical records. Today, patient reported was short of breath, remain constant, severe, without specific alleviating factors, exacerbated with movement. Her daughter notes that patient's oxygen saturations may be on occasion into the 60s on first awakening from sleep. She has a history of sleep apnea but does not use CPAP Pap machine for several years as it is broke. EMS was called to the scene. Patient reportedly was hypoxic with O2 saturations at 74%. Patient has been taken inhalers prior to arrival.  She was placed on 4 L oxygen via nasal cannula. On arrival to the emergency department, initial recorded vital signs temperature 98.0 °F, /71, heart rate 64, respirate 12, O2 saturation 93% on 4 L oxygen via nasal cannula. Abnormal labs included hemoglobin 9.9, sodium 129, BUN 43, creatinine 1.97, GFR 25, calcium 8.3, albumin 2.3, proBNP 1350. Chest reportable showed cardiomegaly and mild pulmonary edema. Patient was given Lasix 40 mg IV x1 dose. Patient is now seen for admission to the hospital service with continued evaluation and treatments. Patient has occasional cough wheezing. She also in addition take Spiriva along with albuterol.        REVIEW OF SYSTEMS:  Pertinent items are noted in the History of Present Illness. Past Medical History:   Diagnosis Date    Arthritis     Asthma     Diabetes ([facility] [address]     Glaucoma     Hypertension     Other ill-defined conditions(799.89)     rt knee surgery    Stroke [facility])       Past Surgical History:   Procedure Laterality Date    HX COLONOSCOPY      HX GYN       Prior to Admission medications    Medication Sig Start Date End Date Taking?  Authorizing Provider   lacosamide (VIMPAT) 50 mg tab tablet TAKE ONE TABLET BY MOUTH TWICE DAILY 10/13/22   Lopez Ritchie MD   atorvastatin (LIPITOR) 80 mg tablet TAKE ONE TABLET BY MOUTH DAILY 9/5/22   Lopez Ritchie MD   nirmatrelvir-ritonavir (Paxlovid, EUA,) 300 mg (150 mg x 2)-100 mg tablet As directed  Patient not taking: Reported on 10/3/2022 8/5/22   Lopez Ritchie MD   lancets [employer] Plus Lancet) 33 gauge misc USE TO check BLOOD SUGAR ONCE DAILY 6/28/22   Lopez Ritchie MD   Spiriva with HandiHaler 18 mcg inhalation capsule INHALE THE CONTENTS OF ONE CAPSULE BY MOUTH DAILY, USING HANDIHALER 6/23/22   Lopez Ritchie MD   levETIRAcetam 1,000 mg tablet TAKE 1 TABLET BY MOUTH TWICE DAILY 3/30/22   Lopez Ritchie MD   allopurinoL (ZYLOPRIM) 300 mg tablet TAKE 1 TABLET BY MOUTH EVERY DAY 3/30/22   Lopez Ritchie MD   felodipine (PLENDIL SR) 10 mg 24 hr tablet TAKE 1 TABLET BY MOUTH EVERY DAY 3/30/22   Lopez Ritchie MD   albuterol (PROVENTIL HFA, VENTOLIN HFA, PROAIR HFA) 90 mcg/actuation inhaler Take 2 Puffs by inhalation every four (4) hours as needed for Wheezing. 3/22/22   Lopez Ritchie MD   aspirin delayed-release 81 mg tablet TAKE ONE TABLET BY MOUTH EVERY DAY 3/22/22   Lopez Ritchie MD   carvediloL (COREG) 12.5 mg tablet TAKE ONE TABLET BY MOUTH TWICE DAILY 3/22/22   Lopez Ritchie MD   furosemide (LASIX) 40 mg tablet TAKE ONE TABLET BY MOUTH DAILY 3/22/22   Lopez Ritchie MD   albuterol (PROVENTIL VENTOLIN) 2.5 mg /3 mL (0.083 %) nebu USE ONE vial PER nebulizer EVERY 6 HOURS AS NEEDED FOR WHEEZING OR SHORTNESS OF BREATH 3/22/22   Curt Diaz MD   glucose blood VI test strips (OneTouch Verio test strips) strip Use to test blood sugar once daily. Dx.e11.9 2/23/22   Curt Diaz MD   warfarin (COUMADIN) 5 mg tablet TAKE 1 TABLET BY MOUTH MONDAY THROUGH FRIDAY, TAKE 1 AND 1/2 TABLETS BY MOUTH ON SATURDAY AND SUNDAY  Patient not taking: Reported on 10/3/2022 12/22/21   Curt Diaz MD   spironolactone-hydrochlorothiazide (ALDACTAZIDE) 25-25 mg per tablet TAKE 1 TABLET BY MOUTH DAILY  Patient not taking: No sig reported 10/8/21   Curt Diaz MD   Blood-Glucose Meter (OneTouch Verio Meter) misc Use to test blood sugar daily. Dx.e11.9 1/12/21   Curt Diaz MD   lancets (FreeStyle Lancets) 28 gauge misc USE TO TEST BLOOD SUGAR EVERY DAY 1/9/21   Neo Doyle MD   glucose blood VI test strips (FreeStyle Lite Strips) strip Use to test blood sugar daily 10/29/20   Curt Diaz MD   lisinopril (PRINIVIL, ZESTRIL) 20 mg tablet Take 1 Tab by mouth daily. Patient not taking: Reported on 10/3/2022 6/3/18   Curt Diaz MD   glucose blood VI test strips (TRUE METRIX GLUCOSE TEST STRIP) strip Use to test blood sugar once daily. Dx.e11.9 1/22/18   Curt Diaz MD   Blood-Glucose Meter (TRUE METRIX AIR GLUCOSE METER) [facility] Device by Does Not Apply route daily. Use to test blood sugars daily. dx.e11.9 6/20/17   Curt Diaz MD   lancets (TRUEPLUS LANCETS) 28 gauge misc Use to test blood sugar once daily. Dx.e11.9 6/20/17   Cutr Diaz MD   guaiFENesin-codeine POPLAR [facility]) 100-10 mg/5 mL solution Take 5 mL by mouth three (3) times daily as needed for Cough. Max Daily Amount: 15 mL.   Patient not taking: Reported on 10/3/2022 5/12/17   Curt Diaz MD   metFORMIN (GLUCOPHAGE) 500 mg tablet TAKE 1 TABLET BY MOUTH TWICE DAILY WITH MEALS  Patient not taking: Reported on 10/3/2022 5/23/16   Curt Diaz MD sitagliptin (JANUVIA) 100 mg tablet Take 1 tablet by mouth daily. 9/16/14   Sarah Muller MD   budesonide-formoterol [facility]) 160-4.5 mcg/Actuation HFA inhaler Take 2 Puffs by inhalation two (2) times a day. 6/2/10   Sarah Muller MD     Allergies   Allergen Reactions    Clonidine Other (comments)     Patient becomes incoherent    Metformin Unknown (comments)      FAMILY HISTORY:  Unknown regarding family members with CAD or strokes. Social History:    Smoking:  quit  Alcohol:  denies    Ambulates with walker      Objective:   Visit Vitals  /61   Pulse (!) 58   Temp 98 °F (36.7 °C)   Resp 12   Ht 5' 2\" (1.575 m)   Wt 136.5 kg (301 lb)   SpO2 95%   BMI 55.05 kg/m²    O2 Flow Rate (L/min): 5 l/min O2 Device: Nasal cannula    PHYSICAL EXAM:   General:  Patient is in no acute respiratory distress. Head:  Normocephalic, without obvious abnormality, atraumatic   Eyes:  Conjunctivae/corneas clear. Pupils 2 mm reactive bilateral.   E/N/M/T: Nares normal. Septum midline.  No nasal drainage or sinus tenderness  Tongue midline/ non-edematous  Clear oropharynx   Neck: Normal appearance and movements, symmetrical, trachea midline  No palpable adenopathy  No thyroid enlargement, tenderness or nodules  No carotid bruit   No JVD  Trachea midline   Lungs:   Symmetrical chest expansion and respiratory effort  Diffuse wheezing to auscultation bilaterally   Chest wall:  No tenderness or deformity   Heart:  Regular rate and rhythm   Normal S1 and S2; no murmur, click, rub or gallop   Abdomen:   Soft, generalized mild tenderness  No rebound, guarding, or rigidity  Non-distended   Bowel sounds normal  No masses or hepatosplenomegaly  No hernias present   Back: No costovertebral angle tenderness  No step-off deformity   Extremities: Extremities normal, atraumatic  No cyanosis   Marked nonpitting edema bilateral lower extremities eluding marked  bilateral nonpitting pedal edema     Vascular/  Pulses: 2+ radial/ 1+ DP bilateral pulses   Integument/  Skin: No rashes or ulcers  Warm and dry   Musculo-      skeletal: Gait not tested  No calf tenderness   Neuro: GCS 15. Minimally moves all extremities x 4 with generalized weakness. No slurred speech. No facial droop. Sensation grossly intact. Psych: Initially somnolent. Upon awakening, patient oriented x3. Geniturinary: Purewick external urinary catheter in place        Data Review: All diagnostic labs and studies have been reviewed. Abnormal Labs Reviewed   CBC WITH AUTOMATED DIFF - Abnormal; Notable for the following components:       Result Value    RBC 3.49 (*)     HGB 9.9 (*)     HCT 33.2 (*)     MCHC 29.8 (*)     RDW 20.6 (*)     All other components within normal limits   METABOLIC PANEL, COMPREHENSIVE - Abnormal; Notable for the following components:    Sodium 129 (*)     BUN 43 (*)     Creatinine 1.97 (*)     BUN/Creatinine ratio 22 (*)     eGFR 25 (*)     Calcium 8.3 (*)     Bilirubin, total <0.1 (*)     Protein, total 8.4 (*)     Albumin 2.8 (*)     Globulin 5.6 (*)     A-G Ratio 0.5 (*)     All other components within normal limits   NT-PRO BNP - Abnormal; Notable for the following components:    NT pro-BNP 1,350 (*)     All other components within normal limits       All Micro Results       Procedure Component Value Units Date/Time    CULTURE, BLOOD [[id number]] Collected: 10/31/22 1431    Order Status: Sent Specimen: Blood Updated: 10/31/22 1618    CULTURE, BLOOD [[id number]]     Order Status: Sent Specimen: Blood             IMAGING:   XR CHEST PORT   Final Result      Cardiomegaly and mild pulmonary edema which is increased from the prior study.               ECG/ECHO:    Results for orders placed or performed during the hospital encounter of 10/31/22   EKG, 12 LEAD, INITIAL   Result Value Ref Range    Ventricular Rate 61 BPM    Atrial Rate 61 BPM    P-R Interval 160 ms    QRS Duration 68 ms    Q-T Interval 406 ms    QTC Calculation (Bezet) 408 ms Calculated P Axis 45 degrees    Calculated R Axis 18 degrees    Calculated T Axis 99 degrees    Diagnosis       Normal sinus rhythm  Nonspecific T wave abnormality  Abnormal ECG  When compared with ECG of 16-SEP-2021 14:07,  No significant change was found          Assessment:   Given the patient's current clinical presentation, there is a high level of concern for decompensation if discharged from the emergency department. Complex decision making was performed, which includes reviewing the patient's available past medical records, laboratory results, and imaging studies. Active Problems:    Acute CHF (congestive heart failure), unspecified  -chest xray showing cardiomegaly and pulmonary edema  -admit to telemetry floor  -given Lasix 40 mg IV x 1 dose  -Need diuresis with Lasix as needed  -Order 2D echocardiogram in a.m.  -Placed on strict I's and O's and daily weights and monitor fluid status closely  -Consult cardiologist in a.m.  -already on Coreg; on ACEI - Lisinopril but have to monitor creatinine which is elevated    2. Acute respiratory failure with hypoxia  -Continuous pulse oximetry monitoring  -Provide supplemental oxygen  -Suspect possible underlying COPD with known asthma current moderate exacerbation  -Order stat ABG  -Resume back on CPAP with supplemental oxygen tonight  -Consult pulmonologist in a.m.  -order respiratory viral panel/ COVID/ influenza testing    3. Asthma exacerbation  -Possible COPD  -Order DuoNeb nebulizer treatments every 4 hours as needed  -Order same 125 mg IV x1 dose stat, then 40 mg IV every 6 hours    4. Edema bilateral lower extremities  -Keep elbow extremities elevated at rest    5. Acute kidney injury superimposed on chronic kidney disease  -BUN 43, creatinine 1.97, GFR 25  -Repeat renal panel in a.m.  -Consider for nephrology consultation    6. Acute hyponatremia  -Serum sodium 129. Proceed towards slow correction of sodium    7.   Normocytic anemia  -Hemoglobin 9.9; versus prior hemoglobin 11.1 on 10/3/2022  -Repeat H&H  -Transfuse if hemoglobin less than 7.0  -Check iron profile, U04, folic acid level    8. Type 2 diabetes mellitus  -Order Humalog insulin correctional coverage, scheduled blood glucose checks and check hemoglobin A1c level. 9.  As abdominal pain  -Order CT abdomen pelvis without IV contrast  -N.p.o. pending CT result    10. Seizure disorder  -Check Lacosamide and Levetiracetam levels  -order home dose of Lacosamide 50 mg BID and levetiracetam 1000 mg BID    11. Paroxysmal atrial fibrillation  -Currently in normal sinus rhythm  -Continue Warfarin (pharmacy to dose)  -PT/INR/PTT pending    12. Class III obesity  -BMI 55.05 K2/M2  -Would encourage eventual weight loss, heart healthy diet, lifestyle modification    13. Generalized weakness  -Placed on fall precautions and neurochecks    14. Essential hypertension  -Monitor blood pressure closely provide hypertensive medication as needed      15. Fall from ground level  -patient uncertain if she hit her head; she is on Warfarin  -order CT head without contrast    16. Hyperlipidemia  -check lipid panel  -continue Atorvastatin    17. Glaucoma  -but no meds on patient's med list for the same    DIET:  NPO UNTIL PASSES DYSPHAGIA SCREEN AND ABDOMINAL CT NEGATIVE. IF PASSES,THEN CARB CONSISTENT DIET. ISOLATION PRECAUTIONS: There are currently no Active Isolations    DVT PROPHYLAXIS: Coumadin  FUNCTIONAL STATUS PRIOR TO HOSPITALIZATION: Ambulatory and capable of self-care but relies on assistive devices (rolling walker/cane). Ambulatory status/function: Ambulates with assistance:  Walker   EARLY MOBILITY ASSESSMENT: Recommend an assessment from physical therapy and/or occupational therapy  ANTICIPATED DISCHARGE: Greater than 48 hours. ANTICIPATED DISPOSITION: Home with Home Healthcare  EMERGENCY CONTACT/SURROGATE DECISION MAKER: Janeth Guallpa, daughter [phone].     CRITICAL CARE WAS PERFORMED FOR THIS ENCOUNTER: NO.      ADVANCED DIRECTIVE/ CODE STATUS:   FULL CODE as per discussion with patient and her daughter who requests the same. Signed By: Taras Lanier MD     October 31, 2022         Please note that this dictation may have been completed with Dragon, the computer voice recognition software. Quite often unanticipated grammatical, syntax, homophones, and other interpretive errors are inadvertently transcribed by the computer software. Please disregard these errors. Please excuse any errors that have escaped final proofreading. ADDENDUM:  Coagulopathy/ supratherapeutic INR  -INR 5.8. Hold Warfarin and repeat INR.

## 2022-11-01 NOTE — ED NOTES
TRANSFER - OUT REPORT:    Verbal report given to Porsha Nagy RN(name) on [employer]  being transferred to (unit) for routine progression of care       Report consisted of patients Situation, Background, Assessment and   Recommendations(SBAR). Information from the following report(s) SBAR and ED Summary was reviewed with the receiving nurse. Lines:   Peripheral IV 10/31/22 Right Hand (Active)   Site Assessment Clean, dry, & intact 10/31/22 1430   Dressing Status Clean, dry, & intact 10/31/22 1430   Dressing Type Transparent 10/31/22 1430        Opportunity for questions and clarification was provided.       Patient transported with:   Monitor  Registered Nurse  Tech  RRT

## 2022-11-01 NOTE — PROGRESS NOTES
TRANSFER - IN REPORT:    Verbal report received from Elia(name) on Soren Henry  being received from ED(unit) for routine progression of care      Report consisted of patients Situation, Background, Assessment and   Recommendations(SBAR). Information from the following report(s) SBAR, Kardex, ED Summary, Intake/Output, MAR, Accordion, and Cardiac Rhythm SR  was reviewed with the receiving nurse. Opportunity for questions and clarification was provided. Assessment completed upon patients arrival to unit and care assumed.

## 2022-11-01 NOTE — ED NOTES
Patient offered slips of water and food, tolerated well. Granddaughter at bedside, will continue to monitor.

## 2022-11-01 NOTE — PROGRESS NOTES
11/1/2022 -   TRANSITIONS OF CARE PLAN:   RUR: 15%; Moderate   DISPOSITION: TBD - possibly own home  TRANSPORT: TBD - family vs BLS  DME:  NEEDS FOR DISCHARGE: LIZET Orders to [employer]   BARRIERS:  ANTICIPATED FOLLOW UPS: PCP, Cardio, Pulmonary, will need a Sleep Study  ONGOING INPATIENT NEEDS: Cardio Consult, Pulmonary Consult, Nebs, Steroids, CPAP Support as Needed, Diuresing, Echo, Monitoring of Labs, PT/OT evals as medically appropriate     Anticipated Discharge is: Greater than 48 Hours    Reason for Admission:   CHF                  RUR Score:   15%; Moderate                PCP: First and Last name:   Mcihel Cruz MD     Name of Practice: [place] [id number]   Are you a current patient: Yes/No: Yes   Approximate date of last visit: 10/3   Can you participate in a virtual visit if needed: Yes with assistance     Do you (patient/family) have any concerns for transition/discharge? Patient's CPAP was recalled               Plan for utilizing home health: [address] Orders for [facility]      Current Advanced Directive/Advance Care Plan:  Full Code    Healthcare Decision Maker:   Click here to complete [employer] including selection of the Healthcare Decision Maker Relationship (ie \"Primary\")            PRIMARY: Lance Manzanares, daughter: [phone]    Transition of Care Plan:        CM notes that patient is reported as a limited historian and that patient is currently on CPAP support. CM contacted patient's listed emergency contact (daughter, Lance Manzanares: [phone]). At baseline, patient lives with daughter and adult granddaughter in a 2 story home, 9 exterior steps and ramp accessible, first floor living     At baseline, patient is dependent in ADLs; patient can feed self and patient's daughter and granddaughter assist patient; family transports the patient.       Home DME includes: cane, walker, wheelchair, CPAP that was recalled, bedside commode, raised toilet seat, shower stool, nebulizer, glucometer, bp cuff, scale     Patient has hx of HH with [facility] that is curren and Rehab at [facility] and [facility] preference is: [address] [facility] or [place]  at [address] includes: Daughter and Granddaughter     Patient has an AMD that indicates daughter Melani Boucher as mPOA1     Patient has been fully vaccinated via the [address] vaccine, with dosing dates of: 6/6/21 and 6/5/21    Patient's daughter inquired about patient needing a new CPAP. CM identified that per 11/1 Pulmonary note, patient will need to complete an OP sleep study for a new CPAP. The daughter expressed understanding of the above. Likely disposition is for discharge to own home with transport via family vs BLS and LIZET Orders for [facility], pending medical progression and clearance by patient's medical team.     CM Team to continue following for any additional ROSALIA needs. Medicare pt has received, reviewed, and signed IM letter informing them of their right to appeal the discharge. Signed copy has been placed on pt bedside chart. Care Management Interventions  PCP Verified by CM: Yes (Dr. Anny Baltazar)  Last Visit to PCP: 10/03/22  Palliative Care Criteria Met (RRAT>21 & CHF Dx)?: No  Mode of Transport at Discharge:  Other (see comment) (family vs bls)  Transition of Care Consult (CM Consult): Discharge Planning  Discharge Durable Medical Equipment: No (has: cane, walker, wheelchair, CPAP that was recalled, bedside commode, raised toilet seat, shower stool, nebulizer, glucometer, bp cuff, scale )  Health Maintenance Reviewed: Yes (cm spoke with patient's daughter by phone)  Physical Therapy Consult: Yes  Occupational Therapy Consult: Yes  Support Systems: Child(devan), Other Family Member(s)  Confirm Follow Up Transport: Family (assisted in adls, other than feeding self)  Discharge Location  Patient Expects to be Discharged to[de-identified] Unable to determine at this time (lives with daughter and adult granddaughter in a 2 story home, 9 exterior steps and ramp accessible, first floor living )  CRM: Thomas Stevens, MPH, [address].: [phone] or [phone]

## 2022-11-01 NOTE — PROGRESS NOTES
Pharmacist Note - Warfarin Dosing  Consult provided for this 76 y. o.female to manage warfarin for PAF    INR Goal: 2 - 3  Home regimen/ tablet size: 7.5 mg Mon-Thurs; 5 mg Fr-Sa-Bullard    Drugs that may increase INR: None  Drugs that may decrease INR: None  Other current anticoagulants/ drugs that may increase bleeding risk: Aspirin  Risk factors: Age > 65  Daily INR ordered: YES    Recent Labs     11/01/22  0357 10/31/22  2121 10/31/22  1434   HGB 10.7*  --  9.9*   INR 4.8* 5.5*  --      Date               INR                  Dose  10/31  5.5     Held   11/01  4.8     Held                                                                               Assessment/ Plan:  Hold warfarin for INR 4.8. Pharmacy will continue to monitor daily and adjust therapy as indicated. Please contact the pharmacist at t [phone] or  for outpatient recommendations if needed.      Dora Tucker, PharmD  Clinical Pharmacist  [facility] ()

## 2022-11-02 PROBLEM — N18.9 ACUTE KIDNEY INJURY SUPERIMPOSED ON CHRONIC KIDNEY DISEASE (HCC): Status: ACTIVE | Noted: 2022-11-02

## 2022-11-02 PROBLEM — J96.22 ACUTE ON CHRONIC RESPIRATORY FAILURE WITH HYPOXIA AND HYPERCAPNIA (HCC): Status: ACTIVE | Noted: 2022-11-02

## 2022-11-02 PROBLEM — J44.1 ACUTE EXACERBATION OF COPD WITH ASTHMA (HCC): Status: ACTIVE | Noted: 2022-11-02

## 2022-11-02 PROBLEM — N17.9 ACUTE KIDNEY INJURY SUPERIMPOSED ON CHRONIC KIDNEY DISEASE (HCC): Status: ACTIVE | Noted: 2022-11-02

## 2022-11-02 PROBLEM — R10.84 GENERALIZED ABDOMINAL PAIN: Status: ACTIVE | Noted: 2022-11-02

## 2022-11-02 PROBLEM — J96.21 ACUTE ON CHRONIC RESPIRATORY FAILURE WITH HYPOXIA AND HYPERCAPNIA (HCC): Status: ACTIVE | Noted: 2022-11-02

## 2022-11-02 PROBLEM — D64.9 NORMOCYTIC ANEMIA: Status: ACTIVE | Noted: 2022-11-02

## 2022-11-02 PROBLEM — J81.0 ACUTE PULMONARY EDEMA (HCC): Status: ACTIVE | Noted: 2022-11-02

## 2022-11-02 PROBLEM — J96.02 ACUTE RESPIRATORY FAILURE WITH HYPOXIA AND HYPERCARBIA (HCC): Status: ACTIVE | Noted: 2022-11-02

## 2022-11-02 PROBLEM — J45.901 ACUTE EXACERBATION OF COPD WITH ASTHMA (HCC): Status: ACTIVE | Noted: 2022-11-02

## 2022-11-02 PROBLEM — J96.01 ACUTE HYPOXEMIC RESPIRATORY FAILURE (HCC): Status: ACTIVE | Noted: 2022-11-02

## 2022-11-02 PROBLEM — J96.01 ACUTE RESPIRATORY FAILURE WITH HYPOXIA AND HYPERCARBIA (HCC): Status: ACTIVE | Noted: 2022-11-02

## 2022-11-02 LAB
GLUCOSE BLD STRIP.AUTO-MCNC: 124 MG/DL (ref 65–117)
GLUCOSE BLD STRIP.AUTO-MCNC: 157 MG/DL (ref 65–117)
GLUCOSE BLD STRIP.AUTO-MCNC: 186 MG/DL (ref 65–117)
GLUCOSE BLD STRIP.AUTO-MCNC: 208 MG/DL (ref 65–117)
INR PPP: 4.4 (ref 0.9–1.1)
PROTHROMBIN TIME: 41.5 SEC (ref 9–11.1)
SERVICE CMNT-IMP: ABNORMAL

## 2022-11-02 PROCEDURE — 97161 PT EVAL LOW COMPLEX 20 MIN: CPT

## 2022-11-02 PROCEDURE — 36600 WITHDRAWAL OF ARTERIAL BLOOD: CPT

## 2022-11-02 PROCEDURE — 82962 GLUCOSE BLOOD TEST: CPT

## 2022-11-02 PROCEDURE — 65660000001 HC RM ICU INTERMED STEPDOWN

## 2022-11-02 PROCEDURE — 97116 GAIT TRAINING THERAPY: CPT

## 2022-11-02 PROCEDURE — 99232 SBSQ HOSP IP/OBS MODERATE 35: CPT | Performed by: INTERNAL MEDICINE

## 2022-11-02 PROCEDURE — 97165 OT EVAL LOW COMPLEX 30 MIN: CPT

## 2022-11-02 PROCEDURE — 74011250636 HC RX REV CODE- 250/636: Performed by: FAMILY MEDICINE

## 2022-11-02 PROCEDURE — 77010033678 HC OXYGEN DAILY

## 2022-11-02 PROCEDURE — 99223 1ST HOSP IP/OBS HIGH 75: CPT | Performed by: INTERNAL MEDICINE

## 2022-11-02 PROCEDURE — 36415 COLL VENOUS BLD VENIPUNCTURE: CPT

## 2022-11-02 PROCEDURE — 74011250637 HC RX REV CODE- 250/637: Performed by: FAMILY MEDICINE

## 2022-11-02 PROCEDURE — 94640 AIRWAY INHALATION TREATMENT: CPT

## 2022-11-02 PROCEDURE — 74011636637 HC RX REV CODE- 636/637: Performed by: FAMILY MEDICINE

## 2022-11-02 PROCEDURE — 74011000250 HC RX REV CODE- 250: Performed by: FAMILY MEDICINE

## 2022-11-02 PROCEDURE — 97535 SELF CARE MNGMENT TRAINING: CPT

## 2022-11-02 PROCEDURE — 85610 PROTHROMBIN TIME: CPT

## 2022-11-02 RX ORDER — FUROSEMIDE 40 MG/1
40 TABLET ORAL DAILY
Status: DISCONTINUED | OUTPATIENT
Start: 2022-11-02 | End: 2022-11-03

## 2022-11-02 RX ORDER — PREDNISONE 20 MG/1
60 TABLET ORAL
Status: DISCONTINUED | OUTPATIENT
Start: 2022-11-03 | End: 2022-11-08 | Stop reason: HOSPADM

## 2022-11-02 RX ORDER — FUROSEMIDE 10 MG/ML
40 INJECTION INTRAMUSCULAR; INTRAVENOUS DAILY
Status: DISCONTINUED | OUTPATIENT
Start: 2022-11-02 | End: 2022-11-02

## 2022-11-02 RX ADMIN — Medication 2 UNITS: at 06:53

## 2022-11-02 RX ADMIN — ATORVASTATIN CALCIUM 80 MG: 40 TABLET, FILM COATED ORAL at 10:32

## 2022-11-02 RX ADMIN — Medication 2 UNITS: at 12:43

## 2022-11-02 RX ADMIN — LACOSAMIDE 50 MG: 50 TABLET, FILM COATED ORAL at 10:28

## 2022-11-02 RX ADMIN — BUDESONIDE 500 MCG: 0.5 INHALANT RESPIRATORY (INHALATION) at 08:30

## 2022-11-02 RX ADMIN — BUDESONIDE 500 MCG: 0.5 INHALANT RESPIRATORY (INHALATION) at 22:21

## 2022-11-02 RX ADMIN — ARFORMOTEROL TARTRATE 15 MCG: 15 SOLUTION RESPIRATORY (INHALATION) at 22:21

## 2022-11-02 RX ADMIN — LEVETIRACETAM 1000 MG: 100 SOLUTION ORAL at 18:07

## 2022-11-02 RX ADMIN — METHYLPREDNISOLONE SODIUM SUCCINATE 40 MG: 40 INJECTION, POWDER, FOR SOLUTION INTRAMUSCULAR; INTRAVENOUS at 00:02

## 2022-11-02 RX ADMIN — ARFORMOTEROL TARTRATE 15 MCG: 15 SOLUTION RESPIRATORY (INHALATION) at 08:30

## 2022-11-02 RX ADMIN — LEVETIRACETAM 1000 MG: 100 SOLUTION ORAL at 10:32

## 2022-11-02 RX ADMIN — AMLODIPINE BESYLATE 10 MG: 5 TABLET ORAL at 10:32

## 2022-11-02 RX ADMIN — METHYLPREDNISOLONE SODIUM SUCCINATE 40 MG: 40 INJECTION, POWDER, FOR SOLUTION INTRAMUSCULAR; INTRAVENOUS at 06:25

## 2022-11-02 RX ADMIN — LACOSAMIDE 50 MG: 50 TABLET, FILM COATED ORAL at 18:06

## 2022-11-02 RX ADMIN — METHYLPREDNISOLONE SODIUM SUCCINATE 40 MG: 40 INJECTION, POWDER, FOR SOLUTION INTRAMUSCULAR; INTRAVENOUS at 12:44

## 2022-11-02 RX ADMIN — Medication 10 ML: at 06:25

## 2022-11-02 RX ADMIN — ASPIRIN 81 MG: 81 TABLET, COATED ORAL at 10:28

## 2022-11-02 RX ADMIN — CARVEDILOL 12.5 MG: 12.5 TABLET, FILM COATED ORAL at 10:31

## 2022-11-02 NOTE — PROGRESS NOTES
Pulmonary    More alert this am   On NC O2 and in no resp distress    Patient Vitals for the past 4 hrs:   Pulse   22 0600 64   Temp (24hrs), Av.3 °F (36.8 °C), Min:98.1 °F (36.7 °C), Max:98.5 °F (36.9 °C)    Intake/Output Summary (Last 24 hours) at 2022 0817  Last data filed at 2022 0349  Gross per 24 hour   Intake --   Output 750 ml   Net -750 ml     Exam:  Heavy  No resp distress  No accessory use  Decreased bs at bases  Warm and dry  1-2+ edema    Lab:  Recent Labs     22  0357 10/31/22  2121 10/31/22  1434 10/31/22  1431   WBC 4.2  --  4.9  --    HGB 10.7*  --  9.9*  --      --  183  --      --  129*  --    K 4.9  --  SPECIMEN HEMOLYZED, RESULTS MAY BE AFFECTED  --      --  106  --    CO2 29  --  29  --    BUN 40*  --  43*  --    CREA 1.84*  --  1.97*  --    *  --  90  --    CA 9.4  --  8.3*  --    MG 2.0  --  SPECIMEN HEMOLYZED, RESULTS MAY BE AFFECTED  --    INR 4.8* 5.5*  --   --    TROPHS  --   --   --  7   BNPNT  --   --  1,350*  --    TBILI 0.3  --  <0.1*  --    LAC  --   --   --  0.4     Echo:    Left Ventricle: Normal left ventricular systolic function with a visually estimated EF of 60 - 65%. Left ventricle size is normal. Normal wall thickness. Normal wall motion. Normal diastolic function.     Pulmonary Impression / Recommendations:  Acute on chronic hypoxemic and hypercarbic resp failure - former smoker with h/o asthma / NENO - not on cpap / parox afib on coumadin and CXR suggestive of pulmonary edema - on NIPPV and supplemental O2     --standard treatment for copd / asthma exacerbation  --diuresis  --outpatient pulmonary follow up with PFT and sleep eval for currently untreated NENO / OHS (has seen Dr Hellen Dong in the distant past - )    Lela Quinteros MD

## 2022-11-02 NOTE — PROGRESS NOTES
Problem: Mobility Impaired (Adult and Pediatric)  Goal: *Acute Goals and Plan of Care (Insert Text)  Description: FUNCTIONAL STATUS PRIOR TO ADMISSION: Patient was modified independent using a rolling walker for functional mobility. Patient does have history of falls and patient's daughter states patient attempts to ambulate without RW at times. HOME SUPPORT PRIOR TO ADMISSION: The patient lived with daughter, granddaughter and required minimal assistance/contact guard assist for transfers, ADLs, etc. Patient's daughter states most days patient is able to mobilize independently but that on \"bad days\" patient requires a little help. Physical Therapy Goals  Initiated 11/2/2022  1. Patient will move from supine to sit and sit to supine  in bed with minimal assistance/contact guard assist within 7 day(s). 2.  Patient will transfer from bed to chair and chair to bed with minimal assistance/contact guard assist using the least restrictive device within 7 day(s). 3.  Patient will perform sit to stand with minimal assistance/contact guard assist within 7 day(s). 4.  Patient will ambulate with minimal assistance/contact guard assist for 30 feet with the least restrictive device within 7 day(s). 5.  Patient will ascend/descend 5 stairs with one handrail(s) with minimal assistance/contact guard assist within 7 day(s). Outcome: Progressing Towards Goal   PHYSICAL THERAPY EVALUATION  Patient: Leonor Infante (27 y.o. female)  Date: 11/2/2022  Primary Diagnosis: CHF (congestive heart failure) ([facility] [I50.9]       Precautions:   Fall      ASSESSMENT  Based on the objective data described below, the patient presents with decreased activity tolerance, new need for supplemental O2, generalized weakness, impaired balance, increased need for physical assistance, and high risk for falls. Patient found supine in bed with Spo2 table on 3LPM O2 and agreeable to PT.  Patient required min A for supine > sit with bed modified, additional time needed for rest breaks while transitioning to sitting 2/2 fatigue. Patient required up to mod A for sit <> stand at RW, and min A for bed > chair with RW. Patient demonstrated increased R/L weight shifting and difficulty advancing LEs while taking a few steps to chair. Patient also noted to demonstrate poor proximity to RW, but was receptive to cues for appropriate placement and sequencing. Spo2 remained stable on 3LPM during and after activity. Patient left seated in chair with chair alarm activated and all needs in reach. Patient's daughter and granddaughter have been caring for patient at home and are able to provide 24/7 assist and physical assist as needed. Recommend home with 24/7 family support and HH PT.     Current Level of Function Impacting Discharge (mobility/balance): bed > chair min A x 1 and RW     Functional Outcome Measure: The patient scored Total Score: 11/28 on the Tinetti outcome measure which is indicative of high fall risk. Other factors to consider for discharge: supportive family able to provide 24/7 physical assist      Patient will benefit from skilled therapy intervention to address the above noted impairments. PLAN :  Recommendations and Planned Interventions: bed mobility training, transfer training, gait training, therapeutic exercises, neuromuscular re-education, patient and family training/education, and therapeutic activities      Frequency/Duration: Patient will be followed by physical therapy:  5 times a week to address goals.     Recommendation for discharge: (in order for the patient to meet his/her long term goals)  Physical therapy at least 2 days/week in the home AND ensure assist and/or supervision for safety with all mobility     This discharge recommendation:  A follow-up discussion with the attending provider and/or case management is planned    IF patient discharges home will need the following DME: patient owns DME required for discharge SUBJECTIVE:   Patient stated the year is 2002.     OBJECTIVE DATA SUMMARY:   HISTORY:    Past Medical History:   Diagnosis Date    Arthritis     Asthma     Diabetes ([facility]     Glaucoma     Hypertension     Other ill-defined conditions(799.89)     rt knee surgery    Stroke [facility])      Past Surgical History:   Procedure Laterality Date    HX COLONOSCOPY      HX GYN         Personal factors and/or comorbidities impacting plan of care: CHF, hx falls     Home Situation  Home Environment: Private residence  # Steps to Enter: 5  Rails to Enter: Yes  Wheelchair Ramp: Yes  One/Two Story Residence: Two story, live on 1st floor  # of Interior Steps: 15  Interior Rails: Left  Living Alone: No  Support Systems: Child(devan), Other Family Member(s)  Patient Expects to be Discharged to[de-identified] Home with family assistance  Current DME Used/Available at Home: Frederich Gu, rollator, Cane, straight, Shower chair, Commode, bedside, Other (comment) (transport w/c)  Tub or Shower Type: Tub/Shower combination    EXAMINATION/PRESENTATION/DECISION MAKING:   Critical Behavior:  Neurologic State: Alert  Orientation Level: Oriented to person, Oriented to place, Disoriented to situation, Disoriented to time  Cognition: Follows commands, Poor safety awareness  Safety/Judgement: Home safety, Fall prevention, Decreased insight into deficits  Hearing:     Skin:  intact  Edema: significant bilateral LE edema   Range Of Motion:  AROM: Generally decreased, functional           PROM: Generally decreased, functional           Strength:    Strength: Generally decreased, functional                    Tone & Sensation:   Tone: Normal              Sensation: Intact               Coordination:  Coordination: Generally decreased, functional  Vision:      Functional Mobility:  Bed Mobility:     Supine to Sit: Minimum assistance;Assist x1;Additional time;Bed Modified     Scooting: Minimum assistance;Assist x1  Transfers:  Sit to Stand:  Moderate assistance  Stand to Sit: Minimum assistance        Bed to Chair: Minimum assistance              Balance:   Sitting: Intact; Without support  Standing: Impaired  Standing - Static: Good;Constant support  Standing - Dynamic : Fair;Constant support  Ambulation/Gait Training:  Distance (ft): 3 Feet (ft)  Assistive Device: Walker, rolling;Gait belt  Ambulation - Level of Assistance: Minimal assistance;Assist x1        Gait Abnormalities: Decreased step clearance;Shuffling gait;Trunk sway increased        Base of Support: Widened  Stance: Weight shift  Speed/Taylor: Slow;Shuffled  Step Length: Right shortened;Left shortened                      Functional Measure:  Tinetti test:    Sitting Balance: 1  Arises: 0  Attempts to Rise: 0  Immediate Standing Balance: 1  Standing Balance: 1  Nudged: 2  Eyes Closed: 1  Turn 360 Degrees - Continuous/Discontinuous: 0  Turn 360 Degrees - Steady/Unsteady: 0  Sitting Down: 1  Balance Score: 7 Balance total score  Indication of Gait: 0  R Step Length/Height: 0  L Step Length/Height: 0  R Foot Clearance: 1  L Foot Clearance: 1  Step Symmetry: 1  Step Continuity: 0  Path: 1  Trunk: 0  Walking Time: 0  Gait Score: 4 Gait total score  Total Score: 11/28 Overall total score         Tinetti Tool Score Risk of Falls  <19 = High Fall Risk  19-24 = Moderate Fall Risk  25-28 = Low Fall Risk  Tinetti ME. Performance-Oriented Assessment of Mobility Problems in Elderly Patients. Ness 66; [id number].  (Scoring Description: PT Bulletin Feb. 10, 1993)    Older adults: Sherryle Hobby et al, 2009; n = 1000 [facility] elderly evaluated with ABC, LATONYA, ADL, and IADL)  · Mean LATONYA score for males aged 69-68 years = 26.21(3.40)  · Mean LATONYA score for females age 69-68 years = 25.16(4.30)  · Mean LATONYA score for males over 80 years = 23.29(6.02)  · Mean LATONYA score for females over 80 years = 17.20(8.32)        Physical Therapy Evaluation Charge Determination   History Examination Presentation Decision-Making   MEDIUM  Complexity : 1-2 comorbidities / personal factors will impact the outcome/ POC  LOW Complexity : 1-2 Standardized tests and measures addressing body structure, function, activity limitation and / or participation in recreation  LOW Complexity : Stable, uncomplicated  Other outcome measures tinetti  HIGH       Based on the above components, the patient evaluation is determined to be of the following complexity level: LOW     Pain Ratin/10    Activity Tolerance:   Good, desaturates with exertion and requires oxygen, requires frequent rest breaks, and observed SOB with activity      After treatment patient left in no apparent distress:   Sitting in chair, Call bell within reach, Bed / chair alarm activated, and Caregiver / family present    COMMUNICATION/EDUCATION:   The patients plan of care was discussed with: Registered nurse. Fall prevention education was provided and the patient/caregiver indicated understanding.     Thank you for this referral.  Fany Bradshaw, PT   Time Calculation: 27 mins

## 2022-11-02 NOTE — PROGRESS NOTES
Pt lost IV access. Notified hospitalist. Not able to establish new access. Asked hospitalist if he wanted to get midline or other form of access, he said no. He said he would convert all IV meds to PO. So pt is stable with no IV access and MD is aware.

## 2022-11-02 NOTE — PROGRESS NOTES
Problem: Self Care Deficits Care Plan (Adult)  Goal: *Therapy Goal (Edit Goal, Insert Text)  Description: FUNCTIONAL STATUS PRIOR TO ADMISSION: Patient lives with family who assists with bathing and dressing. She typically can toilet (using BSC) without assistance, though occasionally requires assist with bowel hygiene. Patient has history of LE edema. She uses a RW for mobility. Occupational Therapy Goals  Initiated 11/2/2022  1. Patient will perform grooming in stand with supervision/set-up within 7 day(s). 2.  Patient will perform UB and anterior thigh bathing with supervision/set-up within 7 day(s). 3.  Patient will perform toilet transfers with supervision/set-up within 7 day(s). 4.  Patient will perform all aspects of toileting with minimal assistance/contact guard assist within 7 day(s). Outcome: Progressing Towards Goal   OCCUPATIONAL THERAPY EVALUATION  Patient: Dez Anton (67 y.o. female)  Date: 11/2/2022  Primary Diagnosis: CHF (congestive heart failure) ([facility] [address] [facility] [I50.9]       Precautions:   Fall    ASSESSMENT  Based on the objective data described below, the patient presents with decreased ADL performance from baseline due to general weakness, impaired standing tolerance, SOB with minimal exertion and need for supplemental O2, BLE edema. Family present during today's session and elaborated on patient's typical level of assistance. They provide assistance as needed with bathing and dressing she typically can toilet using BS without assist. [facility]. Current Level of Function Impacting Discharge (ADLs/self-care): max assist for LB ADL, toileting. Up to Mod A for transfers- RW support in stand    Functional Outcome Measure: The patient scored Total: 45/100 on the Barthel Index outcome measure    Patient will benefit from skilled therapy intervention to address the above noted impairments.        PLAN :  Recommendations and Planned Interventions: self care training, functional mobility training, therapeutic exercise, balance training, therapeutic activities, endurance activities, patient education, home safety training, and family training/education    Frequency/Duration: Patient will be followed by occupational therapy 4 times a week to address goals. Recommendation for discharge: (in order for the patient to meet his/her long term goals)  Occupational therapy at least 2 days/week in the home     This discharge recommendation:  Has been made in collaboration with the attending provider and/or case management    IF patient discharges home will need the following DME: hospital bed for LE edema management and positioning needs- discussed with        SUBJECTIVE:   Patient pleasant and cooperative     OBJECTIVE DATA SUMMARY:   HISTORY:   Past Medical History:   Diagnosis Date    Arthritis     Asthma     Diabetes ([facility]     Glaucoma     Hypertension     Other ill-defined conditions(799.89)     rt knee surgery    Stroke [facility])      Past Surgical History:   Procedure Laterality Date    HX COLONOSCOPY      HX GYN         Expanded or extensive additional review of patient history:     Home Situation  Home Environment: Private residence  # Steps to Enter: 5  Rails to Enter: Yes  Wheelchair Ramp: Yes  One/Two Story Residence: Two story, live on 1st floor  # of Interior Steps: 15  Interior Rails: Left  Living Alone: No  Support Systems: Child(devan), Other Family Member(s)  Patient Expects to be Discharged to[de-identified] Home with family assistance  Current DME Used/Available at Home: Lora Dessert, rollator, Cane, straight, Shower chair, Commode, bedside, Other (comment) (transport w/c)  Tub or Shower Type: Tub/Shower combination      EXAMINATION OF PERFORMANCE DEFICITS:  Cognitive/Behavioral Status:  Neurologic State: Alert  Orientation Level: Oriented to person;Oriented to place; Disoriented to situation;Disoriented to time  Cognition: Follows commands;Poor safety awareness  Perception: Appears intact  Perseveration: No perseveration noted  Safety/Judgement: Home safety; Fall prevention;Decreased insight into deficits      Edema: BLE edema     Hearing:       Vision/Perceptual:                                     Range of Motion:    AROM: Generally decreased, functional  PROM: Generally decreased, functional                      Strength:    Strength: Generally decreased, functional                Coordination:  Coordination: Generally decreased, functional  Fine Motor Skills-Upper: Left Intact; Right Intact    Gross Motor Skills-Upper: Left Intact; Right Intact    Tone & Sensation:    Tone: Normal  Sensation: Intact                      Balance:  Sitting: Intact; Without support  Standing: Impaired  Standing - Static: Good;Constant support  Standing - Dynamic : Fair;Constant support    Functional Mobility and Transfers for ADLs:  Bed Mobility:  Supine to Sit: Minimum assistance;Assist x1;Additional time;Bed Modified  Scooting: Minimum assistance;Assist x1    Transfers:  Sit to Stand: Moderate assistance  Stand to Sit: Minimum assistance  Bed to Chair: Minimum assistance  Assistive Device : Walker, rolling    ADL Assessment:  Feeding: Independent    Oral Facial Hygiene/Grooming: Supervision;Setup (seated)    Bathing: Moderate assistance         Upper Body Dressing: Setup    Lower Body Dressing: Maximum assistance    Toileting: Maximum assistance                ADL Intervention and task modifications:   Transfer training- ed on improved technique for sit to stand with good results- min assist to stand at [address] [place] [address]. Max A for bowel hygiene (extensive BM). Transfer from [facility] to chair- increased time and cues for safe walker management. Static standing balance with RW support good. Tolerance >2 min in standing           Cognitive Retraining  Safety/Judgement: Home safety; Fall prevention;Decreased insight into deficits    Functional Measure:    Barthel Index:  Bathin  Bladder: 5  Bowels: 5  Groomin  Dressin  Feeding: 10  Mobility: 0  Stairs: 0  Toilet Use: 5  Transfer (Bed to Chair and Back): 10  Total: 45/100      The Barthel ADL Index: Guidelines  1. The index should be used as a record of what a patient does, not as a record of what a patient could do. 2. The main aim is to establish degree of independence from any help, physical or verbal, however minor and for whatever reason. 3. The need for supervision renders the patient not independent. 4. A patient's performance should be established using the best available evidence. Asking the patient, friends/relatives and nurses are the usual sources, but direct observation and common sense are also important. However direct testing is not needed. 5. Usually the patient's performance over the preceding 24-48 hours is important, but occasionally longer periods will be relevant. 6. Middle categories imply that the patient supplies over 50 per cent of the effort. 7. Use of aids to be independent is allowed. Score Interpretation (from [address] [address])    Independent   60-79 Minimally independent   40-59 Partially dependent   20-39 Very dependent   <20 Totally dependent     -Richard Manuel., Barthel, DSaturninoW. (1965). Functional evaluation: the Barthel Index. [facility] ([address] (). The Barthel activities of daily living index: self-reporting versus actual performance in the old (> or = 75 years). Journal of [address] [place] [address] 45(7), [facility], SAVANA, Arturo Damon., Franklin Doty. (). Measuring the change in disability after inpatient rehabilitation; comparison of the responsiveness of the Barthel Index and Functional [place] Measure. Journal of Neurology, Neurosurgery, and Psychiatry, 66(4), 010-345. Get Lazo, N.J.A, TOBY Sorenson, & Behzad Ness MSaturninoA. (2004) Assessment of post-stroke quality of life in cost-effectiveness studies:  The usefulness of the Barthel Index and the EuroQoL-5D. Quality of Life Research, 15, 501-56     Occupational Therapy Evaluation Charge Determination   History Examination Decision-Making   LOW Complexity : Brief history review  LOW Complexity : 1-3 performance deficits relating to physical, cognitive , or psychosocial skils that result in activity limitations and / or participation restrictions  LOW Complexity : No comorbidities that affect functional and no verbal or physical assistance needed to complete eval tasks       Based on the above components, the patient evaluation is determined to be of the following complexity level: LOW   Pain Rating:  None reported     Activity Tolerance:   Fair, desaturates with exertion and requires oxygen, and requires rest breaks    After treatment patient left in no apparent distress:    Sitting in chair, Call bell within reach, and Caregiver / family present    COMMUNICATION/EDUCATION:   The patients plan of care was discussed with: Registered nurse. Home safety education was provided and the patient/caregiver indicated understanding. and Patient/family have participated as able in goal setting and plan of care. This patients plan of care is appropriate for delegation to JR.     Thank you for this referral.  Gilse Sanchez OT  Time Calculation: 20 mins

## 2022-11-02 NOTE — NURSE NAVIGATOR
HEART FAILURE NURSE NAVIGATOR NOTE  [facility]    Patient chart was reviewed by Heart Failure (HF) Nurse Navigators for compliance of prescribed treatment with guidelines directed medical therapy (GDMT) and HF database completed. Please, review beneath recommendations for symptomatic patients with HF with Preserved Ejection Fraction ? 50% (HFpEF) for your consideration when taking care of this patient. Current HF Medical Therapy:      Name Ekta Posada    1945   LVEF 60-65%   NYHA Functional Class Not documentated   ARNi/ACEi/ARB    Aldosterone Antagonist    SGLT2 inhibitor    Consulting Cardiologist CAV-consult Pending     Recommendations for HF Management:    For patients with HFpEF ? 50%, consider adding the following GDMT, if appropriate:  SGLT2 inhibitor [Class 2a]  ARNi or ARB [Class 2b]  Aldosterone antagonist [Class 2b]  Adjust antihypertensive therapy [Class 1]  Adjust diuretic dose at discharge if hospitalized for volume overload [Class 1]  For patient with hyperkalemia while on RAASi > 5.5, consider adding potassium binders (patiromer, sodium zirconium cycosilicate) [Class 2a]    Patients with suspected cardiac amyloidosis (older > 61years old with LVH > 1.2cm and/or any other signs of amyloidosis) should be offered screening labs and imaging [Class 1]: (a) serum gammopathy profile and UPEP with immunofixation, and (b) PYP test. If PYP test is positive patient should have genetic testing done for inherited ATTR amyloidosis. If any findings are positive or you need genetic testing ordered, please, consider in-patient consultation or referral to [address] [place] [address]. Note that the following medications may be potentially harmful in heart failure [Class 3].   Calcium channel blockers (doxazosin, diltiazem, verapamil, nifedipine)  Antiarrhythmics (flecanide, disopyrimide, sotalol, dronedarone)  Diabetes medications (thiasolidinediones, saxagliptin, alogliptin)  NSAIDs and SUAREZ 2 inhibitors    Consider vaccinations for respiratory illnesses (flu, pneumonia, covid) [Class 2b]      Due to h/o recurrent hospitalizations this patient may benefit from referral to Advanced Heart Failure Program to assess suitability for advanced therapies, such as left-ventricular assist device, heart transplantation, palliative inotropes or palliation [Class 1]. To obtain in-patient consultation or refer to [address] [place] [address], call [phone]    Patient Heart Failure Education:     Teach back in heart failure education provided, including information about medical therapy, lifestyle modifications, diet and fluid restrictions, physical activity. Educational resources provided: Living with Heart Failure booklet; Signs/Symptoms magnet; Weight Calendar; [facility] flyer; Preparing for Successful Discharge check list.  Information provided about HF support group. Heart failure avoiding triggers on discharge instructions. Plan for Transitional Care:    Post discharge follow up phone call to be made within 48-72 hours of discharge. Patient will follow-up with PCP. Patient will follow-up with Primary Cardiologist.  Patient screened for obstructive sleep apnea and referred to Sleep Medicine. Referral/follow-up with Cardiac Rehabilitation. Referral/follow-up with Advanced Heart Failure Specialist.  Referral/follow-up with Palliative Care Specialist.        Heart Failure Nurse Navigator  Phone: [phone] / [phone]    *Recommendations listed above are based on the guidelines: 2022 AHA/ACC/HFSA Guideline for the Management of Heart Failure: A Report of the [address] on Clinical Practice Guidelines. Circulation 2022; [id number].  and 2017 AHA/ACC/HRS guideline for management of patients with ventricular arrhythmias and the prevention of sudden cardiac death: A Report of the American College of Cardiology/American Heart Association Task Force on Clinical Practice Guidelines and the [employer].  Heart Rhythm, Vol 15, No 10, October 2018 *Class of Recommendation: Class 1 (strong), Class 2a (moderate), Class 2b (weak), Class 3 (not recommended, potentially harmful)

## 2022-11-02 NOTE — CONSULTS
[facility]  Cardiology Care Note                  [x]Initial visit     []Established visit     Patient Name: Geri Morrow - WOF:3/23/1835 - YWW:[id number]  Primary Cardiologist: none  Consulting Cardiologist: Shelia Oscar MD   Request for cardiology consult received today. Reason for initial visit: CHF    Patient seen and examined by me with the above nurse practitioner. I personally performed all components of the history, physical, and medical decision making and agree with the assessment and plan with minor modifications as noted. Today the patient presents with pain in her bilateral lower extremities, but noted to be hypoxic and found to have pulmonary edema on chest x-ray. Echo shows normal LVEF and normal wall thickness, no obvious diastolic dysfunction. General PE  Gen:  NAD  Mental Status - Alert. General Appearance - Not in acute distress. HEENT:  PERRL, no carotid bruits or JVD  Chest and Lung Exam   Inspection: Accessory muscles - No use of accessory muscles in breathing. Auscultation:   Breath sounds: - Normal.   Cardiovascular   Inspection: Jugular vein - Bilateral - Inspection Normal.   Palpation/Percussion:   Apical Impulse: - Normal.   Auscultation: Rhythm - Regular. Heart Sounds - S1 WNL and S2 WNL. No S3 or S4. Murmurs & Other Heart Sounds: Auscultation of the heart reveals - No Murmurs. Peripheral Vascular   Upper Extremity: Inspection - Bilateral - No Cyanotic nailbeds or Digital clubbing. Lower Extremity:   Palpation: Edema - Bilateral - No edema. Abdomen:   Soft, non-tender, bowel sounds are active. Neuro: A&O times 3, CN and motor grossly WNL    Clinically, the patient likely has a component of heart failure with preserved ejection fraction, also possibly some volume overload related to renal insufficiency. Diurese as tolerated by creatinine.   Continue treatment of PAF, mixed hyperlipidemia, and hypertension. Thanks for the consult. We appreciate the opportunity to participate in this patient's care. HPI:    Ms. Jackie Michel is a 76 y.o. fmeale with PMH of HTN, DM type II, dyslipidemia, CKD stage III, asthma, PAF on coumadin. NENO (does not use CPAP), anemia, morbid obesity, CVA who presents from home with chief c/o SOB. Patient is a poor historian so majority of HPI obtained from chart. Pt says she presented to ER because she had pain in BLE. Chart indicates that patient presetned 10/31/22 with chief c/o SOB which worsend with movement. Daughter noted that patient's O2 saturation were in 60s at times when first awakening from sleep. EMS was called and pt's O2 sats were 74%. On arrival to ER, her sats were 94=3% on 4 L NC.  CXR showed cardiomegaly and mild pulmonary edema. She was given lasix 40 mg IV in ER. Her INR is supratherapeutic. NT pro bNP was 1350 on admission. Creatinine was elevated from baseline at 1.97 but trended down after diuretic to 1.84. ( HS troponin on presentation was 7. She denies following with any cardiologist.   Attempted to reach pt's daughter for further history of events but no answer. SUBJECTIVE:  Currently reports improvement in SOB. Denies chest pain,dizziness, palpitations. Reports BLE edema. She is on 3 liters nasal cannula  ADDENDUM:; pt lost IV access today per nurse. Assessment and Plan      Acute hypoxic hypercapneic respiratory failure   Likely multifactorial - Asthma exacerbation and some pulmonary edema as well as some likely obesity hypoventilation. Pulmonary consulted as well- nebs steroids, diuresis  Echo with NL EF, normal diastolic function, NWMA    2. Pulmonary edema, suspected probable element of acute HFpEF  CXR with mild pulmonary edema on admission.  Pro BNP is 1350, not elevated for Age but may not be as accurate in setting of morbid obesity.    -Echo with NL EF, NWMA, normal diastolic function, no valvular disorder. HS troponin was low (7) on presentation. D/w Dr. Bry Geiger- will  resume diuretic but will need to give po due to no IV access - start o lasix 40 mg daily. Follow follow creatinine/labs    3. BLE edema:  Suspect possible ?lymphedema- Dr Bry Geiger to evaluate, also may be element of possible HFpEF   EF nl on echo preport. 4.  History of PAF  NSR on telemetry. No PAF noted  On coreg  On coumadin but INR supratherapeutic, 4.8 (trending down from 5.5) thus coumadin being held. (Pharmacy managing)    5. Elevated creatinine:history of CKD  Creatinine trending back down but still up from earlier this year. Lab Results   Component Value Date/Time    Creatinine 1.84 (H) 11/01/2022 03:57 AM   ?consider renal consult- will d/w Dr. Bry Geiger    6. HLD  HDL 55, LDL 70  Continue atorvastatin. 7.  HTN  BP controlled  Coreg, amlodipine    8. NENO/OHV:  untreated. Followup with primary team planned           ____________________________________________________________    Cardiac testing  10/31/22    ECHO ADULT COMPLETE 11/01/2022 11/1/2022    Interpretation Summary    Left Ventricle: Normal left ventricular systolic function with a visually estimated EF of 60 - 65%. Left ventricle size is normal. Normal wall thickness. Normal wall motion. Normal diastolic function. Contrast used: Definity. Signed by: Daxa Leonardo MD on 11/1/2022 12:57 PM                Most recent HS troponins:  Recent Labs     10/31/22  1431   TROPHS 7     ECG: NSR, nonspecific T wave abnormality no acute  ischemic changes. Review of Systems    [x]All other systems reviewed and all negative except as written in HPI  Positive sometimes RUE pain, none now.      [] Patient unable to provide secondary to condition         Past Medical History:   Diagnosis Date    Arthritis     Asthma     Diabetes ([facility] [address]     Glaucoma     Hypertension     Other ill-defined conditions(799.89)     rt knee surgery    Stroke [facility])      Past Surgical History: Procedure Laterality Date    HX COLONOSCOPY      HX GYN       Social Hx:  reports that she has quit smoking. She has never used smokeless tobacco. She reports that she does not drink alcohol and does not use drugs. Family Hx: family history is not on file. Allergies   Allergen Reactions    Clonidine Other (comments)     Patient becomes incoherent    Metformin Unknown (comments)          OBJECTIVE:  Wt Readings from Last 3 Encounters:   11/02/22 132.5 kg (292 lb)   10/03/22 128.7 kg (283 lb 12.8 oz)   05/06/22 125.2 kg (276 lb)       Intake/Output Summary (Last 24 hours) at 11/2/2022 1516  Last data filed at 11/2/2022 0349  Gross per 24 hour   Intake --   Output 750 ml   Net -750 ml         Physical Exam    Vitals:   Vitals:    11/02/22 1031 11/02/22 1200 11/02/22 1216 11/02/22 1400   BP: (!) 127/98  (!) 127/45    Pulse: 70 65 60 76   Resp:   17    Temp:   98.2 °F (36.8 °C)    SpO2:   93%    Weight:       Height:         Telemetry: NSR      General:    Alert, cooperative, no distress, appears stated age. Neck:   Supple,    Back:     Not evaluated   Lungs:     diminished to auscultation bilaterally. Heart[de-identified]    Regular rate and rhythm, S1, S2 normal, no murmur, click, rub or gallop. Abdomen:     Soft, non-tender. Bowel sounds normal.    Extremities:   3+ BLE edema . Vascular:   Pulses - 2+ radial   Skin:   Vascular skin changes BLE   Neurologic:   Alert, Moves all extremities. Data Review:     Radiology:   XR Results (most recent):  Results from Hospital Encounter encounter on 10/31/22    XR CHEST PORT    Narrative  EXAM:  XR CHEST PORT    INDICATION: Dyspnea and cough    COMPARISON: 3/6/2019    TECHNIQUE: Erect portable chest AP view    FINDINGS: Heart size enlarged. Prominence of the bilateral hilar structures may  be related to enlarged pulmonary arteries. Diminished vascular clarity The visualized bones and upper abdomen are  age-appropriate.     Impression  Cardiomegaly and mild pulmonary edema which is increased from the prior study. CT Results (most recent):  Results from Hospital Encounter encounter on 10/31/22    CT HEAD WO CONT    Narrative  EXAM: CT HEAD WO CONT    INDICATION: s/p fall on Warfarin    COMPARISON: CT 9/16/2021. CONTRAST: None. TECHNIQUE: Unenhanced CT of the head was performed using 5 mm images. Brain and  bone windows were generated. Coronal and sagittal reformats. CT dose reduction  was achieved through use of a standardized protocol tailored for this  examination and automatic exposure control for dose modulation. FINDINGS:  The ventricles and sulci are normal in size, shape and configuration apart from  expected dilation related to encephalomalacia in the left middle cerebral  artery, not significantly changed from prior. There is no significant white  matter disease. There is no intracranial hemorrhage, extra-axial collection, or  mass effect. No significant change in physiologic basal ganglia calcification. The basilar cisterns are open. No CT evidence of acute infarct. . Subcutaneous  lesion of the midline high frontal scalp compatible with epidermal inclusion  cyst is noted, unchanged. The bone windows demonstrate no abnormalities. The visualized portions of the  paranasal sinuses and mastoid air cells are clear. Impression  No intracranial hemorrhage or other acute findings. Chronic changes  as above. No results for input(s): CPK, TROIQ in the last 72 hours.     No lab exists for component: CKQMB, CPKMB, BMPP  Recent Labs     11/01/22  0357 10/31/22  1434    129*   K 4.9 SPECIMEN HEMOLYZED, RESULTS MAY BE AFFECTED    106   CO2 29 29   BUN 40* 43*   CREA 1.84* 1.97*   * 90   CA 9.4 8.3*     Recent Labs     11/01/22  0357 10/31/22  1434   WBC 4.2 4.9   HGB 10.7* 9.9*   HCT 36.2 33.2*    183     Recent Labs     11/01/22  0357 10/31/22  2121 10/31/22  1434   PTP 45.5* 51.7*  --    INR 4.8* 5.5*  --    AP 70  --  SPECIMEN HEMOLYZED, RESULTS MAY BE AFFECTED     No results for input(s): CHOL, LDLC in the last 72 hours. No lab exists for component: TGL, HDLC,  HBA1C  No results for input(s): CRP, TSH, TSHEXT in the last 72 hours.     No lab exists for component: ESR        Current meds:    Current Facility-Administered Medications:     [START ON 11/3/2022] predniSONE (DELTASONE) tablet 60 mg, 60 mg, Oral, DAILY WITH BREAKFAST, Fox Garcia MD    aspirin delayed-release tablet 81 mg, 81 mg, Oral, DAILY, Pegram, Lanie Dandy, MD, 81 mg at 11/02/22 1028    atorvastatin (LIPITOR) tablet 80 mg, 80 mg, Oral, DAILY, Pegram, Lanie Dandy, MD, 80 mg at 11/02/22 1032    amLODIPine (NORVASC) tablet 10 mg, 10 mg, Oral, DAILY, Isiah Man MD, 10 mg at 11/02/22 1032    carvediloL (COREG) tablet 12.5 mg, 12.5 mg, Oral, BID, Isiah Man MD, 12.5 mg at 11/02/22 1031    sodium chloride (NS) flush 5-40 mL, 5-40 mL, IntraVENous, Q8H, Isiah Man MD, 10 mL at 11/02/22 1902    sodium chloride (NS) flush 5-40 mL, 5-40 mL, IntraVENous, PRN, Pegram, Lanie Dandy, MD    acetaminophen (TYLENOL) tablet 650 mg, 650 mg, Oral, Q6H PRN **OR** acetaminophen (TYLENOL) suppository 650 mg, 650 mg, Rectal, Q6H PRN, Pegram, Lanie Dandy, MD    polyethylene glycol (MIRALAX) packet 17 g, 17 g, Oral, DAILY PRN, Isiah Man MD    ondansetron (ZOFRAN ODT) tablet 4 mg, 4 mg, Oral, Q8H PRN **OR** ondansetron (ZOFRAN) injection 4 mg, 4 mg, IntraVENous, Q6H PRN, Pegram, Lanie Dandy, MD    Warfarin Pharmacy Dosing (COUMADIN), , Other, Rx Dosing/Monitoring, Pegram, Lanie Dandy, MD    arformoteroL (BROVANA) neb solution 15 mcg, 15 mcg, Nebulization, BID RT, 15 mcg at 11/02/22 0830 **AND** budesonide (PULMICORT) 500 mcg/2 ml nebulizer suspension, 500 mcg, Nebulization, BID RT, BeachwoodIsiah haywood MD, 500 mcg at 11/02/22 0830    glucose chewable tablet 16 g, 4 Tablet, Oral, PRN, Isiah Man MD    glucagon (GLUCAGEN) injection 1 mg, 1 mg, IntraMUSCular, PRN, [place], Nikolai Lee MD    dextrose 10 % infusion 0-250 mL, 0-250 mL, IntraVENous, PRN, Nikolai Man MD    insulin lispro (HUMALOG) injection, , SubCUTAneous, AC&HS, Nikolai Man MD, 2 Units at 11/02/22 1243    lacosamide (VIMPAT) tablet 50 mg, 50 mg, Oral, BID, Isiah Man MD, 50 mg at 11/02/22 1028    levETIRAcetam (KEPPRA) oral solution 1,000 mg, 1,000 mg, Oral, BID, Isiah Man MD, 1,000 mg at 11/02/22 1032    albuterol-ipratropium (DUO-NEB) 2.5 MG-0.5 MG/3 ML, 3 mL, Nebulization, Q6H PRN, Nikolai Man MD Roxanna Bailiff. KOURTNEY Kramer  [facility] [address] [address]  [place]  [phone]      CC: Curt Diaz MD

## 2022-11-02 NOTE — PROGRESS NOTES
Problem: Heart Failure: Day 2  Goal: Off Pathway (Use only if patient is Off Pathway)  Outcome: Progressing Towards Goal  Goal: Activity/Safety  Outcome: Progressing Towards Goal  Goal: Consults, if ordered  Outcome: Progressing Towards Goal  Goal: Diagnostic Test/Procedures  Outcome: Progressing Towards Goal  Goal: Nutrition/Diet  Outcome: Progressing Towards Goal  Goal: Discharge Planning  Outcome: Progressing Towards Goal  Goal: Medications  Outcome: Progressing Towards Goal  Goal: Respiratory  Outcome: Progressing Towards Goal  Goal: Treatments/Interventions/Procedures  Outcome: Progressing Towards Goal  Goal: Psychosocial  Outcome: Progressing Towards Goal  Goal: *Oxygen saturation within defined limits  Outcome: Progressing Towards Goal  Goal: *Hemodynamically stable  Outcome: Progressing Towards Goal  Goal: *Optimal pain control at patient's stated goal  Outcome: Progressing Towards Goal  Goal: *Anxiety reduced or absent  Outcome: Progressing Towards Goal  Goal: *Demonstrates progressive activity  Outcome: Progressing Towards Goal     Problem: Heart Failure: Day 3  Goal: Off Pathway (Use only if patient is Off Pathway)  Outcome: Progressing Towards Goal  Goal: Activity/Safety  Outcome: Progressing Towards Goal  Goal: Diagnostic Test/Procedures  Outcome: Progressing Towards Goal  Goal: Nutrition/Diet  Outcome: Progressing Towards Goal  Goal: Discharge Planning  Outcome: Progressing Towards Goal  Goal: Medications  Outcome: Progressing Towards Goal  Goal: Respiratory  Outcome: Progressing Towards Goal  Goal: Treatments/Interventions/Procedures  Outcome: Progressing Towards Goal  Goal: Psychosocial  Outcome: Progressing Towards Goal  Goal: *Oxygen saturation within defined limits  Outcome: Progressing Towards Goal  Goal: *Hemodynamically stable  Outcome: Progressing Towards Goal  Goal: *Optimal pain control at patient's stated goal  Outcome: Progressing Towards Goal  Goal: *Anxiety reduced or absent  Outcome: Progressing Towards Goal  Goal: *Demonstrates progressive activity  Outcome: Progressing Towards Goal     Problem: Heart Failure: Day 4  Goal: Off Pathway (Use only if patient is Off Pathway)  Outcome: Progressing Towards Goal  Goal: Activity/Safety  Outcome: Progressing Towards Goal  Goal: Diagnostic Test/Procedures  Outcome: Progressing Towards Goal  Goal: Nutrition/Diet  Outcome: Progressing Towards Goal  Goal: Discharge Planning  Outcome: Progressing Towards Goal  Goal: Medications  Outcome: Progressing Towards Goal  Goal: Respiratory  Outcome: Progressing Towards Goal  Goal: Treatments/Interventions/Procedures  Outcome: Progressing Towards Goal  Goal: Psychosocial  Outcome: Progressing Towards Goal  Goal: *Oxygen saturation within defined limits  Outcome: Progressing Towards Goal  Goal: *Hemodynamically stable  Outcome: Progressing Towards Goal  Goal: *Optimal pain control at patient's stated goal  Outcome: Progressing Towards Goal  Goal: *Anxiety reduced or absent  Outcome: Progressing Towards Goal  Goal: *Demonstrates progressive activity  Outcome: Progressing Towards Goal     Problem: Heart Failure: Day 5  Goal: Off Pathway (Use only if patient is Off Pathway)  Outcome: Progressing Towards Goal  Goal: Activity/Safety  Outcome: Progressing Towards Goal  Goal: Diagnostic Test/Procedures  Outcome: Progressing Towards Goal  Goal: Nutrition/Diet  Outcome: Progressing Towards Goal  Goal: Discharge Planning  Outcome: Progressing Towards Goal  Goal: Medications  Outcome: Progressing Towards Goal  Goal: Respiratory  Outcome: Progressing Towards Goal  Goal: Treatments/Interventions/Procedures  Outcome: Progressing Towards Goal  Goal: Psychosocial  Outcome: Progressing Towards Goal     Problem: Heart Failure: Discharge Outcomes  Goal: *Demonstrates ability to perform prescribed activity without shortness of breath or discomfort  Outcome: Progressing Towards Goal  Goal: *Left ventricular function assessment completed prior to or during stay, or planned for post-discharge  Outcome: Progressing Towards Goal  Goal: *ACEI prescribed if LVEF less than 40% and no contraindications or ARB prescribed  Outcome: Progressing Towards Goal  Goal: *Verbalizes understanding and describes prescribed diet  Outcome: Progressing Towards Goal  Goal: *Verbalizes understanding/describes prescribed medications  Outcome: Progressing Towards Goal  Goal: *Describes available resources and support systems  Description: (eg: Home Health, Palliative Care, Advanced Medical Directive)  Outcome: Progressing Towards Goal  Goal: *Describes smoking cessation resources  Outcome: Progressing Towards Goal  Goal: *Understands and describes signs and symptoms to report to providers(Stroke Metric)  Outcome: Progressing Towards Goal  Goal: *Describes/verbalizes understanding of follow-up/return appt  Description: (eg: to physicians, diabetes treatment coordinator, and other resources  Outcome: Progressing Towards Goal  Goal: *Describes importance of continuing daily weights and changes to report to physician  Outcome: Progressing Towards Goal     Problem: Chronic Obstructive Pulmonary Disease (COPD)  Goal: *Oxygen saturation during activity within specified parameters  Outcome: Progressing Towards Goal  Goal: *Able to remain out of bed as prescribed  Outcome: Progressing Towards Goal  Goal: *Absence of hypoxia  Outcome: Progressing Towards Goal  Goal: *Optimize nutritional status  Outcome: Progressing Towards Goal     Problem: Patient Education: Go to Patient Education Activity  Goal: Patient/Family Education  Outcome: Progressing Towards Goal     Problem: Falls - Risk of  Goal: *Absence of Falls  Description: Document Hector Fall Risk and appropriate interventions in the flowsheet.   Outcome: Progressing Towards Goal  Note: Fall Risk Interventions:            Medication Interventions: Patient to call before getting OOB, Teach patient to arise slowly

## 2022-11-02 NOTE — PROGRESS NOTES
Pharmacist Note - Warfarin Dosing  Consult provided for this 76 y. o.female to manage warfarin for PAF     INR Goal: 2 - 3  Home regimen/ tablet size: 7.5 mg Mon-Thurs; 5 mg Fr-Sa-Bullard  Daily INR ordered: YES    Recent Labs     11/02/22  1609 11/01/22  0357 10/31/22  2121 10/31/22  1434   HGB  --  10.7*  --  9.9*   INR 4.4* 4.8* 5.5*  --      Date               INR                  Dose  10/31               5.5                   Held   11/01               4.8                   Held  11/02               4.4                   Held                                                                               Assessment/ Plan: Will hold warfarin  today. Pharmacy will continue to monitor daily and adjust therapy as indicated. Please contact the pharmacist at  for outpatient recommendations if needed.

## 2022-11-02 NOTE — PROGRESS NOTES
Hospital Progress Note    NAME:  Ekta Posada   :   1945   MRN:  [id number]     Date/Time:  2022 8:28 AM    Plan:     Continue jet neb,steroids  Encourage activity  Pt/ot  Risk of Deterioration: Low  []           Moderate  []           High  []                 Assessment:     Principal Problem:    Acute on chronic respiratory failure with hypoxia and hypercapnia (HCC) (2022)     Jet nebs and steroids     CPAP       Active Problems:    Paroxysmal atrial fibrillation (HCC) (2010)      coumadin      Morbid obesity ([facility] (2010)     Encourage weight management      Seizure disorder ([facility] [address] (2014)     home dose of Lacosamide 50 mg BID and levetiracetam 1000 mg BID      Dyslipidemia (1/10/2015)     Lipitor       HTN (hypertension) (1/10/2015)     Titrate home meds      Type 2 diabetes mellitus with chronic kidney disease ([facility] [address] (2022)      SSI      Chronic renal disease, stage III (10/3/2022)           CHF (congestive heart failure) ([address] (10/31/2022)     Evelena Ode EF and no diastolic dyf on echo     Normal bnp     Card consult      Normocytic anemia (2022)      Generalized abdominal pain (2022)     CT neg     reolved      Acute kidney injury superimposed on chronic kidney disease ([address] (2022)      Acute exacerbation of COPD with asthma ([facility] (2022)     Pul following      Acute pulmonary edema ([facility] [address] (2022)            Admitting notes:77 y.o. female with past medical history of arthritis, asthma, sleep apnea, type 2 diabetes mellitus, glaucoma, hypertension, stroke, class III obesity presented to the emergency department via EMS from home accompanied by her daughter with chief complaint of shortness of breath. Patient is a limited historian. Her daughter provides additional history. Remainder of history was obtained per review of ED electronic medical records.   Today, patient reported was short of breath, remain constant, severe, without specific alleviating factors, exacerbated with movement. Her daughter notes that patient's oxygen saturations may be on occasion into the 60s on first awakening from sleep. She has a history of sleep apnea but does not use CPAP Pap machine for several years as it is broke. EMS was called to the scene. Patient reportedly was hypoxic with O2 saturations at 74%. Patient has been taken inhalers prior to arrival.  She was placed on 4 L oxygen via nasal cannula. On arrival to the emergency department, initial recorded vital signs temperature 98.0 °F, /71, heart rate 64, respirate 12, O2 saturation 93% on 4 L oxygen via nasal cannula. Abnormal labs included hemoglobin 9.9, sodium 129, BUN 43, creatinine 1.97, GFR 25, calcium 8.3, albumin 2.3, proBNP 1350. Chest reportable showed cardiomegaly and mild pulmonary edema. Patient was given Lasix 40 mg IV x1 dose. Patient is now seen for admission to the hospital service with continued evaluation and treatments. Patient has occasional cough wheezing. She also in addition take Spiriva along with albuterol.        Subjective:     Feeling better  11 Point Review of Systems:   Negative except no cp.sob improved    []            Unable to obtain ROS due to:       []            mental status change []            sedated []            intubated     Social History     Tobacco Use    Smoking status: Former    Smokeless tobacco: Never    Tobacco comments:     20+years ago   Substance Use Topics    Alcohol use: No     Medications reviewed:  Current Facility-Administered Medications   Medication Dose Route Frequency    aspirin delayed-release tablet 81 mg  81 mg Oral DAILY    atorvastatin (LIPITOR) tablet 80 mg  80 mg Oral DAILY    amLODIPine (NORVASC) tablet 10 mg  10 mg Oral DAILY    carvediloL (COREG) tablet 12.5 mg  12.5 mg Oral BID    sodium chloride (NS) flush 5-40 mL  5-40 mL IntraVENous Q8H    sodium chloride (NS) flush 5-40 mL  5-40 mL IntraVENous PRN    acetaminophen (TYLENOL) tablet 650 mg 650 mg Oral Q6H PRN    Or    acetaminophen (TYLENOL) suppository 650 mg  650 mg Rectal Q6H PRN    polyethylene glycol (MIRALAX) packet 17 g  17 g Oral DAILY PRN    ondansetron (ZOFRAN ODT) tablet 4 mg  4 mg Oral Q8H PRN    Or    ondansetron (ZOFRAN) injection 4 mg  4 mg IntraVENous Q6H PRN    Warfarin Pharmacy Dosing (COUMADIN)   Other Rx Dosing/Monitoring    arformoteroL (BROVANA) neb solution 15 mcg  15 mcg Nebulization BID RT    And    budesonide (PULMICORT) 500 mcg/2 ml nebulizer suspension  500 mcg Nebulization BID RT    methylPREDNISolone (PF) (SOLU-MEDROL) injection 40 mg  40 mg IntraVENous Q6H    glucose chewable tablet 16 g  4 Tablet Oral PRN    glucagon (GLUCAGEN) injection 1 mg  1 mg IntraMUSCular PRN    dextrose 10 % infusion 0-250 mL  0-250 mL IntraVENous PRN    insulin lispro (HUMALOG) injection   SubCUTAneous AC&HS    lacosamide (VIMPAT) tablet 50 mg  50 mg Oral BID    levETIRAcetam (KEPPRA) oral solution 1,000 mg  1,000 mg Oral BID    albuterol-ipratropium (DUO-NEB) 2.5 MG-0.5 MG/3 ML  3 mL Nebulization Q6H PRN        Objective:   Vitals:  Visit Vitals  BP (!) 120/45   Pulse 64   Temp 98.4 °F (36.9 °C)   Resp 16   Ht 5' 2\" (1.575 m)   Wt 292 lb (132.5 kg)   SpO2 99%   BMI 53.41 kg/m²     Temp (24hrs), Av.3 °F (36.8 °C), Min:98.1 °F (36.7 °C), Max:98.5 °F (36.9 °C)    O2 Flow Rate (L/min): 3 l/min O2 Device: Nasal cannula    Last 24hr Input/Output:    Intake/Output Summary (Last 24 hours) at 2022  Last data filed at 2022 0349  Gross per 24 hour   Intake --   Output 750 ml   Net -750 ml        PHYSICAL EXAM:  General:    Alert, cooperative, no distress, appears stated age. Head:   Normocephalic, without obvious abnormality, atraumatic. Eyes:   Conjunctivae/corneas clear. PERRLA  Nose:  Nares normal. No drainage or sinus tenderness.   Throat:    Lips, mucosa, and tongue normal.  No Thrush  Neck:  Supple, symmetrical,  no adenopathy, thyroid: non tender    no carotid bruit and no JVD.  Back:    Symmetric,  No CVA tenderness. Lungs:   Decreased bs. Chest wall:  No tenderness or deformity. No Accessory muscle use. Heart:   Regular rate and rhythm,  no murmur, rub or gallop. Abdomen:   Soft, non-tender. Not distended. Bowel sounds normal. No masses        Lab Data Reviewed:    Recent Labs     11/01/22  0357 10/31/22  1434   WBC 4.2 4.9   HGB 10.7* 9.9*   HCT 36.2 33.2*    183     Recent Labs     11/01/22  0357 10/31/22  2121 10/31/22  1434     --  129*   K 4.9  --  SPECIMEN HEMOLYZED, RESULTS MAY BE AFFECTED     --  106   CO2 29  --  29   *  --  90   BUN 40*  --  43*   CREA 1.84*  --  1.97*   CA 9.4  --  8.3*   MG 2.0  --  SPECIMEN HEMOLYZED, RESULTS MAY BE AFFECTED   ALB 3.4*  --  2.8*   TBILI 0.3  --  <0.1*   ALT 21  --  SPECIMEN HEMOLYZED, RESULTS MAY BE AFFECTED   INR 4.8* 5.5*  --      Lab Results   Component Value Date/Time    Glucose (POC) 157 (H) 11/02/2022 06:37 AM    Glucose (POC) 162 (H) 11/01/2022 08:45 PM    Glucose (POC) 123 (H) 11/01/2022 04:56 PM    Glucose (POC) 143 (H) 11/01/2022 12:23 PM    Glucose (POC) 127 (H) 11/01/2022 08:54 AM     No results for input(s): PH, PCO2, PO2, HCO3, FIO2 in the last 72 hours.   Recent Labs     11/01/22  0357 10/31/22  2121   INR 4.8* 5.5*     ___________________________________________________  ___________________________________________________    Attending Physician: Marianne Vee MD

## 2022-11-02 NOTE — PROGRESS NOTES
2000 Report received from [facility]. Patient alert and oriented with some drowsiness. Pt was able to answer all questions and then drift back to sleep. Pt weaned down on oxygen, will continue to monitor. 7853 Bedside shift change report given to Trellis Phoenix, RN and Claudina Cranker, RN by "[employer]. Report included the following information SBAR, Kardex, MAR, Accordion, and Recent Results and Cardiac Rhythm NSR.     Problem: Heart Failure: Day 1  Goal: Off Pathway (Use only if patient is Off Pathway)  Outcome: Resolved/Met     Problem: Heart Failure: Day 2  Goal: Diagnostic Test/Procedures  Outcome: Progressing Towards Goal     Problem: Chronic Obstructive Pulmonary Disease (COPD)  Goal: *Absence of hypoxia  Outcome: Progressing Towards Goal

## 2022-11-03 ENCOUNTER — APPOINTMENT (OUTPATIENT)
Dept: GENERAL RADIOLOGY | Age: 77
DRG: 189 | End: 2022-11-03
Attending: NURSE PRACTITIONER
Payer: MEDICARE

## 2022-11-03 LAB
ALBUMIN SERPL-MCNC: 2.8 G/DL (ref 3.5–5)
ANION GAP SERPL CALC-SCNC: 4 MMOL/L (ref 5–15)
BNP SERPL-MCNC: 1830 PG/ML
BUN SERPL-MCNC: 52 MG/DL (ref 6–20)
BUN/CREAT SERPL: 28 (ref 12–20)
CALCIUM SERPL-MCNC: 8.7 MG/DL (ref 8.5–10.1)
CHLORIDE SERPL-SCNC: 106 MMOL/L (ref 97–108)
CO2 SERPL-SCNC: 25 MMOL/L (ref 21–32)
CREAT SERPL-MCNC: 1.89 MG/DL (ref 0.55–1.02)
GLUCOSE BLD STRIP.AUTO-MCNC: 130 MG/DL (ref 65–117)
GLUCOSE BLD STRIP.AUTO-MCNC: 139 MG/DL (ref 65–117)
GLUCOSE BLD STRIP.AUTO-MCNC: 183 MG/DL (ref 65–117)
GLUCOSE BLD STRIP.AUTO-MCNC: 219 MG/DL (ref 65–117)
GLUCOSE SERPL-MCNC: 123 MG/DL (ref 65–100)
INR PPP: 3.3 (ref 0.9–1.1)
LACOSAMIDE SERPL-MCNC: 4.1 UG/ML (ref 5–10)
PHOSPHATE SERPL-MCNC: 3.3 MG/DL (ref 2.6–4.7)
POTASSIUM SERPL-SCNC: 5 MMOL/L (ref 3.5–5.1)
PROTHROMBIN TIME: 32 SEC (ref 9–11.1)
SERVICE CMNT-IMP: ABNORMAL
SODIUM SERPL-SCNC: 135 MMOL/L (ref 136–145)

## 2022-11-03 PROCEDURE — 65660000001 HC RM ICU INTERMED STEPDOWN

## 2022-11-03 PROCEDURE — 71045 X-RAY EXAM CHEST 1 VIEW: CPT

## 2022-11-03 PROCEDURE — 83880 ASSAY OF NATRIURETIC PEPTIDE: CPT

## 2022-11-03 PROCEDURE — 74011000250 HC RX REV CODE- 250: Performed by: FAMILY MEDICINE

## 2022-11-03 PROCEDURE — 82962 GLUCOSE BLOOD TEST: CPT

## 2022-11-03 PROCEDURE — 74011636637 HC RX REV CODE- 636/637: Performed by: INTERNAL MEDICINE

## 2022-11-03 PROCEDURE — 94664 DEMO&/EVAL PT USE INHALER: CPT

## 2022-11-03 PROCEDURE — 99232 SBSQ HOSP IP/OBS MODERATE 35: CPT | Performed by: INTERNAL MEDICINE

## 2022-11-03 PROCEDURE — 99233 SBSQ HOSP IP/OBS HIGH 50: CPT | Performed by: INTERNAL MEDICINE

## 2022-11-03 PROCEDURE — 97116 GAIT TRAINING THERAPY: CPT

## 2022-11-03 PROCEDURE — 97530 THERAPEUTIC ACTIVITIES: CPT

## 2022-11-03 PROCEDURE — 80069 RENAL FUNCTION PANEL: CPT

## 2022-11-03 PROCEDURE — 36415 COLL VENOUS BLD VENIPUNCTURE: CPT

## 2022-11-03 PROCEDURE — 36600 WITHDRAWAL OF ARTERIAL BLOOD: CPT

## 2022-11-03 PROCEDURE — 74011636637 HC RX REV CODE- 636/637: Performed by: FAMILY MEDICINE

## 2022-11-03 PROCEDURE — 94640 AIRWAY INHALATION TREATMENT: CPT

## 2022-11-03 PROCEDURE — 97535 SELF CARE MNGMENT TRAINING: CPT

## 2022-11-03 PROCEDURE — 74011250637 HC RX REV CODE- 250/637: Performed by: NURSE PRACTITIONER

## 2022-11-03 PROCEDURE — 74011250637 HC RX REV CODE- 250/637: Performed by: FAMILY MEDICINE

## 2022-11-03 PROCEDURE — 85610 PROTHROMBIN TIME: CPT

## 2022-11-03 RX ORDER — WARFARIN 1 MG/1
1 TABLET ORAL ONCE
Status: COMPLETED | OUTPATIENT
Start: 2022-11-03 | End: 2022-11-03

## 2022-11-03 RX ORDER — FUROSEMIDE 40 MG/1
80 TABLET ORAL DAILY
Status: DISCONTINUED | OUTPATIENT
Start: 2022-11-03 | End: 2022-11-08 | Stop reason: HOSPADM

## 2022-11-03 RX ADMIN — AMLODIPINE BESYLATE 10 MG: 5 TABLET ORAL at 08:49

## 2022-11-03 RX ADMIN — Medication 3 UNITS: at 17:08

## 2022-11-03 RX ADMIN — ATORVASTATIN CALCIUM 80 MG: 40 TABLET, FILM COATED ORAL at 08:49

## 2022-11-03 RX ADMIN — BUDESONIDE 500 MCG: 0.5 INHALANT RESPIRATORY (INHALATION) at 07:22

## 2022-11-03 RX ADMIN — LACOSAMIDE 50 MG: 50 TABLET, FILM COATED ORAL at 17:05

## 2022-11-03 RX ADMIN — ASPIRIN 81 MG: 81 TABLET, COATED ORAL at 08:49

## 2022-11-03 RX ADMIN — CARVEDILOL 12.5 MG: 12.5 TABLET, FILM COATED ORAL at 17:05

## 2022-11-03 RX ADMIN — WARFARIN SODIUM 1 MG: 1 TABLET ORAL at 17:07

## 2022-11-03 RX ADMIN — CARVEDILOL 12.5 MG: 12.5 TABLET, FILM COATED ORAL at 08:49

## 2022-11-03 RX ADMIN — Medication 2 UNITS: at 00:57

## 2022-11-03 RX ADMIN — LEVETIRACETAM 1000 MG: 100 SOLUTION ORAL at 08:49

## 2022-11-03 RX ADMIN — ARFORMOTEROL TARTRATE 15 MCG: 15 SOLUTION RESPIRATORY (INHALATION) at 07:22

## 2022-11-03 RX ADMIN — LEVETIRACETAM 1000 MG: 100 SOLUTION ORAL at 17:08

## 2022-11-03 RX ADMIN — FUROSEMIDE 80 MG: 40 TABLET ORAL at 10:19

## 2022-11-03 RX ADMIN — PREDNISONE 60 MG: 20 TABLET ORAL at 08:49

## 2022-11-03 RX ADMIN — LACOSAMIDE 50 MG: 50 TABLET, FILM COATED ORAL at 08:49

## 2022-11-03 NOTE — PROGRESS NOTES
[facility]  Cardiology Care Note                  [x]Initial visit     [x]Established visit     Patient Name: Tommy Hernandez - SYF:[birth year] - PHP:[id number]  Primary Cardiologist: none  Consulting Cardiologist: Jhonatan Hutton MD     Reason for initial visit: CHF    Patient seen and examined by me with the above nurse practitioner. I personally performed all components of the history, physical, and medical decision making and agree with the assessment and plan with minor modifications as noted. Today the patient presents with pain in her bilateral lower extremities, but noted to be hypoxic and found to have pulmonary edema on chest x-ray. Echo shows normal LVEF and normal wall thickness, no obvious diastolic dysfunction. General PE  Gen:  NAD  Mental Status - Alert. General Appearance - Not in acute distress. HEENT:  PERRL, no carotid bruits or JVD  Chest and Lung Exam   Inspection: Accessory muscles - No use of accessory muscles in breathing. Auscultation:   Breath sounds: -Few crackles  Cardiovascular   Inspection: Jugular vein - Bilateral - Inspection Normal.   Palpation/Percussion:   Apical Impulse: - Normal.   Auscultation: Rhythm - Regular. Heart Sounds - S1 WNL and S2 WNL. No S3 or S4. Murmurs & Other Heart Sounds: Auscultation of the heart reveals - No Murmurs. Peripheral Vascular   Upper Extremity: Inspection - Bilateral - No Cyanotic nailbeds or Digital clubbing. Lower Extremity:   Palpation: Edema - Bilateral - No edema. Abdomen:   Soft, non-tender, bowel sounds are active. Neuro: A&O times 3, CN and motor grossly WNL     Clinically, the patient likely has a component of heart failure with preserved ejection fraction, also possibly some volume overload related to renal insufficiency. Diurese as tolerated by creatinine-increase Lasix today, monitor creatinine.   Continue treatment of PAF, mixed hyperlipidemia, and hypertension. Thanks for the consult. We appreciate the opportunity to participate in this patient's care. HPI:    Ms. Mei Grigsby is a 76 y.o. fmeale with PMH of HTN, DM type II, dyslipidemia, CKD stage III, asthma, PAF on coumadin. NENO (does not use CPAP), anemia, morbid obesity, CVA who presents from home with chief c/o SOB. Patient is a poor historian so majority of HPI obtained from chart. Pt says she presented to ER because she had pain in BLE. Chart indicates that patient presetned 10/31/22 with chief c/o SOB which worsend with movement. Daughter noted that patient's O2 saturation were in 60s at times when first awakening from sleep. EMS was called and pt's O2 sats were 74%. On arrival to ER, her sats were 94=3% on 4 L NC.  CXR showed cardiomegaly and mild pulmonary edema. She was given lasix 40 mg IV in ER. Her INR is supratherapeutic. NT pro bNP was 1350 on admission. Creatinine was elevated from baseline at 1.97 but trended down after diuretic to 1.84. ( HS troponin on presentation was 7. She denies following with any cardiologist.   Attempted to reach pt's daughter for further history of events but no answer. SUBJECTIVE:  Pt denies chest pain, arm pain. She states she is \"not any more SOB than usual.\"    She did not get her lasix dose last evening. I/Os not accurate. Assessment and Plan      Acute hypoxic hypercapneic respiratory failure   Likely multifactorial - Asthma exacerbation and some pulmonary edema as well as some likely obesity hypoventilation. Pulmonary consulted-- advised nebs steroids, diuresis    2. Pulmonary edema, probable element of acute HFpEF in setting of renal dysfunction  Pro BNP trending up 1830. Echo with NL EF, NWMA, normal diastolic function, no valvular disorder. Per Dr. Dalia Gaines- will increase lasix to 80 mg daily po for now. since home dose if 40 mg daily-watch renal function.    Pt is on spironolactone-HCTZ PTA (per chart)- will review with Dr. Pat Turner. Requested daily standing weight. 3.  BLE edema:  Suspect possible ?lymphedema- ?venous insuffiency Also element of possible HFpEF in setting of renal dysfunction. EF normal.  Diuretics as above. Albumen 2.8 may also be contributing. 4.  History of PAF  NSR on telemetry. No PAF noted  On coreg  On coumadin but has been held (supratherapeutic INR) - INR trending down (3.3 today)  Pharmacy managing. 5.  Elevated creatinine:history of CKD  Creatinine near same as yesterday. Lab Results   Component Value Date/Time    Creatinine 1.89 (H) 11/03/2022 07:19 AM   ?consider renal consult-     6. HLD  HDL 55, LDL 70  Continue atorvastatin. 7.  HTN  BP controlled  Coreg, amlodipine. Home ace-I on hold (renal dysfunction)     8. NENO/OHV:  untreated. Followup with primary team planned    Further plan per Dr. Pat Turner           ____________________________________________________________    Cardiac testing  10/31/22    ECHO ADULT COMPLETE 11/01/2022 11/1/2022    Interpretation Summary    Left Ventricle: Normal left ventricular systolic function with a visually estimated EF of 60 - 65%. Left ventricle size is normal. Normal wall thickness. Normal wall motion. Normal diastolic function. Contrast used: Definity. Signed by: Bari Parker MD on 11/1/2022 12:57 PM          Most recent HS troponins:  Recent Labs     10/31/22  1431   TROPHS 7     ECG: NSR, nonspecific T wave abnormality no acute  ischemic changes. Review of Systems    [x]All other systems reviewed and all negative except as written in HPI  Positive sometimes RUE pain, none now.      [] Patient unable to provide secondary to condition         Past Medical History:   Diagnosis Date    Arthritis     Asthma     Diabetes ([address]     Glaucoma     Hypertension     Other ill-defined conditions(799.89)     rt knee surgery    Stroke [facility])      Past Surgical History:   Procedure Laterality Date    HX COLONOSCOPY      HX GYN Social Hx:  reports that she has quit smoking. She has never used smokeless tobacco. She reports that she does not drink alcohol and does not use drugs. Family Hx: family history is not on file. Allergies   Allergen Reactions    Clonidine Other (comments)     Patient becomes incoherent    Metformin Unknown (comments)          OBJECTIVE:  Wt Readings from Last 3 Encounters:   11/03/22 131.9 kg (290 lb 12.8 oz)   10/03/22 128.7 kg (283 lb 12.8 oz)   05/06/22 125.2 kg (276 lb)     No intake or output data in the 24 hours ending 11/03/22 1155        Physical Exam    Vitals:   Vitals:    11/03/22 0723 11/03/22 0844 11/03/22 1000 11/03/22 1128   BP:  (!) 134/55  (!) 119/59   Pulse:  61 (!) 57 (!) 56   Resp:  18  16   Temp:  97.7 °F (36.5 °C)  97.5 °F (36.4 °C)   SpO2: 98% 95%  98%   Weight:  131.9 kg (290 lb 12.8 oz)     Height:  5' 2\" (1.575 m)       Telemetry: NSR      General:    Alert, cooperative, no distress, appears stated age. Neck:   Supple,    Back:     Not evaluated   Lungs:     diminished to auscultation bilaterally. Heart[de-identified]    Regular rate and rhythm, S1, S2 normal, no murmur, click, rub or gallop. Abdomen:     Soft, non-tender. Bowel sounds normal.    Extremities:   3+ BLE edema . BLE  tender to palpation. Vascular:   Pulses - 2+ radial   Skin:   Vascular skin changes BLE   Neurologic:   Alert, Moves all extremities. Data Review:     Radiology:   XR Results (most recent):  Results from Hospital Encounter encounter on 10/31/22    XR CHEST PORT    Narrative  EXAM:  XR CHEST PORT    INDICATION: Dyspnea and cough    COMPARISON: 3/6/2019    TECHNIQUE: Erect portable chest AP view    FINDINGS: Heart size enlarged. Prominence of the bilateral hilar structures may  be related to enlarged pulmonary arteries. Diminished vascular clarity The visualized bones and upper abdomen are  age-appropriate. Impression  Cardiomegaly and mild pulmonary edema which is increased from the prior study.     CT Results (most recent):  Results from Hospital Encounter encounter on 10/31/22    CT HEAD WO CONT    Narrative  EXAM: CT HEAD WO CONT    INDICATION: s/p fall on Warfarin    COMPARISON: CT 9/16/2021. CONTRAST: None. TECHNIQUE: Unenhanced CT of the head was performed using 5 mm images. Brain and  bone windows were generated. Coronal and sagittal reformats. CT dose reduction  was achieved through use of a standardized protocol tailored for this  examination and automatic exposure control for dose modulation. FINDINGS:  The ventricles and sulci are normal in size, shape and configuration apart from  expected dilation related to encephalomalacia in the left middle cerebral  artery, not significantly changed from prior. There is no significant white  matter disease. There is no intracranial hemorrhage, extra-axial collection, or  mass effect. No significant change in physiologic basal ganglia calcification. The basilar cisterns are open. No CT evidence of acute infarct. . Subcutaneous  lesion of the midline high frontal scalp compatible with epidermal inclusion  cyst is noted, unchanged. The bone windows demonstrate no abnormalities. The visualized portions of the  paranasal sinuses and mastoid air cells are clear. Impression  No intracranial hemorrhage or other acute findings. Chronic changes  as above. No results for input(s): CPK, TROIQ in the last 72 hours.     No lab exists for component: CKQMB, CPKMB, BMPP  Recent Labs     11/03/22  0719 11/01/22 0357   * 139   K 5.0 4.9    106   CO2 25 29   BUN 52* 40*   CREA 1.89* 1.84*   * 122*   PHOS 3.3  --    CA 8.7 9.4     Recent Labs     11/01/22  0357 10/31/22  1434   WBC 4.2 4.9   HGB 10.7* 9.9*   HCT 36.2 33.2*    183     Recent Labs     11/03/22  0719 11/02/22  1609 11/01/22 0357 10/31/22  2121 10/31/22  1434   PTP 32.0* 41.5* 45.5*   < >  --    INR 3.3* 4.4* 4.8*   < >  --    AP  --   --  70  --  SPECIMEN HEMOLYZED, RESULTS MAY BE AFFECTED    < > = values in this interval not displayed. No results for input(s): CHOL, LDLC in the last 72 hours. No lab exists for component: TGL, HDLC,  HBA1C  No results for input(s): CRP, TSH, TSHEXT, TSHEXT in the last 72 hours.     No lab exists for component: ESR        Current meds:    Current Facility-Administered Medications:     furosemide (LASIX) tablet 80 mg, 80 mg, Oral, DAILY, Jannette Kramer NP, 80 mg at 11/03/22 1019    predniSONE (DELTASONE) tablet 60 mg, 60 mg, Oral, DAILY WITH BREAKFAST, Jorge A Garcia MD, 60 mg at 11/03/22 0849    aspirin delayed-release tablet 81 mg, 81 mg, Oral, DAILY, Jaleel Man MD, 81 mg at 11/03/22 0849    atorvastatin (LIPITOR) tablet 80 mg, 80 mg, Oral, DAILY, Jaleel Man MD, 80 mg at 11/03/22 0849    amLODIPine (NORVASC) tablet 10 mg, 10 mg, Oral, DAILY, Jaleel Man MD, 10 mg at 11/03/22 0849    carvediloL (COREG) tablet 12.5 mg, 12.5 mg, Oral, BID, Isiah Man MD, 12.5 mg at 11/03/22 0849    sodium chloride (NS) flush 5-40 mL, 5-40 mL, IntraVENous, Q8H, Isiah Man MD, 10 mL at 11/02/22 5138    sodium chloride (NS) flush 5-40 mL, 5-40 mL, IntraVENous, PRN, Jaleel Man MD    acetaminophen (TYLENOL) tablet 650 mg, 650 mg, Oral, Q6H PRN **OR** acetaminophen (TYLENOL) suppository 650 mg, 650 mg, Rectal, Q6H PRN, Jaleel Man MD    polyethylene glycol (MIRALAX) packet 17 g, 17 g, Oral, DAILY PRN, Isiah Man MD    ondansetron (ZOFRAN ODT) tablet 4 mg, 4 mg, Oral, Q8H PRN **OR** ondansetron (ZOFRAN) injection 4 mg, 4 mg, IntraVENous, Q6H PRN, Jaleel Man MD    Warfarin Pharmacy Dosing (COUMADIN), , Other, Rx Dosing/Monitoring, Jaleel Man MD    arformoteroL (BROVANA) neb solution 15 mcg, 15 mcg, Nebulization, BID RT, 15 mcg at 11/03/22 0722 **AND** budesonide (PULMICORT) 500 mcg/2 ml nebulizer suspension, 500 mcg, Nebulization, BID RT, Jaleel Man MD, 500 mcg at 11/03/22 0722    glucose chewable tablet 16 g, 4 Tablet, Oral, PRN, Isiah Man MD    glucagon (GLUCAGEN) injection 1 mg, 1 mg, IntraMUSCular, PRN, Isiah Man MD    dextrose 10 % infusion 0-250 mL, 0-250 mL, IntraVENous, PRN, Jyoti Man MD    insulin lispro (HUMALOG) injection, , SubCUTAneous, AC&HS, Jyoti Man MD, 2 Units at 11/03/22 0057    lacosamide (VIMPAT) tablet 50 mg, 50 mg, Oral, BID, Jyoti Man MD, 50 mg at 11/03/22 0849    levETIRAcetam (KEPPRA) oral solution 1,000 mg, 1,000 mg, Oral, BID, Isiah Man MD, 1,000 mg at 11/03/22 0849    albuterol-ipratropium (DUO-NEB) 2.5 MG-0.5 MG/3 ML, 3 mL, Nebulization, Q6H PRN, Jyoti Man MD Elihu Kemp. KOURTNEY Kramer  [facility], [address] [address]  [facility], [address]  [phone]      CC: Valeria Knowles MD

## 2022-11-03 NOTE — PROGRESS NOTES
Pharmacist Note - Warfarin Dosing  Consult provided for this 76 y. o.female to manage warfarin for PAF     INR Goal: 2 - 3  Home regimen/ tablet size: 7.5 mg Mon-Thurs; 5 mg Fr-Sa-Bullard    Drugs that may increase INR: None  Drugs that may decrease INR: None  Other current anticoagulants/ drugs that may increase bleeding risk: Aspirin  Risk factors: Age > 65  Daily INR ordered: YES    Recent Labs     11/03/22  0719 11/02/22  1609 11/01/22  0357 10/31/22  2121 10/31/22  1434   HGB  --   --  10.7*  --  9.9*   INR 3.3* 4.4* 4.8*   < >  --     < > = values in this interval not displayed. Date               INR                  Dose  10/31               5.5                   Held   11/01               4.8                   Held  11/02               4.4                   Held  11/03   3.3    1 mg                                                                                 Assessment/ Plan:  Anticipate INR will drop further after being held x3 days. Will order a conservative dose of warfarin 1 mg x1 today. Pharmacy will continue to monitor daily and adjust therapy as indicated. Please contact the pharmacist at  or  for outpatient recommendations if needed.

## 2022-11-03 NOTE — PROGRESS NOTES
1245: Madhuri Chavis RT attempted twice to get art stick for iron profile labs with no success    1306: This RN called and left message for ordering nephrologist regarding lack of success in getting blood sample and asking for direction for next steps.

## 2022-11-03 NOTE — PROGRESS NOTES
Problem: Mobility Impaired (Adult and Pediatric)  Goal: *Acute Goals and Plan of Care (Insert Text)  Description: FUNCTIONAL STATUS PRIOR TO ADMISSION: Patient was modified independent using a rolling walker for functional mobility. Patient does have history of falls and patient's daughter states patient attempts to ambulate without RW at times. HOME SUPPORT PRIOR TO ADMISSION: The patient lived with daughter, granddaughter and required minimal assistance/contact guard assist for transfers, ADLs, etc. Patient's daughter states most days patient is able to mobilize independently but that on \"bad days\" patient requires a little help. Physical Therapy Goals  Initiated 11/2/2022  1. Patient will move from supine to sit and sit to supine  in bed with minimal assistance/contact guard assist within 7 day(s). 2.  Patient will transfer from bed to chair and chair to bed with minimal assistance/contact guard assist using the least restrictive device within 7 day(s). 3.  Patient will perform sit to stand with minimal assistance/contact guard assist within 7 day(s). 4.  Patient will ambulate with minimal assistance/contact guard assist for 30 feet with the least restrictive device within 7 day(s). 5.  Patient will ascend/descend 5 stairs with one handrail(s) with minimal assistance/contact guard assist within 7 day(s). Outcome: Progressing Towards Goal   PHYSICAL THERAPY TREATMENT  Patient: Kenn Keenan (21 y.o. female)  Date: 11/3/2022  Diagnosis: CHF (congestive heart failure) ([facility] [address] [I50.9] Acute on chronic respiratory failure with hypoxia and hypercapnia ([facility] [address]      Precautions: Fall  Chart, physical therapy assessment, plan of care and goals were reviewed. ASSESSMENT  Patient continues with skilled PT services and is progressing towards goals. Patient found supine in bed and agreeable to PT. Patient required up to mod A for supine > sit with HOB elevated and bed rails utilized.  Patient required mod A x 2 for sit <> stand at RW, and demonstrated improved standing activity tolerance and static standing balance at RW while extensive hygiene completed, as patient incontinent of bowels while standing. Following 2 x seated rest breaks during clean up, patient stood again with mod A x 2 and ambulated 3 feet with RW and up to min A x 1 BSC > recliner chair. All VSS on 2LPM supplemental O2. Patient left seated in chair with chair alarm activated and all needs in reach. Current Level of Function Impacting Discharge (mobility/balance): sit <> stand mod A x 2, bed > chair min A x 1 and RW    Other factors to consider for discharge: supportive family able to provide 24/7 and physical assist at home          PLAN :  Patient continues to benefit from skilled intervention to address the above impairments. Continue treatment per established plan of care. to address goals. Recommendation for discharge: (in order for the patient to meet his/her long term goals)  Physical therapy at least 2 days/week in the home AND ensure assist and/or supervision for safety with all mobility     This discharge recommendation:  A follow-up discussion with the attending provider and/or case management is planned    IF patient discharges home will need the following DME: patient owns DME required for discharge       SUBJECTIVE:   Patient stated The year is 0.     OBJECTIVE DATA SUMMARY:   Critical Behavior:  Neurologic State: Alert  Orientation Level: Oriented to person, Oriented to place, Disoriented to situation, Disoriented to time  Cognition: Appropriate decision making, Follows commands  Safety/Judgement: Awareness of environment, Fall prevention, Insight into deficits  Functional Mobility Training:  Bed Mobility:     Supine to Sit: Moderate assistance;Maximum assistance     Scooting: Moderate assistance;Assist x2        Transfers:  Sit to Stand:  Moderate assistance;Assist x2  Stand to Sit: Minimum assistance;Assist x1        Bed to Chair: Minimum assistance;Contact guard assistance;Assist x2                    Balance:  Sitting: Intact; Without support  Standing: Impaired; With support  Standing - Static: Good;Constant support  Standing - Dynamic : Fair;Constant support  Ambulation/Gait Training:  Distance (ft): 3 Feet (ft) (3 feet x 2)  Assistive Device: Walker, rolling;Gait belt  Ambulation - Level of Assistance: Minimal assistance;Contact guard assistance;Assist x1        Gait Abnormalities: Decreased step clearance;Shuffling gait;Trunk sway increased        Base of Support: Widened     Speed/Taylor: Slow;Shuffled  Step Length: Right shortened;Left shortened             Pain Ratin/10    Activity Tolerance:   Good, desaturates with exertion and requires oxygen, and requires rest breaks    After treatment patient left in no apparent distress:   Sitting in chair, Call bell within reach, and Bed / chair alarm activated    COMMUNICATION/COLLABORATION:   The patients plan of care was discussed with: Occupational therapist and Registered nurse.      Pam Prieto, PT   Time Calculation: 28 mins

## 2022-11-03 NOTE — PROGRESS NOTES
Physician Progress Note      Alexandra Lima  CSN #:                  [id number]  :                       1945  ADMIT DATE:       10/31/2022 1:36 PM  [address] [place] DATE:  RESPONDING  PROVIDER #:        Curt Ramirez MD          QUERY TEXT:    Patient admitted with Acute respiratory failure with hypoxia. Pt noted to have documented diabetes with elevated A1c requiring sliding scale insulin. Please document in progress notes and discharge summary further specificity regarding the control status of DM: The medical record reflects the following:  Risk Factors: hyperglycemia    Clinical Indicators:  POC glucose  127  143  123  162  157  186  124  204  130  139    10/3/22 10:19  Hemoglobin A1c, (calculated): 6.4 (H)  Est. average glucose: 137      22 03:57  Hemoglobin A1c, (calculated): 6.0 (H)  Est. average glucose: 126    H&P 10/31  Type 2 diabetes mellitus      Treatment:  POC glucose monitoring, lispro sliding scale insulin    Thank you,  Chris Larsen RN [facility]  Clinical Documentation  [phone] or via [address] [place] [address] provided:  -- Hyperglycemia due to DM type II  -- Hyperglycemia not related to DM type II  -- Other - I will add my own diagnosis  -- Disagree - Not applicable / Not valid  -- Disagree - Clinically unable to determine / Unknown  -- Refer to Clinical Documentation Reviewer    PROVIDER RESPONSE TEXT:    This patient has Hyperglycemia due to DM type II.     Query created by: Zoila Lozada on 11/3/2022 2:59 PM      Electronically signed by:  Curt Ramirez MD 11/3/2022 7:17 PM

## 2022-11-03 NOTE — CONSULTS
[address]    Name:  Jeet Howard  MR#:  [id number]  :  1945  ACCOUNT #:  [de-identified]  DATE OF SERVICE:  2022      IN-HOSPITAL NEPHROLOGY CONSULT    REFERRING PHYSICIAN:  Liu Gu MD    REASON FOR CONSULTATION:  Acute kidney injury. HISTORY OF PRESENT ILLNESS:  The patient is a 23-year-old woman who was admitted to [facility] on 10/31/2022 after her daughter brought her in with complaints of severe shortness of breath. The patient was diuresed aggressively and her symptoms are still persisting but improved. The patient is a poor historian. It is not clear if she had preexisting or chronic kidney disease, but she was noted to have rising serum creatinine, the lowest level being 1.17 and today 1.89. PAST MEDICAL HISTORY:  The patient has a very extensive past medical history including morbid obesity, arthritis, asthma, sleep apnea, type 2 diabetes mellitus, glaucoma, hypertension, and CVA. PAST SURGICAL HISTORY:  Colonoscopy and gynecology surgery. ALLERGIES:  ALLERGIC TO CLONIDINE AND METFORMIN. MEDICATIONS:  Medication list at home consists of:  1. Vimpat 50 once a day. 2.  Lipitor 80 once a day. 3.  Paxlovid started on 10/12/2022, although it is not mentioned that she had COVID. 4.  Spiriva. 5.  Levetiracetam  1 g twice a day. 6.  Allopurinol 300 every day. 7.  Plendil 10 mg once a day. 8.  Proventil. 9.  Aspirin. 10.  Carvedilol 12.5 twice a day. 11.  Lasix 40. 12.  Spironolactone with hydrochlorothiazide 25/25 once a day. 13.  Lisinopril 20.  14.  Metformin 500 mg twice a day. The patient has been on it until 10/2022, but she has metformin allergy as mentioned in her chart. 15.  Januvia. FAMILY HISTORY:  Unknown. SOCIAL HISTORY:  The patient is a former smoker. REVIEW OF SYSTEMS:  Somewhat limited. She complains of shortness of breath and difficulties with ambulation.     PHYSICAL EXAMINATION:  GENERAL:  The patient is an elderly black woman, morbidly obese with BMI of 55. She is incontinent for stool. The patient is mildly dyspneic especially trying to move around. VITAL SIGNS:  Blood pressure currently is 134/55, heart rate is 61, temperature is 97.7. HEENT:  Head is normocephalic, atraumatic. Eyes with clear conjunctivae. NECK:  Short and thick with no increased JVP. LUNGS:  With symmetrical expansion and expiratory wheezes. No rales. HEART:  With S1 and S2, regular rate and rhythm. ABDOMEN:  Soft with no rebound. The patient has a pannus. EXTREMITIES:  With edema bilaterally. Pulses are diminished due to edema. SKIN:  Warm and dry. NEUROLOGIC:  The patient is able to move spontaneously. Her motor exam is pretty much nonfocal.  PSYCHIATRY:  She is calm. LABORATORY DATA:  Sodium is 135, potassium is 5. Sodium initially was 129. BUN is 52, creatinine is 1.89, CO2 is 25. Urinalysis with proteinuria. There is no quantification. White blood count is 4.2 and hemoglobin is 10.7. IMPRESSION:  1. Chronic kidney disease at least stage IIIA based on old laboratory data. The patient has multiple risk factors for chronic kidney disease - hypertension, diabetes, morbid obesity. 2.  Acute kidney injury from aggressive diuresis that is expected. The patient's electrolytes are with resolving hypervolemic hyponatremia. 3.  Hypertension, currently controlled. 4.  Proteinuria likely related to chronic kidney disease from diabetes. 5.  Morbid obesity. RECOMMENDATIONS:  1. Continue aggressive diuresis. The patient is still edematous and dyspneic especially with any attempt to move. 2.  Continue monitoring renal function. It is expected that serum creatinine may rise before she has re equilibration  of electrolytes. 3.  I's and O's need to be recorded. 4.  Quantification of proteinuria. 5.  Assess anemia. Check iron profile.   6.  Avoid any unnecessary nephrotoxic agents. 7.  The patient will need followup after discharge. There is no indication for renal replacement therapy. We will follow up closely.       Leola Bee MD      [id number]  D:  11/03/2022 11:26  T:  11/03/2022 14:32  JOB #:  [id number]  CC:  Amarilis Pierce MD

## 2022-11-03 NOTE — PROGRESS NOTES
Problem: Self Care Deficits Care Plan (Adult)  Goal: *Therapy Goal (Edit Goal, Insert Text)  Description: FUNCTIONAL STATUS PRIOR TO ADMISSION: Patient lives with family who assists with bathing and dressing. She typically can toilet (using BSC) without assistance, though occasionally requires assist with bowel hygiene. Patient has history of LE edema. She uses a RW for mobility. Occupational Therapy Goals  Initiated 11/2/2022  1. Patient will perform grooming in stand with supervision/set-up within 7 day(s). 2.  Patient will perform UB and anterior thigh bathing with supervision/set-up within 7 day(s). 3.  Patient will perform toilet transfers with supervision/set-up within 7 day(s). 4.  Patient will perform all aspects of toileting with minimal assistance/contact guard assist within 7 day(s). Outcome: Progressing Towards Goal   OCCUPATIONAL THERAPY TREATMENT  Patient: Lorrie Manriquez (55 y.o. female)  Date: 11/3/2022  Diagnosis: CHF (congestive heart failure) ([facility] [address] [I50.9] Acute on chronic respiratory failure with hypoxia and hypercapnia ([facility] [address]      Precautions: Fall  Chart, occupational therapy assessment, plan of care, and goals were reviewed. ASSESSMENT  Patient continues with skilled OT services and is progressing towards goals. Patient received reclined in bed, amenable to session. Required x2 assist for transfers and bed mobility for safety. Once standing at EOB, noted to be soiled and have had a BM, transferred to [facility] to finish voiding. Required max A for bowel/bladder hygiene. Transferred to bedside chair, left with all needs in reach, NAD. Will continue to benefit from [facility] and family support upon discharge. Current Level of Function Impacting Discharge (ADLs): up to Max A toileting    Other factors to consider for discharge: below baseline, required assist at baseline         PLAN :  Patient continues to benefit from skilled intervention to address the above impairments.   Continue treatment per established plan of care to address goals. Recommend with staff: OOB 3x daily for meals, transfers to [address] [place] [address] Road next OT session: OOB ADL    Recommendation for discharge: (in order for the patient to meet his/her long term goals)  Occupational therapy at least 2 days/week in the home AND ensure assist and/or supervision for safety with ADL/IADL    This discharge recommendation:  Has been made in collaboration with the attending provider and/or case management    IF patient discharges home will need the following DME: TBD pending progress       SUBJECTIVE:   Patient stated Oh no, I made eye contact with yall and now you're here.     OBJECTIVE DATA SUMMARY:   Cognitive/Behavioral Status:  Neurologic State: Alert  Orientation Level: Oriented to person;Oriented to place; Disoriented to situation;Disoriented to time  Cognition: Appropriate decision making; Follows commands  Perception: Appears intact  Perseveration: No perseveration noted  Safety/Judgement: Awareness of environment; Fall prevention; Insight into deficits    Functional Mobility and Transfers for ADLs:  Bed Mobility:  Supine to Sit: Moderate assistance;Maximum assistance  Scooting: Moderate assistance;Assist x2    Transfers:  Sit to Stand: Moderate assistance;Assist x2  Functional Transfers  Toilet Transfer : Moderate assistance (BSC)  Bed to Chair: Minimum assistance;Contact guard assistance;Assist x2    Balance:  Sitting: Intact; Without support  Standing: Impaired; With support  Standing - Static: Good;Constant support  Standing - Dynamic : Fair;Constant support    ADL Intervention:                                Lower Body Dressing Assistance  Socks: Total assistance (dependent)  Position Performed: Supine    Toileting  Bladder Hygiene: Maximum assistance  Bowel Hygiene: Maximum assistance  Clothing Management: Maximum assistance    Cognitive Retraining  Safety/Judgement: Awareness of environment; Fall prevention; Insight into deficits      Pain:  BLE feet edema    Activity Tolerance:   Fair and requires rest breaks    After treatment patient left in no apparent distress:   Sitting in chair, Call bell within reach, and Bed / chair alarm activated    COMMUNICATION/COLLABORATION:   The patients plan of care was discussed with: Physical therapist and Registered nurse.      Karie Iglesias OT  Time Calculation: 32 mins

## 2022-11-03 NOTE — PROGRESS NOTES
Hospital Progress Note    NAME:  Ivan Henderson   :   1945   MRN:  [id number]     Date/Time:  11/3/2022 8:28 AM    Plan:     Continue jet neb  Change to PO steroids  Encourage activity  Pt/ot  Risk of Deterioration: Low  []           Moderate  []           High  []                 Assessment:     Principal Problem:    Acute on chronic respiratory failure with hypoxia and hypercapnia (HCC) (2022)     Jet nebs and steroids     CPAP       Active Problems:    Paroxysmal atrial fibrillation (HCC) (2010)      coumadin      Morbid obesity ([facility] [address] (2010)     Encourage weight management      Seizure disorder ([facility] [address] (2014)     home dose of Lacosamide 50 mg BID and levetiracetam 1000 mg BID      Dyslipidemia (1/10/2015)     Lipitor       HTN (hypertension) (1/10/2015)     Titrate home meds      Type 2 diabetes mellitus with chronic kidney disease ([facility] [address] (2022)      SSI      Chronic renal disease, stage III (10/3/2022)           CHF (congestive heart failure) ([facility] [address] (10/31/2022)     Montero Loth EF and no diastolic dyf on echo     Normal bnp     Card consult      Normocytic anemia (2022)      Generalized abdominal pain (2022)     CT neg     reolved      Acute kidney injury superimposed on chronic kidney disease ([address] (2022)      Acute exacerbation of COPD with asthma ([facility] [address] (2022)     Pul following      Acute pulmonary edema ([facility] [address] (2022)            Admitting notes:77 y.o. female with past medical history of arthritis, asthma, sleep apnea, type 2 diabetes mellitus, glaucoma, hypertension, stroke, class III obesity presented to the emergency department via EMS from home accompanied by her daughter with chief complaint of shortness of breath. Patient is a limited historian. Her daughter provides additional history. Remainder of history was obtained per review of ED electronic medical records.   Today, patient reported was short of breath, remain constant, severe, without specific alleviating factors, exacerbated with movement. Her daughter notes that patient's oxygen saturations may be on occasion into the 60s on first awakening from sleep. She has a history of sleep apnea but does not use CPAP Pap machine for several years as it is broke. EMS was called to the scene. Patient reportedly was hypoxic with O2 saturations at 74%. Patient has been taken inhalers prior to arrival.  She was placed on 4 L oxygen via nasal cannula. On arrival to the emergency department, initial recorded vital signs temperature 98.0 °F, /71, heart rate 64, respirate 12, O2 saturation 93% on 4 L oxygen via nasal cannula. Abnormal labs included hemoglobin 9.9, sodium 129, BUN 43, creatinine 1.97, GFR 25, calcium 8.3, albumin 2.3, proBNP 1350. Chest reportable showed cardiomegaly and mild pulmonary edema. Patient was given Lasix 40 mg IV x1 dose. Patient is now seen for admission to the hospital service with continued evaluation and treatments. Patient has occasional cough wheezing. She also in addition take Spiriva along with albuterol.        Subjective:     Feeling better  11 Point Review of Systems:   Negative except no cp.sob improved    []            Unable to obtain ROS due to:       []            mental status change []            sedated []            intubated     Social History     Tobacco Use    Smoking status: Former    Smokeless tobacco: Never    Tobacco comments:     20+years ago   Substance Use Topics    Alcohol use: No     Medications reviewed:  Current Facility-Administered Medications   Medication Dose Route Frequency    furosemide (LASIX) tablet 80 mg  80 mg Oral DAILY    predniSONE (DELTASONE) tablet 60 mg  60 mg Oral DAILY WITH BREAKFAST    aspirin delayed-release tablet 81 mg  81 mg Oral DAILY    atorvastatin (LIPITOR) tablet 80 mg  80 mg Oral DAILY    amLODIPine (NORVASC) tablet 10 mg  10 mg Oral DAILY    carvediloL (COREG) tablet 12.5 mg  12.5 mg Oral BID    sodium chloride (NS) flush 5-40 mL  5-40 mL IntraVENous Q8H    sodium chloride (NS) flush 5-40 mL  5-40 mL IntraVENous PRN    acetaminophen (TYLENOL) tablet 650 mg  650 mg Oral Q6H PRN    Or    acetaminophen (TYLENOL) suppository 650 mg  650 mg Rectal Q6H PRN    polyethylene glycol (MIRALAX) packet 17 g  17 g Oral DAILY PRN    ondansetron (ZOFRAN ODT) tablet 4 mg  4 mg Oral Q8H PRN    Or    ondansetron (ZOFRAN) injection 4 mg  4 mg IntraVENous Q6H PRN    Warfarin Pharmacy Dosing (COUMADIN)   Other Rx Dosing/Monitoring    arformoteroL (BROVANA) neb solution 15 mcg  15 mcg Nebulization BID RT    And    budesonide (PULMICORT) 500 mcg/2 ml nebulizer suspension  500 mcg Nebulization BID RT    glucose chewable tablet 16 g  4 Tablet Oral PRN    glucagon (GLUCAGEN) injection 1 mg  1 mg IntraMUSCular PRN    dextrose 10 % infusion 0-250 mL  0-250 mL IntraVENous PRN    insulin lispro (HUMALOG) injection   SubCUTAneous AC&HS    lacosamide (VIMPAT) tablet 50 mg  50 mg Oral BID    levETIRAcetam (KEPPRA) oral solution 1,000 mg  1,000 mg Oral BID    albuterol-ipratropium (DUO-NEB) 2.5 MG-0.5 MG/3 ML  3 mL Nebulization Q6H PRN        Objective:   Vitals:  Visit Vitals  BP (!) 105/35   Pulse 62   Temp 97.9 °F (36.6 °C)   Resp 15   Ht 5' 2\" (1.575 m)   Wt 292 lb (132.5 kg)   SpO2 98%   BMI 53.41 kg/m²     Temp (24hrs), Av.1 °F (36.7 °C), Min:97.9 °F (36.6 °C), Max:98.2 °F (36.8 °C)    O2 Flow Rate (L/min): 2 l/min O2 Device: Nasal cannula    Last 24hr Input/Output:  No intake or output data in the 24 hours ending 22 0842       PHYSICAL EXAM:  General:    Alert, cooperative, no distress, appears stated age. Head:   Normocephalic, without obvious abnormality, atraumatic. Eyes:   Conjunctivae/corneas clear. PERRLA  Nose:  Nares normal. No drainage or sinus tenderness. Throat:    Lips, mucosa, and tongue normal.  No Thrush  Neck:  Supple, symmetrical,  no adenopathy, thyroid: non tender    no carotid bruit and no JVD.   Back:    Symmetric,  No CVA tenderness. Lungs:   Decreased bs.exp wheezes  Chest wall:  No tenderness or deformity. No Accessory muscle use. Heart:   Regular rate and rhythm,  no murmur, rub or gallop. Abdomen:   Soft, non-tender. Not distended. Bowel sounds normal. No masses        Lab Data Reviewed:    Recent Labs     11/01/22  0357 10/31/22  1434   WBC 4.2 4.9   HGB 10.7* 9.9*   HCT 36.2 33.2*    183       Recent Labs     11/03/22  0719 11/02/22  1609 11/01/22  0357 10/31/22  2121 10/31/22  1434   *  --  139  --  129*   K 5.0  --  4.9  --  SPECIMEN HEMOLYZED, RESULTS MAY BE AFFECTED     --  106  --  106   CO2 25  --  29  --  29   *  --  122*  --  90   BUN 52*  --  40*  --  43*   CREA 1.89*  --  1.84*  --  1.97*   CA 8.7  --  9.4  --  8.3*   MG  --   --  2.0  --  SPECIMEN HEMOLYZED, RESULTS MAY BE AFFECTED   PHOS 3.3  --   --   --   --    ALB 2.8*  --  3.4*  --  2.8*   TBILI  --   --  0.3  --  <0.1*   ALT  --   --  21  --  SPECIMEN HEMOLYZED, RESULTS MAY BE AFFECTED   INR 3.3* 4.4* 4.8*   < >  --     < > = values in this interval not displayed. Lab Results   Component Value Date/Time    Glucose (POC) 130 (H) 11/03/2022 07:09 AM    Glucose (POC) 208 (H) 11/02/2022 11:19 PM    Glucose (POC) 124 (H) 11/02/2022 04:49 PM    Glucose (POC) 186 (H) 11/02/2022 12:33 PM    Glucose (POC) 157 (H) 11/02/2022 06:37 AM     No results for input(s): PH, PCO2, PO2, HCO3, FIO2 in the last 72 hours.   Recent Labs     11/03/22 0719 11/02/22 1609 11/01/22 0357   INR 3.3* 4.4* 4.8*       ___________________________________________________  ___________________________________________________    Attending Physician: Daryle Denver, MD

## 2022-11-03 NOTE — PROGRESS NOTES
Pulmonary, Critical Care, and Sleep Medicine~Progress Note    Name: Shahida Patiño MRN: [id number]   : 1945 Hospital: [facility] [place] [address]   Date: 11/3/2022 9:10 AM Admission: 10/31/2022     Impression Plan   Acute on chronic hypoxic hypercarbic respiratory failure  Volume overload  History of asthma/?copd  Former smoker  Afib on warfarin  NENO/OHS - not treated O2/NIV as needed  Steroids- plan wean tomorrow if she continues to improve  Diuresis  Bronchodilators  Would benefit from outpatient follow up with PFTs and evaluation in the sleep lab     Daily Progression:    11/3  Stable on 2L this morning  Did not wean NIV overnight  Alert and interactive. No new complaints. I have reviewed the labs and previous days notes. A comprehensive review of systems was negative except for that written in the HPI. OBJECTIVE:     Vital Signs:     Visit Vitals  BP (!) 134/55 (BP 1 Location: Right lower arm, BP Patient Position: At rest)   Pulse 61   Temp 97.7 °F (36.5 °C)   Resp 18   Ht 5' 2\" (1.575 m)   Wt 131.9 kg (290 lb 12.8 oz)   SpO2 95%   BMI 53.19 kg/m²      Temp (24hrs), Av.9 °F (36.6 °C), Min:97.7 °F (36.5 °C), Max:98.2 °F (36.8 °C)     Intake/Output:     Last shift: No intake/output data recorded.     Last 3 shifts:  1901 -  0700  In: -   Out: 350 [Urine:350]      No intake or output data in the 24 hours ending 22 0910    Physical Exam:                                        Exam Findings Other   General: No resp distress noted, appears stated age    [de-identified]:  No ulcers, JVD not elevated, no cervical LAD    Chest: No pectus deformity, normal chest rise b/l    HEART:  RRR, no murmurs/rubs/gallops    Lungs:  CTA b/l, no rhonchi/crackles/wheeze, diminished BS at bases    ABD: Soft/NT, non rigid mildly distended    EXT: No cyanosis/clubbing/edema, normal peripheral pulses    Skin: No rashes or ulcers, no mottling    Neuro: A/O x 3        Medications:  Current Facility-Administered Medications   Medication Dose Route Frequency    furosemide (LASIX) tablet 80 mg  80 mg Oral DAILY    predniSONE (DELTASONE) tablet 60 mg  60 mg Oral DAILY WITH BREAKFAST    aspirin delayed-release tablet 81 mg  81 mg Oral DAILY    atorvastatin (LIPITOR) tablet 80 mg  80 mg Oral DAILY    amLODIPine (NORVASC) tablet 10 mg  10 mg Oral DAILY    carvediloL (COREG) tablet 12.5 mg  12.5 mg Oral BID    sodium chloride (NS) flush 5-40 mL  5-40 mL IntraVENous Q8H    sodium chloride (NS) flush 5-40 mL  5-40 mL IntraVENous PRN    acetaminophen (TYLENOL) tablet 650 mg  650 mg Oral Q6H PRN    Or    acetaminophen (TYLENOL) suppository 650 mg  650 mg Rectal Q6H PRN    polyethylene glycol (MIRALAX) packet 17 g  17 g Oral DAILY PRN    ondansetron (ZOFRAN ODT) tablet 4 mg  4 mg Oral Q8H PRN    Or    ondansetron (ZOFRAN) injection 4 mg  4 mg IntraVENous Q6H PRN    Warfarin Pharmacy Dosing (COUMADIN)   Other Rx Dosing/Monitoring    arformoteroL (BROVANA) neb solution 15 mcg  15 mcg Nebulization BID RT    And    budesonide (PULMICORT) 500 mcg/2 ml nebulizer suspension  500 mcg Nebulization BID RT    glucose chewable tablet 16 g  4 Tablet Oral PRN    glucagon (GLUCAGEN) injection 1 mg  1 mg IntraMUSCular PRN    dextrose 10 % infusion 0-250 mL  0-250 mL IntraVENous PRN    insulin lispro (HUMALOG) injection   SubCUTAneous AC&HS    lacosamide (VIMPAT) tablet 50 mg  50 mg Oral BID    levETIRAcetam (KEPPRA) oral solution 1,000 mg  1,000 mg Oral BID    albuterol-ipratropium (DUO-NEB) 2.5 MG-0.5 MG/3 ML  3 mL Nebulization Q6H PRN       Labs:  ABG Recent Labs     11/01/22  0544 11/01/22  0312 11/01/22  0302   PHI 7.26*  --  7.29*   PCO2I 59.3*  --  57.2*   PO2I 74*  --  25*   HCO3I 26.9*  --  27.4*   SO2I 91.9*  --  36.4*   FIO2I 35 35 35        CBC Recent Labs     11/01/22  0357 10/31/22  1434   WBC 4.2 4.9   HGB 10.7* 9.9*   HCT 36.2 33.2*    183   MCV 95.3 95.1   MCH 28.2 28.4 Metabolic  Panel Recent Labs     11/03/22  0719 11/02/22  1609 11/01/22  0357 10/31/22  2121 10/31/22  1434   *  --  139  --  129*   K 5.0  --  4.9  --  SPECIMEN HEMOLYZED, RESULTS MAY BE AFFECTED     --  106  --  106   CO2 25  --  29  --  29   *  --  122*  --  90   BUN 52*  --  40*  --  43*   CREA 1.89*  --  1.84*  --  1.97*   CA 8.7  --  9.4  --  8.3*   MG  --   --  2.0  --  SPECIMEN HEMOLYZED, RESULTS MAY BE AFFECTED   PHOS 3.3  --   --   --   --    ALB 2.8*  --  3.4*  --  2.8*   ALT  --   --  21  --  SPECIMEN HEMOLYZED, RESULTS MAY BE AFFECTED   INR 3.3* 4.4* 4.8*   < >  --     < > = values in this interval not displayed.         Pertinent Labs                Chacho Sheffield NP  11/3/2022

## 2022-11-04 LAB
GLUCOSE BLD STRIP.AUTO-MCNC: 128 MG/DL (ref 65–117)
GLUCOSE BLD STRIP.AUTO-MCNC: 129 MG/DL (ref 65–117)
GLUCOSE BLD STRIP.AUTO-MCNC: 165 MG/DL (ref 65–117)
GLUCOSE BLD STRIP.AUTO-MCNC: 213 MG/DL (ref 65–117)
SERVICE CMNT-IMP: ABNORMAL

## 2022-11-04 PROCEDURE — 99232 SBSQ HOSP IP/OBS MODERATE 35: CPT | Performed by: INTERNAL MEDICINE

## 2022-11-04 PROCEDURE — 99223 1ST HOSP IP/OBS HIGH 75: CPT | Performed by: SPECIALIST

## 2022-11-04 PROCEDURE — 74011636637 HC RX REV CODE- 636/637: Performed by: FAMILY MEDICINE

## 2022-11-04 PROCEDURE — 97116 GAIT TRAINING THERAPY: CPT

## 2022-11-04 PROCEDURE — 74011000250 HC RX REV CODE- 250: Performed by: FAMILY MEDICINE

## 2022-11-04 PROCEDURE — 74011250637 HC RX REV CODE- 250/637: Performed by: NURSE PRACTITIONER

## 2022-11-04 PROCEDURE — 65270000029 HC RM PRIVATE

## 2022-11-04 PROCEDURE — 97535 SELF CARE MNGMENT TRAINING: CPT

## 2022-11-04 PROCEDURE — 97530 THERAPEUTIC ACTIVITIES: CPT

## 2022-11-04 PROCEDURE — 74011250637 HC RX REV CODE- 250/637: Performed by: FAMILY MEDICINE

## 2022-11-04 PROCEDURE — 94640 AIRWAY INHALATION TREATMENT: CPT

## 2022-11-04 PROCEDURE — 74011636637 HC RX REV CODE- 636/637: Performed by: INTERNAL MEDICINE

## 2022-11-04 PROCEDURE — 82962 GLUCOSE BLOOD TEST: CPT

## 2022-11-04 RX ADMIN — FUROSEMIDE 80 MG: 40 TABLET ORAL at 10:15

## 2022-11-04 RX ADMIN — AMLODIPINE BESYLATE 10 MG: 5 TABLET ORAL at 10:17

## 2022-11-04 RX ADMIN — PREDNISONE 60 MG: 20 TABLET ORAL at 10:18

## 2022-11-04 RX ADMIN — Medication 2 UNITS: at 22:00

## 2022-11-04 RX ADMIN — ARFORMOTEROL TARTRATE 15 MCG: 15 SOLUTION RESPIRATORY (INHALATION) at 19:20

## 2022-11-04 RX ADMIN — Medication 2 UNITS: at 17:03

## 2022-11-04 RX ADMIN — LACOSAMIDE 50 MG: 50 TABLET, FILM COATED ORAL at 17:04

## 2022-11-04 RX ADMIN — LEVETIRACETAM 1000 MG: 100 SOLUTION ORAL at 10:26

## 2022-11-04 RX ADMIN — BUDESONIDE 500 MCG: 0.5 INHALANT RESPIRATORY (INHALATION) at 19:20

## 2022-11-04 RX ADMIN — ATORVASTATIN CALCIUM 80 MG: 40 TABLET, FILM COATED ORAL at 10:16

## 2022-11-04 RX ADMIN — BUDESONIDE 500 MCG: 0.5 INHALANT RESPIRATORY (INHALATION) at 09:20

## 2022-11-04 RX ADMIN — ASPIRIN 81 MG: 81 TABLET, COATED ORAL at 12:20

## 2022-11-04 RX ADMIN — CARVEDILOL 12.5 MG: 12.5 TABLET, FILM COATED ORAL at 17:04

## 2022-11-04 RX ADMIN — LACOSAMIDE 50 MG: 50 TABLET, FILM COATED ORAL at 10:15

## 2022-11-04 RX ADMIN — ARFORMOTEROL TARTRATE 15 MCG: 15 SOLUTION RESPIRATORY (INHALATION) at 09:20

## 2022-11-04 RX ADMIN — CARVEDILOL 12.5 MG: 12.5 TABLET, FILM COATED ORAL at 10:17

## 2022-11-04 RX ADMIN — LEVETIRACETAM 1000 MG: 100 SOLUTION ORAL at 17:04

## 2022-11-04 NOTE — PROGRESS NOTES
Hospital follow-up PCP transitional care appointment has been scheduled with Dr. Alondra Queen for Thursday, November 10th, 2022 at 1:30p.m. Pending patient discharge.   Dada Hammond, Care Management Assistant

## 2022-11-04 NOTE — PROGRESS NOTES
Pulmonary, Critical Care, and Sleep Medicine~Progress Note    Name: Shahida Patiño MRN: [id number]   : 1945 Hospital: [facility]   Date: 2022 9:10 AM Admission: 10/31/2022     Impression Plan   Acute on chronic hypoxic hypercarbic respiratory failure  Volume overload  History of asthma/?copd  Former smoker  Afib on warfarin  NENO/OHS - not treated O2/NIV as needed  Steroids- wean  Diuresis  Bronchodilators  Would benefit from outpatient follow up with PFTs and evaluation in the sleep lab  PRN over the weekend      Daily Progression:      Feeling better today     11/3  Stable on 2L this morning  Did not wean NIV overnight  Alert and interactive. No new complaints. I have reviewed the labs and previous days notes. A comprehensive review of systems was negative except for that written in the HPI. OBJECTIVE:     Vital Signs:     Visit Vitals  BP (!) 133/38 (BP 1 Location: Left lower arm, BP Patient Position: Sitting)   Pulse 67   Temp 97.7 °F (36.5 °C)   Resp 17   Ht 5' 2\" (1.575 m)   Wt 132.3 kg (291 lb 11.2 oz)   SpO2 99%   BMI 53.35 kg/m²      Temp (24hrs), Av.8 °F (36.6 °C), Min:97.5 °F (36.4 °C), Max:98 °F (36.7 °C)     Intake/Output:     Last shift: No intake/output data recorded.     Last 3 shifts:  1901 -  0700  In: -   Out: 600 [Urine:600]        Intake/Output Summary (Last 24 hours) at 2022 1114  Last data filed at 2022 0604  Gross per 24 hour   Intake --   Output 600 ml   Net -600 ml         Physical Exam:                                        Exam Findings Other   General: No resp distress noted, appears stated age    HEENT:  No ulcers, JVD not elevated, no cervical LAD    Chest: No pectus deformity, normal chest rise b/l    HEART:  RRR, no murmurs/rubs/gallops    Lungs:  CTA b/l, no rhonchi/crackles/wheeze, diminished BS at bases    ABD: Soft/NT, non rigid mildly distended    EXT: No cyanosis/clubbing/edema, normal peripheral pulses    Skin: No rashes or ulcers, no mottling    Neuro: A/O x 3        Medications:  Current Facility-Administered Medications   Medication Dose Route Frequency    furosemide (LASIX) tablet 80 mg  80 mg Oral DAILY    predniSONE (DELTASONE) tablet 60 mg  60 mg Oral DAILY WITH BREAKFAST    aspirin delayed-release tablet 81 mg  81 mg Oral DAILY    atorvastatin (LIPITOR) tablet 80 mg  80 mg Oral DAILY    amLODIPine (NORVASC) tablet 10 mg  10 mg Oral DAILY    carvediloL (COREG) tablet 12.5 mg  12.5 mg Oral BID    sodium chloride (NS) flush 5-40 mL  5-40 mL IntraVENous Q8H    sodium chloride (NS) flush 5-40 mL  5-40 mL IntraVENous PRN    acetaminophen (TYLENOL) tablet 650 mg  650 mg Oral Q6H PRN    Or    acetaminophen (TYLENOL) suppository 650 mg  650 mg Rectal Q6H PRN    polyethylene glycol (MIRALAX) packet 17 g  17 g Oral DAILY PRN    ondansetron (ZOFRAN ODT) tablet 4 mg  4 mg Oral Q8H PRN    Or    ondansetron (ZOFRAN) injection 4 mg  4 mg IntraVENous Q6H PRN    Warfarin Pharmacy Dosing (COUMADIN)   Other Rx Dosing/Monitoring    arformoteroL (BROVANA) neb solution 15 mcg  15 mcg Nebulization BID RT    And    budesonide (PULMICORT) 500 mcg/2 ml nebulizer suspension  500 mcg Nebulization BID RT    glucose chewable tablet 16 g  4 Tablet Oral PRN    glucagon (GLUCAGEN) injection 1 mg  1 mg IntraMUSCular PRN    dextrose 10 % infusion 0-250 mL  0-250 mL IntraVENous PRN    insulin lispro (HUMALOG) injection   SubCUTAneous AC&HS    lacosamide (VIMPAT) tablet 50 mg  50 mg Oral BID    levETIRAcetam (KEPPRA) oral solution 1,000 mg  1,000 mg Oral BID    albuterol-ipratropium (DUO-NEB) 2.5 MG-0.5 MG/3 ML  3 mL Nebulization Q6H PRN       Labs:  ABG No results for input(s): PHI, PCO2I, PO2I, HCO3I, SO2I, FIO2I in the last 72 hours. CBC No results for input(s): WBC, HGB, HCT, PLT, MCV, MCH, HGBEXT, HCTEXT, PLTEXT, HGBEXT, HCTEXT, PLTEXT in the last 72 hours.        Metabolic  Panel Recent Labs     11/03/22  0719 11/02/22  1609   *  --    K 5.0  --      --    CO2 25  --    *  --    BUN 52*  --    CREA 1.89*  --    CA 8.7  --    PHOS 3.3  --    ALB 2.8*  --    INR 3.3* 4.4*          Pertinent Labs                Bobby Cm PA-C  11/4/2022

## 2022-11-04 NOTE — PROGRESS NOTES
Name: Cheryle Banco MRN: [id number]   : 1945 Hospital: [facility]   Date: 2022        IMPRESSION:   CKD stage 3A as baseline  LYDIA from diuretic use. No new chemistry today  Fluid overload - on loop diuretics- improved. Morbid obesity  Hyponatremia - resolved  Proteinuria - urine studies were ordered. PLAN:   Repeat the renal panel  Obtain urine studies  I's and O's  Oral fluid restriction, daily weights. Subjective/Interval History:   I have reviewed the flowsheet and previous days notes. ROS:A comprehensive review of systems was negative except for that written in the HPI. Objective:   Vital Signs:    Visit Vitals  BP (!) 133/38 (BP 1 Location: Left lower arm, BP Patient Position: Sitting)   Pulse 67   Temp 97.7 °F (36.5 °C)   Resp 17   Ht 5' 2\" (1.575 m)   Wt 132.3 kg (291 lb 11.2 oz)   SpO2 99%   BMI 53.35 kg/m²       O2 Device: Nasal cannula   O2 Flow Rate (L/min): 2 l/min   Temp (24hrs), Av.8 °F (36.6 °C), Min:97.5 °F (36.4 °C), Max:98 °F (36.7 °C)       Intake/Output:   Last shift:      No intake/output data recorded. Last 3 shifts:  1901 -  0700  In: -   Out: 600 [Urine:600]    Intake/Output Summary (Last 24 hours) at 2022 1059  Last data filed at 2022 0604  Gross per 24 hour   Intake --   Output 600 ml   Net -600 ml        Physical Exam:  General:    Awake, slightly confused, morbidly obese. Head:   Normocephalic, without obvious abnormality, atraumatic. Eyes:   Conjunctivae/corneas clear. Throat:    Lips, mucosa, and tongue normal.  No Thrush  Neck:  Short, no obvious masses. Lungs:   Diminished to auscultation bilaterally. Crepitations at the bases. Chest wall:  No tenderness or deformity. No Accessory muscle use. Heart:   Regular rate and rhythm,  no murmur, rub or gallop. Abdomen:   Soft, non-tender. Not distended. Bowel sounds normal. No masses  Extremities: Extremities normal, atraumatic, No cyanosis. 2+ edema. Rhenda Ifeoma  No clubbing  Skin:     Texture, turgor normal. No rashes or lesions. Not Jaundiced  Psych:  Poor/fair insight. Not depressed. Not anxious or agitated. Neurologic: Non focal.      DATA:  Labs:  Recent Labs     11/03/22  0719   *   K 5.0      CO2 25   BUN 52*   CREA 1.89*   CA 8.7   ALB 2.8*   PHOS 3.3     No results for input(s): WBC, HGB, HCT, PLT, HGBEXT, HCTEXT, PLTEXT in the last 72 hours. No results for input(s): ILIR, KU, CLU, CREAU in the last 72 hours.     No lab exists for component: PROU    Total time spent with patient:  35 minutes    [] Critical Care Provided    Care Plan discussed with:   Staff, Medical Team    Ryley Huston MD

## 2022-11-04 NOTE — PROGRESS NOTES
Hospital Progress Note    NAME:  Lorrie Manriquez   :   1945   MRN:  [id number]     Date/Time:  2022 8:28 AM    Plan:     Continue jet neb  Continue PO steroids  Encourage activity  Pt/ot  Risk of Deterioration: Low  []           Moderate  []           High  []                 Assessment:     Principal Problem:    Acute on chronic respiratory failure with hypoxia and hypercapnia (HCC) (2022)     Jet nebs and steroids     CPAP       Active Problems:    Paroxysmal atrial fibrillation (HCC) (2010)      coumadin      Morbid obesity ([facility] (2010)     Encourage weight management      Seizure disorder ([facility] [address] (2014)     home dose of Lacosamide 50 mg BID and levetiracetam 1000 mg BID      Dyslipidemia (1/10/2015)     Lipitor       HTN (hypertension) (1/10/2015)     Titrate home meds      Type 2 diabetes mellitus with chronic kidney disease ([facility] (2022)      SSI      Chronic renal disease, stage III (10/3/2022)     Renal following           CHF (congestive heart failure) ([facility] (10/31/2022)     Adam Lee EF and no diastolic dyf on echo     Normal bnp     Card consult appreciated      Normocytic anemia (2022)      Generalized abdominal pain (2022)     CT neg     reolved      Acute kidney injury superimposed on chronic kidney disease ([facility] (2022)    Renal following      Acute exacerbation of COPD with asthma ([facility] (2022)     Pul following      Acute pulmonary edema ([facility] [address] (2022)            Admitting notes:77 y.o. female with past medical history of arthritis, asthma, sleep apnea, type 2 diabetes mellitus, glaucoma, hypertension, stroke, class III obesity presented to the emergency department via EMS from home accompanied by her daughter with chief complaint of shortness of breath. Patient is a limited historian. Her daughter provides additional history. Remainder of history was obtained per review of ED electronic medical records.   Today, patient reported was short of breath, remain constant, severe, without specific alleviating factors, exacerbated with movement. Her daughter notes that patient's oxygen saturations may be on occasion into the 60s on first awakening from sleep. She has a history of sleep apnea but does not use CPAP Pap machine for several years as it is broke. EMS was called to the scene. Patient reportedly was hypoxic with O2 saturations at 74%. Patient has been taken inhalers prior to arrival.  She was placed on 4 L oxygen via nasal cannula. On arrival to the emergency department, initial recorded vital signs temperature 98.0 °F, /71, heart rate 64, respirate 12, O2 saturation 93% on 4 L oxygen via nasal cannula. Abnormal labs included hemoglobin 9.9, sodium 129, BUN 43, creatinine 1.97, GFR 25, calcium 8.3, albumin 2.3, proBNP 1350. Chest reportable showed cardiomegaly and mild pulmonary edema. Patient was given Lasix 40 mg IV x1 dose. Patient is now seen for admission to the hospital service with continued evaluation and treatments. Patient has occasional cough wheezing. She also in addition take Spiriva along with albuterol.        Subjective:     Feeling better  11 Point Review of Systems:   Negative except no cp.sob improved    []            Unable to obtain ROS due to:       []            mental status change []            sedated []            intubated     Social History     Tobacco Use    Smoking status: Former    Smokeless tobacco: Never    Tobacco comments:     20+years ago   Substance Use Topics    Alcohol use: No     Medications reviewed:  Current Facility-Administered Medications   Medication Dose Route Frequency    furosemide (LASIX) tablet 80 mg  80 mg Oral DAILY    predniSONE (DELTASONE) tablet 60 mg  60 mg Oral DAILY WITH BREAKFAST    aspirin delayed-release tablet 81 mg  81 mg Oral DAILY    atorvastatin (LIPITOR) tablet 80 mg  80 mg Oral DAILY    amLODIPine (NORVASC) tablet 10 mg  10 mg Oral DAILY    carvediloL (COREG) tablet 12.5 mg 12.5 mg Oral BID    sodium chloride (NS) flush 5-40 mL  5-40 mL IntraVENous Q8H    sodium chloride (NS) flush 5-40 mL  5-40 mL IntraVENous PRN    acetaminophen (TYLENOL) tablet 650 mg  650 mg Oral Q6H PRN    Or    acetaminophen (TYLENOL) suppository 650 mg  650 mg Rectal Q6H PRN    polyethylene glycol (MIRALAX) packet 17 g  17 g Oral DAILY PRN    ondansetron (ZOFRAN ODT) tablet 4 mg  4 mg Oral Q8H PRN    Or    ondansetron (ZOFRAN) injection 4 mg  4 mg IntraVENous Q6H PRN    Warfarin Pharmacy Dosing (COUMADIN)   Other Rx Dosing/Monitoring    arformoteroL (BROVANA) neb solution 15 mcg  15 mcg Nebulization BID RT    And    budesonide (PULMICORT) 500 mcg/2 ml nebulizer suspension  500 mcg Nebulization BID RT    glucose chewable tablet 16 g  4 Tablet Oral PRN    glucagon (GLUCAGEN) injection 1 mg  1 mg IntraMUSCular PRN    dextrose 10 % infusion 0-250 mL  0-250 mL IntraVENous PRN    insulin lispro (HUMALOG) injection   SubCUTAneous AC&HS    lacosamide (VIMPAT) tablet 50 mg  50 mg Oral BID    levETIRAcetam (KEPPRA) oral solution 1,000 mg  1,000 mg Oral BID    albuterol-ipratropium (DUO-NEB) 2.5 MG-0.5 MG/3 ML  3 mL Nebulization Q6H PRN        Objective:   Vitals:  Visit Vitals  BP (!) 122/38 (BP 1 Location: Left lower arm, BP Patient Position: Sitting)   Pulse (!) 57   Temp 98 °F (36.7 °C)   Resp 16   Ht 5' 2\" (1.575 m)   Wt 291 lb 11.2 oz (132.3 kg)   SpO2 98%   BMI 53.35 kg/m²     Temp (24hrs), Av.8 °F (36.6 °C), Min:97.5 °F (36.4 °C), Max:98 °F (36.7 °C)    O2 Flow Rate (L/min): 2 l/min O2 Device: Nasal cannula    Last 24hr Input/Output:  No intake or output data in the 24 hours ending 22 0813       PHYSICAL EXAM:  General:    Alert, cooperative, no distress, appears stated age. Head:   Normocephalic, without obvious abnormality, atraumatic. Eyes:   Conjunctivae/corneas clear. PERRLA  Nose:  Nares normal. No drainage or sinus tenderness.   Throat:    Lips, mucosa, and tongue normal.  No Thrush  Neck:  Supple, symmetrical,  no adenopathy, thyroid: non tender    no carotid bruit and no JVD. Back:    Symmetric,  No CVA tenderness. Lungs:   Decreased bs.exp wheezes  Chest wall:  No tenderness or deformity. No Accessory muscle use. Heart:   Regular rate and rhythm,  no murmur, rub or gallop. Abdomen:   Soft, non-tender. Not distended. Bowel sounds normal. No masses        Lab Data Reviewed:    No results for input(s): WBC, HGB, HCT, PLT, HGBEXT, HCTEXT, PLTEXT, HGBEXT, HCTEXT, PLTEXT in the last 72 hours. Recent Labs     11/03/22 0719 11/02/22  1609   *  --    K 5.0  --      --    CO2 25  --    *  --    BUN 52*  --    CREA 1.89*  --    CA 8.7  --    PHOS 3.3  --    ALB 2.8*  --    INR 3.3* 4.4*       Lab Results   Component Value Date/Time    Glucose (POC) 183 (H) 11/03/2022 09:03 PM    Glucose (POC) 219 (H) 11/03/2022 04:49 PM    Glucose (POC) 139 (H) 11/03/2022 12:56 PM    Glucose (POC) 130 (H) 11/03/2022 07:09 AM    Glucose (POC) 208 (H) 11/02/2022 11:19 PM     No results for input(s): PH, PCO2, PO2, HCO3, FIO2 in the last 72 hours.   Recent Labs     11/03/22 0719 11/02/22  1609   INR 3.3* 4.4*       ___________________________________________________  ___________________________________________________    Attending Physician: Jeanna Corey MD

## 2022-11-04 NOTE — PROGRESS NOTES
ROSALIA:   RUR: 18%    Disposition:  Home with daughter and granddaughter    CM met with patient and her granddaughter. Family is requesting assistance with a hospital bed and new c-pap. Message sent to Dr. Moises Lima. DAREK continuing to follow. Alvaro Angeles.  [facility], [address] [place] [address]

## 2022-11-04 NOTE — PROGRESS NOTES
Problem: Mobility Impaired (Adult and Pediatric)  Goal: *Acute Goals and Plan of Care (Insert Text)  Description: FUNCTIONAL STATUS PRIOR TO ADMISSION: Patient was modified independent using a rolling walker for functional mobility. Patient does have history of falls and patient's daughter states patient attempts to ambulate without RW at times. HOME SUPPORT PRIOR TO ADMISSION: The patient lived with daughter, granddaughter and required minimal assistance/contact guard assist for transfers, ADLs, etc. Patient's daughter states most days patient is able to mobilize independently but that on \"bad days\" patient requires a little help. Physical Therapy Goals  Initiated 11/2/2022  1. Patient will move from supine to sit and sit to supine  in bed with minimal assistance/contact guard assist within 7 day(s). 2.  Patient will transfer from bed to chair and chair to bed with minimal assistance/contact guard assist using the least restrictive device within 7 day(s). 3.  Patient will perform sit to stand with minimal assistance/contact guard assist within 7 day(s). 4.  Patient will ambulate with minimal assistance/contact guard assist for 30 feet with the least restrictive device within 7 day(s). 5.  Patient will ascend/descend 5 stairs with one handrail(s) with minimal assistance/contact guard assist within 7 day(s). Outcome: Progressing Towards Goal     PHYSICAL THERAPY TREATMENT  Patient: Jake Du (48 y.o. female)  Date: 11/4/2022  Diagnosis: CHF (congestive heart failure) ([facility] [address] [I50.9] Acute on chronic respiratory failure with hypoxia and hypercapnia ([facility] [address]      Precautions: Fall  Chart, physical therapy assessment, plan of care and goals were reviewed. ASSESSMENT  Patient continues with skilled PT services and is progressing towards goals. Patient with great progress towards goals today. Received sitting on BSC but agreeable to treatment after she was done.  Patient required CGA to min A x 2 for sit to stand from [facility], noted to require constant support when standing. Became SOB with exertion however SpO2 remained % on 2L. Provided use of RW for next transfers and made great gains. Able to transfer with CGA from recliner x 3. Initiated gait training in room distances, ambulates approximately 8ft with CGA and use of RW and returns to sit. Noted to be SOB but again all vitals WNL. Patient resides with her daughter and granddaughter who are able to provide support and has all equipment needed. Will continue to follow in acute setting, recommend [facility] at discharge. Current Level of Function Impacting Discharge (mobility/balance): CGA to min A for transfers, CGA for ambulation with RW up to 8ft    Other factors to consider for discharge: strong family support at home         PLAN :  Patient continues to benefit from skilled intervention to address the above impairments. Continue treatment per established plan of care. to address goals. Recommendation for discharge: (in order for the patient to meet his/her long term goals)  Physical therapy at least 2 days/week in the home AND ensure assist and/or supervision for safety with all mobility    This discharge recommendation:  Has been made in collaboration with the attending provider and/or case management    IF patient discharges home will need the following DME: patient owns DME required for discharge       SUBJECTIVE:   Patient stated I feel like I can breathe a lot better today.     OBJECTIVE DATA SUMMARY:   Critical Behavior:  Neurologic State: Appropriate for age, Alert  Orientation Level: Oriented X4  Cognition: Appropriate decision making, Appropriate for age attention/concentration, Follows commands  Safety/Judgement: Awareness of environment, Fall prevention, Insight into deficits  Functional Mobility Training:  Bed Mobility:     Supine to Sit:  (received on BSC)  Sit to Supine:  (left in recliner)     Transfers:  Sit to Stand: Contact guard assistance;Minimum assistance;Assist x1  Stand to Sit: Contact guard assistance;Minimum assistance;Assist x1        Bed to Chair: Minimum assistance;Assist x2           Balance:  Sitting: Intact; Without support  Standing: Impaired; With support  Standing - Static: Fair  Standing - Dynamic : Constant support; Fair  Ambulation/Gait Training:  Distance (ft): 8 Feet (ft)  Assistive Device: Walker, rolling;Gait belt  Ambulation - Level of Assistance: Contact guard assistance;Assist x1        Gait Abnormalities: Decreased step clearance;Shuffling gait        Base of Support: Widened     Speed/Taylor: Slow;Shuffled  Step Length: Right shortened;Left shortened          Therapeutic Exercises:   Reviewed seated exercises including hip flexion, knee extension, ankle DF/PF, hip abduction 10x    Activity Tolerance:   Fair and observed SOB with activity    After treatment patient left in no apparent distress:   Sitting in chair, Call bell within reach, and OT in room to finish session    COMMUNICATION/COLLABORATION:   The patients plan of care was discussed with: Occupational therapist and Registered nurse.      Torsten Thompson, PT   Time Calculation: 24 mins

## 2022-11-04 NOTE — PROGRESS NOTES
[facility]  Cardiology Care Note                  [x]Initial visit     [x]Established visit     Patient Name: Jake Du - TQP: - [facility]  Primary Cardiologist: none  Dr. Charles Logan saw on initial visit. Reason for initial visit: CHF  HPI:    Ms. Yoni Flannery is a 76 y.o. fmeale with PMH of HTN, DM type II, dyslipidemia, CKD stage III, asthma, PAF on coumadin. NENO (does not use CPAP), anemia, morbid obesity, CVA who presents from home with chief c/o SOB. Patient is a poor historian so majority of HPI obtained from chart. Pt says she presented to ER because she had pain in BLE. Chart indicates that patient presetned 10/31/22 with chief c/o SOB which worsend with movement. Daughter noted that patient's O2 saturation were in 60s at times when first awakening from sleep. EMS was called and pt's O2 sats were 74%. On arrival to ER, her sats were 94=3% on 4 L NC.  CXR showed cardiomegaly and mild pulmonary edema. She was given lasix 40 mg IV in ER. Her INR is supratherapeutic. NT pro bNP was 1350 on admission. Creatinine was elevated from baseline at 1.97 but trended down after diuretic to 1.84. ( HS troponin on presentation was 7. She denies following with any cardiologist. Attempted to reach pt's daughter for further history of events but no answer. SUBJECTIVE:  Pt denies chest pain, arm pain. Again states  she is \"not any more SOB than usual.\"  She has more wheezing this a.m. I/Os not accurate, no standing weight available. However, she does report that she urinated \"all  night last night. \" AM labs pending. No IV access available. Dr. Juma Viveros now following.      Assessment and Plan      Acute hypoxic hypercapneic respiratory failure   -Likely multifactorial - Asthma exacerbation ,some pulmonary edema and suspect obesity hypoventilation.   -Pulmonary consulted-- advised nebs steroids, diuresis    2. Pulmonary edema, possible element of acute HFpEF in setting of renal dysfunction  -Pro BNP trended up 1830 yesterday.   -Echo with NL EF, NWMA, normal diastolic function, no valvular disorder  -Continue diuretics- on Lasix 80 mg po daily (home po lasix 40 mg daily)- no IV access so unable to give IV lasix.    -On spironolactone-HCTZ PTA (per chart)- We recommend only resuming one of these but await a.m. labs and will defer further diuretic dosing to renal.    -Requested daily standing weight. 3.  BLE edema:  -Suspect possible venous insuffiency, ?lymphadema, . Possible element of possible HFpEF in setting of renal dysfunction. -EF normal.  Diuretics as above.    -Albumen 2.8 may also be contributing. 4.  History of PAF  -NSR on telemetry. No PAF noted  -On coreg  -On coumadin was held for supratherapeutic INR but downtrending. Pharmacy managing. 5.  Elevated creatinine:history of CKD  -AM labs pending. Lab Results   Component Value Date/Time    Creatinine 1.89 (H) 11/03/2022 07:19 AM     6. HLD  -HDL 55, LDL 70  -Continue atorvastatin. 7.  HTN: controlled  Coreg, amlodipine. Home ace-I (lisinopril)on hold (renal dysfunction)     8. NENO/OHV:  untreated. Followup pulmonary. Pt seen by Dr. Dawit Lebron- no further cardiac testing or recommendations at this time. Assessment/Plan/Discussion:Cardiology Attending:     Patient seen on the day of progress note, examined and reviewed  with Nurse Practitioner. Sita Chaves is a 76 y.o. female with multiple cardiac risk factors including PAF hypertension diabetes dyslipidemia sleep apnea and prior stroke she has persistent shortness of breath with low oxygen sats at times. She is thought to have mild congestive heart with a BNP of 1350 and has an echocardiogram pending her disease also may relate to renal disease as she has an elevated creatinine almost 2.   Echo has shown normal function systolically she has likely diastolic dysfunction would continue diuretics and consult renal as this may be the beginnings of decompensation of her renal function. O: VS reviewed   Patient Vitals for the past 4 hrs:   Temp Pulse Resp BP SpO2   11/04/22 1200 -- 60 -- -- --   11/04/22 1159 97.4 °F (36.3 °C) 62 16 (!) 119/39 98 %        NC AT no JVD, clear lungs, RRR smrg, no edema    L:  Lab Results   Component Value Date/Time    Creatinine 1.89 (H) 11/03/2022 07:19 AM     Lab Results   Component Value Date/Time    NT pro-BNP 1,830 (H) 11/03/2022 07:19 AM    NT pro-BNP 1,350 (H) 10/31/2022 02:34 PM    NT pro-BNP 1,061 (H) 10/03/2022 10:19 AM    NT pro- (H) 12/13/2021 04:40 PM     Lab Results   Component Value Date/Time    HGB 10.7 (L) 11/01/2022 03:57 AM       A/P:  CHF diastolic, acute, chronic NYHA 2-3  Risk factors diabetes hypertension dyslipidemia  CKD  PAF  Rivka Martinez MD   This note was created using voice recognition software. Despite editing, there may be syntax errors. ____________________________________________________________    Cardiac testing  10/31/22    ECHO ADULT COMPLETE 11/01/2022 11/1/2022    Interpretation Summary    Left Ventricle: Normal left ventricular systolic function with a visually estimated EF of 60 - 65%. Left ventricle size is normal. Normal wall thickness. Normal wall motion. Normal diastolic function. Contrast used: Definity. Signed by: Elver Arita MD on 11/1/2022 12:57 PM          Most recent HS troponins:  No results for input(s): TROPHS in the last 72 hours. No lab exists for component:  CKMB    ECG: NSR, nonspecific T wave abnormality no acute  ischemic changes. Review of Systems    [x]All other systems reviewed and all negative except as written in HPI  Positive sometimes RUE pain, none now.      [] Patient unable to provide secondary to condition         Past Medical History:   Diagnosis Date    Arthritis     Asthma     Diabetes ([facility] [address]     Glaucoma     Hypertension     Other ill-defined conditions(799.89)     rt knee surgery    Stroke [facility])      Past Surgical History:   Procedure Laterality Date    HX COLONOSCOPY      HX GYN       Social Hx:  reports that she has quit smoking. She has never used smokeless tobacco. She reports that she does not drink alcohol and does not use drugs. Family Hx: family history is not on file. Allergies   Allergen Reactions    Clonidine Other (comments)     Patient becomes incoherent    Metformin Unknown (comments)          OBJECTIVE:  Wt Readings from Last 3 Encounters:   11/04/22 132.3 kg (291 lb 11.2 oz)   10/03/22 128.7 kg (283 lb 12.8 oz)   05/06/22 125.2 kg (276 lb)       Intake/Output Summary (Last 24 hours) at 11/4/2022 1340  Last data filed at 11/4/2022 0604  Gross per 24 hour   Intake --   Output 600 ml   Net -600 ml           Physical Exam    Vitals:   Vitals:    11/04/22 1000 11/04/22 1015 11/04/22 1159 11/04/22 1200   BP:   (!) 119/39    Pulse: 60 67 62 60   Resp:   16    Temp:   97.4 °F (36.3 °C)    SpO2:   98%    Weight:       Height:         Telemetry: NSR      General:    Alert, cooperative, no distress, appears stated age. Neck:   Supple,    Back:     Not evaluated   Lungs:     Faint wheezing bilaterally. Heart[de-identified]    Regular rate and rhythm, S1, S2 normal, no murmur, click, rub or gallop. Abdomen:     Soft, non-tender. Bowel sounds normal.    Extremities:   2+ BLE edema . BLE  tender to palpation. Vascular:   Pulses - 2+ radial   Skin:   Vascular skin changes BLE   Neurologic:   Alert, Moves all extremities. Data Review:     Radiology:   XR Results (most recent):  Results from Hospital Encounter encounter on 10/31/22    XR CHEST PORT    Narrative  EXAM: Portable CXR. 1321 hours. COMPARISON: 10/31/2022. INDICATION: pulmonary edema? followup    FINDINGS:  Unchanged mild interstitial pulmonary edema and cardiomegaly. No new finding. No  pneumothorax. Impression  Stable mild interstitial pulmonary edema.     CT Results (most recent):  Results from [place] encounter on 10/31/22    CT HEAD WO CONT    Narrative  EXAM: CT HEAD WO CONT    INDICATION: s/p fall on Warfarin    COMPARISON: CT 9/16/2021. CONTRAST: None. TECHNIQUE: Unenhanced CT of the head was performed using 5 mm images. Brain and  bone windows were generated. Coronal and sagittal reformats. CT dose reduction  was achieved through use of a standardized protocol tailored for this  examination and automatic exposure control for dose modulation. FINDINGS:  The ventricles and sulci are normal in size, shape and configuration apart from  expected dilation related to encephalomalacia in the left middle cerebral  artery, not significantly changed from prior. There is no significant white  matter disease. There is no intracranial hemorrhage, extra-axial collection, or  mass effect. No significant change in physiologic basal ganglia calcification. The basilar cisterns are open. No CT evidence of acute infarct. . Subcutaneous  lesion of the midline high frontal scalp compatible with epidermal inclusion  cyst is noted, unchanged. The bone windows demonstrate no abnormalities. The visualized portions of the  paranasal sinuses and mastoid air cells are clear. Impression  No intracranial hemorrhage or other acute findings. Chronic changes  as above. No results for input(s): CPK, TROIQ in the last 72 hours. No lab exists for component: CKQMB, CPKMB, BMPP  Recent Labs     11/03/22  0719   *   K 5.0      CO2 25   BUN 52*   CREA 1.89*   *   PHOS 3.3   CA 8.7     No results for input(s): WBC, HGB, HCT, PLT, HGBEXT, HCTEXT, PLTEXT, HGBEXT, HCTEXT, PLTEXT in the last 72 hours. Recent Labs     11/03/22  0719 11/02/22  1609   PTP 32.0* 41.5*   INR 3.3* 4.4*     No results for input(s): CHOL, LDLC in the last 72 hours.     No lab exists for component: TGL, HDLC,  HBA1C  No results for input(s): CRP, TSH, TSHEXT, TSHEXT in the last 72 hours.    No lab exists for component: ESR        Current meds:    Current Facility-Administered Medications:     furosemide (LASIX) tablet 80 mg, 80 mg, Oral, DAILY, Valerie Kramer NP, 80 mg at 11/04/22 1015    predniSONE (DELTASONE) tablet 60 mg, 60 mg, Oral, DAILY WITH BREAKFAST, Jeanne Garcia MD, 60 mg at 11/04/22 1018    aspirin delayed-release tablet 81 mg, 81 mg, Oral, DAILY, Inna Man MD, 81 mg at 11/04/22 1220    atorvastatin (LIPITOR) tablet 80 mg, 80 mg, Oral, DAILY, Inna Man MD, 80 mg at 11/04/22 1016    amLODIPine (NORVASC) tablet 10 mg, 10 mg, Oral, DAILY, Isiah Man MD, 10 mg at 11/04/22 1017    carvediloL (COREG) tablet 12.5 mg, 12.5 mg, Oral, BID, Isiah Man MD, 12.5 mg at 11/04/22 1017    sodium chloride (NS) flush 5-40 mL, 5-40 mL, IntraVENous, Q8H, Isiah Man MD, 10 mL at 11/02/22 9314    sodium chloride (NS) flush 5-40 mL, 5-40 mL, IntraVENous, PRN, Inna Man MD    acetaminophen (TYLENOL) tablet 650 mg, 650 mg, Oral, Q6H PRN **OR** acetaminophen (TYLENOL) suppository 650 mg, 650 mg, Rectal, Q6H PRN, Inna Man MD    polyethylene glycol (MIRALAX) packet 17 g, 17 g, Oral, DAILY PRN, Isiah Man MD    ondansetron (ZOFRAN ODT) tablet 4 mg, 4 mg, Oral, Q8H PRN **OR** ondansetron (ZOFRAN) injection 4 mg, 4 mg, IntraVENous, Q6H PRN, Inna Man MD    Warfarin Pharmacy Dosing (COUMADIN), , Other, Rx Dosing/Monitoring, Inna Man MD    arformoteroL (BROVANA) neb solution 15 mcg, 15 mcg, Nebulization, BID RT, 15 mcg at 11/04/22 0920 **AND** budesonide (PULMICORT) 500 mcg/2 ml nebulizer suspension, 500 mcg, Nebulization, BID RT, Isiah Man MD, 500 mcg at 11/04/22 0920    glucose chewable tablet 16 g, 4 Tablet, Oral, PRN, Isiah Man MD    glucagon (GLUCAGEN) injection 1 mg, 1 mg, IntraMUSCular, PRN, Isiah Man MD    dextrose 10 % infusion 0-250 mL, 0-250 mL, IntraVENous, PRN, OrovadaInna haywood MD insulin lispro (HUMALOG) injection, , SubCUTAneous, AC&HS, Joyce Man MD, 3 Units at 11/03/22 1708    lacosamide (VIMPAT) tablet 50 mg, 50 mg, Oral, BID, Joyce Man MD, 50 mg at 11/04/22 1015    levETIRAcetam (KEPPRA) oral solution 1,000 mg, 1,000 mg, Oral, BID, Isiah Man MD, 1,000 mg at 11/04/22 1026    albuterol-ipratropium (DUO-NEB) 2.5 MG-0.5 MG/3 ML, 3 mL, Nebulization, Q6H PRN, Joyce Man MD Celestina Canning. KOURTNEY Kramer  [facility], [address] [address],[address]  [facility]  [phone]      CC: Steffany Ross MD

## 2022-11-04 NOTE — PROGRESS NOTES
Problem: Self Care Deficits Care Plan (Adult)  Goal: *Therapy Goal (Edit Goal, Insert Text)  Description: FUNCTIONAL STATUS PRIOR TO ADMISSION: Patient lives with family who assists with bathing and dressing. She typically can toilet (using BSC) without assistance, though occasionally requires assist with bowel hygiene. Patient has history of LE edema. She uses a RW for mobility. Occupational Therapy Goals  Initiated 11/2/2022  1. Patient will perform grooming in stand with supervision/set-up within 7 day(s). 2.  Patient will perform UB and anterior thigh bathing with supervision/set-up within 7 day(s). 3.  Patient will perform toilet transfers with supervision/set-up within 7 day(s). 4.  Patient will perform all aspects of toileting with minimal assistance/contact guard assist within 7 day(s). Outcome: Progressing Towards Goal   OCCUPATIONAL THERAPY TREATMENT  Patient: Rosalba Veras (11 y.o. female)  Date: 11/4/2022  Diagnosis: CHF (congestive heart failure) ([facility] [address] [I50.9] Acute on chronic respiratory failure with hypoxia and hypercapnia ([facility] [address]      Precautions: Fall  Chart, occupational therapy assessment, plan of care, and goals were reviewed. ASSESSMENT  Patient continues with skilled OT services and is progressing towards goals. Patient received sitting on [facility] with nurse aide present, agreeable to working with therapy. Patient stood from [facility] and total A for bowel care in standing, reports daughter assists to complete at baseline and poor access. Patient then stood and transferred back into recliner, requiring handheld assist or hands on arm rest of chair in standing. Trialed a RW with patient and able to take several steps and return to recliner, would benefit from using RW for transfers for increased safety. Patient participated in upper extremity therapeutic exercise seated in recliner, see below for details, and grooming to wash face and brush teeth.  Overall patient with good participation in upper extremity ADLs but limited lower extremity access impacting ability to participate with lower body ADLs. Patient has good family support at baseline and was requiring assist for self care prior to admission. Patient would benefit from skilled OT services during admission to improve independence with self care and functional mobility/transfers. Recommend discharge to home with [facility] and continued family assist at this time. Current Level of Function Impacting Discharge (ADLs): CGA to min A x2 for mobility/transfers, setup grooming, max to total A for toileting    Other factors to consider for discharge: prior level of function requires assist, family support         PLAN :  Patient continues to benefit from skilled intervention to address the above impairments. Continue treatment per established plan of care to address goals. Recommend with staff: up to chair 3x/day for meals, BSC for toileting, use RW for transfers    Recommend next OT session: continue POC    Recommendation for discharge: (in order for the patient to meet his/her long term goals)  Occupational therapy at least 2 days/week in the home AND ensure assist and/or supervision for safety with self care PRN    This discharge recommendation:  Has been made in collaboration with the attending provider and/or case management    IF patient discharges home will need the following DME: patient owns DME required for discharge       SUBJECTIVE:   Patient stated I have one of those (RW) that I use at home.     OBJECTIVE DATA SUMMARY:   Cognitive/Behavioral Status:  Neurologic State: Alert  Orientation Level: Oriented X4  Cognition: Appropriate decision making; Appropriate for age attention/concentration; Follows commands             Functional Mobility and Transfers for ADLs:  Bed Mobility:  Supine to Sit:  (received on BSC)  Sit to Supine:  (left in recliner)    Transfers:  Sit to Stand: Contact guard assistance;Minimum assistance;Assist x1  Functional Transfers  Toilet Transfer : Minimum assistance;Assist x2 (BSC)  Bed to Chair: Minimum assistance;Assist x2    Balance:  Sitting: Intact; Without support  Standing: Impaired; With support  Standing - Static: Fair  Standing - Dynamic : Constant support; Fair    ADL Intervention:       Grooming  Position Performed: Seated in chair  Washing Face: Set-up  Brushing Teeth: Set-up                             Toileting  Bowel Hygiene: Total assistance (dependent)  Clothing Management: Maximum assistance         Therapeutic Exercises:   Patient completed shoulder flexion/extension x10 bilaterally while sitting in recliner    Pain:  None reported    Activity Tolerance:   Fair, desaturates with exertion and requires oxygen, and requires rest breaks    After treatment patient left in no apparent distress:   Sitting in chair and Call bell within reach    COMMUNICATION/COLLABORATION:   The patients plan of care was discussed with: Physical therapist and Registered nurse.      SANDRA Mixon/L  Time Calculation: 28 mins

## 2022-11-04 NOTE — ROUTINE PROCESS
Bedside and Verbal shift change report given to [place] (oncoming nurse) by Jeanette Banuelos (offgoing nurse).  Report included the following information SBAR, Kardex, Intake/Output, MAR, and Cardiac Rhythm SR .

## 2022-11-05 LAB
ALBUMIN SERPL-MCNC: 3 G/DL (ref 3.5–5)
ANION GAP SERPL CALC-SCNC: 4 MMOL/L (ref 5–15)
BUN SERPL-MCNC: 71 MG/DL (ref 6–20)
BUN/CREAT SERPL: 39 (ref 12–20)
CALCIUM SERPL-MCNC: 8.6 MG/DL (ref 8.5–10.1)
CHLORIDE SERPL-SCNC: 103 MMOL/L (ref 97–108)
CO2 SERPL-SCNC: 27 MMOL/L (ref 21–32)
CREAT SERPL-MCNC: 1.81 MG/DL (ref 0.55–1.02)
ERYTHROCYTE [DISTWIDTH] IN BLOOD BY AUTOMATED COUNT: 17.2 % (ref 11.5–14.5)
GLUCOSE BLD STRIP.AUTO-MCNC: 135 MG/DL (ref 65–117)
GLUCOSE BLD STRIP.AUTO-MCNC: 163 MG/DL (ref 65–117)
GLUCOSE BLD STRIP.AUTO-MCNC: 199 MG/DL (ref 65–117)
GLUCOSE BLD STRIP.AUTO-MCNC: 224 MG/DL (ref 65–117)
GLUCOSE SERPL-MCNC: 160 MG/DL (ref 65–100)
HCT VFR BLD AUTO: 33.9 % (ref 35–47)
HGB BLD-MCNC: 10.6 G/DL (ref 11.5–16)
INR PPP: 1.6 (ref 0.9–1.1)
MCH RBC QN AUTO: 27.7 PG (ref 26–34)
MCHC RBC AUTO-ENTMCNC: 31.3 G/DL (ref 30–36.5)
MCV RBC AUTO: 88.5 FL (ref 80–99)
NRBC # BLD: 0.05 K/UL (ref 0–0.01)
NRBC BLD-RTO: 1.2 PER 100 WBC
PHOSPHATE SERPL-MCNC: 2.8 MG/DL (ref 2.6–4.7)
PLATELET # BLD AUTO: 177 K/UL (ref 150–400)
PMV BLD AUTO: 13.1 FL (ref 8.9–12.9)
POTASSIUM SERPL-SCNC: 4.9 MMOL/L (ref 3.5–5.1)
PROTHROMBIN TIME: 15.9 SEC (ref 9–11.1)
RBC # BLD AUTO: 3.83 M/UL (ref 3.8–5.2)
SERVICE CMNT-IMP: ABNORMAL
SODIUM SERPL-SCNC: 134 MMOL/L (ref 136–145)
WBC # BLD AUTO: 4.3 K/UL (ref 3.6–11)

## 2022-11-05 PROCEDURE — 36415 COLL VENOUS BLD VENIPUNCTURE: CPT

## 2022-11-05 PROCEDURE — 74011250637 HC RX REV CODE- 250/637: Performed by: NURSE PRACTITIONER

## 2022-11-05 PROCEDURE — 80069 RENAL FUNCTION PANEL: CPT

## 2022-11-05 PROCEDURE — 99232 SBSQ HOSP IP/OBS MODERATE 35: CPT | Performed by: INTERNAL MEDICINE

## 2022-11-05 PROCEDURE — 85027 COMPLETE CBC AUTOMATED: CPT

## 2022-11-05 PROCEDURE — 85610 PROTHROMBIN TIME: CPT

## 2022-11-05 PROCEDURE — 74011000250 HC RX REV CODE- 250: Performed by: FAMILY MEDICINE

## 2022-11-05 PROCEDURE — 74011636637 HC RX REV CODE- 636/637: Performed by: FAMILY MEDICINE

## 2022-11-05 PROCEDURE — 74011636637 HC RX REV CODE- 636/637: Performed by: INTERNAL MEDICINE

## 2022-11-05 PROCEDURE — 99232 SBSQ HOSP IP/OBS MODERATE 35: CPT | Performed by: SPECIALIST

## 2022-11-05 PROCEDURE — 94640 AIRWAY INHALATION TREATMENT: CPT

## 2022-11-05 PROCEDURE — 74011250637 HC RX REV CODE- 250/637: Performed by: INTERNAL MEDICINE

## 2022-11-05 PROCEDURE — 65270000029 HC RM PRIVATE

## 2022-11-05 PROCEDURE — 82962 GLUCOSE BLOOD TEST: CPT

## 2022-11-05 PROCEDURE — 74011250637 HC RX REV CODE- 250/637: Performed by: FAMILY MEDICINE

## 2022-11-05 RX ADMIN — FUROSEMIDE 80 MG: 40 TABLET ORAL at 10:06

## 2022-11-05 RX ADMIN — ATORVASTATIN CALCIUM 80 MG: 40 TABLET, FILM COATED ORAL at 10:06

## 2022-11-05 RX ADMIN — LEVETIRACETAM 1000 MG: 100 SOLUTION ORAL at 17:39

## 2022-11-05 RX ADMIN — Medication 10 ML: at 17:34

## 2022-11-05 RX ADMIN — ARFORMOTEROL TARTRATE 15 MCG: 15 SOLUTION RESPIRATORY (INHALATION) at 07:32

## 2022-11-05 RX ADMIN — Medication 2 UNITS: at 22:15

## 2022-11-05 RX ADMIN — LACOSAMIDE 50 MG: 50 TABLET, FILM COATED ORAL at 17:31

## 2022-11-05 RX ADMIN — WARFARIN SODIUM 3 MG: 2 TABLET ORAL at 17:34

## 2022-11-05 RX ADMIN — ASPIRIN 81 MG: 81 TABLET, COATED ORAL at 10:06

## 2022-11-05 RX ADMIN — BUDESONIDE 500 MCG: 0.5 INHALANT RESPIRATORY (INHALATION) at 07:32

## 2022-11-05 RX ADMIN — PREDNISONE 60 MG: 20 TABLET ORAL at 10:06

## 2022-11-05 RX ADMIN — BUDESONIDE 500 MCG: 0.5 INHALANT RESPIRATORY (INHALATION) at 21:54

## 2022-11-05 RX ADMIN — AMLODIPINE BESYLATE 10 MG: 5 TABLET ORAL at 10:06

## 2022-11-05 RX ADMIN — Medication 2 UNITS: at 17:32

## 2022-11-05 RX ADMIN — Medication 2 UNITS: at 12:43

## 2022-11-05 RX ADMIN — LACOSAMIDE 50 MG: 50 TABLET, FILM COATED ORAL at 10:06

## 2022-11-05 RX ADMIN — CARVEDILOL 12.5 MG: 12.5 TABLET, FILM COATED ORAL at 17:31

## 2022-11-05 RX ADMIN — ARFORMOTEROL TARTRATE 15 MCG: 15 SOLUTION RESPIRATORY (INHALATION) at 21:54

## 2022-11-05 RX ADMIN — Medication 10 ML: at 21:39

## 2022-11-05 RX ADMIN — LEVETIRACETAM 1000 MG: 100 SOLUTION ORAL at 10:06

## 2022-11-05 NOTE — PROGRESS NOTES
Pharmacist Note - Warfarin Dosing  Consult provided for this 76 y. o.female to manage warfarin for PAF     INR Goal: 2 - 3  Home regimen/ tablet size: 7.5 mg Mon-Thurs; 5 mg Fr-Sa-Su    Drugs that may increase INR: None  Drugs that may decrease INR: None  Other current anticoagulants/ drugs that may increase bleeding risk: Aspirin  Risk factors: Age > 65  Daily INR ordered: YES    Recent Labs     11/05/22  1115 11/03/22  0719 11/02/22  1609   HGB 10.6*  --   --    INR 1.6* 3.3* 4.4*     Date               INR                  Dose  10/31               5.5                   Held   11/01               4.8                   Held  11/02               4.4                   Held  11/03   3.3    1 mg  11/04    ---    Hold  11/05               1.6                    3 mg                                                                                   Assessment/ Plan: Will order warfarin 3 mg PO x 1. Pharmacy will continue to monitor daily and adjust therapy as indicated. Please contact the pharmacist at  for outpatient recommendations if needed.

## 2022-11-05 NOTE — PROGRESS NOTES
Bedside shift change report given to Jayne Sweeney RN (oncoming nurse) by Holley Hendrix (offgoing nurse). Report included the following information SBAR, Kardex, Intake/Output, MAR, and Cardiac Rhythm NSR .

## 2022-11-05 NOTE — PROGRESS NOTES
Hospital Progress Note    NAME:  Carmen Gutierrez   :   1945   MRN:  [id number]     Date/Time:  2022 8:28 AM    Plan:     Continue jet neb  Arterial stick for labs  Wean oxygen  Pt/ot  Risk of Deterioration: Low  []           Moderate  []           High  []                 Assessment:     Principal Problem:    Acute on chronic respiratory failure with hypoxia and hypercapnia (HCC) (2022)     Jet nebs and steroids     CPAP       Active Problems:    Paroxysmal atrial fibrillation ([facility] (2010)      coumadin      Morbid obesity ([facility] (2010)     Encourage weight management      Seizure disorder ([facility] (2014)     home dose of Lacosamide 50 mg BID and levetiracetam 1000 mg BID      Dyslipidemia (1/10/2015)     Lipitor       HTN (hypertension) (1/10/2015)     Titrate home meds      Type 2 diabetes mellitus with chronic kidney disease ([facility] (2022)      SSI      Chronic renal disease, stage III (10/3/2022)     Renal following           CHF (congestive heart failure) ([facility] (10/31/2022)     [place] EF and no diastolic dyf on echo     Normal bnp     Card consult appreciated      Normocytic anemia (2022)      Generalized abdominal pain (2022)     CT neg     reolved      Acute kidney injury superimposed on chronic kidney disease ([facility] (2022)    Renal following      Acute exacerbation of COPD with asthma ([facility] (2022)     Pul following      Acute pulmonary edema ([facility] (2022)            Admitting notes:77 y.o. female with past medical history of arthritis, asthma, sleep apnea, type 2 diabetes mellitus, glaucoma, hypertension, stroke, class III obesity presented to the emergency department via EMS from home accompanied by her daughter with chief complaint of shortness of breath. Patient is a limited historian. Her daughter provides additional history. Remainder of history was obtained per review of ED electronic medical records.   Today, patient reported was short of breath, remain constant, severe, without specific alleviating factors, exacerbated with movement. Her daughter notes that patient's oxygen saturations may be on occasion into the 60s on first awakening from sleep. She has a history of sleep apnea but does not use CPAP Pap machine for several years as it is broke. EMS was called to the scene. Patient reportedly was hypoxic with O2 saturations at 74%. Patient has been taken inhalers prior to arrival.  She was placed on 4 L oxygen via nasal cannula. On arrival to the emergency department, initial recorded vital signs temperature 98.0 °F, /71, heart rate 64, respirate 12, O2 saturation 93% on 4 L oxygen via nasal cannula. Abnormal labs included hemoglobin 9.9, sodium 129, BUN 43, creatinine 1.97, GFR 25, calcium 8.3, albumin 2.3, proBNP 1350. Chest reportable showed cardiomegaly and mild pulmonary edema. Patient was given Lasix 40 mg IV x1 dose. Patient is now seen for admission to the hospital service with continued evaluation and treatments. Patient has occasional cough wheezing. She also in addition take Spiriva along with albuterol.        Subjective:     Feeling better  11 Point Review of Systems:   Negative except no cp.sob improved    []            Unable to obtain ROS due to:       []            mental status change []            sedated []            intubated     Social History     Tobacco Use    Smoking status: Former    Smokeless tobacco: Never    Tobacco comments:     20+years ago   Substance Use Topics    Alcohol use: No     Medications reviewed:  Current Facility-Administered Medications   Medication Dose Route Frequency    furosemide (LASIX) tablet 80 mg  80 mg Oral DAILY    predniSONE (DELTASONE) tablet 60 mg  60 mg Oral DAILY WITH BREAKFAST    aspirin delayed-release tablet 81 mg  81 mg Oral DAILY    atorvastatin (LIPITOR) tablet 80 mg  80 mg Oral DAILY    amLODIPine (NORVASC) tablet 10 mg  10 mg Oral DAILY    carvediloL (COREG) tablet 12.5 mg  12.5 mg Oral BID    sodium chloride (NS) flush 5-40 mL  5-40 mL IntraVENous Q8H    sodium chloride (NS) flush 5-40 mL  5-40 mL IntraVENous PRN    acetaminophen (TYLENOL) tablet 650 mg  650 mg Oral Q6H PRN    Or    acetaminophen (TYLENOL) suppository 650 mg  650 mg Rectal Q6H PRN    polyethylene glycol (MIRALAX) packet 17 g  17 g Oral DAILY PRN    ondansetron (ZOFRAN ODT) tablet 4 mg  4 mg Oral Q8H PRN    Or    ondansetron (ZOFRAN) injection 4 mg  4 mg IntraVENous Q6H PRN    Warfarin Pharmacy Dosing (COUMADIN)   Other Rx Dosing/Monitoring    arformoteroL (BROVANA) neb solution 15 mcg  15 mcg Nebulization BID RT    And    budesonide (PULMICORT) 500 mcg/2 ml nebulizer suspension  500 mcg Nebulization BID RT    glucose chewable tablet 16 g  4 Tablet Oral PRN    glucagon (GLUCAGEN) injection 1 mg  1 mg IntraMUSCular PRN    dextrose 10 % infusion 0-250 mL  0-250 mL IntraVENous PRN    insulin lispro (HUMALOG) injection   SubCUTAneous AC&HS    lacosamide (VIMPAT) tablet 50 mg  50 mg Oral BID    levETIRAcetam (KEPPRA) oral solution 1,000 mg  1,000 mg Oral BID    albuterol-ipratropium (DUO-NEB) 2.5 MG-0.5 MG/3 ML  3 mL Nebulization Q6H PRN        Objective:   Vitals:  Visit Vitals  BP (!) 143/54 (BP 1 Location: Left lower arm, BP Patient Position: Lying)   Pulse 61   Temp 97.3 °F (36.3 °C)   Resp 15   Ht 5' 2\" (1.575 m)   Wt 291 lb 11.2 oz (132.3 kg)   SpO2 99%   BMI 53.35 kg/m²     Temp (24hrs), Av.6 °F (36.4 °C), Min:97.3 °F (36.3 °C), Max:98 °F (36.7 °C)    O2 Flow Rate (L/min): 2 l/min O2 Device: Nasal cannula    Last 24hr Input/Output:  No intake or output data in the 24 hours ending 22 0942       PHYSICAL EXAM:  General:    Alert, cooperative, no distress, appears stated age. Head:   Normocephalic, without obvious abnormality, atraumatic. Eyes:   Conjunctivae/corneas clear. PERRLA  Nose:  Nares normal. No drainage or sinus tenderness.   Throat:    Lips, mucosa, and tongue normal.  No Thrush  Neck:  Supple, symmetrical,  no adenopathy, thyroid: non tender    no carotid bruit and no JVD. Back:    Symmetric,  No CVA tenderness. Lungs:   Decreased bs.exp wheezes  Chest wall:  No tenderness or deformity. No Accessory muscle use. Heart:   Regular rate and rhythm,  no murmur, rub or gallop. Abdomen:   Soft, non-tender. Not distended. Bowel sounds normal. No masses        Lab Data Reviewed:    No results for input(s): WBC, HGB, HCT, PLT, HGBEXT, HCTEXT, PLTEXT, HGBEXT, HCTEXT, PLTEXT in the last 72 hours. Recent Labs     11/03/22 0719 11/02/22  1609   *  --    K 5.0  --      --    CO2 25  --    *  --    BUN 52*  --    CREA 1.89*  --    CA 8.7  --    PHOS 3.3  --    ALB 2.8*  --    INR 3.3* 4.4*       Lab Results   Component Value Date/Time    Glucose (POC) 135 (H) 11/05/2022 06:04 AM    Glucose (POC) 213 (H) 11/04/2022 09:28 PM    Glucose (POC) 165 (H) 11/04/2022 04:30 PM    Glucose (POC) 129 (H) 11/04/2022 11:53 AM    Glucose (POC) 128 (H) 11/04/2022 08:13 AM     No results for input(s): PH, PCO2, PO2, HCO3, FIO2 in the last 72 hours.   Recent Labs     11/03/22 0719 11/02/22  1609   INR 3.3* 4.4*       ___________________________________________________  ___________________________________________________    Attending Physician: Aura Khan MD

## 2022-11-05 NOTE — PROGRESS NOTES
[facility]  Cardiology Care Note                  [x]Initial visit     [x]Established visit     Patient Name: Dea Barbosa - KRP:[birth year] - GXE:[id number]  Primary Cardiologist: none  Dr. Domenic Brady saw on initial visit. Reason for initial visit: CHF  HPI:    Ms. Thom Vásquez is a 76 y.o. fmeale with PMH of HTN, DM type II, dyslipidemia, CKD stage III, asthma, PAF on coumadin. NENO (does not use CPAP), anemia, morbid obesity, CVA who presents from home with chief c/o SOB. Patient is a poor historian so majority of HPI obtained from chart. Pt says she presented to ER because she had pain in BLE. Chart indicates that patient presetned 10/31/22 with chief c/o SOB which worsend with movement. Daughter noted that patient's O2 saturation were in 60s at times when first awakening from sleep. EMS was called and pt's O2 sats were 74%. On arrival to ER, her sats were 94=3% on 4 L NC.  CXR showed cardiomegaly and mild pulmonary edema. She was given lasix 40 mg IV in ER. Her INR is supratherapeutic. NT pro bNP was 1350 on admission. Creatinine was elevated from baseline at 1.97 but trended down after diuretic to 1.84. ( HS troponin on presentation was 7. She denies following with any cardiologist. Attempted to reach pt's daughter for further history of events but no answer. SUBJECTIVE:      She is baseline in terms of shortness of breath, and still has a significant lower extremity edema. I talked to her about the sleep apnea turns out she has CPAP at home but has not been wearing it for quite some time because the mask does not fit or the machine is not working properly. No labs today.          Assessment and Plan      Acute hypoxic hypercapneic respiratory failure   -Likely multifactorial - Asthma exacerbation ,some pulmonary edema and suspect obesity hypoventilation.   -Pulmonary consulted-- advised nebs steroids, diuresis    2. Pulmonary edema, possible element of acute HFpEF in setting of renal dysfunction  -Pro BNP trended up 1830 yesterday.   -Echo with NL EF, NWMA, normal diastolic function, no valvular disorder  -Continue diuretics- on Lasix 80 mg po daily (home po lasix 40 mg daily)- n-Requested daily standing weight. 3.  BLE edema:  -Suspect possible venous insuffiency, ?lymphadema, .   -Albumen 2.8 may also be contributing. 4.  History of PAF  -NSR on telemetry. No PAF noted  -On coreg  -On coumadin was held for supratherapeutic INR but downtrending. Pharmacy managing. 5.  Elevated creatinine:history of CKD  -AM labs pending. Lab Results   Component Value Date/Time    Creatinine 1.89 (H) 11/03/2022 07:19 AM     6. HLD  -HDL 55, LDL 70  -Continue atorvastatin. 7.  HTN: controlled  Coreg, amlodipine. Home ace-I (lisinopril)on hold (renal dysfunction)         11/5 suspect major portion of her problem relates to untreated sleep apnea. Agree with current meds and higher dose lasix as long as renal function and bp will tolerate. No further cardiac testing or fup needed. Will see prn                   ____________________________________________________________    Cardiac testing  10/31/22    ECHO ADULT COMPLETE 11/01/2022 11/1/2022    Interpretation Summary    Left Ventricle: Normal left ventricular systolic function with a visually estimated EF of 60 - 65%. Left ventricle size is normal. Normal wall thickness. Normal wall motion. Normal diastolic function. Contrast used: Definity. Signed by: Ramón Gibson MD on 11/1/2022 12:57 PM          Most recent HS troponins:  No results for input(s): TROPHS in the last 72 hours. No lab exists for component:  CKMB    ECG: NSR, nonspecific T wave abnormality no acute  ischemic changes. Review of Systems    [x]All other systems reviewed and all negative except as written in HPI  Positive sometimes RUE pain, none now.      [] Patient unable to provide secondary to condition         Past Medical History:   Diagnosis Date    Arthritis     Asthma     Diabetes ([facility]     Glaucoma     Hypertension     Other ill-defined conditions(799.89)     rt knee surgery    Stroke [facility])      Past Surgical History:   Procedure Laterality Date    HX COLONOSCOPY      HX GYN       Social Hx:  reports that she has quit smoking. She has never used smokeless tobacco. She reports that she does not drink alcohol and does not use drugs. Family Hx: family history is not on file. Allergies   Allergen Reactions    Clonidine Other (comments)     Patient becomes incoherent    Metformin Unknown (comments)          OBJECTIVE:  Wt Readings from Last 3 Encounters:   11/04/22 291 lb 11.2 oz (132.3 kg)   10/03/22 283 lb 12.8 oz (128.7 kg)   05/06/22 276 lb (125.2 kg)     No intake or output data in the 24 hours ending 11/05/22 1110          Physical Exam    Vitals:   Vitals:    11/05/22 0600 11/05/22 0734 11/05/22 1000 11/05/22 1004   BP:    109/62   Pulse: 61  64 (!) 58   Resp:    20   Temp:    97.5 °F (36.4 °C)   SpO2:  99%  96%   Weight:       Height:         Telemetry: NSR      General:    Alert, cooperative, no distress, appears stated age. Neck:   Supple,    Back:     Not evaluated   Lungs:     Faint wheezing bilaterally. Heart[de-identified]    Regular rate and rhythm, S1, S2 normal, no murmur, click, rub or gallop. Abdomen:     Soft, non-tender. Bowel sounds normal.    Extremities:   2+ BLE edema . BLE  tender to palpation. Vascular:   Pulses - 2+ radial   Skin:   Vascular skin changes BLE   Neurologic:   Alert, Moves all extremities. Data Review:     Radiology:   XR Results (most recent):  Results from Hospital Encounter encounter on 10/31/22    XR CHEST PORT    Narrative  EXAM: Portable CXR. 1321 hours. COMPARISON: 10/31/2022. INDICATION: pulmonary edema? followup    FINDINGS:  Unchanged mild interstitial pulmonary edema and cardiomegaly. No new finding. No  pneumothorax. Impression  Stable mild interstitial pulmonary edema. CT Results (most recent):  Results from Hospital Encounter encounter on 10/31/22    CT HEAD WO CONT    Narrative  EXAM: CT HEAD WO CONT    INDICATION: s/p fall on Warfarin    COMPARISON: CT 9/16/2021. CONTRAST: None. TECHNIQUE: Unenhanced CT of the head was performed using 5 mm images. Brain and  bone windows were generated. Coronal and sagittal reformats. CT dose reduction  was achieved through use of a standardized protocol tailored for this  examination and automatic exposure control for dose modulation. FINDINGS:  The ventricles and sulci are normal in size, shape and configuration apart from  expected dilation related to encephalomalacia in the left middle cerebral  artery, not significantly changed from prior. There is no significant white  matter disease. There is no intracranial hemorrhage, extra-axial collection, or  mass effect. No significant change in physiologic basal ganglia calcification. The basilar cisterns are open. No CT evidence of acute infarct. . Subcutaneous  lesion of the midline high frontal scalp compatible with epidermal inclusion  cyst is noted, unchanged. The bone windows demonstrate no abnormalities. The visualized portions of the  paranasal sinuses and mastoid air cells are clear. Impression  No intracranial hemorrhage or other acute findings. Chronic changes  as above. No results for input(s): CPK, TROIQ in the last 72 hours. No lab exists for component: CKQMB, CPKMB, BMPP  Recent Labs     11/03/22  0719   *   K 5.0      CO2 25   BUN 52*   CREA 1.89*   *   PHOS 3.3   CA 8.7       No results for input(s): WBC, HGB, HCT, PLT, HGBEXT, HCTEXT, PLTEXT, HGBEXT, HCTEXT, PLTEXT in the last 72 hours. Recent Labs     11/03/22  0719 11/02/22  1609   PTP 32.0* 41.5*   INR 3.3* 4.4*       No results for input(s): CHOL, LDLC in the last 72 hours.     No lab exists for component: TGL, HDLC,  HBA1C  No results for input(s): CRP, TSH, TSHEXT, TSHEXT in the last 72 hours.     No lab exists for component: ESR        Current meds:    Current Facility-Administered Medications:     furosemide (LASIX) tablet 80 mg, 80 mg, Oral, DAILY, Anitha Kramer, NP, 80 mg at 11/05/22 1006    predniSONE (DELTASONE) tablet 60 mg, 60 mg, Oral, DAILY WITH BREAKFAST, Dori Garcia MD, 60 mg at 11/05/22 1006    aspirin delayed-release tablet 81 mg, 81 mg, Oral, DAILY, Edgardo Man MD, 81 mg at 11/05/22 1006    atorvastatin (LIPITOR) tablet 80 mg, 80 mg, Oral, DAILY, Edgardo Man MD, 80 mg at 11/05/22 1006    amLODIPine (NORVASC) tablet 10 mg, 10 mg, Oral, DAILY, Edgardo Man MD, 10 mg at 11/05/22 1006    carvediloL (COREG) tablet 12.5 mg, 12.5 mg, Oral, BID, Isiah Man MD, 12.5 mg at 11/04/22 1704    sodium chloride (NS) flush 5-40 mL, 5-40 mL, IntraVENous, Q8H, Isiah Man MD, 10 mL at 11/02/22 4217    sodium chloride (NS) flush 5-40 mL, 5-40 mL, IntraVENous, PRN, Edgardo Man MD    acetaminophen (TYLENOL) tablet 650 mg, 650 mg, Oral, Q6H PRN **OR** acetaminophen (TYLENOL) suppository 650 mg, 650 mg, Rectal, Q6H PRN, Edgardo Man MD    polyethylene glycol (MIRALAX) packet 17 g, 17 g, Oral, DAILY PRN, Isiah Man MD    ondansetron (ZOFRAN ODT) tablet 4 mg, 4 mg, Oral, Q8H PRN **OR** ondansetron (ZOFRAN) injection 4 mg, 4 mg, IntraVENous, Q6H PRN, Edgardo Man MD    Warfarin Pharmacy Dosing (COUMADIN), , Other, Rx Dosing/Monitoring, Edgardo Man MD    arformoteroL (BROVANA) neb solution 15 mcg, 15 mcg, Nebulization, BID RT, 15 mcg at 11/05/22 0732 **AND** budesonide (PULMICORT) 500 mcg/2 ml nebulizer suspension, 500 mcg, Nebulization, BID RT, [place]Isiah haywood MD, 500 mcg at 11/05/22 0732    glucose chewable tablet 16 g, 4 Tablet, Oral, PRN, Isiah Man MD    glucagon (GLUCAGEN) injection 1 mg, 1 mg, IntraMUSCular, PRN, Edgardo Man MD dextrose 10 % infusion 0-250 mL, 0-250 mL, IntraVENous, PRN, Julio Cesar Man MD    insulin lispro (HUMALOG) injection, , SubCUTAneous, AC&HS, Julio Cesar Man MD, 2 Units at 11/04/22 2200    lacosamide (VIMPAT) tablet 50 mg, 50 mg, Oral, BID, Julio Cesar Man MD, 50 mg at 11/05/22 1006    levETIRAcetam (KEPPRA) oral solution 1,000 mg, 1,000 mg, Oral, BID, Julio Cesar Man MD, 1,000 mg at 11/05/22 1006    albuterol-ipratropium (DUO-NEB) 2.5 MG-0.5 MG/3 ML, 3 mL, Nebulization, Q6H PRN, Julio Cesar Man MD Felizardo Crafts, MD  [facility], [address] [address]  [facility]  [phone]      CC: Keara Brito MD

## 2022-11-05 NOTE — PROGRESS NOTES
1400- pts O2 sats down to 80% at rest on room air, pt placed back on 2L NC, O2 sats came up to 94%    Bedside shift change report given to Austin Rodgers [place] (oncoming nurse) by Sobia Rubi RN (offgoing nurse). Report included the following information SBAR, Kardex, ED Summary, Intake/Output, MAR, Accordion, Recent Results, and Cardiac Rhythm NSR .

## 2022-11-06 LAB
ALBUMIN SERPL-MCNC: 2.7 G/DL (ref 3.5–5)
ANION GAP SERPL CALC-SCNC: 5 MMOL/L (ref 5–15)
BACTERIA SPEC CULT: NORMAL
BUN SERPL-MCNC: 69 MG/DL (ref 6–20)
BUN/CREAT SERPL: 40 (ref 12–20)
CALCIUM SERPL-MCNC: 8.7 MG/DL (ref 8.5–10.1)
CHLORIDE SERPL-SCNC: 106 MMOL/L (ref 97–108)
CO2 SERPL-SCNC: 27 MMOL/L (ref 21–32)
CREAT SERPL-MCNC: 1.71 MG/DL (ref 0.55–1.02)
GLUCOSE BLD STRIP.AUTO-MCNC: 115 MG/DL (ref 65–117)
GLUCOSE BLD STRIP.AUTO-MCNC: 115 MG/DL (ref 65–117)
GLUCOSE BLD STRIP.AUTO-MCNC: 130 MG/DL (ref 65–117)
GLUCOSE BLD STRIP.AUTO-MCNC: 172 MG/DL (ref 65–117)
GLUCOSE BLD STRIP.AUTO-MCNC: 194 MG/DL (ref 65–117)
GLUCOSE SERPL-MCNC: 122 MG/DL (ref 65–100)
INR PPP: 1.4 (ref 0.9–1.1)
PHOSPHATE SERPL-MCNC: 3.2 MG/DL (ref 2.6–4.7)
POTASSIUM SERPL-SCNC: 4.9 MMOL/L (ref 3.5–5.1)
PROTHROMBIN TIME: 14 SEC (ref 9–11.1)
SERVICE CMNT-IMP: ABNORMAL
SERVICE CMNT-IMP: NORMAL
SODIUM SERPL-SCNC: 138 MMOL/L (ref 136–145)

## 2022-11-06 PROCEDURE — 74011250637 HC RX REV CODE- 250/637: Performed by: INTERNAL MEDICINE

## 2022-11-06 PROCEDURE — 80069 RENAL FUNCTION PANEL: CPT

## 2022-11-06 PROCEDURE — 94640 AIRWAY INHALATION TREATMENT: CPT

## 2022-11-06 PROCEDURE — 74011250637 HC RX REV CODE- 250/637: Performed by: FAMILY MEDICINE

## 2022-11-06 PROCEDURE — 36415 COLL VENOUS BLD VENIPUNCTURE: CPT

## 2022-11-06 PROCEDURE — 99232 SBSQ HOSP IP/OBS MODERATE 35: CPT | Performed by: INTERNAL MEDICINE

## 2022-11-06 PROCEDURE — 74011636637 HC RX REV CODE- 636/637: Performed by: FAMILY MEDICINE

## 2022-11-06 PROCEDURE — 65270000029 HC RM PRIVATE

## 2022-11-06 PROCEDURE — 74011000250 HC RX REV CODE- 250: Performed by: FAMILY MEDICINE

## 2022-11-06 PROCEDURE — 85610 PROTHROMBIN TIME: CPT

## 2022-11-06 PROCEDURE — 94760 N-INVAS EAR/PLS OXIMETRY 1: CPT

## 2022-11-06 PROCEDURE — 82962 GLUCOSE BLOOD TEST: CPT

## 2022-11-06 PROCEDURE — 94660 CPAP INITIATION&MGMT: CPT

## 2022-11-06 PROCEDURE — 77010033678 HC OXYGEN DAILY

## 2022-11-06 PROCEDURE — 74011250637 HC RX REV CODE- 250/637: Performed by: NURSE PRACTITIONER

## 2022-11-06 PROCEDURE — 74011636637 HC RX REV CODE- 636/637: Performed by: INTERNAL MEDICINE

## 2022-11-06 RX ORDER — WARFARIN SODIUM 5 MG/1
5 TABLET ORAL ONCE
Status: COMPLETED | OUTPATIENT
Start: 2022-11-06 | End: 2022-11-06

## 2022-11-06 RX ADMIN — Medication 10 ML: at 07:16

## 2022-11-06 RX ADMIN — CARVEDILOL 12.5 MG: 12.5 TABLET, FILM COATED ORAL at 09:32

## 2022-11-06 RX ADMIN — AMLODIPINE BESYLATE 10 MG: 5 TABLET ORAL at 09:33

## 2022-11-06 RX ADMIN — ARFORMOTEROL TARTRATE 15 MCG: 15 SOLUTION RESPIRATORY (INHALATION) at 22:00

## 2022-11-06 RX ADMIN — FUROSEMIDE 80 MG: 40 TABLET ORAL at 09:33

## 2022-11-06 RX ADMIN — BUDESONIDE 500 MCG: 0.5 INHALANT RESPIRATORY (INHALATION) at 22:00

## 2022-11-06 RX ADMIN — LACOSAMIDE 50 MG: 50 TABLET, FILM COATED ORAL at 09:32

## 2022-11-06 RX ADMIN — ARFORMOTEROL TARTRATE 15 MCG: 15 SOLUTION RESPIRATORY (INHALATION) at 08:50

## 2022-11-06 RX ADMIN — ATORVASTATIN CALCIUM 80 MG: 40 TABLET, FILM COATED ORAL at 09:32

## 2022-11-06 RX ADMIN — LEVETIRACETAM 1000 MG: 100 SOLUTION ORAL at 09:33

## 2022-11-06 RX ADMIN — LACOSAMIDE 50 MG: 50 TABLET, FILM COATED ORAL at 17:21

## 2022-11-06 RX ADMIN — ASPIRIN 81 MG: 81 TABLET, COATED ORAL at 09:33

## 2022-11-06 RX ADMIN — LEVETIRACETAM 1000 MG: 100 SOLUTION ORAL at 17:21

## 2022-11-06 RX ADMIN — Medication 2 UNITS: at 17:22

## 2022-11-06 RX ADMIN — CARVEDILOL 12.5 MG: 12.5 TABLET, FILM COATED ORAL at 17:22

## 2022-11-06 RX ADMIN — BUDESONIDE 500 MCG: 0.5 INHALANT RESPIRATORY (INHALATION) at 08:51

## 2022-11-06 RX ADMIN — WARFARIN SODIUM 5 MG: 5 TABLET ORAL at 17:22

## 2022-11-06 RX ADMIN — Medication 10 ML: at 17:22

## 2022-11-06 RX ADMIN — PREDNISONE 60 MG: 20 TABLET ORAL at 09:32

## 2022-11-06 NOTE — ROUTINE PROCESS
1945: Verbal shift change report given to Sara Simon RN (oncoming nurse) by Carley Whitman RN (offgoing nurse). Report included the following information SBAR, ED Summary, Intake/Output, MAR, Recent Results, Cardiac Rhythm NSR, and Quality Measures. The beginning of Daylight Saving Time occurred at 0200 hrs. Documentation of patient care and medications administered is done with respect to the time change. 0730: Verbal shift change report given to Carolyn Larios (oncoming nurse) by Sara Simon RN (offgoing nurse). Report included the following information SBAR, ED Summary, Intake/Output, MAR, Recent Results, Cardiac Rhythm SBAR, Procedure Summary, MAR, Med Rec Status, Cardiac Rhythm NSR and Quality Measures and Quality Measures.

## 2022-11-06 NOTE — PROGRESS NOTES
Hospital Progress Note    NAME:  Jil Holter   :   1945   MRN:  [id number]     Date/Time:  2022 8:28 AM    Plan:     Continue jet neb  Arterial stick for labs  Wean oxygen -unable - will ask RT assistance in qualifying for hme oxygen and proper fitting face mask for home CPAP  Pt/ot  Home Monday? Risk of Deterioration: Low  []           Moderate  []           High  []                 Assessment:     Principal Problem:    Acute on chronic respiratory failure with hypoxia and hypercapnia (HCC) (2022)     Jet nebs and steroids     CPAP       Active Problems:    Paroxysmal atrial fibrillation ([address] (2010)      coumadin      Morbid obesity ([address] (2010)     Encourage weight management      Seizure disorder ([address] (2014)     home dose of Lacosamide 50 mg BID and levetiracetam 1000 mg BID      Dyslipidemia (1/10/2015)     Lipitor       HTN (hypertension) (1/10/2015)     Titrate home meds      Type 2 diabetes mellitus with chronic kidney disease ([address] (2022)      SSI      Chronic renal disease, stage III (10/3/2022)     Renal following           CHF (congestive heart failure) ([address] (10/31/2022)     [place] EF and no diastolic dyf on echo     Normal bnp     Card consult appreciated      Normocytic anemia (2022)      Generalized abdominal pain (2022)     CT neg     reolved      Acute kidney injury superimposed on chronic kidney disease ([address] (2022)    Renal following      Acute exacerbation of COPD with asthma ([address] (2022)     Pul following      Acute pulmonary edema ([address] (2022)            Admitting notes:77 y.o. female with past medical history of arthritis, asthma, sleep apnea, type 2 diabetes mellitus, glaucoma, hypertension, stroke, class III obesity presented to the emergency department via EMS from home accompanied by her daughter with chief complaint of shortness of breath. Patient is a limited historian. Her daughter provides additional history.   Remainder of history was obtained per review of ED electronic medical records. Today, patient reported was short of breath, remain constant, severe, without specific alleviating factors, exacerbated with movement. Her daughter notes that patient's oxygen saturations may be on occasion into the 60s on first awakening from sleep. She has a history of sleep apnea but does not use CPAP Pap machine for several years as it is broke. EMS was called to the scene. Patient reportedly was hypoxic with O2 saturations at 74%. Patient has been taken inhalers prior to arrival.  She was placed on 4 L oxygen via nasal cannula. On arrival to the emergency department, initial recorded vital signs temperature 98.0 °F, /71, heart rate 64, respirate 12, O2 saturation 93% on 4 L oxygen via nasal cannula. Abnormal labs included hemoglobin 9.9, sodium 129, BUN 43, creatinine 1.97, GFR 25, calcium 8.3, albumin 2.3, proBNP 1350. Chest reportable showed cardiomegaly and mild pulmonary edema. Patient was given Lasix 40 mg IV x1 dose. Patient is now seen for admission to the hospital service with continued evaluation and treatments. Patient has occasional cough wheezing. She also in addition take Spiriva along with albuterol.        Subjective:     Feeling better  11 Point Review of Systems:   Negative except no cp.sob improved    []            Unable to obtain ROS due to:       []            mental status change []            sedated []            intubated     Social History     Tobacco Use    Smoking status: Former    Smokeless tobacco: Never    Tobacco comments:     20+years ago   Substance Use Topics    Alcohol use: No     Medications reviewed:  Current Facility-Administered Medications   Medication Dose Route Frequency    furosemide (LASIX) tablet 80 mg  80 mg Oral DAILY    predniSONE (DELTASONE) tablet 60 mg  60 mg Oral DAILY WITH BREAKFAST    aspirin delayed-release tablet 81 mg  81 mg Oral DAILY    atorvastatin (LIPITOR) tablet 80 mg  80 mg Oral DAILY    amLODIPine (NORVASC) tablet 10 mg  10 mg Oral DAILY    carvediloL (COREG) tablet 12.5 mg  12.5 mg Oral BID    sodium chloride (NS) flush 5-40 mL  5-40 mL IntraVENous Q8H    sodium chloride (NS) flush 5-40 mL  5-40 mL IntraVENous PRN    acetaminophen (TYLENOL) tablet 650 mg  650 mg Oral Q6H PRN    Or    acetaminophen (TYLENOL) suppository 650 mg  650 mg Rectal Q6H PRN    polyethylene glycol (MIRALAX) packet 17 g  17 g Oral DAILY PRN    ondansetron (ZOFRAN ODT) tablet 4 mg  4 mg Oral Q8H PRN    Or    ondansetron (ZOFRAN) injection 4 mg  4 mg IntraVENous Q6H PRN    Warfarin Pharmacy Dosing (COUMADIN)   Other Rx Dosing/Monitoring    arformoteroL (BROVANA) neb solution 15 mcg  15 mcg Nebulization BID RT    And    budesonide (PULMICORT) 500 mcg/2 ml nebulizer suspension  500 mcg Nebulization BID RT    glucose chewable tablet 16 g  4 Tablet Oral PRN    glucagon (GLUCAGEN) injection 1 mg  1 mg IntraMUSCular PRN    dextrose 10 % infusion 0-250 mL  0-250 mL IntraVENous PRN    insulin lispro (HUMALOG) injection   SubCUTAneous AC&HS    lacosamide (VIMPAT) tablet 50 mg  50 mg Oral BID    levETIRAcetam (KEPPRA) oral solution 1,000 mg  1,000 mg Oral BID    albuterol-ipratropium (DUO-NEB) 2.5 MG-0.5 MG/3 ML  3 mL Nebulization Q6H PRN        Objective:   Vitals:  Visit Vitals  BP (!) 149/61 (BP 1 Location: Right lower arm, BP Patient Position: At rest)   Pulse 62   Temp 97.9 °F (36.6 °C)   Resp 14   Ht 5' 2\" (1.575 m)   Wt 292 lb 3.2 oz (132.5 kg)   SpO2 95%   BMI 53.44 kg/m²     Temp (24hrs), Av.8 °F (36.6 °C), Min:97 °F (36.1 °C), Max:98.2 °F (36.8 °C)    O2 Flow Rate (L/min): 2 l/min O2 Device: Nasal cannula    Last 24hr Input/Output:    Intake/Output Summary (Last 24 hours) at 2022 0742  Last data filed at 2022 0717  Gross per 24 hour   Intake --   Output 1550 ml   Net -1550 ml          PHYSICAL EXAM:  General:    Alert, cooperative, no distress, appears stated age.      Head: Normocephalic, without obvious abnormality, atraumatic. Eyes:   Conjunctivae/corneas clear. PERRLA  Nose:  Nares normal. No drainage or sinus tenderness. Throat:    Lips, mucosa, and tongue normal.  No Thrush  Neck:  Supple, symmetrical,  no adenopathy, thyroid: non tender    no carotid bruit and no JVD. Back:    Symmetric,  No CVA tenderness. Lungs:   Decreased bs.exp wheezes  Chest wall:  No tenderness or deformity. No Accessory muscle use. Heart:   Regular rate and rhythm,  no murmur, rub or gallop. Abdomen:   Soft, non-tender. Not distended. Bowel sounds normal. No masses        Lab Data Reviewed:    Recent Labs     11/05/22  1115   WBC 4.3   HGB 10.6*   HCT 33.9*          Recent Labs     11/06/22  0601 11/05/22  1115    134*   K 4.9 4.9    103   CO2 27 27   * 160*   BUN 69* 71*   CREA 1.71* 1.81*   CA 8.7 8.6   PHOS 3.2 2.8   ALB 2.7* 3.0*   INR 1.4* 1.6*       Lab Results   Component Value Date/Time    Glucose (POC) 115 11/06/2022 07:16 AM    Glucose (POC) 224 (H) 11/05/2022 09:43 PM    Glucose (POC) 163 (H) 11/05/2022 04:57 PM    Glucose (POC) 199 (H) 11/05/2022 12:16 PM    Glucose (POC) 135 (H) 11/05/2022 06:04 AM     No results for input(s): PH, PCO2, PO2, HCO3, FIO2 in the last 72 hours.   Recent Labs     11/06/22  0601 11/05/22  1115   INR 1.4* 1.6*       ___________________________________________________  ___________________________________________________    Attending Physician: Elizabeth Long MD

## 2022-11-06 NOTE — PROGRESS NOTES
Name: Carmen Gutierrez MRN: [id number]   : 1945 [facility]   Date: 2022        IMPRESSION:   CKD stage 3A as baseline  LYDIA from diuretic use. Renal function is improved, likely currently at baseline  Fluid overload - on loop diuretics- improved. Morbid obesity  Hyponatremia - resolved  Proteinuria - urine studies were ordered. PLAN:   Stable for discharge on Monday from renal standpoint   Weight loss would be beneficial.  I's and O's  Oral fluid restriction, daily weights. Subjective/Interval History:   I have reviewed the flowsheet and previous days notes. ROS:A comprehensive review of systems was negative except for that written in the HPI. Objective:   Vital Signs:    Visit Vitals  BP (!) 143/55 (BP 1 Location: Right lower arm, BP Patient Position: At rest)   Pulse 60   Temp 98.3 °F (36.8 °C)   Resp 17   Ht 5' 2\" (1.575 m)   Wt 132.5 kg (292 lb 3.2 oz)   SpO2 98%   BMI 53.44 kg/m²       O2 Device: Nasal cannula   O2 Flow Rate (L/min): 2 l/min   Temp (24hrs), Av.9 °F (36.6 °C), Min:97 °F (36.1 °C), Max:98.3 °F (36.8 °C)       Intake/Output:   Last shift:      701 - 1900  In: -   Out: 250 [Urine:250]  Last 3 shifts: 1901 - 700  In: -   Out: 1300 [Urine:1300]    Intake/Output Summary (Last 24 hours) at 2022 0955  Last data filed at 2022 0717  Gross per 24 hour   Intake --   Output 1550 ml   Net -1550 ml          Physical Exam:  General:    Awake, slightly confused, morbidly obese. Head:   Normocephalic, without obvious abnormality, atraumatic. Eyes:   Conjunctivae/corneas clear. Throat:    Lips, mucosa, and tongue normal.  No Thrush  Neck:  Short, no obvious masses. Lungs:   Diminished to auscultation bilaterally. Crepitations at the bases. Chest wall:  No tenderness or deformity. No Accessory muscle use. Heart:   Regular rate and rhythm,  no murmur, rub or gallop. Abdomen:   Soft, non-tender. Not distended.   Bowel sounds normal. No masses  Extremities: Extremities normal, atraumatic, No cyanosis. 2+ edema. . No clubbing  Skin:     Texture, turgor normal. No rashes or lesions. Not Jaundiced  Psych:  Poor/fair insight. Not depressed. Not anxious or agitated. Neurologic: Non focal.      DATA:  Labs:  Recent Labs     11/06/22  0601 11/05/22  1115    134*   K 4.9 4.9    103   CO2 27 27   BUN 69* 71*   CREA 1.71* 1.81*   CA 8.7 8.6   ALB 2.7* 3.0*   PHOS 3.2 2.8       Recent Labs     11/05/22  1115   WBC 4.3   HGB 10.6*   HCT 33.9*        No results for input(s): ILIR, KU, CLU, CREAU in the last 72 hours.     No lab exists for component: PROU    Total time spent with patient:  35 minutes    [] Critical Care Provided    Care Plan discussed with:   Staff, Medical Team    Ashlee Sellers MD

## 2022-11-06 NOTE — PROGRESS NOTES
Pharmacist Note - Warfarin Dosing  Consult provided for this 76 y. o.female to manage warfarin for PAF     INR Goal: 2 - 3  Home regimen/ tablet size: 7.5 mg Mon-Thurs; 5 mg Fr-Sa-Su    Drugs that may increase INR: None  Drugs that may decrease INR: None  Other current anticoagulants/ drugs that may increase bleeding risk: Aspirin  Risk factors: Age > 65  Daily INR ordered: YES    Recent Labs     11/06/22  0601 11/05/22  1115   HGB  --  10.6*   INR 1.4* 1.6*     Date               INR                  Dose  10/31               5.5                   Held   11/01               4.8                   Held  11/02               4.4                   Held  11/03   3.3    1 mg  11/04    ---    Hold  11/05               1.6                    3 mg  11/06               1.4                    5 mg                                                                                   Assessment/ Plan: Will order warfarin 5 mg PO x 1. Pharmacy will continue to monitor daily and adjust therapy as indicated. Please contact the pharmacist at  or  for outpatient recommendations if needed.

## 2022-11-07 LAB
GLUCOSE BLD STRIP.AUTO-MCNC: 167 MG/DL (ref 65–117)
GLUCOSE BLD STRIP.AUTO-MCNC: 176 MG/DL (ref 65–117)
GLUCOSE BLD STRIP.AUTO-MCNC: 195 MG/DL (ref 65–117)
GLUCOSE BLD STRIP.AUTO-MCNC: 205 MG/DL (ref 65–117)
INR PPP: 1.3 (ref 0.9–1.1)
PROTHROMBIN TIME: 12.9 SEC (ref 9–11.1)
SERVICE CMNT-IMP: ABNORMAL

## 2022-11-07 PROCEDURE — 65270000046 HC RM TELEMETRY

## 2022-11-07 PROCEDURE — 94660 CPAP INITIATION&MGMT: CPT

## 2022-11-07 PROCEDURE — 74011636637 HC RX REV CODE- 636/637: Performed by: INTERNAL MEDICINE

## 2022-11-07 PROCEDURE — 97116 GAIT TRAINING THERAPY: CPT

## 2022-11-07 PROCEDURE — 82962 GLUCOSE BLOOD TEST: CPT

## 2022-11-07 PROCEDURE — 94640 AIRWAY INHALATION TREATMENT: CPT

## 2022-11-07 PROCEDURE — 74011250637 HC RX REV CODE- 250/637: Performed by: NURSE PRACTITIONER

## 2022-11-07 PROCEDURE — 77010033678 HC OXYGEN DAILY

## 2022-11-07 PROCEDURE — 36415 COLL VENOUS BLD VENIPUNCTURE: CPT

## 2022-11-07 PROCEDURE — 74011250637 HC RX REV CODE- 250/637: Performed by: FAMILY MEDICINE

## 2022-11-07 PROCEDURE — 99232 SBSQ HOSP IP/OBS MODERATE 35: CPT | Performed by: INTERNAL MEDICINE

## 2022-11-07 PROCEDURE — 74011636637 HC RX REV CODE- 636/637: Performed by: FAMILY MEDICINE

## 2022-11-07 PROCEDURE — 85610 PROTHROMBIN TIME: CPT

## 2022-11-07 PROCEDURE — 97530 THERAPEUTIC ACTIVITIES: CPT

## 2022-11-07 PROCEDURE — 74011000250 HC RX REV CODE- 250: Performed by: FAMILY MEDICINE

## 2022-11-07 RX ORDER — WARFARIN 7.5 MG/1
7.5 TABLET ORAL ONCE
Status: COMPLETED | OUTPATIENT
Start: 2022-11-07 | End: 2022-11-07

## 2022-11-07 RX ORDER — LEVETIRACETAM 500 MG/1
1000 TABLET ORAL EVERY 12 HOURS
Status: DISCONTINUED | OUTPATIENT
Start: 2022-11-07 | End: 2022-11-08 | Stop reason: HOSPADM

## 2022-11-07 RX ADMIN — Medication 10 ML: at 18:53

## 2022-11-07 RX ADMIN — Medication 3 UNITS: at 12:41

## 2022-11-07 RX ADMIN — Medication 10 ML: at 07:24

## 2022-11-07 RX ADMIN — ARFORMOTEROL TARTRATE 15 MCG: 15 SOLUTION RESPIRATORY (INHALATION) at 07:22

## 2022-11-07 RX ADMIN — AMLODIPINE BESYLATE 10 MG: 5 TABLET ORAL at 09:23

## 2022-11-07 RX ADMIN — ATORVASTATIN CALCIUM 80 MG: 40 TABLET, FILM COATED ORAL at 09:23

## 2022-11-07 RX ADMIN — LEVETIRACETAM 1000 MG: 100 SOLUTION ORAL at 09:23

## 2022-11-07 RX ADMIN — FUROSEMIDE 80 MG: 40 TABLET ORAL at 09:24

## 2022-11-07 RX ADMIN — CARVEDILOL 12.5 MG: 12.5 TABLET, FILM COATED ORAL at 09:23

## 2022-11-07 RX ADMIN — ARFORMOTEROL TARTRATE 15 MCG: 15 SOLUTION RESPIRATORY (INHALATION) at 23:09

## 2022-11-07 RX ADMIN — LACOSAMIDE 50 MG: 50 TABLET, FILM COATED ORAL at 09:22

## 2022-11-07 RX ADMIN — Medication 10 ML: at 23:35

## 2022-11-07 RX ADMIN — BUDESONIDE 500 MCG: 0.5 INHALANT RESPIRATORY (INHALATION) at 23:10

## 2022-11-07 RX ADMIN — PREDNISONE 60 MG: 20 TABLET ORAL at 09:23

## 2022-11-07 RX ADMIN — Medication 2 UNITS: at 19:04

## 2022-11-07 RX ADMIN — LEVETIRACETAM 1000 MG: 500 TABLET, FILM COATED ORAL at 23:34

## 2022-11-07 RX ADMIN — ASPIRIN 81 MG: 81 TABLET, COATED ORAL at 09:23

## 2022-11-07 RX ADMIN — CARVEDILOL 12.5 MG: 12.5 TABLET, FILM COATED ORAL at 18:50

## 2022-11-07 RX ADMIN — BUDESONIDE 500 MCG: 0.5 INHALANT RESPIRATORY (INHALATION) at 07:22

## 2022-11-07 RX ADMIN — WARFARIN SODIUM 7.5 MG: 7.5 TABLET ORAL at 18:50

## 2022-11-07 RX ADMIN — LACOSAMIDE 50 MG: 50 TABLET, FILM COATED ORAL at 18:50

## 2022-11-07 RX ADMIN — Medication 2 UNITS: at 09:23

## 2022-11-07 NOTE — PROGRESS NOTES
11/07/22 0018   Oxygen Therapy   O2 Sat (%) 99 %   Pulse via Oximetry 58 beats per minute   O2 Device Nasal cannula   O2 Flow Rate (L/min) 2 l/min   CPAP/BIPAP   CPAP/BIPAP Start/Stop Off  (Pt refused)   $$ CPAP Daily Yes  (on S/B in room)

## 2022-11-07 NOTE — PROGRESS NOTES
Problem: Heart Failure: Day 2  Goal: Off Pathway (Use only if patient is Off Pathway)  Outcome: Progressing Towards Goal  Goal: Activity/Safety  Outcome: Progressing Towards Goal  Goal: Consults, if ordered  Outcome: Progressing Towards Goal  Goal: Diagnostic Test/Procedures  Outcome: Progressing Towards Goal  Goal: Nutrition/Diet  Outcome: Progressing Towards Goal  Goal: Discharge Planning  Outcome: Progressing Towards Goal  Goal: Medications  Outcome: Progressing Towards Goal  Goal: Respiratory  Outcome: Progressing Towards Goal  Goal: Treatments/Interventions/Procedures  Outcome: Progressing Towards Goal  Goal: Psychosocial  Outcome: Progressing Towards Goal  Goal: *Oxygen saturation within defined limits  Outcome: Progressing Towards Goal  Goal: *Hemodynamically stable  Outcome: Progressing Towards Goal  Goal: *Optimal pain control at patient's stated goal  Outcome: Progressing Towards Goal  Goal: *Anxiety reduced or absent  Outcome: Progressing Towards Goal  Goal: *Demonstrates progressive activity  Outcome: Progressing Towards Goal     Problem: Heart Failure: Day 3  Goal: Off Pathway (Use only if patient is Off Pathway)  Outcome: Progressing Towards Goal  Goal: Activity/Safety  Outcome: Progressing Towards Goal  Goal: Diagnostic Test/Procedures  Outcome: Progressing Towards Goal  Goal: Nutrition/Diet  Outcome: Progressing Towards Goal  Goal: Discharge Planning  Outcome: Progressing Towards Goal  Goal: Medications  Outcome: Progressing Towards Goal  Goal: Respiratory  Outcome: Progressing Towards Goal  Goal: Treatments/Interventions/Procedures  Outcome: Progressing Towards Goal  Goal: Psychosocial  Outcome: Progressing Towards Goal  Goal: *Oxygen saturation within defined limits  Outcome: Progressing Towards Goal  Goal: *Hemodynamically stable  Outcome: Progressing Towards Goal  Goal: *Optimal pain control at patient's stated goal  Outcome: Progressing Towards Goal  Goal: *Anxiety reduced or absent  Outcome: Progressing Towards Goal  Goal: *Demonstrates progressive activity  Outcome: Progressing Towards Goal     Problem: Heart Failure: Day 4  Goal: Off Pathway (Use only if patient is Off Pathway)  Outcome: Progressing Towards Goal  Goal: Activity/Safety  Outcome: Progressing Towards Goal  Goal: Diagnostic Test/Procedures  Outcome: Progressing Towards Goal  Goal: Nutrition/Diet  Outcome: Progressing Towards Goal  Goal: Discharge Planning  Outcome: Progressing Towards Goal  Goal: Medications  Outcome: Progressing Towards Goal  Goal: Respiratory  Outcome: Progressing Towards Goal  Goal: Treatments/Interventions/Procedures  Outcome: Progressing Towards Goal  Goal: Psychosocial  Outcome: Progressing Towards Goal  Goal: *Oxygen saturation within defined limits  Outcome: Progressing Towards Goal  Goal: *Hemodynamically stable  Outcome: Progressing Towards Goal  Goal: *Optimal pain control at patient's stated goal  Outcome: Progressing Towards Goal  Goal: *Anxiety reduced or absent  Outcome: Progressing Towards Goal  Goal: *Demonstrates progressive activity  Outcome: Progressing Towards Goal     Problem: Heart Failure: Day 5  Goal: Off Pathway (Use only if patient is Off Pathway)  Outcome: Progressing Towards Goal  Goal: Activity/Safety  Outcome: Progressing Towards Goal  Goal: Diagnostic Test/Procedures  Outcome: Progressing Towards Goal  Goal: Nutrition/Diet  Outcome: Progressing Towards Goal  Goal: Discharge Planning  Outcome: Progressing Towards Goal  Goal: Medications  Outcome: Progressing Towards Goal  Goal: Respiratory  Outcome: Progressing Towards Goal  Goal: Treatments/Interventions/Procedures  Outcome: Progressing Towards Goal  Goal: Psychosocial  Outcome: Progressing Towards Goal     Problem: Heart Failure: Discharge Outcomes  Goal: *Demonstrates ability to perform prescribed activity without shortness of breath or discomfort  Outcome: Progressing Towards Goal  Goal: *Left ventricular function assessment completed prior to or during stay, or planned for post-discharge  Outcome: Progressing Towards Goal  Goal: *ACEI prescribed if LVEF less than 40% and no contraindications or ARB prescribed  Outcome: Progressing Towards Goal  Goal: *Verbalizes understanding and describes prescribed diet  Outcome: Progressing Towards Goal  Goal: *Verbalizes understanding/describes prescribed medications  Outcome: Progressing Towards Goal  Goal: *Describes available resources and support systems  Description: (eg: Home Health, Palliative Care, Advanced Medical Directive)  Outcome: Progressing Towards Goal  Goal: *Describes smoking cessation resources  Outcome: Progressing Towards Goal  Goal: *Understands and describes signs and symptoms to report to providers(Stroke Metric)  Outcome: Progressing Towards Goal  Goal: *Describes/verbalizes understanding of follow-up/return appt  Description: (eg: to physicians, diabetes treatment coordinator, and other resources  Outcome: Progressing Towards Goal  Goal: *Describes importance of continuing daily weights and changes to report to physician  Outcome: Progressing Towards Goal     Problem: Chronic Obstructive Pulmonary Disease (COPD)  Goal: *Oxygen saturation during activity within specified parameters  Outcome: Progressing Towards Goal  Goal: *Able to remain out of bed as prescribed  Outcome: Progressing Towards Goal  Goal: *Absence of hypoxia  Outcome: Progressing Towards Goal  Goal: *Optimize nutritional status  Outcome: Progressing Towards Goal     Problem: Patient Education: Go to Patient Education Activity  Goal: Patient/Family Education  Outcome: Progressing Towards Goal     Problem: Falls - Risk of  Goal: *Absence of Falls  Description: Document Hector Fall Risk and appropriate interventions in the flowsheet.   Outcome: Progressing Towards Goal  Note: Fall Risk Interventions:  Mobility Interventions: Bed/chair exit alarm, Communicate number of staff needed for ambulation/transfer, Patient to call before getting OOB, PT Consult for mobility concerns    Mentation Interventions: Adequate sleep, hydration, pain control, Door open when patient unattended, Bed/chair exit alarm    Medication Interventions: Bed/chair exit alarm, Evaluate medications/consider consulting pharmacy, Patient to call before getting OOB    Elimination Interventions: Call light in reach, Patient to call for help with toileting needs, Stay With Me (per policy), Toilet paper/wipes in reach    History of Falls Interventions: Bed/chair exit alarm, Door open when patient unattended, Room close to nurse's station

## 2022-11-07 NOTE — PROGRESS NOTES
Pharmacist Note - Warfarin Dosing  Consult provided for this 76 y. o.female to manage warfarin for PAF     INR Goal: 2 - 3  Home regimen/ tablet size: 7.5 mg Mon-Thurs; 5 mg Fr-Sa-Su    Drugs that may increase INR: None  Drugs that may decrease INR: None  Other current anticoagulants/ drugs that may increase bleeding risk: Aspirin  Risk factors: Age > 65  Daily INR ordered: YES    Recent Labs     11/07/22  0541 11/06/22  0601 11/05/22  1115   HGB  --   --  10.6*   INR 1.3* 1.4* 1.6*     Date               INR                  Dose  10/31               5.5                   Held   11/01               4.8                   Held  11/02               4.4                   Held  11/03   3.3    1 mg  11/04    ---    Hold  11/05               1.6                    3 mg  11/06               1.4                    5 mg  11/07    1.3       7.5 mg                                                                                    Assessment/ Plan: Will order warfarin home dose of 7.5 mg PO x 1. Pharmacy will continue to monitor daily and adjust therapy as indicated. Please contact the pharmacist at  or  for outpatient recommendations if needed.

## 2022-11-07 NOTE — PROGRESS NOTES
ROSALIA:  RUR: 20%    Disposition:  Home Tues/Wed. Patient lives with daughter/granddaughter    CM spoke with Dr. Toñito Shankar. Awaiting qualifying O2 sats. CM continuing to follow. Sabas Medina, [address], [address] bed ordered via [employer] on Fridy 11/4/22. CM spoke with the patient's daughter Elizabeth Leon) re request made for C-Pap. Patient has not used the cpap (purchased on 2014 for the pat 2 years as daughter state it has not been functioning properly for years. Family also states fear that using a malfunctioning piece of equipment may lead to further complications. C-pap was last used 2 years ago. A copy of the sales agreement (dated July 2014 is at beside. CM removed pink copy of sales agreement with the daughter's permission. CM called [facility] Lindsey [phone]) to discuss order. [place] stated a sleep study would be needed and if Kelley Gomez can provide copy of sleep study (done in [phone] the either agency can  fill the order. CM called Kelley Gomez ([phone]) and left message to have call returned. CM made copy of invoice and returning original to room 610. Patient transferred off the unit today. CM continuing to follow.     Sabas Medina, BSW, CRM

## 2022-11-07 NOTE — PROGRESS NOTES
ROSALIA:  RUR: 20%    Disposition:  Home Tues/Wed. Patient lives with daughter/granddaughter  [facility]. LIZET orders sent earlier during hospital course. CM spoke with Dr. Fareed Hensley. Awaiting qualifying O2 sats. CM continuing to follow. Ollie Marie, [address], [address] bed ordered via [employer] on Fr[place] 11/4/22. CM spoke with the patient's daughter Roberts Hammans) re request made for C-Pap. Patient has not used the cpap (purchased on 2014 for the pat 2 years as daughter state it has not been functioning properly for years. Family also states fear that using a malfunctioning piece of equipment may lead to further complications. C-pap was last used 2 years ago. A copy of the sales agreement (dated July 2014 is at beside. CM removed pink copy of sales agreement with the daughter's permission. CM called [facility] Hein [phone]) to discuss order. Rogelio David stated a sleep study would be needed and if Christoph Yadav can provide copy of sleep study (done in [phone] the either agency can  fill the order. CM called Christoph Yadav ([phone]) and left message to have call returned. CM made copy of invoice and returning original to room 610. Patient transferred off the unit today. CM continuing to follow.     Ollie Marie, BSW, CRM

## 2022-11-07 NOTE — PROGRESS NOTES
Problem: Mobility Impaired (Adult and Pediatric)  Goal: *Acute Goals and Plan of Care (Insert Text)  Description: FUNCTIONAL STATUS PRIOR TO ADMISSION: Patient was modified independent using a rolling walker for functional mobility. Patient does have history of falls and patient's daughter states patient attempts to ambulate without RW at times. HOME SUPPORT PRIOR TO ADMISSION: The patient lived with daughter, granddaughter and required minimal assistance/contact guard assist for transfers, ADLs, etc. Patient's daughter states most days patient is able to mobilize independently but that on \"bad days\" patient requires a little help. Physical Therapy Goals  Initiated 11/2/2022  1. Patient will move from supine to sit and sit to supine  in bed with minimal assistance/contact guard assist within 7 day(s). 2.  Patient will transfer from bed to chair and chair to bed with minimal assistance/contact guard assist using the least restrictive device within 7 day(s). 3.  Patient will perform sit to stand with minimal assistance/contact guard assist within 7 day(s). 4.  Patient will ambulate with minimal assistance/contact guard assist for 30 feet with the least restrictive device within 7 day(s). 5.  Patient will ascend/descend 5 stairs with one handrail(s) with minimal assistance/contact guard assist within 7 day(s). Outcome: Progressing Towards Goal     PHYSICAL THERAPY TREATMENT  Patient: Soren Henry (69 y.o. female)  Date: 11/7/2022  Diagnosis: CHF (congestive heart failure) ([facility] [address] [I50.9] Acute on chronic respiratory failure with hypoxia and hypercapnia ([facility] [address]      Precautions: Fall  Chart, physical therapy assessment, plan of care and goals were reviewed. ASSESSMENT  Patient continues with skilled PT services and is progressing towards goals. Pt continues to require considerable assist getting OOB (likely baseline), transfers progressed from [address] to [address] during this session.   She demonstrates improvement in her activity tolerance ambulating 40 ft with the RW w/ CGA (limited to 8 ft the previous session). Gait improved from shuffling to pt taking steps with adequate foot clearance, slow and steady. Additional distance was limited by activity tolerance/ fatigue. Pt remained sitting in the chair with her lunch. Current Level of Function Impacting Discharge (mobility/balance): As documented below    Other factors to consider for discharge: caregiver support         PLAN :  Patient continues to benefit from skilled intervention to address the above impairments. Continue treatment per established plan of care. to address goals. Recommendation for discharge: (in order for the patient to meet his/her long term goals)  Physical therapy at least 2 days/week in the home AND ensure assist and/or supervision for safety with mobility as they have been assisting    This discharge recommendation:  Has been made in collaboration with the attending provider and/or case management    IF patient discharges home will need the following DME: patient owns DME required for discharge       SUBJECTIVE:   Patient stated Oh. .this feels good.  sitting up in the chair and walking    OBJECTIVE DATA SUMMARY:   Critical Behavior:  Neurologic State: Alert  Orientation Level: Oriented X4  Cognition: Appropriate decision making, Appropriate for age attention/concentration, Appropriate safety awareness  Safety/Judgement: Awareness of environment, Fall prevention, Insight into deficits  Functional Mobility Training:  Bed Mobility:  Supine to Sit: Moderate assistance;Bed Modified; Additional time; Adaptive equipment;Assist x1 (HOB raised, bedrail used, assist for LEs to EOB and trunk upright)  Sit to Supine: Other (comment) (remained sitting in the chair)  Cues for hands towards bed rail, tactile cues and manual assist for LE's to EOB        Transfers:  Sit to Stand: Minimum assistance;Assist x1;Other (comment) (on first attempt; CGA on 2nd & 3rd)  Stand to Sit: Contact guard assistance        Bed to Chair: Contact guard assistance;Assist x1;Additional time; Adaptive equipment (rolling walker)  Cues for hand placement              Balance:  Sitting: Intact; Without support  Standing: Impaired; Without support  Standing - Static: Good;Constant support  Standing - Dynamic : Fair;Good;Constant support  Ambulation/Gait Training:  Distance (ft): 40 Feet (ft)  Assistive Device: Gait belt;Walker, rolling  Ambulation - Level of Assistance: Contact guard assistance;Assist x1;Additional time; Adaptive equipment        Gait Abnormalities: Decreased step clearance        Base of Support: Widened     Speed/Taylor: Slow  Step Length: Right shortened;Left shortened  Cues for paced activity taking standing rest breaks when fatigued. Pain Rating:  None voiced    Activity Tolerance:   Fair, requires rest breaks, observed SOB with activity, and on 2L NCO2    After treatment patient left in no apparent distress:   Sitting in chair and Call bell within reach    COMMUNICATION/COLLABORATION:   The patients plan of care was discussed with: Registered nurse.      Nada Boas, PT   Time Calculation: 40 mins

## 2022-11-07 NOTE — PROGRESS NOTES
Pulmonary, Critical Care, and Sleep Medicine~Progress Note    Name: Jake Du MRN: [id number]   : 1945 [facility]   Date: 2022 9:10 AM Admission: 10/31/2022     Impression Plan   Acute on chronic hypoxic hypercarbic respiratory failure  Volume overload  History of asthma/?copd  Former smoker  Afib on warfarin  NENO/OHS - not treated O2/NIV as needed  Steroids- weaning. Now on PO  Diuresis  Bronchodilators  Would benefit from outpatient follow up with PFTs and evaluation in the sleep lab  O2 titration above 90%     Daily Progression:      stable      Feeling better today     11/3  Stable on 2L this morning  Did not wean NIV overnight  Alert and interactive. No new complaints. I have reviewed the labs and previous days notes. A comprehensive review of systems was negative except for that written in the HPI.     OBJECTIVE:     Vital Signs:     Visit Vitals  BP (!) 136/54 (BP 1 Location: Right lower arm, BP Patient Position: Sitting)   Pulse (!) 58   Temp 98.8 °F (37.1 °C)   Resp 18   Ht 5' 2\" (1.575 m)   Wt 132.5 kg (292 lb 3.2 oz)   SpO2 96%   BMI 53.44 kg/m²      Temp (24hrs), Av.2 °F (36.8 °C), Min:97.8 °F (36.6 °C), Max:98.8 °F (37.1 °C)     Intake/Output:     Last shift: 701 - 1900  In: 240 [P.O.:240]  Out: 500 [Urine:500]    Last 3 shifts: 1901 -  0700  In: 540 [P.O.:540]  Out: 1150 [Urine:1150]        Intake/Output Summary (Last 24 hours) at 2022 1128  Last data filed at 2022 0820  Gross per 24 hour   Intake 540 ml   Output 900 ml   Net -360 ml         Physical Exam:                                        Exam Findings Other   General: No resp distress noted, appears stated age    HEENT:  No ulcers, JVD not elevated, no cervical LAD    Chest: No pectus deformity, normal chest rise b/l    HEART:  RRR, no murmurs/rubs/gallops    Lungs:  CTA b/l, no rhonchi/crackles/wheeze, diminished BS at bases    ABD: Soft/NT, non rigid mildly distended    EXT: No cyanosis/clubbing/edema, normal peripheral pulses    Skin: No rashes or ulcers, no mottling    Neuro: A/O x 3        Medications:  Current Facility-Administered Medications   Medication Dose Route Frequency    warfarin (COUMADIN) tablet 7.5 mg  7.5 mg Oral ONCE    furosemide (LASIX) tablet 80 mg  80 mg Oral DAILY    predniSONE (DELTASONE) tablet 60 mg  60 mg Oral DAILY WITH BREAKFAST    aspirin delayed-release tablet 81 mg  81 mg Oral DAILY    atorvastatin (LIPITOR) tablet 80 mg  80 mg Oral DAILY    amLODIPine (NORVASC) tablet 10 mg  10 mg Oral DAILY    carvediloL (COREG) tablet 12.5 mg  12.5 mg Oral BID    sodium chloride (NS) flush 5-40 mL  5-40 mL IntraVENous Q8H    sodium chloride (NS) flush 5-40 mL  5-40 mL IntraVENous PRN    acetaminophen (TYLENOL) tablet 650 mg  650 mg Oral Q6H PRN    Or    acetaminophen (TYLENOL) suppository 650 mg  650 mg Rectal Q6H PRN    polyethylene glycol (MIRALAX) packet 17 g  17 g Oral DAILY PRN    ondansetron (ZOFRAN ODT) tablet 4 mg  4 mg Oral Q8H PRN    Or    ondansetron (ZOFRAN) injection 4 mg  4 mg IntraVENous Q6H PRN    Warfarin Pharmacy Dosing (COUMADIN)   Other Rx Dosing/Monitoring    arformoteroL (BROVANA) neb solution 15 mcg  15 mcg Nebulization BID RT    And    budesonide (PULMICORT) 500 mcg/2 ml nebulizer suspension  500 mcg Nebulization BID RT    glucose chewable tablet 16 g  4 Tablet Oral PRN    glucagon (GLUCAGEN) injection 1 mg  1 mg IntraMUSCular PRN    dextrose 10 % infusion 0-250 mL  0-250 mL IntraVENous PRN    insulin lispro (HUMALOG) injection   SubCUTAneous AC&HS    lacosamide (VIMPAT) tablet 50 mg  50 mg Oral BID    levETIRAcetam (KEPPRA) oral solution 1,000 mg  1,000 mg Oral BID    albuterol-ipratropium (DUO-NEB) 2.5 MG-0.5 MG/3 ML  3 mL Nebulization Q6H PRN       Labs:  ABG No results for input(s): PHI, PCO2I, PO2I, HCO3I, SO2I, FIO2I in the last 72 hours. CBC Recent Labs     11/05/22  1115   WBC 4.3   HGB 10.6*   HCT 33.9*      MCV 88.5   MCH 14.5          Metabolic  Panel Recent Labs     11/07/22  0541 11/06/22  0601 11/05/22  1115   NA  --  138 134*   K  --  4.9 4.9   CL  --  106 103   CO2  --  27 27   GLU  --  122* 160*   BUN  --  69* 71*   CREA  --  1.71* 1.81*   CA  --  8.7 8.6   PHOS  --  3.2 2.8   ALB  --  2.7* 3.0*   INR 1.3* 1.4* 1.6*          Pertinent Labs                Bobby Miller PA-C  11/7/2022

## 2022-11-07 NOTE — PROGRESS NOTES
Hospital Progress Note    NAME:  Reji Richmond   :   1945   MRN:  [id number]     Date/Time:  2022 8:28 AM    Plan:     Continue jet neb  Arterial stick for labs  Wean oxygen -unable - will ask RT assistance in qualifying for hme oxygen and proper fitting face mask for home CPAP  Patient is not currently using CPAP at home  Pt/ot  Suspects she need home o2- need to qualify - will ask PUL to assist  Risk of Deterioration: Low  []           Moderate  []           High  []                 Assessment:     Principal Problem:    Acute on chronic respiratory failure with hypoxia and hypercapnia (HCC) (2022)     Jet nebs and steroids     CPAP       Active Problems:    Paroxysmal atrial fibrillation ([facility] [address] (2010)      coumadin      Morbid obesity ([facility] [address] (2010)     Encourage weight management      Seizure disorder ([facility] [address] (2014)     home dose of Lacosamide 50 mg BID and levetiracetam 1000 mg BID      Dyslipidemia (1/10/2015)     Lipitor       HTN (hypertension) (1/10/2015)     Titrate home meds      Type 2 diabetes mellitus with chronic kidney disease ([address] (2022)      SSI      Chronic renal disease, stage III (10/3/2022)     Renal following           CHF (congestive heart failure) ([address] (10/31/2022)     Erica Furl EF and no diastolic dyf on echo     Normal bnp     Card consult appreciated      Normocytic anemia (2022)      Generalized abdominal pain (2022)     CT neg     reolved      Acute kidney injury superimposed on chronic kidney disease ([address] (2022)    Renal following      Acute exacerbation of COPD with asthma ([address] (2022)     Pul following      Acute pulmonary edema ([facility] [address] (2022)            Admitting notes:77 y.o. female with past medical history of arthritis, asthma, sleep apnea, type 2 diabetes mellitus, glaucoma, hypertension, stroke, class III obesity presented to the emergency department via EMS from home accompanied by her daughter with chief complaint of shortness of breath. Patient is a limited historian. Her daughter provides additional history. Remainder of history was obtained per review of ED electronic medical records. Today, patient reported was short of breath, remain constant, severe, without specific alleviating factors, exacerbated with movement. Her daughter notes that patient's oxygen saturations may be on occasion into the 60s on first awakening from sleep. She has a history of sleep apnea but does not use CPAP Pap machine for several years as it is broke. EMS was called to the scene. Patient reportedly was hypoxic with O2 saturations at 74%. Patient has been taken inhalers prior to arrival.  She was placed on 4 L oxygen via nasal cannula. On arrival to the emergency department, initial recorded vital signs temperature 98.0 °F, /71, heart rate 64, respirate 12, O2 saturation 93% on 4 L oxygen via nasal cannula. Abnormal labs included hemoglobin 9.9, sodium 129, BUN 43, creatinine 1.97, GFR 25, calcium 8.3, albumin 2.3, proBNP 1350. Chest reportable showed cardiomegaly and mild pulmonary edema. Patient was given Lasix 40 mg IV x1 dose. Patient is now seen for admission to the hospital service with continued evaluation and treatments. Patient has occasional cough wheezing. She also in addition take Spiriva along with albuterol.        Subjective:     Feeling better   11 Point Review of Systems:   Negative except no cp.sob improved    []            Unable to obtain ROS due to:       []            mental status change []            sedated []            intubated     Social History     Tobacco Use    Smoking status: Former    Smokeless tobacco: Never    Tobacco comments:     20+years ago   Substance Use Topics    Alcohol use: No     Medications reviewed:  Current Facility-Administered Medications   Medication Dose Route Frequency    furosemide (LASIX) tablet 80 mg  80 mg Oral DAILY    predniSONE (DELTASONE) tablet 60 mg  60 mg Oral DAILY WITH BREAKFAST    aspirin delayed-release tablet 81 mg  81 mg Oral DAILY    atorvastatin (LIPITOR) tablet 80 mg  80 mg Oral DAILY    amLODIPine (NORVASC) tablet 10 mg  10 mg Oral DAILY    carvediloL (COREG) tablet 12.5 mg  12.5 mg Oral BID    sodium chloride (NS) flush 5-40 mL  5-40 mL IntraVENous Q8H    sodium chloride (NS) flush 5-40 mL  5-40 mL IntraVENous PRN    acetaminophen (TYLENOL) tablet 650 mg  650 mg Oral Q6H PRN    Or    acetaminophen (TYLENOL) suppository 650 mg  650 mg Rectal Q6H PRN    polyethylene glycol (MIRALAX) packet 17 g  17 g Oral DAILY PRN    ondansetron (ZOFRAN ODT) tablet 4 mg  4 mg Oral Q8H PRN    Or    ondansetron (ZOFRAN) injection 4 mg  4 mg IntraVENous Q6H PRN    Warfarin Pharmacy Dosing (COUMADIN)   Other Rx Dosing/Monitoring    arformoteroL (BROVANA) neb solution 15 mcg  15 mcg Nebulization BID RT    And    budesonide (PULMICORT) 500 mcg/2 ml nebulizer suspension  500 mcg Nebulization BID RT    glucose chewable tablet 16 g  4 Tablet Oral PRN    glucagon (GLUCAGEN) injection 1 mg  1 mg IntraMUSCular PRN    dextrose 10 % infusion 0-250 mL  0-250 mL IntraVENous PRN    insulin lispro (HUMALOG) injection   SubCUTAneous AC&HS    lacosamide (VIMPAT) tablet 50 mg  50 mg Oral BID    levETIRAcetam (KEPPRA) oral solution 1,000 mg  1,000 mg Oral BID    albuterol-ipratropium (DUO-NEB) 2.5 MG-0.5 MG/3 ML  3 mL Nebulization Q6H PRN        Objective:   Vitals:  Visit Vitals  BP (!) 145/55 (BP 1 Location: Right lower arm)   Pulse 67   Temp 97.8 °F (36.6 °C)   Resp 14   Ht 5' 2\" (1.575 m)   Wt 292 lb 3.2 oz (132.5 kg)   SpO2 100%   BMI 53.44 kg/m²     Temp (24hrs), Av.1 °F (36.7 °C), Min:97.8 °F (36.6 °C), Max:98.6 °F (37 °C)    O2 Flow Rate (L/min): 2 l/min O2 Device: Nasal cannula    Last 24hr Input/Output:    Intake/Output Summary (Last 24 hours) at 2022 0839  Last data filed at 2022 0737  Gross per 24 hour   Intake 300 ml   Output 900 ml   Net -600 ml          PHYSICAL EXAM:  General: Alert, cooperative, no distress, appears stated age. Head:   Normocephalic, without obvious abnormality, atraumatic. Eyes:   Conjunctivae/corneas clear. PERRLA  Nose:  Nares normal. No drainage or sinus tenderness. Throat:    Lips, mucosa, and tongue normal.  No Thrush  Neck:  Supple, symmetrical,  no adenopathy, thyroid: non tender    no carotid bruit and no JVD. Back:    Symmetric,  No CVA tenderness. Lungs:   Decreased bs.exp wheezes  Chest wall:  No tenderness or deformity. No Accessory muscle use. Heart:   Regular rate and rhythm,  no murmur, rub or gallop. Abdomen:   Soft, non-tender. Not distended. Bowel sounds normal. No masses        Lab Data Reviewed:    Recent Labs     11/05/22  1115   WBC 4.3   HGB 10.6*   HCT 33.9*          Recent Labs     11/07/22  0541 11/06/22  0601 11/05/22  1115   NA  --  138 134*   K  --  4.9 4.9   CL  --  106 103   CO2  --  27 27   GLU  --  122* 160*   BUN  --  69* 71*   CREA  --  1.71* 1.81*   CA  --  8.7 8.6   PHOS  --  3.2 2.8   ALB  --  2.7* 3.0*   INR 1.3* 1.4* 1.6*       Lab Results   Component Value Date/Time    Glucose (POC) 167 (H) 11/07/2022 07:22 AM    Glucose (POC) 194 (H) 11/06/2022 10:14 PM    Glucose (POC) 172 (H) 11/06/2022 03:04 PM    Glucose (POC) 130 (H) 11/06/2022 11:43 AM    Glucose (POC) 115 11/06/2022 09:01 AM     No results for input(s): PH, PCO2, PO2, HCO3, FIO2 in the last 72 hours.   Recent Labs     11/07/22  0541 11/06/22  0601 11/05/22  1115   INR 1.3* 1.4* 1.6*       ___________________________________________________  ___________________________________________________    Attending Physician: Brdigette Silvestre, MD

## 2022-11-07 NOTE — PROGRESS NOTES
Please clarify Overnight Oximetry order. Pt already wears nocturnal CPAP at home. Home CPAP should eliminate need for oxygen. Do you want the study done on CPAP with no oxygen? Pt may benefit from a re-titration at the sleep center after discharge.

## 2022-11-07 NOTE — PROGRESS NOTES
Bedside shift change report given to Opal Iglesias RN (oncoming nurse) by Sharon Dexter (offgoing nurse). Report included the following information SBAR, Kardex, Intake/Output, MAR, and Cardiac Rhythm NSR .

## 2022-11-08 VITALS
RESPIRATION RATE: 18 BRPM | DIASTOLIC BLOOD PRESSURE: 72 MMHG | WEIGHT: 292.2 LBS | HEIGHT: 62 IN | HEART RATE: 59 BPM | TEMPERATURE: 97.8 F | BODY MASS INDEX: 53.77 KG/M2 | OXYGEN SATURATION: 90 % | SYSTOLIC BLOOD PRESSURE: 125 MMHG

## 2022-11-08 LAB
GLUCOSE BLD STRIP.AUTO-MCNC: 117 MG/DL (ref 65–117)
GLUCOSE BLD STRIP.AUTO-MCNC: 159 MG/DL (ref 65–117)
INR PPP: 1.4 (ref 0.9–1.1)
PROTHROMBIN TIME: 14.6 SEC (ref 9–11.1)
SERVICE CMNT-IMP: ABNORMAL
SERVICE CMNT-IMP: NORMAL

## 2022-11-08 PROCEDURE — 85610 PROTHROMBIN TIME: CPT

## 2022-11-08 PROCEDURE — 97116 GAIT TRAINING THERAPY: CPT

## 2022-11-08 PROCEDURE — 82962 GLUCOSE BLOOD TEST: CPT

## 2022-11-08 PROCEDURE — 74011250637 HC RX REV CODE- 250/637: Performed by: FAMILY MEDICINE

## 2022-11-08 PROCEDURE — 74011000250 HC RX REV CODE- 250: Performed by: FAMILY MEDICINE

## 2022-11-08 PROCEDURE — 74011636637 HC RX REV CODE- 636/637: Performed by: FAMILY MEDICINE

## 2022-11-08 PROCEDURE — 74011636637 HC RX REV CODE- 636/637: Performed by: INTERNAL MEDICINE

## 2022-11-08 PROCEDURE — 97530 THERAPEUTIC ACTIVITIES: CPT

## 2022-11-08 PROCEDURE — 36415 COLL VENOUS BLD VENIPUNCTURE: CPT

## 2022-11-08 PROCEDURE — 99238 HOSP IP/OBS DSCHRG MGMT 30/<: CPT | Performed by: INTERNAL MEDICINE

## 2022-11-08 PROCEDURE — 74011250637 HC RX REV CODE- 250/637: Performed by: NURSE PRACTITIONER

## 2022-11-08 RX ORDER — PREDNISONE 20 MG/1
TABLET ORAL
Qty: 60 TABLET | Refills: 1 | Status: SHIPPED | OUTPATIENT
Start: 2022-11-08

## 2022-11-08 RX ORDER — WARFARIN SODIUM 5 MG/1
TABLET ORAL
Qty: 103 TABLET | Refills: 3 | Status: SHIPPED | OUTPATIENT
Start: 2022-11-08

## 2022-11-08 RX ORDER — POLYETHYLENE GLYCOL 3350 17 G/17G
17 POWDER, FOR SOLUTION ORAL
Qty: 60 EACH | Refills: 11 | Status: SHIPPED | OUTPATIENT
Start: 2022-11-08

## 2022-11-08 RX ORDER — PREDNISONE 20 MG/1
40 TABLET ORAL
Qty: 20 TABLET | Refills: 0 | Status: SHIPPED | OUTPATIENT
Start: 2022-11-08

## 2022-11-08 RX ORDER — WARFARIN 7.5 MG/1
7.5 TABLET ORAL ONCE
Status: DISCONTINUED | OUTPATIENT
Start: 2022-11-08 | End: 2022-11-08 | Stop reason: HOSPADM

## 2022-11-08 RX ADMIN — LACOSAMIDE 50 MG: 50 TABLET, FILM COATED ORAL at 08:53

## 2022-11-08 RX ADMIN — PREDNISONE 60 MG: 20 TABLET ORAL at 08:53

## 2022-11-08 RX ADMIN — LEVETIRACETAM 1000 MG: 500 TABLET, FILM COATED ORAL at 08:52

## 2022-11-08 RX ADMIN — ASPIRIN 81 MG: 81 TABLET, COATED ORAL at 08:53

## 2022-11-08 RX ADMIN — ATORVASTATIN CALCIUM 80 MG: 40 TABLET, FILM COATED ORAL at 08:52

## 2022-11-08 RX ADMIN — AMLODIPINE BESYLATE 10 MG: 5 TABLET ORAL at 08:52

## 2022-11-08 RX ADMIN — Medication 2 UNITS: at 12:18

## 2022-11-08 RX ADMIN — Medication 10 ML: at 14:58

## 2022-11-08 RX ADMIN — CARVEDILOL 12.5 MG: 12.5 TABLET, FILM COATED ORAL at 08:53

## 2022-11-08 RX ADMIN — Medication 10 ML: at 07:23

## 2022-11-08 RX ADMIN — FUROSEMIDE 80 MG: 40 TABLET ORAL at 08:52

## 2022-11-08 NOTE — PROGRESS NOTES
ROSALIA:  1. RUR-19%  2. Return home with family and [facility] accepted. 3. Family transport. 4. Home O2 order sent to [facility]. CM spoke with Karley Cinda at [facility], [phone] in regards to previous c-pap machine. Patient RX was in 2014, which is not eligible for new c-pap. Karley Loo stated that patient would need the following to receive new c-pap. (Sleep study, prescription, face-to-face, authorization from provider who prescribes cpap. Cm informed attending MD through [employer] serve message. Cm can order home oxygen and patient can follow up in OP for C-pap, waiting for Dr. Atul Quiroz agreement. Will update patient and her daughter when MD responds. 1617- Oxygen delivered, daughter requested for new nebulizer face mask. CM informed her to follow up with the [employer] company (she could not recall the name). Medicare pt has received, reviewed, and signed 2nd IM letter informing them of their right to appeal the discharge. Signed copy has been placed on pt bedside chart.         Gianni Martin [facility]

## 2022-11-08 NOTE — PROGRESS NOTES
NUTRITION  Reason for Assessment: Initial/LOS      Recommendations/Interventions/Plan:   Continue regular 4 carb choice diet      Will rescreen per policy       Past Medical History:   Diagnosis Date    Arthritis     Asthma     Diabetes ([facility] [address]     Glaucoma     Hypertension     Other ill-defined conditions(799.89)     rt knee surgery    Stroke ([facility] [address]        Pt screened for LOS. Chart/labs/meds reviewed. Admitted with CHF (congestive heart failure) (HCC) [I50.9], acute on chronic resp failure with hypoxia and hypercapnia on jet nebs and steroids. Noted pt to be discharged today. Visited her at bedside this morning where she denied any issues r/t appetite. Recorded meal intakes consistently %. Wt history in EMR shows wt trending up x 6 months. No overt nutrition concerns at this time. Nutrition Related Findings:   Edema: Generalized: Non-pitting (11/8/2022  8:15 AM)  LLE: 1+; Non-pitting (11/8/2022  8:15 AM)  RLE: 1+; Non-pitting (11/8/2022  8:15 AM)  RUE: 1+; Non-pitting (11/8/2022  8:15 AM)      Last BM: 11/06/22, Formed, Soft    Skin: WNL      Current Nutrition Therapies:  Diet: 4 carb choice regular  Supplements: none  Meal intake: Patient Vitals for the past 168 hrs:   % Diet Eaten   11/07/22 0820 76 - 100%   11/06/22 1536 76 - 100%   11/06/22 1140 76 - 100%   11/06/22 0829 76 - 100%   11/04/22 0824 76 - 100%     Supplement intake: No data found.       Weight Hx:  Wt Readings from Last 10 Encounters:   11/06/22 132.5 kg (292 lb 3.2 oz)   10/03/22 128.7 kg (283 lb 12.8 oz)   05/06/22 125.2 kg (276 lb)   03/28/22 125.2 kg (276 lb 1.6 oz)   12/13/21 125.8 kg (277 lb 4.8 oz)   09/16/21 123.4 kg (272 lb)   09/08/21 123.6 kg (272 lb 6.4 oz)   06/04/21 125.6 kg (276 lb 14.4 oz)   01/20/20 125.5 kg (276 lb 11.2 oz)   10/14/19 124.1 kg (273 lb 9.6 oz)         Estimated Nutrition Needs:   Energy: 9784  Wt used: Current  Protein: 120  Wt used: Current   Fluid: 1 ml/kcal       Recent Labs     11/06/22  0601   GLU 122*   BUN 69*   CREA 1.71*      K 4.9      CO2 27   CA 8.7   PHOS 3.2       Recent Labs     11/08/22  1130 11/08/22  0722 11/07/22  2342 11/07/22  1856 11/07/22  1201 11/07/22  0722 11/06/22  2214 11/06/22  1504 11/06/22  1143 11/06/22  0901 11/06/22  0716 11/05/22  2143 11/05/22  1657   GLUCPOC 159* 117 176* 195* 205* 167* 194* 172* 130* 115 115 224* 163*       Lab Results   Component Value Date/Time    Hemoglobin A1c 6.0 (H) 11/01/2022 03:57 AM    Hemoglobin A1c 6.4 (H) 10/03/2022 10:19 AM    Hemoglobin A1c 6.0 (H) 05/06/2022 10:45 AM         Yajaira Juárez RD  Available via [employer]

## 2022-11-08 NOTE — PROGRESS NOTES
Pulmonary, Critical Care, and Sleep Medicine~Progress Note    Name: Jose David Gregg MRN: [id number]   : 1945 Hospital: [facility]   Date: 2022 9:10 AM Admission: 10/31/2022     Impression Plan   Acute on chronic hypoxic hypercarbic respiratory failure  Volume overload  History of asthma/?copd  Former smoker  Afib on warfarin  NENO/OHS - not treated Steroids- weaning. Now on PO  Diuresis  Bronchodilators  Would benefit from outpatient follow up with PFTs and evaluation in the sleep lab. We will help arrange this  O2 titration above 90%, does not appear to require O2 at discharge  We will be available again to see if needed      Daily Progression:      No desaturations with ambulation as reported by PT   Feeling better and wants to go home       stable      Feeling better today     11/3  Stable on 2L this morning  Did not wean NIV overnight  Alert and interactive. No new complaints. I have reviewed the labs and previous days notes. A comprehensive review of systems was negative except for that written in the HPI. OBJECTIVE:     Vital Signs:     Visit Vitals  /67 (BP 1 Location: Left upper arm, BP Patient Position: At rest)   Pulse (!) 55   Temp 97.9 °F (36.6 °C)   Resp 17   Ht 5' 2\" (1.575 m)   Wt 132.5 kg (292 lb 3.2 oz)   SpO2 94%   BMI 53.44 kg/m²      Temp (24hrs), Av.9 °F (36.6 °C), Min:97.6 °F (36.4 °C), Max:98.2 °F (36.8 °C)     Intake/Output:     Last shift: No intake/output data recorded.     Last 3 shifts:  1901 -  0700  In: 240 [P.O.:240]  Out: 1100 [Urine:1100]        Intake/Output Summary (Last 24 hours) at 2022 1024  Last data filed at 2022 1845  Gross per 24 hour   Intake --   Output 600 ml   Net -600 ml         Physical Exam:                                        Exam Findings Other   General: No resp distress noted, appears stated age    [de-identified]:  No ulcers, JVD not elevated, no cervical LAD    Chest: No pectus deformity, normal chest rise b/l    HEART:  RRR, no murmurs/rubs/gallops    Lungs:  CTA b/l, no rhonchi/crackles/wheeze, diminished BS at bases    ABD: Soft/NT, non rigid mildly distended    EXT: No cyanosis/clubbing/edema, normal peripheral pulses    Skin: No rashes or ulcers, no mottling    Neuro: A/O x 3        Medications:  Current Facility-Administered Medications   Medication Dose Route Frequency    warfarin (COUMADIN) tablet 7.5 mg  7.5 mg Oral ONCE    levETIRAcetam (KEPPRA) tablet 1,000 mg  1,000 mg Oral Q12H    furosemide (LASIX) tablet 80 mg  80 mg Oral DAILY    predniSONE (DELTASONE) tablet 60 mg  60 mg Oral DAILY WITH BREAKFAST    aspirin delayed-release tablet 81 mg  81 mg Oral DAILY    atorvastatin (LIPITOR) tablet 80 mg  80 mg Oral DAILY    amLODIPine (NORVASC) tablet 10 mg  10 mg Oral DAILY    carvediloL (COREG) tablet 12.5 mg  12.5 mg Oral BID    sodium chloride (NS) flush 5-40 mL  5-40 mL IntraVENous Q8H    sodium chloride (NS) flush 5-40 mL  5-40 mL IntraVENous PRN    acetaminophen (TYLENOL) tablet 650 mg  650 mg Oral Q6H PRN    Or    acetaminophen (TYLENOL) suppository 650 mg  650 mg Rectal Q6H PRN    polyethylene glycol (MIRALAX) packet 17 g  17 g Oral DAILY PRN    ondansetron (ZOFRAN ODT) tablet 4 mg  4 mg Oral Q8H PRN    Or    ondansetron (ZOFRAN) injection 4 mg  4 mg IntraVENous Q6H PRN    Warfarin Pharmacy Dosing (COUMADIN)   Other Rx Dosing/Monitoring    arformoteroL (BROVANA) neb solution 15 mcg  15 mcg Nebulization BID RT    And    budesonide (PULMICORT) 500 mcg/2 ml nebulizer suspension  500 mcg Nebulization BID RT    glucose chewable tablet 16 g  4 Tablet Oral PRN    glucagon (GLUCAGEN) injection 1 mg  1 mg IntraMUSCular PRN    dextrose 10 % infusion 0-250 mL  0-250 mL IntraVENous PRN    insulin lispro (HUMALOG) injection   SubCUTAneous AC&HS    lacosamide (VIMPAT) tablet 50 mg  50 mg Oral BID    albuterol-ipratropium (DUO-NEB) 2.5 MG-0.5 MG/3 ML  3 mL Nebulization Q6H PRN       Labs:  ABG No results for input(s): PHI, PCO2I, PO2I, HCO3I, SO2I, FIO2I in the last 72 hours. CBC No results for input(s): WBC, HGB, HCT, PLT, MCV, MCH, HGBEXT, HCTEXT, PLTEXT, HGBEXT, HCTEXT, PLTEXT in the last 72 hours.        Metabolic  Panel Recent Labs     11/08/22  0123 11/07/22  0541 11/06/22  0601   NA  --   --  138   K  --   --  4.9   CL  --   --  106   CO2  --   --  27   GLU  --   --  122*   BUN  --   --  69*   CREA  --   --  1.71*   CA  --   --  8.7   PHOS  --   --  3.2   ALB  --   --  2.7*   INR 1.4* 1.3* 1.4*          Pertinent Labs                Barrett Jori Rinne, PA-C  11/8/2022

## 2022-11-08 NOTE — PROGRESS NOTES
Problem: Mobility Impaired (Adult and Pediatric)  Goal: *Acute Goals and Plan of Care (Insert Text)  Description: FUNCTIONAL STATUS PRIOR TO ADMISSION: Patient was modified independent using a rolling walker for functional mobility. Patient does have history of falls and patient's daughter states patient attempts to ambulate without RW at times. HOME SUPPORT PRIOR TO ADMISSION: The patient lived with daughter, granddaughter and required minimal assistance/contact guard assist for transfers, ADLs, etc. Patient's daughter states most days patient is able to mobilize independently but that on \"bad days\" patient requires a little help. Physical Therapy Goals  Initiated 11/2/2022  1. Patient will move from supine to sit and sit to supine  in bed with minimal assistance/contact guard assist within 7 day(s). 2.  Patient will transfer from bed to chair and chair to bed with minimal assistance/contact guard assist using the least restrictive device within 7 day(s). 3.  Patient will perform sit to stand with minimal assistance/contact guard assist within 7 day(s). 4.  Patient will ambulate with minimal assistance/contact guard assist for 30 feet with the least restrictive device within 7 day(s). 5.  Patient will ascend/descend 5 stairs with one handrail(s) with minimal assistance/contact guard assist within 7 day(s). Outcome: Progressing Towards Goal   PHYSICAL THERAPY TREATMENT INCLUDING 6 MINUTE WALK TEST RESULTS  Patient: David Gilliam (29 y.o. female)  Date: 11/8/2022  Diagnosis: CHF (congestive heart failure) ([facility]) [I50.9] Acute on chronic respiratory failure with hypoxia and hypercapnia (HCC)      Precautions: Fall  Chart, physical therapy assessment, plan of care and goals were reviewed. ASSESSMENT  Patient continues with skilled PT services and is progressing towards goals. Patient received in bed on  room air and most agreeable to session.   Patient most motivated to attempt bed mobility without assistance but did require minimal assist to manage RLE to off the bed and min assist with trunk. Once sitting, able to go sit to stand and ambulate with overall supervision/CGA. Did not tolerate full 6 minutes requesting to sit down. Unable to get reading of oxygen saturation during gait due to weightbearing on hand. Did note slight SOB with gait and once sitting oxygen reading 90% and did not drop below. No complaints of dizziness and states the distance she walked is about her baseline at home. Left on room air. .     Current Level of Function Impacting Discharge (mobility/balance): minimal assist    Other factors to consider for discharge: baseline? PLAN :  Patient continues to benefit from skilled intervention to address the above impairments. Continue treatment per established plan of care. to address goals. Recommendation for discharge: (in order for the patient to meet his/her long term goals)  Physical therapy at least 2 days/week in the home AND ensure assist and/or supervision for safety with mobility as needed    This discharge recommendation:  Has been made in collaboration with the attending provider and/or case management    IF patient discharges home will need the following DME: patient owns DME required for discharge       SUBJECTIVE:   Patient stated I walk back and forth in home and sit in my chair.     OBJECTIVE DATA SUMMARY:   Critical Behavior:  Neurologic State: Alert  Orientation Level: Oriented X4  Cognition: Appropriate decision making, Appropriate for age attention/concentration, Appropriate safety awareness, Follows commands  Safety/Judgement: Awareness of environment, Fall prevention, Insight into deficits  Functional Mobility Training:  Bed Mobility:     Supine to Sit: Minimum assistance; Additional time  Sit to Supine: Stand-by assistance; Additional time           Transfers:  Sit to Stand: Contact guard assistance  Stand to Sit: Supervision Balance:  Sitting: Intact  Standing: Impaired; With support  Standing - Static: Good  Standing - Dynamic : Good  Ambulation/Gait Training:  Distance (ft): 74 Feet (ft)  Assistive Device: Gait belt;Walker, rolling  Ambulation - Level of Assistance: Stand-by assistance        Gait Abnormalities: Decreased step clearance        Base of Support: Widened     Speed/Taylor: Pace decreased (<100 feet/min)  Step Length: Right shortened;Left shortened                  6 MWT results:  Distance Walked in Feet (ft): 74 ft. (tolerated 5.15 minutes)    Pre Heart Rate: 64    Pre O2 Saturation: 94        Post Heart Rate: 66    Post O2 Saturation: 90    Assistive device used: Assistive Device: Gait belt;Walker, rolling        Normative data: 30-57 years old = [phone] 43 feet; Men 39-80 years old = 1889 feet; Women 3680 years ztn=1217 feet  Modified 10 point Danny RPE scale utilized where 0 = no breathlessness at all; 10 = maximum exertion  Please refer to the flowsheet for any additional vital signs taken during this treatment. Pain Ratin    Activity Tolerance:   Good, SpO2 stable on RA, and requires rest breaks  Please refer to the flowsheet for vital signs taken during this treatment.     After treatment patient left in no apparent distress:   Supine in bed, Call bell within reach, and Side rails x 3    COMMUNICATION/COLLABORATION:   The patients plan of care was discussed with: Registered Nurse    Lisandra Chopra, PT   Time Calculation: 30 mins

## 2022-11-08 NOTE — PROGRESS NOTES
Pharmacist Note - Warfarin Dosing  Consult provided for this 76 y. o.female to manage warfarin for PAF     INR Goal: 2 - 3  Home regimen/ tablet size: 7.5 mg Mon-Thurs; 5 mg Fr-Sa-Su    Drugs that may increase INR: None  Drugs that may decrease INR: None  Other current anticoagulants/ drugs that may increase bleeding risk: Aspirin  Risk factors: Age > 65  Daily INR ordered: YES    Recent Labs     11/08/22  0123 11/07/22  0541 11/06/22  0601 11/05/22  1115   HGB  --   --   --  10.6*   INR 1.4* 1.3* 1.4* 1.6*     Date               INR                  Dose  10/31               5.5                   Held   11/01               4.8                   Held  11/02               4.4                   Held  11/03   3.3    1 mg  11/04    ---    Hold  11/05               1.6                    3 mg  11/06               1.4                    5 mg  11/07    1.3       7.5 mg   11/08    1.4       7.5 mg                                                                                Assessment/ Plan: Will order warfarin home dose of 7.5 mg PO x 1. Pharmacy will continue to monitor daily and adjust therapy as indicated. Please contact the pharmacist at  or  for outpatient recommendations if needed.

## 2022-11-08 NOTE — PROGRESS NOTES
Hospital Progress Note    NAME:  Denys Muir   :   1945   MRN:  [id number]     Date/Time:  2022 8:28 AM    Plan:     Continue jet neb  Arterial stick for labs  Wean oxygen -unable - will ask RT assistance in qualifying for hme oxygen and proper fitting face mask for home CPAP  Patient is not currently using CPAP at home  Pt/ot  Suspects she need home o2- need to qualify - will ask PUL to assist - awaiting oxygen determination for home uise  Risk of Deterioration: Low  []           Moderate  []           High  []                 Assessment:     Principal Problem:    Acute on chronic respiratory failure with hypoxia and hypercapnia (HCC) (2022)     Jet nebs and steroids     CPAP       Active Problems:    Paroxysmal atrial fibrillation ([facility] [address] (2010)      coumadin      Morbid obesity ([facility] [address] (2010)     Encourage weight management      Seizure disorder ([facility] [address] (2014)     home dose of Lacosamide 50 mg BID and levetiracetam 1000 mg BID      Dyslipidemia (1/10/2015)     Lipitor       HTN (hypertension) (1/10/2015)     Titrate home meds      Type 2 diabetes mellitus with chronic kidney disease ([facility] [address] (2022)      SSI      Chronic renal disease, stage III (10/3/2022)     Renal following           CHF (congestive heart failure) ([facility] [address] (10/31/2022)     Shan Bolls EF and no diastolic dyf on echo     Normal bnp     Card consult appreciated      Normocytic anemia (2022)      Generalized abdominal pain (2022)     CT neg     reolved      Acute kidney injury superimposed on chronic kidney disease ([address] (2022)    Renal following      Acute exacerbation of COPD with asthma ([facility] [address] (2022)     Pul following      Acute pulmonary edema ([facility] [address] (2022)            Admitting notes:77 y.o. female with past medical history of arthritis, asthma, sleep apnea, type 2 diabetes mellitus, glaucoma, hypertension, stroke, class III obesity presented to the emergency department via EMS from home accompanied by her daughter with chief complaint of shortness of breath. Patient is a limited historian. Her daughter provides additional history. Remainder of history was obtained per review of ED electronic medical records. Today, patient reported was short of breath, remain constant, severe, without specific alleviating factors, exacerbated with movement. Her daughter notes that patient's oxygen saturations may be on occasion into the 60s on first awakening from sleep. She has a history of sleep apnea but does not use CPAP Pap machine for several years as it is broke. EMS was called to the scene. Patient reportedly was hypoxic with O2 saturations at 74%. Patient has been taken inhalers prior to arrival.  She was placed on 4 L oxygen via nasal cannula. On arrival to the emergency department, initial recorded vital signs temperature 98.0 °F, /71, heart rate 64, respirate 12, O2 saturation 93% on 4 L oxygen via nasal cannula. Abnormal labs included hemoglobin 9.9, sodium 129, BUN 43, creatinine 1.97, GFR 25, calcium 8.3, albumin 2.3, proBNP 1350. Chest reportable showed cardiomegaly and mild pulmonary edema. Patient was given Lasix 40 mg IV x1 dose. Patient is now seen for admission to the hospital service with continued evaluation and treatments. Patient has occasional cough wheezing. She also in addition take Spiriva along with albuterol.        Subjective:     Feeling better - no new c/o  11 Point Review of Systems:   Negative except no cp.sob improved    []            Unable to obtain ROS due to:       []            mental status change []            sedated []            intubated     Social History     Tobacco Use    Smoking status: Former    Smokeless tobacco: Never    Tobacco comments:     20+years ago   Substance Use Topics    Alcohol use: No     Medications reviewed:  Current Facility-Administered Medications   Medication Dose Route Frequency    levETIRAcetam (KEPPRA) tablet 1,000 mg  1,000 mg Oral Q12H    furosemide (LASIX) tablet 80 mg  80 mg Oral DAILY    predniSONE (DELTASONE) tablet 60 mg  60 mg Oral DAILY WITH BREAKFAST    aspirin delayed-release tablet 81 mg  81 mg Oral DAILY    atorvastatin (LIPITOR) tablet 80 mg  80 mg Oral DAILY    amLODIPine (NORVASC) tablet 10 mg  10 mg Oral DAILY    carvediloL (COREG) tablet 12.5 mg  12.5 mg Oral BID    sodium chloride (NS) flush 5-40 mL  5-40 mL IntraVENous Q8H    sodium chloride (NS) flush 5-40 mL  5-40 mL IntraVENous PRN    acetaminophen (TYLENOL) tablet 650 mg  650 mg Oral Q6H PRN    Or    acetaminophen (TYLENOL) suppository 650 mg  650 mg Rectal Q6H PRN    polyethylene glycol (MIRALAX) packet 17 g  17 g Oral DAILY PRN    ondansetron (ZOFRAN ODT) tablet 4 mg  4 mg Oral Q8H PRN    Or    ondansetron (ZOFRAN) injection 4 mg  4 mg IntraVENous Q6H PRN    Warfarin Pharmacy Dosing (COUMADIN)   Other Rx Dosing/Monitoring    arformoteroL (BROVANA) neb solution 15 mcg  15 mcg Nebulization BID RT    And    budesonide (PULMICORT) 500 mcg/2 ml nebulizer suspension  500 mcg Nebulization BID RT    glucose chewable tablet 16 g  4 Tablet Oral PRN    glucagon (GLUCAGEN) injection 1 mg  1 mg IntraMUSCular PRN    dextrose 10 % infusion 0-250 mL  0-250 mL IntraVENous PRN    insulin lispro (HUMALOG) injection   SubCUTAneous AC&HS    lacosamide (VIMPAT) tablet 50 mg  50 mg Oral BID    albuterol-ipratropium (DUO-NEB) 2.5 MG-0.5 MG/3 ML  3 mL Nebulization Q6H PRN        Objective:   Vitals:  Visit Vitals  /61 (BP 1 Location: Right lower arm, BP Patient Position: At rest)   Pulse (!) 52   Temp 97.6 °F (36.4 °C)   Resp 18   Ht 5' 2\" (1.575 m)   Wt 292 lb 3.2 oz (132.5 kg)   SpO2 97%   BMI 53.44 kg/m²     Temp (24hrs), Av.2 °F (36.8 °C), Min:97.6 °F (36.4 °C), Max:98.8 °F (37.1 °C)    O2 Flow Rate (L/min): 2 l/min O2 Device: Nasal cannula    Last 24hr Input/Output:    Intake/Output Summary (Last 24 hours) at 2022 0758  Last data filed at 2022 1845  Gross per 24 hour Intake 240 ml   Output 600 ml   Net -360 ml          PHYSICAL EXAM:  General:    Alert, cooperative, no distress, appears stated age. Head:   Normocephalic, without obvious abnormality, atraumatic. Eyes:   Conjunctivae/corneas clear. PERRLA  Nose:  Nares normal. No drainage or sinus tenderness. Throat:    Lips, mucosa, and tongue normal.  No Thrush  Neck:  Supple, symmetrical,  no adenopathy, thyroid: non tender    no carotid bruit and no JVD. Back:    Symmetric,  No CVA tenderness. Lungs:   Decreased bs.exp wheezes  Chest wall:  No tenderness or deformity. No Accessory muscle use. Heart:   Regular rate and rhythm,  no murmur, rub or gallop. Abdomen:   Soft, non-tender. Not distended. Bowel sounds normal. No masses        Lab Data Reviewed:    Recent Labs     11/05/22  1115   WBC 4.3   HGB 10.6*   HCT 33.9*          Recent Labs     11/08/22  0123 11/07/22  0541 11/06/22  0601 11/05/22  1115   NA  --   --  138 134*   K  --   --  4.9 4.9   CL  --   --  106 103   CO2  --   --  27 27   GLU  --   --  122* 160*   BUN  --   --  69* 71*   CREA  --   --  1.71* 1.81*   CA  --   --  8.7 8.6   PHOS  --   --  3.2 2.8   ALB  --   --  2.7* 3.0*   INR 1.4* 1.3* 1.4* 1.6*       Lab Results   Component Value Date/Time    Glucose (POC) 117 11/08/2022 07:22 AM    Glucose (POC) 176 (H) 11/07/2022 11:42 PM    Glucose (POC) 195 (H) 11/07/2022 06:56 PM    Glucose (POC) 205 (H) 11/07/2022 12:01 PM    Glucose (POC) 167 (H) 11/07/2022 07:22 AM     No results for input(s): PH, PCO2, PO2, HCO3, FIO2 in the last 72 hours.   Recent Labs     11/08/22  0123 11/07/22  0541 11/06/22  0601   INR 1.4* 1.3* 1.4*       ___________________________________________________  ___________________________________________________    Attending Physician: Sarah Lal MD

## 2022-11-08 NOTE — PROGRESS NOTES
11/07/22 2336   Hector Fall Risk   Mobility 1.0   Mobility Interventions Communicate number of staff needed for ambulation/transfer;Patient to call before getting OOB;Utilize walker, cane, or other assistive device   Mentation 0   Mentation Interventions Adequate sleep, hydration, pain control;Door open when patient unattended;Evaluate medications/consider consulting pharmacy   Medication 1   Medication Interventions Evaluate medications/consider consulting pharmacy; Patient to call before getting OOB   Elimination 1.0   Elimination Interventions Call light in reach; Patient to call for help with toileting needs   Prior Fall History 0   Total Score 3   High Fall Risk Yes     Patient is high fall risk, please coordinate with PT for six minute walk test.

## 2022-11-08 NOTE — PROGRESS NOTES
AVS was given and explained to the pt and her daughter. All questions were answered. Iv was removed with no bleeding noted. Belongings were collected and returned to the pt via pt belongings bags. Pts daughter is taking the pt home. Safety precautions are in place. All documentation performed by Juliette Cai RN was reviewed by Arielle Matias RN.

## 2022-11-08 NOTE — PROGRESS NOTES
Problem: Falls - Risk of  Goal: *Absence of Falls  Description: Document Nacho Mcgowan Fall Risk and appropriate interventions in the flowsheet. Outcome: Progressing Towards Goal  Note: Fall Risk Interventions:  Mobility Interventions: Communicate number of staff needed for ambulation/transfer, Patient to call before getting OOB, Utilize walker, cane, or other assistive device    Mentation Interventions: Adequate sleep, hydration, pain control, Door open when patient unattended, Evaluate medications/consider consulting pharmacy    Medication Interventions: Evaluate medications/consider consulting pharmacy, Patient to call before getting OOB    Elimination Interventions: Call light in reach, Patient to call for help with toileting needs    History of Falls Interventions: Door open when patient unattended         Problem: Patient Education: Go to Patient Education Activity  Goal: Patient/Family Education  Outcome: Progressing Towards Goal     Problem: Pressure Injury - Risk of  Goal: *Prevention of pressure injury  Description: Document Carlos Scale and appropriate interventions in the flowsheet.   Outcome: Progressing Towards Goal  Note: Pressure Injury Interventions:  Sensory Interventions: Assess changes in LOC    Moisture Interventions: Absorbent underpads, Apply protective barrier, creams and emollients    Activity Interventions: Increase time out of bed    Mobility Interventions: Pressure redistribution bed/mattress (bed type)    Nutrition Interventions: Document food/fluid/supplement intake    Friction and Shear Interventions: Apply protective barrier, creams and emollients                Problem: Patient Education: Go to Patient Education Activity  Goal: Patient/Family Education  Outcome: Progressing Towards Goal

## 2022-11-08 NOTE — PROGRESS NOTES
Pulse oximetry assessment   94% at rest on room air (if 88% or less, skip next steps)  90% while ambulating on room air  96% at rest on 2 LPM    Pt was taken off oxygen and resting in the bed it was 94%. Then physical therapy walked the pt and the pts oxygen saturation remained at 90%. Pt was unable to walk for 6 minutes. Pt was placed back in the bed and oxygen saturation on room air was 94%. RN re-evaluated the oxygen saturation an hour later and pts oxygen resting in bed on room air was 87%. RN placed pt on 2 L of oxygen via nasal cannula and oxygen saturation increased to 96%. Dr. Uriah Barron was called and made aware. Case management was made aware as well.

## 2022-11-09 ENCOUNTER — PATIENT OUTREACH (OUTPATIENT)
Dept: CASE MANAGEMENT | Age: 77
End: 2022-11-09

## 2022-11-09 NOTE — ACP (ADVANCE CARE PLANNING)
Patient's granddaughter, Jeet Chairez, states patient does not have an ACP document. Education provided to Jeet Chairez.

## 2022-11-09 NOTE — DISCHARGE SUMMARY
Carl[facility] Leo [address] SUMMARY    Name:  Paz Harmon  MR#:  [id number]  :  1945  ACCOUNT #:  [de-identified]  ADMIT DATE:  10/31/2022  DISCHARGE DATE:  2022    HISTORY:   This is a 75-year-old black female presented to the emergency room via EMS from home accompanied by her daughter with chief complaint of shortness of breath. She is a limited historian. Her daughter provides additional history. Today, she reported shortness of breath remained constant, severe without specific alleviating factors, exacerbated with movement. Her daughter also notes the patient's oxygen saturation on occasions was 60s on first awakening from sleep. She has a history of sleep apnea, but has not used a CPAP machine for several years. EMS was called and the patient was found to be hypoxemic with O2 saturation at 74%. She has been taking her inhalers. On arrival, she was placed on 4 liters of nasal cannula. She was evaluated by the hospitalist and subsequently had further evaluation and care. Past medical history, review of systems, and physical examination as noted in the HPI. CONSULTATIONS:  Pam Dunbar MD, Pulmonary. Assessment:  Acute-on-chronic hypoxemic, hypercarbic respiratory failure. Former smoker with history of asthma, obstructive sleep apnea, not on CPAP, paroxysmal atrial fibrillation, on Coumadin. Chest x-ray suggested pulmonary edema on NIPPV and supplemental oxygen. Plan:  Standard treatment for COPD, asthma exacerbation, diuresis, and echocardiogram.  Cardiology to see outpatient pulmonary in followup with pulmonary function test and sleep evaluation for currently untreated obstructive sleep apnea - OHS. She has been seen by Dr. Gerlean Bamberger in the past in 2015. Daren Mansfield MD Cardiology. Assessment:  1. Acute hypoxemic hypercapnic respiratory failure, likely multifactorial, asthma exacerbation, some pulmonary edema as well as obesity, hypoventilation. Pulmonary consulted. Echo was nlEF. Normal diastolic function and wall motion abnormality. 2.  Pulmonary edema suspected probable element of acute heart failure with preserved ejection fraction . Chest x-ray, now pulmonary edema on admission. ProBNP was 1350 and that is elevated for age, but may not be as accurate in the setting of morbid obesity. We will resume diuretic, but may be get p.o. given no IV access. Start p.o. Lasix 40 mg daily. Follow creatinines and labs. 3.  Bilateral lower extremity edema, suspect probable lymphedema and history of paroxysmal atrial fibrillation, on Coumadin. Elevated creatinine up from earlier this year. Jackeline Cooper. Randall Chacko MD, Nephrology. Assessment:  1. Chronic kidney disease, at least stage IIIA based on laboratory data. This patient has multiple risk factors for chronic disease, hypertension, diabetes, and morbid obesity. 2.  Acute kidney injury from aggressive diuresis, it is expected via the electrolytes with resolving hypervolemia and hyponatremia. 3.  Hypertension, currently controlled. 4.  Proteinuria, likely related to chronic kidney disease from diabetes  5. Morbid obesity. Recommendations:  1. Continue aggressive diuresis. The patient is still edematous and dyspneic, especially with any attempt to move. 2.  Continue to monitor renal function. It is expected that the serum creatinine may rise before she is equilibrated on the electrolytes. I and Os needed to be recorded. Quantification of proteinuria and assess anemia with iron profile. Avoid unnecessary nephrotoxic agent. The patient will need followup after discharge. No indication for renal replacement therapy. We will follow up closely with you. RESULTS REVIEW:  Hemoglobin 10.6, hematocrit 33.9, WBC 4.3, platelets 217,113. Urinalysis unremarkable except for 1+ protein. INR at the day of discharge 1.4. It was highest 5.5 during the hospital stay.     At the time of discharge, sodium 138, potassium 4.9, chloride 106, CO2 is 27, glucose 122, BUN 69, creatinine 1.71, albumin 2.7, GFR was 30. BNP was 1830. Hemoglobin A1c was 6.0.    RSV by PCR not detected. Blood cultures, no growth. On 11/01/2022, pH 7.46, pCO2 was 59, and O2 was 74 on CPAP. IMAGING STUDIES:  The CT abdomen and pelvis, no acute findings. The findings correlate with abdominal pain, incidental pancolitis, diverticulosis and coronary artery disease. Head CT, no intracranial hemorrhage or other acute findings or chronic changes. Chest x-ray, stable mild interstitial pulmonary edema. CARDIAC STUDIES:  EKG, normal sinus rhythm, nonspecific T wave abnormality when compared to EKG of 09/16/2021, no changes now. Echocardiogram, normal left ventricular systolic function with the estimated ejection fraction of 60%-65%. Left ventricular size is normal.  Normal wall thickness. Normal wall motion. Normal diastolic function. HOSPITAL COURSE:  The patient was admitted as stated above. She was seen in consultation by the consultants who assisted us with her care throughout the hospital stay. She was found to have acute hypoxemic hypercapnic respiratory failure and is followed by Pulmonary. IV access was difficult and at times almost impossible. She initially received a course of Solu-Medrol IV and because of the IV access, she was changed to p.o. prednisone which she continued to have remainder of her hospital stay. She symptomatically improved from the aggressive pulmonary treatment including jet nebs and p.o. steroids, but continued to have some bronchospasm on exam at the time of discharge, but she is asymptomatic. She is felt stable from a pulmonary perspective to be discharged. Attempt to wean oxygen multiple times over the hospital stay was not successful.   She was evaluated for home O2 and was noted to be hypoxemic with O2 saturation as low as 87% on room air, and therefore, was sent home on home O2 and even at times she would desaturate whenever the oxygen was removed. Regarding her pulmonary status, it is recommended that she be reevaluated for CPAP machine and also [facility]eval[facility] by [facility] with appropriate testing. She has obstructive sleep apnea, but has not used the machine for years. There is a history of paroxysmal atrial fibrillation for which she is on Coumadin and will continue to be titrated as an outpatient. Morbid obesity remains a concern, and will be managed as an outpatient. She has had no seizures during the hospital stay. We will continue her antiseizure medications. She has dyslipidemia for which she remains on Lipitor. Blood pressure was adequately controlled on her home medications. She has type II diabetes and chronic kidney disease and was on correctional scale. She will continue with her oral agent at home. She is found to have congestive heart failure by Cardiology and continued diuresis with p.o. Lasix. She has anemia of chronic kidney disease. She was having abdominal pain when she came in the hospital, but on discharge it has been dissipated. The CT scan fortunately was negative. She was seen in consultation by Nephrology for acute-on-chronic kidney disease with recommendations followed as well as outpatient followup recommended. All medical issues have now been addressed. The patient's medical status is stable. She is being discharged home under the care of her family in stable and satisfactory condition improved. FINAL DIAGNOSES:  1. Acute-on-chronic hypoxemic hypercapnic respiratory failure. 2.  Acute-on-chronic heart failure with preserved ejection fraction. 3.  Paroxysmal atrial fibrillation. 4.  Obstructive sleep apnea. 5.  Morbid obesity. 6.  Seizure disorder. 7.  Dyslipidemia. 8.  Hypertension. 9.  Type II diabetes with chronic kidney disease. 10.  Acute-on-chronic kidney disease, stage III.   11.  Anemia of chronic kidney disease. 12.  Abdominal pain, resolved. 13.  Acute pulmonary edema, resolved. DISPOSITION:  Discharged to home ambulatory, sent back to the care of her family. Up as tolerated. DISCHARGE MEDICATIONS:  1. Albuterol 2 sprays q.4 hours p.r.n.  2.  Albuterol 2.5 mg per aerosol q.4 hours. p.r.n.  3.  Allopurinol 300 mg daily. 4.  Atorvastatin 80 mg daily. 5.  Symbicort 160/4.5, 2 sprays b.i.d.  6.  Carvedilol 12.5 mg b.i.d.  7.  Plendil SR 10 mg daily. 8.  Furosemide 40 mg daily. 9.  Vimpat 50 mg b.i.d. 10.  Keppra 1000 mg b.i.d. 11.  MiraLax 17 g b.i.d. p.r.n.  12.  Prednisone 60 mg a day for three days, 40 mg a day for two days, and 20 mg daily for five days. 13.  Januvia 100 mg daily. 14.  Spiriva 18 mcg daily. 15.  Warfarin 5 mg daily and 7.5 mg starting on Sunday. DISCHARGE INSTRUCTIONS:  She is on [employer] diabetic diet. Up as tolerated. Return to see Dr. Ab Gaffney in three to five days for followup care. Home health service. Nasal O2 at 2 liters. I personally saw the patient today and spent less than or equal to 30 minutes in counseling and coordination of care and discharging this patient.       Ralph Irby MD      [id number]  D:  11/08/2022 17:40  T:  11/09/2022 11:12  JOB #:  [id number]

## 2022-11-09 NOTE — PROGRESS NOTES
Care Transitions Initial Call    Call within 2 business days of discharge: Yes. Patient: Harjit Hurst Patient : 1945 MRN: [id number]    Last Discharge  Street       Date Complaint Diagnosis Description Type Department Provider    10/31/22 Shortness of Breath Acute on chronic congestive heart failure, unspecified heart failure type ([facility] [address] . .. ED to Hosp-Admission (Discharged) (ADMIT) Peter Ga MD; Viral Stephens. .. Was this an external facility discharge? No.    Challenges to be reviewed by the provider   Additional needs identified to be addressed with provider: Yes  - Patient is not weighing at home daily, granddaughter agrees to assist with home daily weights and the recording of home weights starting 11-10-22.  - Diet education provided. Patient is not avoiding salt/sodium, not avoiding concentrated sweets, and is not reducing carbohydrates. - Granddaughter is requesting cardiology referral.  - Patient is not taking the following 2 medications:       1. Symbicort 160-4.5mcg actuation HFA inhaler. 2.  Miralax 17 gram packet. Symbicort and Miralax were marked as 'Not Taking' on today's medication reconciliation. Please consider removing these 2 medications from patient's current medication list.  - Patient is not taking dose pack of Deltasone, granddaughter plans to  dose pack of Deltasone today from pharmacy. - Patient is not taking Deltasone 20mg tab, take 2 tabs daily. This prescription was sent to patient's mail order pharmacy. Both the dose pack of Deltasone and Deltasone 20mg tab were marked as 'Not Taking' on today's medication reconciliation.  - Patient is taking Coumadin differently than ordered. Granddaughter states patient is taking 7.5mg of Coumadin (1 1/2 tabs) Monday through Thursday, and taking 5mg (1 tab) on Friday, Saturday, and . Notation was made on patient's current list of medications.    - Granddaughter states patient needs to have sleep study scheduled in preparation for a new home CPAP machine. Method of communication with provider:   chart routing to PCP marked with high priority.      Component      Latest Ref Rng & Units 11/5/2022 11/1/2022 10/31/2022 10/31/2022          11:15 AM  3:57 AM  2:34 PM  2:34 PM   HGB      11.5 - 16.0 g/dL 10.6 (L) 10.7 (L)  9.9 (L)   HCT      35.0 - 47.0 % 33.9 (L) 36.2  33.2 (L)     Component      Latest Ref Rng & Units 11/5/2022 11/1/2022 10/31/2022 10/31/2022          11:15 AM  3:57 AM  2:34 PM  2:34 PM   RDW      11.5 - 14.5 % 17.2 (H) 18.2 (H)  20.6 (H)     Component      Latest Ref Rng & Units 11/5/2022 11/1/2022 10/31/2022 10/31/2022          11:15 AM  3:57 AM  2:34 PM  2:34 PM   MPV      8.9 - 12.9 FL 13.1 (H) 10.7  10.8   NRBC      0  WBC 1.2 (H) 0.0  0.0   ABSOLUTE NRBC      0.00 - 0.01 K/uL 0.05 (H) 0.00  0.00     Component      Latest Ref Rng & Units 11/1/2022 10/31/2022 10/31/2022           3:57 AM  2:34 PM  2:34 PM   IMMATURE GRANULOCYTES      0.0 - 0.5 % 1 (H)  0     Component      Latest Ref Rng & Units 11/3/2022 10/31/2022 10/31/2022 10/3/2022           7:19 AM  2:34 PM  2:31 PM 10:19 AM   NT pro-BNP      <450 PG/ML 1,830 (H) 1,350 (H)  1,061 (H)     Component      Latest Ref Rng & Units 11/8/2022 11/7/2022 11/6/2022 11/5/2022           1:23 AM  5:41 AM  6:01 AM 11:15 AM   INR      0.9 - 1.1   1.4 (H) 1.3 (H) 1.4 (H) 1.6 (H)   Prothrombin time      9.0 - 11.1 sec 14.6 (H) 12.9 (H) 14.0 (H) 15.9 (H)     Component      Latest Ref Rng & Units 11/3/2022           7:19 AM   INR      0.9 - 1.1   3.3 (H)   Prothrombin time      9.0 - 11.1 sec 32.0 (H)     Component      Latest Ref Rng & Units 11/6/2022 11/5/2022 11/3/2022           6:01 AM 11:15 AM  7:19 AM   BUN      6 - 20 MG/DL 69 (H) 71 (H) 52 (H)   Creatinine      0.55 - 1.02 MG/DL 1.71 (H) 1.81 (H) 1.89 (H)   BUN/Creatinine ratio      12 - 20   40 (H) 39 (H) 28 (H)   eGFR      >60 ml/min/1.73m2 30 (L) 28 (L) 27 (L) Component      Latest Ref Rng & Units 11/6/2022 11/5/2022 11/3/2022           6:01 AM 11:15 AM  7:19 AM   Albumin      3.5 - 5.0 g/dL 2.7 (L) 3.0 (L) 2.8 (L)     Component      Latest Ref Rng & Units 10/31/2022          11:30 PM   Lacosamide      5.0 - 10.0 ug/mL 4.1 (L)     Component      Latest Ref Rng & Units 11/8/2022 11/8/2022 11/7/2022 11/7/2022          11:30 AM  7:22 AM 11:42 PM  6:56 PM   GLUCOSE,FAST - POC      65 - 117 mg/dL 159 (H) 117 176 (H) 195 (H)     Component      Latest Ref Rng & Units 11/7/2022 11/7/2022          12:01 PM  7:22 AM   GLUCOSE,FAST - POC      65 - 117 mg/dL 205 (H) 167 (H)     Component      Latest Ref Rng & Units 11/1/2022 10/3/2022           3:57 AM 10:19 AM   Hemoglobin A1c, (calculated)      4.0 - 5.6 % 6.0 (H) 6.4 (H)   Est. average glucose      mg/dL 126 137     Discussed COVID-19 related testing which was available at this time. Test results were  negative on 11-1-22. Patient, daughter, and granddaughter informed of results, if available? yes     Advance Care Planning:   Does patient have an Advance Directive: not on file; education provided to patient's granddaughter, Tk Mays. Inpatient Readmission Risk score: Unplanned Readmit Risk Score: 18.5    Was this a readmission? no   Patient stated reason for the admission: Patient did not provide an answer. Patients top risk factors for readmission:  Medical condition - A-fib, hypertension, venous insufficiency, CHF, history of cerebrovascular accident, diabetes with CKD, asthma, COPD, acute pulmonary edema, acute on chronic respiratory failure with hypoxia and hypercapnia, chronic renal disease stage III, dyslipidemia, and normocytic anemia per chart.      Interventions to address risk factors: Obtained and reviewed discharge summary and/or continuity of care documents and Education of patient/family/caregiver/guardian to support self-management-Education provided regarding the importance of daily home weights and the recording of home weights, education provided regarding the correct procedure for home weights, education provided regarding the parameters of weights gain, and education provided regarding the the use of low sodium/salt diet. Care Transition Nurse (CTN) contacted the  patient, patient's daughter Marcos Moore, and patient's granddaughter Eugenia Sutherland) by telephone to perform post hospital discharge assessment. Patient gave verbal permission for this CTN to speak with her daughter and granddaughter during today's phone conversations. Verified name and  with  patient's daughter  as identifiers. Provided introduction to self, and explanation of the CTN role. CTN reviewed discharge instructions, medical action plan and red flags with  patient, patient's daughter, and patient's granddaughter  who verbalized understanding. Were discharge instructions available to patient? yes. Reviewed appropriate site of care based on symptoms and resources available to patient including: PCP, Urgent Care Clinics, Home Health, When to call 911, and [facility] [place] for DME . Patient and family were given an opportunity to ask questions and does not have any further questions or concerns at this time. The  daughter and granddaughter agree to contact the PCP office for questions related to patient's healthcare. Medication reconciliation was performed with  the granddaughter , who verbalizes understanding of administration of home medications. Advised obtaining a 90-day supply of all daily and as-needed medications. Referral to Pharm D needed: no     Home Health/Outpatient orders at discharge: home health care, PT, OT, and SLP for resumption of care. [address]: [address]  Date of initial visit: Not scheduled at this time per granddaughter; however the granddaughter states she does have the phone number for [address] should she need to contact their office.      Durable Medical Equipment ordered at discharge:  Home oxygen and home oxygen supplies, hospital bed. [address]: [employer]  Durable Medical Equipment received: Yes, per granddaughter. Was patient discharged with a pulse oximeter? no    Discussed follow-up appointments. If no appointment was previously scheduled, appointment scheduling offered:  N/A, patient has ROSALIA appointment scheduled with her PCP on 11-10-22. Is follow up appointment scheduled within 7 days of discharge? yes. [facility] follow up appointment(s):   Future Appointments   Date Time Provider Marie Luo   11/10/2022  1:30 PM May Viramontes MD [facility] MAIN BS AMB   1/3/2023 11:15 AM May Viramontes MD [facility] MAIN BS AMB     Non-[facility] follow up appointment(s): None noted at this time. Granddaughter states patient needs sleep study to be scheduled. Plan for follow-up call in 10-14 days based on severity of symptoms and risk factors. Plan for next call: self management-Review red flags of CHF, review home weights, reinforce diet education, and follow up appointment-Evaluate if patient is attending follow-up appointment(s) as scheduled, offer assistance with scheduling as needed. CTN provided contact information for future needs. Goals        Understands red flags post discharge. 11-9-22: Red flags of CHF reviewed with patient, patient's daughter Buck Núñez), and patient's granddaughter Chela Mendez), the granddaughter verbalized an understanding. The correct procedure for home daily weights and the recording of home weights was reviewed and the granddaughter verbalized an understanding. The granddaughter agrees to assist patient with home daily weights and the recording of home weights starting 11-10-22. . The parameters of weight gain were reviewed with patient's daughter and granddaughter:  1. If patient should gain 3 or more pounds in 24 hours and/or,  2.  If patient should gain 5 or more pounds in one week, patient's PCP/cardiologist should be contacted immediately. Granddaughter verbalized an understanding of the parameters of weight gain and granddaughter was able to provide teach-back. Education provided to daughter and granddaughter regarding the use of a low sodium diet and the avoidance of salt/sodium in food preparation, granddaughter verbalized an understanding. Granddaughter denies patient having shortness of breath, denies chest pain, denies fever/chills, denies nausea/vomiting, and denies swelling in patient's lower extremities since discharge on 11-8-22. Granddaughter reports patient has had 4 falls in the last 6 months, and a total of 8 falls in the last 12 months, granddaughter states patient did not sustain traumatic injury with any of the 8 falls. Granddaughter states patient is currently utilizing a regular diet at home, patient is not avoiding sodium/salt, and patient is not avoiding concentrated sweets and reducing carbohydrates, granddaughter states patient's appetite is good. Granddaughter has no red flags to report at this time. Care Transitions Nurse will review red flags again on next  phone conversation with patient/family.   Marcia Gonzales

## 2022-11-10 ENCOUNTER — OFFICE VISIT (OUTPATIENT)
Dept: INTERNAL MEDICINE CLINIC | Age: 77
End: 2022-11-10
Payer: MEDICARE

## 2022-11-10 ENCOUNTER — PATIENT OUTREACH (OUTPATIENT)
Dept: CASE MANAGEMENT | Age: 77
End: 2022-11-10

## 2022-11-10 ENCOUNTER — TELEPHONE (OUTPATIENT)
Dept: INTERNAL MEDICINE CLINIC | Age: 77
End: 2022-11-10

## 2022-11-10 VITALS
HEIGHT: 62 IN | HEART RATE: 60 BPM | SYSTOLIC BLOOD PRESSURE: 116 MMHG | BODY MASS INDEX: 53.02 KG/M2 | TEMPERATURE: 97.7 F | OXYGEN SATURATION: 99 % | WEIGHT: 288.1 LBS | DIASTOLIC BLOOD PRESSURE: 67 MMHG | RESPIRATION RATE: 22 BRPM

## 2022-11-10 DIAGNOSIS — I50.31 ACUTE DIASTOLIC CHF (CONGESTIVE HEART FAILURE) (HCC): Primary | ICD-10-CM

## 2022-11-10 DIAGNOSIS — E66.01 MORBID OBESITY (HCC): Chronic | ICD-10-CM

## 2022-11-10 DIAGNOSIS — J96.21 ACUTE ON CHRONIC RESPIRATORY FAILURE WITH HYPOXIA AND HYPERCAPNIA (HCC): ICD-10-CM

## 2022-11-10 DIAGNOSIS — G47.33 OBSTRUCTIVE SLEEP APNEA: ICD-10-CM

## 2022-11-10 DIAGNOSIS — I87.2 VENOUS INSUFFICIENCY: ICD-10-CM

## 2022-11-10 DIAGNOSIS — N17.9 ACUTE KIDNEY INJURY SUPERIMPOSED ON CHRONIC KIDNEY DISEASE (HCC): ICD-10-CM

## 2022-11-10 DIAGNOSIS — J96.22 ACUTE ON CHRONIC RESPIRATORY FAILURE WITH HYPOXIA AND HYPERCAPNIA (HCC): ICD-10-CM

## 2022-11-10 DIAGNOSIS — N18.9 ACUTE KIDNEY INJURY SUPERIMPOSED ON CHRONIC KIDNEY DISEASE (HCC): ICD-10-CM

## 2022-11-10 DIAGNOSIS — E66.9 RESTRICTIVE LUNG DISEASE SECONDARY TO OBESITY: ICD-10-CM

## 2022-11-10 DIAGNOSIS — J98.4 RESTRICTIVE LUNG DISEASE SECONDARY TO OBESITY: ICD-10-CM

## 2022-11-10 DIAGNOSIS — J43.1 PANLOBULAR EMPHYSEMA (HCC): ICD-10-CM

## 2022-11-10 PROCEDURE — 3074F SYST BP LT 130 MM HG: CPT | Performed by: INTERNAL MEDICINE

## 2022-11-10 PROCEDURE — G8754 DIAS BP LESS 90: HCPCS | Performed by: INTERNAL MEDICINE

## 2022-11-10 PROCEDURE — 1111F DSCHRG MED/CURRENT MED MERGE: CPT | Performed by: INTERNAL MEDICINE

## 2022-11-10 PROCEDURE — 1123F ACP DISCUSS/DSCN MKR DOCD: CPT | Performed by: INTERNAL MEDICINE

## 2022-11-10 PROCEDURE — 1090F PRES/ABSN URINE INCON ASSESS: CPT | Performed by: INTERNAL MEDICINE

## 2022-11-10 PROCEDURE — 3078F DIAST BP <80 MM HG: CPT | Performed by: INTERNAL MEDICINE

## 2022-11-10 PROCEDURE — G8399 PT W/DXA RESULTS DOCUMENT: HCPCS | Performed by: INTERNAL MEDICINE

## 2022-11-10 PROCEDURE — G8510 SCR DEP NEG, NO PLAN REQD: HCPCS | Performed by: INTERNAL MEDICINE

## 2022-11-10 PROCEDURE — G8427 DOCREV CUR MEDS BY ELIG CLIN: HCPCS | Performed by: INTERNAL MEDICINE

## 2022-11-10 PROCEDURE — G8417 CALC BMI ABV UP PARAM F/U: HCPCS | Performed by: INTERNAL MEDICINE

## 2022-11-10 PROCEDURE — G8536 NO DOC ELDER MAL SCRN: HCPCS | Performed by: INTERNAL MEDICINE

## 2022-11-10 PROCEDURE — G8752 SYS BP LESS 140: HCPCS | Performed by: INTERNAL MEDICINE

## 2022-11-10 PROCEDURE — 99214 OFFICE O/P EST MOD 30 MIN: CPT | Performed by: INTERNAL MEDICINE

## 2022-11-10 PROCEDURE — 1101F PT FALLS ASSESS-DOCD LE1/YR: CPT | Performed by: INTERNAL MEDICINE

## 2022-11-10 NOTE — PROGRESS NOTES
This Care Transitions Nurse (CTN) received an incoming phone call, with voicemail message left, from patient's daughter, Jose Maria Dominguez, requesting call back at 4:27pm on 11-9-22. CTN called patient and patient's daughter, Jose Maria Dominguez, back at 11:07am today. Verified name and date of birth with patient as identifiers. Patient gave verbal permission for this CTN to speak with her daughter, Jose Maria Dominguez, during today's phone conversation(s). Daughter states the patient has been delivered an incorrect set of side rails for her hospital bed. This CTN spoke with Marie Crawfordyoung, who states half rails were ordered for patient's hospital bed and half are currently out of stock. Glenna Issa states full rails were delivered to patient's residence, and their plan is to deliver half rails when they become available for delivery. Glenna Issa states she will contact the bed/rail  today to attempt to identify an approximate shipping date for the half rails, and Glenna Issa states she will reach out to patient's daughter, Jose Maria Dominguez, today with an approximate delivery date of the half rails for patient's hospital bed. CTN will continue to monitor.

## 2022-11-10 NOTE — TELEPHONE ENCOUNTER
Patient cara called in yesterday pt 20.8 and inr 1.9 she is restarted at 1 tab mon-fri, ans 1 1/2 tab sat and sun

## 2022-11-13 PROBLEM — E66.9 RESTRICTIVE LUNG DISEASE SECONDARY TO OBESITY: Status: ACTIVE | Noted: 2022-11-13

## 2022-11-13 PROBLEM — I11.0: Status: ACTIVE | Noted: 2022-11-13

## 2022-11-13 PROBLEM — I50.31 ACUTE DIASTOLIC CHF (CONGESTIVE HEART FAILURE) (HCC): Status: ACTIVE | Noted: 2022-11-13

## 2022-11-13 PROBLEM — I50.30: Status: ACTIVE | Noted: 2022-11-13

## 2022-11-13 PROBLEM — G47.33 OBSTRUCTIVE SLEEP APNEA: Status: ACTIVE | Noted: 2022-11-13

## 2022-11-13 PROBLEM — J98.4 RESTRICTIVE LUNG DISEASE SECONDARY TO OBESITY: Status: ACTIVE | Noted: 2022-11-13

## 2022-11-13 NOTE — PROGRESS NOTES
[facility]  [place] [address]  [address] [facility] [address]  Phone:  [phone]  Fax: [phone]       Chief Complaint   Patient presents with    Hospital Follow Up     [facility]   . SUBJECTIVE:    Gabriella Olivera is a 76 y.o. female comes in for return visit having been hospitalized most recently at [facility] for presumed congestive heart failure with preserved systolic function. This was superimposed on her chronic respiratory failure from her COPD and restrictive lung disease related to her morbid obesity. She now requires oxygen because of respiratory failure. She is on approximately 2 liters. The main issue of the fact that she has obstructive sleep apnea, which she has had for years, and had a CPAP machine but she uses it when she wants to and on most instances she does not use it at all. She also has chronic edema, which I did not feel is related to congestive heart failure because she has had it for a decade plus. This is primarily related to her venous insufficiency. She does have mild renal dysfunction, which was felt to be possibly related to chronic renal disease with obviously a prerenal component secondary to the aggressive diuresis that occurred. She had minimal microalbuminuria present. Morbid obesity continues to be a major problem and she does have a history of chronic atrial fibrillation for which she is on warfarin and a seizure disorder. With compliance with her anticonvulsants, she does extremely well. Current Outpatient Medications   Medication Sig Dispense Refill    warfarin (COUMADIN) 5 mg tablet TAKE 1 TABLET BY MOUTH MONDAY THROUGH FRIDAY, TAKE 1 AND 1/2 TABLETS BY MOUTH ON SATURDAY AND SUNDAY (Patient taking differently: 5 mg.  TAKE 1 TABLET BY MOUTH MONDAY THROUGH FRIDAY, TAKE 1 AND 1/2 TABLETS BY MOUTH ON SATURDAY AND SUNDAY  Indications: 11-9-22: Monday through Thursday patient is taking 1 1/2 tabs (7.5mg), and taking 1 tab (5mg) on Friday, Saturday, and Sunday.) 103 Tablet 3    predniSONE (DELTASONE) 20 mg tablet 3 tab x 2 days the 2 tabs x 2 days then 1 tab 60 Tablet 1    lacosamide (VIMPAT) 50 mg tab tablet TAKE ONE TABLET BY MOUTH TWICE DAILY 180 Tablet 3    atorvastatin (LIPITOR) 80 mg tablet TAKE ONE TABLET BY MOUTH DAILY 90 Tablet 3    lancets (OneTouch Delica Plus Lancet) 33 gauge misc USE TO check BLOOD SUGAR ONCE DAILY 100 Lancet 3    levETIRAcetam 1,000 mg tablet TAKE 1 TABLET BY MOUTH TWICE DAILY 180 Tablet 3    allopurinoL (ZYLOPRIM) 300 mg tablet TAKE 1 TABLET BY MOUTH EVERY DAY 90 Tablet 3    felodipine (PLENDIL SR) 10 mg 24 hr tablet TAKE 1 TABLET BY MOUTH EVERY DAY 90 Tablet 3    albuterol (PROVENTIL HFA, VENTOLIN HFA, PROAIR HFA) 90 mcg/actuation inhaler Take 2 Puffs by inhalation every four (4) hours as needed for Wheezing. 20.1 g 3    carvediloL (COREG) 12.5 mg tablet TAKE ONE TABLET BY MOUTH TWICE DAILY 180 Tablet 3    furosemide (LASIX) 40 mg tablet TAKE ONE TABLET BY MOUTH DAILY 90 Tablet 3    albuterol (PROVENTIL VENTOLIN) 2.5 mg /3 mL (0.083 %) nebu USE ONE vial PER nebulizer EVERY 6 HOURS AS NEEDED FOR WHEEZING OR SHORTNESS OF BREATH 300 mL 3    glucose blood VI test strips (OneTouch Verio test strips) strip Use to test blood sugar once daily. Dx.e11.9 50 Strip 11    Blood-Glucose Meter (OneTouch Verio Meter) misc Use to test blood sugar daily. Dx.e11.9 1 Each 0    glucose blood VI test strips (FreeStyle Lite Strips) strip Use to test blood sugar daily 50 Strip 11    glucose blood VI test strips (TRUE METRIX GLUCOSE TEST STRIP) strip Use to test blood sugar once daily. Dx.e11.9 100 Strip 11    Blood-Glucose Meter (TRUE METRIX AIR GLUCOSE METER) [facility] 1 Device by Does Not Apply route daily. Use to test blood sugars daily. dx.e11.9 1 Each 0    lancets (TRUEPLUS LANCETS) 28 gauge misc Use to test blood sugar once daily.  Dx.e11.9 100 Lancet 11    Januvia 100 mg tablet TAKE ONE TABLET BY MOUTH EVERY DAY (Patient not taking: Reported on 11/10/2022) 30 Tablet 11    polyethylene glycol (MIRALAX) 17 gram packet Take 1 Packet by mouth two (2) times daily as needed for Constipation. (Patient not taking: No sig reported) 60 Each 11    predniSONE (DELTASONE) 20 mg tablet Take 2 Tablets by mouth daily (with breakfast). (Patient not taking: No sig reported) 20 Tablet 0    Spiriva with HandiHaler 18 mcg inhalation capsule INHALE THE CONTENTS OF ONE CAPSULE BY MOUTH DAILY, USING HANDIHALER (Patient not taking: Reported on 11/10/2022) 30 Capsule 11    budesonide-formoterol (SYMBICORT) 160-4.5 mcg/Actuation HFA inhaler Take 2 Puffs by inhalation two (2) times a day.  (Patient not taking: No sig reported) 10.2 Inhaler 11     Past Medical History:   Diagnosis Date    Arrhythmia     Arthritis     Asthma     Chronic kidney disease     Chronic obstructive pulmonary disease [facility]     Congestive heart failure ([facility]     Diabetes ([facility]     Glaucoma     Hypertension     Other ill-defined conditions(799.89)     rt knee surgery    Stroke [facility])      Past Surgical History:   Procedure Laterality Date    HX COLONOSCOPY      HX GYN       Allergies   Allergen Reactions    Clonidine Other (comments)     Patient becomes incoherent    Metformin Unknown (comments)         REVIEW OF SYSTEMS:  General: negative for - chills or fever  ENT: negative for - headaches, nasal congestion or tinnitus  Respiratory: negative for - cough, hemoptysis, shortness of breath or wheezing  Cardiovascular : negative for - chest pain, edema, palpitations or shortness of breath  Gastrointestinal: negative for - abdominal pain, blood in stools, heartburn or nausea/vomiting  Genito-Urinary: no dysuria, trouble voiding, or hematuria  Musculoskeletal: negative for - gait disturbance, joint pain, joint stiffness or joint swelling  Neurological: no TIA or stroke symptoms  Hematologic: no bruises, no bleeding, no swollen glands  Integument: no lumps, mole changes, nail changes or rash  Endocrine: no malaise/lethargy or unexpected weight changes      Social History     Socioeconomic History    Marital status:     Number of children: 1   Occupational History    Occupation: disabled--[employer]   Tobacco Use    Smoking status: Former    Smokeless tobacco: Never    Tobacco comments:     20+years ago   Vaping Use    Vaping Use: Never used   Substance and Sexual Activity    Alcohol use: No    Drug use: No    Sexual activity: Not Currently     History reviewed. No pertinent family history. OBJECTIVE:    Visit Vitals  /67 (BP 1 Location: Left upper arm, BP Patient Position: Sitting)   Pulse 60   Temp 97.7 °F (36.5 °C) (Oral)   Resp 22   Ht 5' 2\" (1.575 m)   Wt 288 lb 1.6 oz (130.7 kg)   SpO2 99%   BMI 52.69 kg/m²     CONSTITUTIONAL: well , well nourished, appears age appropriate  EYES: perrla, eom intact  ENMT:moist mucous membranes, pharynx clear  NECK: supple. Thyroid normal  RESPIRATORY: Chest: clear to ascultation and percussion   CARDIOVASCULAR: Heart: regular rate and rhythm  GASTROINTESTINAL: Abdomen: soft, bowel sounds active  HEMATOLOGIC: no pathological lymph nodes palpated  MUSCULOSKELETAL: Extremities: no edema, pulse 1+   INTEGUMENT: No unusual rashes or suspicious skin lesions noted. Nails appear normal.  NEUROLOGIC: non-focal exam   MENTAL STATUS: alert and oriented, appropriate affect      ASSESSMENT:  1. Acute diastolic CHF (congestive heart failure) ([address]    2. Panlobular emphysema ([address]    3. Restrictive lung disease secondary to obesity    4. Acute on chronic respiratory failure with hypoxia and hypercapnia (HCC)    5. Acute kidney injury superimposed on chronic kidney disease ([address]    6. Morbid obesity ([address]    7. Venous insufficiency        PLAN:  1. The patient has apparently congestive heart failure secondary to preserved systolic function. This was in spite of the fact that her echocardiogram did not demonstrate any diastolic dysfunction.   Her ejection fraction is in the 60-65%. 2. She has COPD with restrictive lung disease leading to chronic respiratory failure. She is now on oxygen at 2 liters a minute continuously. 3. She has predominantly prerenal azotemia superimposed on very mild chronic kidney disease of multifactorial etiology, most likely hypertensive nephrosclerosis. The current prerenal component will continue with the aggressive diuresis that is occurring. She will not mobilize any third space fluid in her extremities  because this is related to venous insufficiency. 4. She is morbidly obese and this is one of the key factors contributing to her comorbidities. 5. She remains on warfarin. INR today was acceptable. She will take 5 mg Monday through Friday and 7.5 mg on Saturday and Sunday. Follow-up and Dispositions    Return in about 4 weeks (around 12/8/2022).            Parish Conde MD

## 2022-11-16 ENCOUNTER — TELEPHONE (OUTPATIENT)
Dept: INTERNAL MEDICINE CLINIC | Age: 77
End: 2022-11-16

## 2022-11-22 ENCOUNTER — TELEPHONE (OUTPATIENT)
Dept: INTERNAL MEDICINE CLINIC | Age: 77
End: 2022-11-22

## 2022-11-22 NOTE — TELEPHONE ENCOUNTER
Patient granddaughter called in her pt/inr 37.0 and inr 3.5 taking 1 tab daily , she needs to hold x 3 days, then restart 1 tab mon-fri and a 1/2 tab on sat and sun call back thursday

## 2022-11-29 ENCOUNTER — TELEPHONE (OUTPATIENT)
Dept: INTERNAL MEDICINE CLINIC | Age: 77
End: 2022-11-29

## 2022-11-29 NOTE — TELEPHONE ENCOUNTER
Granddaughter called in PT 21.8 and INR 2.0 taking 1 tab non-fri, and 1/2 tab sat and sun no change repeat on tuesday

## 2022-11-30 ENCOUNTER — PATIENT OUTREACH (OUTPATIENT)
Dept: CASE MANAGEMENT | Age: 77
End: 2022-11-30

## 2022-11-30 NOTE — PROGRESS NOTES
Care Transitions Follow Up Call    Challenges to be reviewed by the provider   Additional needs identified to be addressed with provider: no  None, daughter has no red flags to report at this time. Method of communication with provider : none, daughter has no red flags to report at this time. Care Transition Nurse (CTN) contacted the  patient's daughter, Ericka Gallegos (on [address] dated 2020),  by telephone to follow up after admission on 10-31-22 to [address] [place] [address]. Verified name and  with  the daughter  as identifiers. Addressed changes since last contact:  Daughter states patient continues to lose weight, weighing at home daily and recording home weights, utilizing a heart healthy diet at home, avoiding concentrated sweets, and reducing carbohydrates. Follow up appointment completed? yes. Was follow up appointment scheduled within 7 days of discharge? yes. CTN reviewed discharge instructions, medical action plan and red flags with  the daughter  and discussed any barriers to care and/or understanding of plan of care after discharge. Discussed appropriate site of care based on symptoms and resources available to patient including: PCP, Urgent Care Clinics, Home Health, and When to call 911. The  daughter  agrees to contact the PCP office for questions related to patient's healthcare. Patients top risk factors for readmission: Medical condition - A-fib, hypertension, venous insufficiency, CHF, history of cerebrovascular accident, diabetes with CKD, asthma, COPD, acute pulmonary edema, acute on chronic respiratory failure with hypoxia and hypercapnia, chronic renal disease stage III, dyslipidemia, and normocytic anemia per chart.    Interventions to address risk factors: Obtained and reviewed discharge summary and/or continuity of care documents and Education of patient/family/caregiver/guardian to support self-management-Education provided to patient's daughter regarding signs/symptoms of CHF, diet education reinforcement, reviewed the importance of weighing at home daily and recording all home weights, daughter verbalized an understanding. [facility] follow up appointment(s):   Future Appointments   Date Time Provider Marie Luo   12/1/2022 10:30 AM Kayla Ruby MD [facility] MAIN BS AMB   12/20/2022  9:40 AM Fatoumata Garcia MD [facility] BS AMB   1/3/2023 11:15 AM Kayla Ruby MD [facility] MAIN BS AMB     Non[facility] follow up appointment(s): Daughter states patient has a sleep study scheduled for 12-20-22. CTN provided contact information for future needs. Plan for follow-up call in 7-10 days based on severity of symptoms and risk factors. Plan for next call: self management-Review red flags of CHF, reinforce previously provided diet education, review home weights, and follow up appointment-Evaluate if patient is attending follow-up appointments as scheduled, offer assistance with scheduling as needed. Goals Addressed                   This Visit's Progress     Understands red flags post discharge. 11-9-22: Red flags of CHF reviewed with patient, patient's daughter Anai Zuniga), and patient's granddaughter Erica Cuellar), the granddaughter verbalized an understanding. The correct procedure for home daily weights and the recording of home weights was reviewed and the granddaughter verbalized an understanding. The granddaughter agrees to assist patient with home daily weights and the recording of home weights starting 11-10-22. . The parameters of weight gain were reviewed with patient's daughter and granddaughter:  1. If patient should gain 3 or more pounds in 24 hours and/or,  2. If patient should gain 5 or more pounds in one week, patient's PCP/cardiologist should be contacted immediately. Granddaughter verbalized an understanding of the parameters of weight gain and granddaughter was able to provide teach-back.   Education provided to daughter and granddaughter regarding the use of a low sodium diet and the avoidance of salt/sodium in food preparation, granddaughter verbalized an understanding. Granddaughter denies patient having shortness of breath, denies chest pain, denies fever/chills, denies nausea/vomiting, and denies swelling in patient's lower extremities since discharge on 11-8-22. Granddaughter reports patient has had 4 falls in the last 6 months, and a total of 8 falls in the last 12 months, granddaughter states patient did not sustain traumatic injury with any of the 8 falls. Granddaughter states patient is currently utilizing a regular diet at home, patient is not avoiding sodium/salt, and patient is not avoiding concentrated sweets and reducing carbohydrates, granddaughter states patient's appetite is good. Granddaughter has no red flags to report at this time. Care Transitions Nurse will review red flags again on next  phone conversation with patient/family. ANIA     11-30-22:  Red flags of CHF reviewed with patient's daughter, Tameka Cunningham (on [address] [place] [address] dated 6-22-20), and daughter verbalized an understanding. Daughter states patient is weighing at home daily with the assistance of her granddaughter, and recording home weights. Daughter states patient is utilizing a heart healthy diet, avoiding concentrated sweets and reducing carbohydrates. Daughter has no red flags to report at this time. Care Transitions Nurse will review red flags again on next phone conversation with patient/family.  Shireen Stroud

## 2022-12-01 ENCOUNTER — OFFICE VISIT (OUTPATIENT)
Dept: INTERNAL MEDICINE CLINIC | Age: 77
End: 2022-12-01
Payer: MEDICARE

## 2022-12-01 VITALS
OXYGEN SATURATION: 98 % | RESPIRATION RATE: 20 BRPM | HEART RATE: 69 BPM | WEIGHT: 292 LBS | BODY MASS INDEX: 53.73 KG/M2 | HEIGHT: 62 IN | DIASTOLIC BLOOD PRESSURE: 60 MMHG | TEMPERATURE: 97.8 F | SYSTOLIC BLOOD PRESSURE: 130 MMHG

## 2022-12-01 DIAGNOSIS — E66.01 MORBID OBESITY (HCC): Chronic | ICD-10-CM

## 2022-12-01 DIAGNOSIS — J96.22 ACUTE ON CHRONIC RESPIRATORY FAILURE WITH HYPOXIA AND HYPERCAPNIA (HCC): ICD-10-CM

## 2022-12-01 DIAGNOSIS — E78.5 DYSLIPIDEMIA: ICD-10-CM

## 2022-12-01 DIAGNOSIS — I87.2 VENOUS INSUFFICIENCY: ICD-10-CM

## 2022-12-01 DIAGNOSIS — E11.9 TYPE 2 DIABETES MELLITUS WITHOUT COMPLICATION, WITHOUT LONG-TERM CURRENT USE OF INSULIN (HCC): Chronic | ICD-10-CM

## 2022-12-01 DIAGNOSIS — J96.21 ACUTE ON CHRONIC RESPIRATORY FAILURE WITH HYPOXIA AND HYPERCAPNIA (HCC): ICD-10-CM

## 2022-12-01 DIAGNOSIS — I10 PRIMARY HYPERTENSION: ICD-10-CM

## 2022-12-01 DIAGNOSIS — I50.9 CONGESTIVE HEART FAILURE, UNSPECIFIED HF CHRONICITY, UNSPECIFIED HEART FAILURE TYPE (HCC): Primary | ICD-10-CM

## 2022-12-01 DIAGNOSIS — N18.31 STAGE 3A CHRONIC KIDNEY DISEASE (HCC): ICD-10-CM

## 2022-12-01 DIAGNOSIS — G40.909 SEIZURE DISORDER (HCC): ICD-10-CM

## 2022-12-01 DIAGNOSIS — J43.1 PANLOBULAR EMPHYSEMA (HCC): ICD-10-CM

## 2022-12-01 NOTE — PROGRESS NOTES
[facility]  [place] [address]  [address] [place] [address]  Phone:  [phone]  Fax: [phone]       Chief Complaint   Patient presents with    Follow-up    Diabetes    Hypertension     Patient here for follow up high blood pressure and diabetes. .      SUBJECTIVE:    John Gregg is a 76 y.o. female comes in for return visit stating that she has done well. She states that she has not been short of breath or had any audible wheezing. She is on very high dose steroids specifically 20 mg of prednisone after tapering. She is also on furosemide 40 mg q.d. for her presumed congestive heart failure secondary to preserved systolic function. According to the daughter, she is indeed losing weight which is great. She has a past history of primary hypertension, dyslipidemia, diabetes mellitus, morbid obesity, as well as COPD with a restrictive component from her obesity. Current Outpatient Medications   Medication Sig Dispense Refill    predniSONE (DELTASONE) 20 mg tablet 3 tab x 2 days the 2 tabs x 2 days then 1 tab 60 Tablet 1    lacosamide (VIMPAT) 50 mg tab tablet TAKE ONE TABLET BY MOUTH TWICE DAILY 180 Tablet 3    atorvastatin (LIPITOR) 80 mg tablet TAKE ONE TABLET BY MOUTH DAILY 90 Tablet 3    lancets (OneTouch Delica Plus Lancet) 33 gauge misc USE TO check BLOOD SUGAR ONCE DAILY 100 Lancet 3    levETIRAcetam 1,000 mg tablet TAKE 1 TABLET BY MOUTH TWICE DAILY 180 Tablet 3    allopurinoL (ZYLOPRIM) 300 mg tablet TAKE 1 TABLET BY MOUTH EVERY DAY 90 Tablet 3    felodipine (PLENDIL SR) 10 mg 24 hr tablet TAKE 1 TABLET BY MOUTH EVERY DAY 90 Tablet 3    albuterol (PROVENTIL HFA, VENTOLIN HFA, PROAIR HFA) 90 mcg/actuation inhaler Take 2 Puffs by inhalation every four (4) hours as needed for Wheezing.  20.1 g 3    carvediloL (COREG) 12.5 mg tablet TAKE ONE TABLET BY MOUTH TWICE DAILY 180 Tablet 3    furosemide (LASIX) 40 mg tablet TAKE ONE TABLET BY MOUTH DAILY 90 Tablet 3    albuterol (PROVENTIL VENTOLIN) 2.5 mg /3 mL (0.083 %) nebu USE ONE vial PER nebulizer EVERY 6 HOURS AS NEEDED FOR WHEEZING OR SHORTNESS OF BREATH 300 mL 3    glucose blood VI test strips (OneTouch Verio test strips) strip Use to test blood sugar once daily. Dx.e11.9 50 Strip 11    Blood-Glucose Meter (OneTouch Verio Meter) misc Use to test blood sugar daily. Dx.e11.9 1 Each 0    glucose blood VI test strips (FreeStyle Lite Strips) strip Use to test blood sugar daily 50 Strip 11    glucose blood VI test strips (TRUE METRIX GLUCOSE TEST STRIP) strip Use to test blood sugar once daily. Dx.e11.9 100 Strip 11    Blood-Glucose Meter (TRUE METRIX AIR GLUCOSE METER) [facility] 1 Device by Does Not Apply route daily. Use to test blood sugars daily. dx.e11.9 1 Each 0    lancets (TRUEPLUS LANCETS) 28 gauge misc Use to test blood sugar once daily. Dx.e11.9 100 Lancet 11    Januvia 100 mg tablet TAKE ONE TABLET BY MOUTH EVERY DAY (Patient not taking: No sig reported) 30 Tablet 11    polyethylene glycol (MIRALAX) 17 gram packet Take 1 Packet by mouth two (2) times daily as needed for Constipation. (Patient not taking: No sig reported) 60 Each 11    predniSONE (DELTASONE) 20 mg tablet Take 2 Tablets by mouth daily (with breakfast). (Patient not taking: No sig reported) 20 Tablet 0    warfarin (COUMADIN) 5 mg tablet TAKE 1 TABLET BY MOUTH MONDAY THROUGH FRIDAY, TAKE 1 AND 1/2 TABLETS BY MOUTH ON SATURDAY AND SUNDAY (Patient taking differently: 5 mg.  TAKE 1 TABLET BY MOUTH MONDAY THROUGH FRIDAY, TAKE 1 AND 1/2 TABLETS BY MOUTH ON SATURDAY AND SUNDAY  Indications: 11-9-22: Monday through Thursday patient is taking 1 1/2 tabs (7.5mg), and taking 1 tab (5mg) on Friday, Saturday, and Sunday.) 103 Tablet 3    Spiriva with HandiHaler 18 mcg inhalation capsule INHALE THE CONTENTS OF ONE CAPSULE BY MOUTH DAILY, USING HANDIHALER (Patient not taking: No sig reported) 30 Capsule 11    budesonide-formoterol (SYMBICORT) 160-4.5 mcg/Actuation HFA inhaler Take 2 Puffs by inhalation two (2) times a day. (Patient not taking: No sig reported) 10.2 Inhaler 11     Past Medical History:   Diagnosis Date    Arrhythmia     Arthritis     Asthma     Chronic kidney disease     Chronic obstructive pulmonary disease ([facility]     Congestive heart failure ([facility]     Diabetes ([facility]     Glaucoma     Hypertension     Other ill-defined conditions(799.89)     rt knee surgery    Stroke [facility])      Past Surgical History:   Procedure Laterality Date    HX COLONOSCOPY      HX GYN       Allergies   Allergen Reactions    Clonidine Other (comments)     Patient becomes incoherent    Metformin Unknown (comments)         REVIEW OF SYSTEMS:  General: negative for - chills or fever  ENT: negative for - headaches, nasal congestion or tinnitus  Respiratory: negative for - cough, hemoptysis, shortness of breath or wheezing  Cardiovascular : negative for - chest pain, edema, palpitations or shortness of breath  Gastrointestinal: negative for - abdominal pain, blood in stools, heartburn or nausea/vomiting  Genito-Urinary: no dysuria, trouble voiding, or hematuria  Musculoskeletal: negative for - gait disturbance, joint pain, joint stiffness or joint swelling  Neurological: no TIA or stroke symptoms  Hematologic: no bruises, no bleeding, no swollen glands  Integument: no lumps, mole changes, nail changes or rash  Endocrine: no malaise/lethargy or unexpected weight changes      Social History     Socioeconomic History    Marital status:     Number of children: 1   Occupational History    Occupation: disabled--[employer]   Tobacco Use    Smoking status: Former    Smokeless tobacco: Never    Tobacco comments:     20+years ago   Vaping Use    Vaping Use: Never used   Substance and Sexual Activity    Alcohol use: No    Drug use: No    Sexual activity: Not Currently     History reviewed. No pertinent family history.     OBJECTIVE:    Visit Vitals  /60   Pulse 69   Temp 97.8 °F (36.6 °C) (Oral)   Resp 20   Ht 5' 2\" (1.575 m)   Wt 292 lb (132.5 kg)   SpO2 98% Comment: 2 liters oxygen   BMI 53.41 kg/m²     CONSTITUTIONAL: well , well nourished, appears age appropriate  EYES: perrla, eom intact  ENMT:moist mucous membranes, pharynx clear  NECK: supple. Thyroid normal,no JVD  RESPIRATORY: Chest: clear to ascultation and percussion   CARDIOVASCULAR: Heart: regular rate and rhythm  GASTROINTESTINAL: Abdomen: soft, bowel sounds active  HEMATOLOGIC: no pathological lymph nodes palpated  MUSCULOSKELETAL: Extremities: 3+ edema distally, pulse 1+   INTEGUMENT: No unusual rashes or suspicious skin lesions noted. Nails appear normal.  NEUROLOGIC: non-focal exam   MENTAL STATUS: alert and oriented, appropriate affect      ASSESSMENT:  1. Congestive heart failure, unspecified HF chronicity, unspecified heart failure type ([address]    2. Stage 3a chronic kidney disease ([address]    3. Primary hypertension    4. Venous insufficiency    5. Type 2 diabetes mellitus without complication, without long-term current use of insulin ([address]    6. Seizure disorder ([address]    7. Panlobular emphysema ([address]    8. Acute on chronic respiratory failure with hypoxia and hypercapnia (HCC)    9. Dyslipidemia    10. Morbid obesity ([address]        PLAN:  1. The patient has a history of congestive heart failure secondary to diastolic dysfunction. She appears to be well compensated currently. I will check a BNP particularly since she is on furosemide 40 mg q.d.  2. She has mild chronic renal failure, bulk of which is probably pre-renal in origin. 3. Blood pressure is excellent, no adjustments are made. 4. She has chronic venous insufficiency which is long standing and stable. 5. Her diabetes historically has been doing well, but it has been a bit elevated of late primarily because of the steroid usage. Her last hemoglobin A1c was 6 in the first part of November.     Prednisone will be reduced to 10 mg q.d. for the next week and it would be discontinued after that. This should help her diabetes significantly and help reduce her weight and salt retention. 6. Seizure disorder is unremarkable and she has not had any seizures since being on her current anticonvulsive regimen which has not changed. 7. She has chronic respiratory failure secondary to emphysema, as well as restrictive disease related to obesity. She is on O2 at 2 liters a minute. 8. She remains on her statin in view of her increased cardiovascular risk. 9. She really needs to lose weight. This can be accomplished by eating meals, eliminating snacks, and avoiding the consumption of processed carbohydrates. .  Orders Placed This Encounter    METABOLIC PANEL, BASIC         Follow-up and Dispositions    Return in about 2 months (around 2/1/2023).            Domenic Fong MD

## 2022-12-01 NOTE — PROGRESS NOTES
Oralia Walls is a 76 y.o. female  Chief Complaint   Patient presents with    Follow-up    Diabetes    Hypertension     Patient here for follow up high blood pressure and diabetes. 1. Have you been to the ER, urgent care clinic since your last visit? Hospitalized since your last visit? No    2. Have you seen or consulted any other health care providers outside of the [address] [place] [address] since your last visit? Include any pap smears or colon screening.  No

## 2022-12-02 LAB
ANION GAP SERPL CALC-SCNC: 5 MMOL/L (ref 5–15)
BUN SERPL-MCNC: 56 MG/DL (ref 6–20)
BUN/CREAT SERPL: 33 (ref 12–20)
CALCIUM SERPL-MCNC: 10.3 MG/DL (ref 8.5–10.1)
CHLORIDE SERPL-SCNC: 102 MMOL/L (ref 97–108)
CO2 SERPL-SCNC: 27 MMOL/L (ref 21–32)
CREAT SERPL-MCNC: 1.7 MG/DL (ref 0.55–1.02)
GLUCOSE SERPL-MCNC: 118 MG/DL (ref 65–100)
POTASSIUM SERPL-SCNC: 6.1 MMOL/L (ref 3.5–5.1)
SODIUM SERPL-SCNC: 134 MMOL/L (ref 136–145)

## 2022-12-07 ENCOUNTER — TELEPHONE (OUTPATIENT)
Dept: INTERNAL MEDICINE CLINIC | Age: 77
End: 2022-12-07

## 2022-12-08 ENCOUNTER — TELEPHONE (OUTPATIENT)
Dept: INTERNAL MEDICINE CLINIC | Age: 77
End: 2022-12-08

## 2022-12-08 ENCOUNTER — PATIENT OUTREACH (OUTPATIENT)
Dept: CASE MANAGEMENT | Age: 77
End: 2022-12-08

## 2022-12-08 NOTE — PROGRESS NOTES
Patient has graduated from the Transitions of Care Coordination  program on 12-8-22. Patient/family has the ability to self-manage at this time Care management goals have been completed. Patient was referred to the [facility] [place] 2[place] team for further management. Goals Addressed                   This Visit's Progress     COMPLETED: Understands red flags post discharge. 11-9-22: Red flags of CHF reviewed with patient, patient's daughter Eileen Verdugo), and patient's granddaughter Edel Seals), the granddaughter verbalized an understanding. The correct procedure for home daily weights and the recording of home weights was reviewed and the granddaughter verbalized an understanding. The granddaughter agrees to assist patient with home daily weights and the recording of home weights starting 11-10-22. . The parameters of weight gain were reviewed with patient's daughter and granddaughter:  1. If patient should gain 3 or more pounds in 24 hours and/or,  2. If patient should gain 5 or more pounds in one week, patient's PCP/cardiologist should be contacted immediately. Granddaughter verbalized an understanding of the parameters of weight gain and granddaughter was able to provide teach-back. Education provided to daughter and granddaughter regarding the use of a low sodium diet and the avoidance of salt/sodium in food preparation, granddaughter verbalized an understanding. Granddaughter denies patient having shortness of breath, denies chest pain, denies fever/chills, denies nausea/vomiting, and denies swelling in patient's lower extremities since discharge on 11-8-22. Granddaughter reports patient has had 4 falls in the last 6 months, and a total of 8 falls in the last 12 months, granddaughter states patient did not sustain traumatic injury with any of the 8 falls.  Granddaughter states patient is currently utilizing a regular diet at home, patient is not avoiding sodium/salt, and patient is not avoiding concentrated sweets and reducing carbohydrates, granddaughter states patient's appetite is good. Granddaughter has no red flags to report at this time. Care Transitions Nurse will review red flags again on next  phone conversation with patient/family. ANIA     11-30-22:  Red flags of CHF reviewed with patient's daughter, Azalea Beavers (on [address] [place] [address] Road dated 6-22-20), and daughter verbalized an understanding. Daughter states patient is weighing at home daily with the assistance of her granddaughter, and recording home weights. Daughter states patient is utilizing a heart healthy diet, avoiding concentrated sweets and reducing carbohydrates. Daughter has no red flags to report at this time. Care Transitions Nurse will review red flags again on next phone conversation with patient/family. Tyshawn Bey     12-8-22: Red flags of CHF reviewed with patient's granddaughter, Faye Rouse (on [address] [place] [address] Road dated 6-, and the granddaughter verbalized an understanding. Granddaughter states patient continues to weigh at home daily and granddaughter is recording all home weights. Today's home reported weight is 261.8 pounds. Granddaughter states she continues to prepare heart healthy foods for patient and is avoiding concentrated sweets, reducing carbohydrates, and low sodium/no added salt. Granddaughter has no red flags to report at this time and states, \"I see improvement every day. She's doing better than she had been for a long time. \"  The granddaughter accepted invitation to have patient further managed by the [facility] Team. Care Transitions Nurse referred to [facility] Team for further case management services. Goal met. Tyshawn Bey             Patient has Care Transition Nurse's contact information for any further questions, concerns, or needs.   Patients upcoming visits:    Future Appointments   Date Time Provider Marie Luo   12/20/2022  9:40 AM MD Agnieszka Preston 39 BS AMB   1/3/2023 11:15 AM Avelina Shearer MD [facility] MAIN BS AMB   2/1/2023 11:00 AM Avelina Shearer, MD [facility] MAIN BS AMB

## 2022-12-13 ENCOUNTER — PATIENT OUTREACH (OUTPATIENT)
Dept: CASE MANAGEMENT | Age: 77
End: 2022-12-13

## 2022-12-13 NOTE — PROGRESS NOTES
Ambulatory Care Management Note    Date/Time:  2022 10:44 AM    This patient was received as a referral from Care Transition Nurse. Ambulatory Care Manager outreached to patient today to offer care management services. Introduction to self and role of care manager provided. Pt's dtr, Dany Garcia pt had a fall yesterday (w/o injury). ACM asked if pt is using any DME for stabilization/support and she reports pt uses a walker. Bettina Marin was then asked why she feels pt is falling so often (weakness in legs?, pt feels lightheaded?) and she thinks it's due to the heaviness of swelling in pt's LE's. ACM informed her that it takes only (1) bad fall that could be very detrimental to pt's health d/t her advanced age and comorbidities. This will need to be looked into further. ACM also informed Bettina Marin that pt had a high K+ level reported on 22 (6.1) and this needs to be corrected. Bettina Marin asked if banana chips would effect this and she was told bananas, as well as, oranges and melon are high K+ foods which should be avoided at this time and instructed her to read article this ACM sent to pt in [employer] titled \"Six Steps to Controlling High Potassium\" and c/b with any questions. Pt's dtr accepted care management services at this time. Follow up call scheduled. Bettina Marin has Ambulatory Care Manager's contact number for any questions or concerns going forward. Heart Failure Education outreach     Ambulatory Care Manager [facility]) contacted the family by telephone to perform Ambulatory Care Coordination. Verified name and  with family as identifiers. Provided introduction to self, and explanation of the Ambulatory Care Manager's role. ACM reviewed that a Health Healthy tips packet for the Holiday has been sent to [employer]. ACM reviewed CHF zones, daily weights, fluid restriction, the importance of low sodium diet, healthy tips packet with the family.  Instructed family to call pt's PCP if they have a weight gain of 3 lbs in 2 days or 5 lbs in a week. Patient reminded that there is a physician on call 24 hours a day / 7 days a week should the patient have questions or concerns.  The family verbalized understanding.  Maya Vickers

## 2022-12-14 ENCOUNTER — TELEPHONE (OUTPATIENT)
Dept: INTERNAL MEDICINE CLINIC | Age: 77
End: 2022-12-14

## 2022-12-14 NOTE — TELEPHONE ENCOUNTER
Poke with cara and ask that she return for repeat BMP ON TEUESDAY. As far as INR 2.1 no change per Dr. Florrie Bloch.

## 2022-12-19 ENCOUNTER — TELEPHONE (OUTPATIENT)
Dept: INTERNAL MEDICINE CLINIC | Age: 77
End: 2022-12-19

## 2022-12-19 NOTE — TELEPHONE ENCOUNTER
We get a call from [facility] today that patient was in [address] [facility] with a broken ankle which she had surgery on on saturday

## 2022-12-23 ENCOUNTER — TELEPHONE (OUTPATIENT)
Dept: INTERNAL MEDICINE CLINIC | Age: 77
End: 2022-12-23

## 2022-12-23 NOTE — TELEPHONE ENCOUNTER
Granddaughter called with INR 4.3, JUST coming out the hospital with a fractured leg was in [facility].  She is ask to hold coumadin and call us Tuesday morning for adjustment

## 2022-12-27 ENCOUNTER — TELEPHONE (OUTPATIENT)
Dept: INTERNAL MEDICINE CLINIC | Age: 77
End: 2022-12-27

## 2022-12-30 ENCOUNTER — TELEPHONE (OUTPATIENT)
Dept: INTERNAL MEDICINE CLINIC | Age: 77
End: 2022-12-30

## 2022-12-30 NOTE — TELEPHONE ENCOUNTER
Sandra Galindo called in inr 1.4 taking 1 mon-fri and 1/2 sat and sun.  We ask that she increase to 5mg 1 tab daily and call office on tuesday

## 2023-01-04 ENCOUNTER — TELEPHONE (OUTPATIENT)
Dept: INTERNAL MEDICINE CLINIC | Age: 78
End: 2023-01-04

## 2023-01-04 NOTE — TELEPHONE ENCOUNTER
[place] called in INR 1.5 TAKING 5MG daily , we will increse to 5mg daily and 7.5mg on sat and sun, call on Monday per Dr. Katlyn Geiger

## 2023-01-11 ENCOUNTER — TELEPHONE (OUTPATIENT)
Dept: INTERNAL MEDICINE CLINIC | Age: 78
End: 2023-01-11

## 2023-01-11 NOTE — TELEPHONE ENCOUNTER
Patient [facility] called stating INR 2.6 TAKING 5MG PER DAY, no change per Dr. Santi López repeat on friday

## 2023-01-16 ENCOUNTER — TELEPHONE (OUTPATIENT)
Dept: INTERNAL MEDICINE CLINIC | Age: 78
End: 2023-01-16

## 2023-01-16 NOTE — TELEPHONE ENCOUNTER
Jean Vargas called in Friday INR 2.5 taking 5mg mon-fri.  And 1-1/2 sat and sun no change ask to call on monday

## 2023-01-20 ENCOUNTER — TELEPHONE (OUTPATIENT)
Dept: INTERNAL MEDICINE CLINIC | Age: 78
End: 2023-01-20

## 2023-01-20 NOTE — TELEPHONE ENCOUNTER
Granddaughter called in PT 33.0 and INR 3.1 WE ASK THAT SHE DECREASE TO 5MG MON-SAT AND 7.5MG ON SUN, HOWEVER SHE WILL CALL ME ON FRIDAY

## 2023-01-20 NOTE — TELEPHONE ENCOUNTER
Granddaughter called in PT 39.3 and INR 3.7 TAKING 5MG DAILY AND 7.5MG SAT and sun, per Dr. Debo Lenz patient to hold coumadin until Tuesday restart at 5mg daily and 7.5mg on Sunday call on monday

## 2023-01-24 ENCOUNTER — TELEPHONE (OUTPATIENT)
Dept: INTERNAL MEDICINE CLINIC | Age: 78
End: 2023-01-24

## 2023-01-31 ENCOUNTER — TELEPHONE (OUTPATIENT)
Dept: INTERNAL MEDICINE CLINIC | Age: 78
End: 2023-01-31

## 2023-01-31 NOTE — TELEPHONE ENCOUNTER
Bismark Richardson called in PT 17.8 and INR 1.6 TAKING 5MG DAILY AND 7.5MG ON SUN. PER DR. KELLY TAKE AN EXTRA 5MG TODAY , THEN 5MG MON-FRI AND 7.5MG ON SAT -SUN

## 2023-02-01 ENCOUNTER — OFFICE VISIT (OUTPATIENT)
Dept: INTERNAL MEDICINE CLINIC | Age: 78
End: 2023-02-01
Payer: MEDICARE

## 2023-02-01 VITALS
RESPIRATION RATE: 20 BRPM | HEART RATE: 59 BPM | BODY MASS INDEX: 53.41 KG/M2 | OXYGEN SATURATION: 98 % | SYSTOLIC BLOOD PRESSURE: 101 MMHG | HEIGHT: 62 IN | DIASTOLIC BLOOD PRESSURE: 63 MMHG | TEMPERATURE: 98.1 F

## 2023-02-01 DIAGNOSIS — E78.5 DYSLIPIDEMIA: ICD-10-CM

## 2023-02-01 DIAGNOSIS — I50.31 ACUTE DIASTOLIC CHF (CONGESTIVE HEART FAILURE) (HCC): Primary | ICD-10-CM

## 2023-02-01 DIAGNOSIS — E66.01 MORBID OBESITY (HCC): Chronic | ICD-10-CM

## 2023-02-01 DIAGNOSIS — I48.0 PAROXYSMAL ATRIAL FIBRILLATION (HCC): Chronic | ICD-10-CM

## 2023-02-01 DIAGNOSIS — E11.9 TYPE 2 DIABETES MELLITUS WITHOUT COMPLICATION, WITHOUT LONG-TERM CURRENT USE OF INSULIN (HCC): Chronic | ICD-10-CM

## 2023-02-01 DIAGNOSIS — I87.2 VENOUS INSUFFICIENCY: ICD-10-CM

## 2023-02-01 DIAGNOSIS — G40.909 SEIZURE DISORDER (HCC): ICD-10-CM

## 2023-02-01 RX ORDER — LACOSAMIDE 50 MG/1
50 TABLET ORAL 2 TIMES DAILY
COMMUNITY

## 2023-02-01 NOTE — PROGRESS NOTES
Chief Complaint   Patient presents with    Diabetes    Hypertension     1. Have you been to the ER, urgent care clinic since your last visit? Hospitalized since your last visit? Yes Where: [facility]    2. Have you seen or consulted any other health care providers outside of the [address] [place] [address] since your last visit? Include any pap smears or colon screening.  Yes Where: yes  Dr. Sepulveda Second foot doctor

## 2023-02-01 NOTE — PROGRESS NOTES
[facility]  [place] [address]  [address] [facility] [address]  Phone:  [phone]  Fax: [phone]       Chief Complaint   Patient presents with    Diabetes    Hypertension   . SUBJECTIVE:    Hui Rosales is a [de-identified] y.o. female comes in for return visit having had surgery on her left ankle after a traumatic fracture ground level at home. This was surgically corrected and she has a cast on and follows up with the orthopod from [facility] on a every two to three week basis. She is having minimal if any pain currently. INR was low and I am not entirely sure why this happened, but she took 10 mg of warfarin yesterday and resumed her usual dose of 5 mg Monday through Friday and 7.5 mg Saturday and Sunday. This is for continuous atrial fibrillation. She has been seizure free. There has been no meaningful weight loss. She has a history of chronic venous insufficiency leading to chronic orthostatic edema of the lower extremities. Historically, she has a history of diabetes, primary hypertension and dyslipidemia. Her diabetes has been doing quite well. Current Outpatient Medications   Medication Sig Dispense Refill    lacosamide (VIMPAT) 50 mg tab tablet Take 50 mg by mouth two (2) times a day.       warfarin (COUMADIN) 5 mg tablet TAKE 1 TABLET BY MOUTH MONDAY THROUGH FRIDAY, TAKE 1 AND 1/2 TABLETS BY MOUTH ON SATURDAY AND SUNDAY 103 Tablet 3    lacosamide (VIMPAT) 50 mg tab tablet TAKE ONE TABLET BY MOUTH TWICE DAILY 180 Tablet 3    atorvastatin (LIPITOR) 80 mg tablet TAKE ONE TABLET BY MOUTH DAILY 90 Tablet 3    lancets (OneTouch Delica Plus Lancet) 33 gauge misc USE TO check BLOOD SUGAR ONCE DAILY 100 Lancet 3    Spiriva with HandiHaler 18 mcg inhalation capsule INHALE THE CONTENTS OF ONE CAPSULE BY MOUTH DAILY, USING HANDIHALER 30 Capsule 11    felodipine (PLENDIL SR) 10 mg 24 hr tablet TAKE 1 TABLET BY MOUTH EVERY DAY 90 Tablet 3    albuterol (PROVENTIL VENTOLIN) 2.5 mg /3 mL (0.083 %) nebu USE ONE vial PER nebulizer EVERY 6 HOURS AS NEEDED FOR WHEEZING OR SHORTNESS OF BREATH 300 mL 3    glucose blood VI test strips (FreeStyle Lite Strips) strip Use to test blood sugar daily 50 Strip 11    glucose blood VI test strips (TRUE METRIX GLUCOSE TEST STRIP) strip Use to test blood sugar once daily. Dx.e11.9 100 Strip 11    Blood-Glucose Meter (TRUE METRIX AIR GLUCOSE METER) [facility] 1 Device by Does Not Apply route daily. Use to test blood sugars daily. dx.e11.9 1 Each 0    lancets (TRUEPLUS LANCETS) 28 gauge misc Use to test blood sugar once daily. Dx.e11.9 100 Lancet 11    budesonide-formoterol (SYMBICORT) 160-4.5 mcg/Actuation HFA inhaler Take 2 Puffs by inhalation two (2) times a day. 10.2 Inhaler 11    glucose blood VI test strips (OneTouch Verio test strips) strip USE TO test BLOOD SUGAR ONCE DAILY 525 Strip 5    carvediloL (COREG) 12.5 mg tablet TAKE ONE TABLET BY MOUTH TWICE DAILY 180 Tablet 5    furosemide (LASIX) 40 mg tablet TAKE ONE TABLET BY MOUTH DAILY 90 Tablet 5    levETIRAcetam 1,000 mg tablet TAKE ONE TABLET BY MOUTH TWICE DAILY 180 Tablet 5    allopurinoL (ZYLOPRIM) 300 mg tablet TAKE ONE TABLET BY MOUTH DAILY 90 Tablet 5    albuterol (PROVENTIL HFA, VENTOLIN HFA, PROAIR HFA) 90 mcg/actuation inhaler INHALE TWO PUFFS BY MOUTH EVERY 4 HOURS AS NEEDED FOR WHEEZING 20.1 g 5    Januvia 100 mg tablet TAKE ONE TABLET BY MOUTH EVERY DAY (Patient not taking: No sig reported) 30 Tablet 11    polyethylene glycol (MIRALAX) 17 gram packet Take 1 Packet by mouth two (2) times daily as needed for Constipation. (Patient not taking: No sig reported) 60 Each 11    predniSONE (DELTASONE) 20 mg tablet Take 2 Tablets by mouth daily (with breakfast).  (Patient not taking: No sig reported) 20 Tablet 0    predniSONE (DELTASONE) 20 mg tablet 3 tab x 2 days the 2 tabs x 2 days then 1 tab (Patient not taking: Reported on 2/1/2023) 60 Tablet 1    Blood-Glucose Meter (OneTouch Verio Meter) misc Use to test blood sugar daily. Dx.e11.9 1 Each 0     Past Medical History:   Diagnosis Date    Arrhythmia     Arthritis     Asthma     Chronic kidney disease     Chronic obstructive pulmonary disease ([facility]     Congestive heart failure ([facility]     Diabetes ([facility]     Glaucoma     Hypertension     Other ill-defined conditions(799.89)     rt knee surgery    Stroke [facility])      Past Surgical History:   Procedure Laterality Date    HX COLONOSCOPY      HX GYN       Allergies   Allergen Reactions    Clonidine Other (comments)     Patient becomes incoherent    Metformin Unknown (comments)         REVIEW OF SYSTEMS:  General: negative for - chills or fever  ENT: negative for - headaches, nasal congestion or tinnitus  Respiratory: negative for - cough, hemoptysis, shortness of breath or wheezing  Cardiovascular : negative for - chest pain, edema, palpitations or shortness of breath  Gastrointestinal: negative for - abdominal pain, blood in stools, heartburn or nausea/vomiting  Genito-Urinary: no dysuria, trouble voiding, or hematuria  Musculoskeletal: negative for - gait disturbance, joint pain, joint stiffness or joint swelling  Neurological: no TIA or stroke symptoms  Hematologic: no bruises, no bleeding, no swollen glands  Integument: no lumps, mole changes, nail changes or rash  Endocrine: no malaise/lethargy or unexpected weight changes      Social History     Socioeconomic History    Marital status:     Number of children: 1   Occupational History    Occupation: disabled--[employer]   Tobacco Use    Smoking status: Former    Smokeless tobacco: Never    Tobacco comments:     20+years ago   Vaping Use    Vaping Use: Never used   Substance and Sexual Activity    Alcohol use: No    Drug use: No    Sexual activity: Not Currently     History reviewed. No pertinent family history.     OBJECTIVE:    Visit Vitals  /63 (BP 1 Location: Left upper arm, BP Patient Position: Sitting)   Pulse (!) 59   Temp 98.1 °F (36.7 °C) (Oral)   Resp 20   Ht 5' 2\" (1.575 m)   SpO2 98% Comment: 2 liters O2   BMI 53.41 kg/m²     CONSTITUTIONAL: well , well nourished, appears age appropriate  EYES: perrla, eom intact  ENMT:moist mucous membranes, pharynx clear  NECK: supple. Thyroid normal  RESPIRATORY: Chest: clear to ascultation and percussion   CARDIOVASCULAR: Heart: regular rate and rhythm  GASTROINTESTINAL: Abdomen: soft, bowel sounds active  HEMATOLOGIC: no pathological lymph nodes palpated  MUSCULOSKELETAL: Extremities: no edema, pulse 1+   INTEGUMENT: No unusual rashes or suspicious skin lesions noted. Nails appear normal.  NEUROLOGIC: non-focal exam   MENTAL STATUS: alert and oriented, appropriate affect      ASSESSMENT:  1. Acute diastolic CHF (congestive heart failure) (HCC)    2. Paroxysmal atrial fibrillation (HCC)    3. Venous insufficiency    4. Seizure disorder ([facility]    5. Type 2 diabetes mellitus without complication, without long-term current use of insulin ([facility]    6. Dyslipidemia    7. Morbid obesity ([facility]        PLAN:  1. No overt signs of congestive heart failure currently exist.  I will check a BNP however. 2. Her ventricular rate is quite stable. 3. She has chronic swelling of her lower extremities, but this is minimal currently and related primarily to venous insufficiency. 4. She remained seizure free on her anticonvulsants. 5. Historically her diabetes has indeed been doing well, but I will await the results of her hemoglobin A1c.  6. She will continue her statin as prescribed in view of her increased cardiovascular risk. 7. She really needs to lose weight, as I have discussed for decades and no meaningful progress has indeed been made. This can however be accomplished by eating meals, eliminating snacks and avoiding the consumption of processed carbohydrates.         Orders Placed This Encounter    HEMOGLOBIN A1C WITH EAG    METABOLIC PANEL, BASIC    MAGNESIUM    lacosamide (VIMPAT) 50 mg tab tablet Follow-up and Dispositions    Return in about 3 months (around 5/1/2023).            Poncho Cardoza MD

## 2023-02-02 LAB
ANION GAP SERPL CALC-SCNC: 4 MMOL/L (ref 5–15)
BUN SERPL-MCNC: 47 MG/DL (ref 6–20)
BUN/CREAT SERPL: 27 (ref 12–20)
CALCIUM SERPL-MCNC: 10.1 MG/DL (ref 8.5–10.1)
CHLORIDE SERPL-SCNC: 103 MMOL/L (ref 97–108)
CO2 SERPL-SCNC: 30 MMOL/L (ref 21–32)
CREAT SERPL-MCNC: 1.75 MG/DL (ref 0.55–1.02)
EST. AVERAGE GLUCOSE BLD GHB EST-MCNC: 117 MG/DL
GLUCOSE SERPL-MCNC: 98 MG/DL (ref 65–100)
HBA1C MFR BLD: 5.7 % (ref 4–5.6)
MAGNESIUM SERPL-MCNC: 1.8 MG/DL (ref 1.6–2.4)
POTASSIUM SERPL-SCNC: 5.2 MMOL/L (ref 3.5–5.1)
SODIUM SERPL-SCNC: 137 MMOL/L (ref 136–145)

## 2023-02-06 ENCOUNTER — TELEPHONE (OUTPATIENT)
Dept: INTERNAL MEDICINE CLINIC | Age: 78
End: 2023-02-06

## 2023-02-06 NOTE — TELEPHONE ENCOUNTER
Granddaughter called in PT 25.8 and INR 2.4 taking 1 tab mon-fri, and 1 1/2 tabs sat and sun call Friday.

## 2023-02-14 ENCOUNTER — TELEPHONE (OUTPATIENT)
Dept: INTERNAL MEDICINE CLINIC | Age: 78
End: 2023-02-14

## 2023-02-14 NOTE — TELEPHONE ENCOUNTER
Granddaughter called in pt 30.3 AND INR 2.8 taking 5mg coumadin mon- fri and 1-1/2 sat and sun , no changes today

## 2023-02-15 DIAGNOSIS — J45.20 INTERMITTENT ASTHMA WITHOUT COMPLICATION, UNSPECIFIED ASTHMA SEVERITY: ICD-10-CM

## 2023-02-15 RX ORDER — FUROSEMIDE 40 MG/1
TABLET ORAL
Qty: 90 TABLET | Refills: 5 | Status: SHIPPED | OUTPATIENT
Start: 2023-02-15

## 2023-02-15 RX ORDER — LEVETIRACETAM 1000 MG/1
TABLET ORAL
Qty: 180 TABLET | Refills: 5 | Status: SHIPPED | OUTPATIENT
Start: 2023-02-15

## 2023-02-15 RX ORDER — ALLOPURINOL 300 MG/1
TABLET ORAL
Qty: 90 TABLET | Refills: 5 | Status: SHIPPED | OUTPATIENT
Start: 2023-02-15

## 2023-02-15 RX ORDER — CARVEDILOL 12.5 MG/1
TABLET ORAL
Qty: 180 TABLET | Refills: 5 | Status: SHIPPED | OUTPATIENT
Start: 2023-02-15

## 2023-02-15 RX ORDER — ALBUTEROL SULFATE 90 UG/1
AEROSOL, METERED RESPIRATORY (INHALATION)
Qty: 20.1 G | Refills: 5 | Status: SHIPPED | OUTPATIENT
Start: 2023-02-15

## 2023-02-17 ENCOUNTER — TELEPHONE (OUTPATIENT)
Dept: INTERNAL MEDICINE CLINIC | Age: 78
End: 2023-02-17

## 2023-02-21 ENCOUNTER — TELEPHONE (OUTPATIENT)
Dept: INTERNAL MEDICINE CLINIC | Age: 78
End: 2023-02-21

## 2023-03-03 ENCOUNTER — PATIENT OUTREACH (OUTPATIENT)
Dept: CASE MANAGEMENT | Age: 78
End: 2023-03-03

## 2023-03-03 ENCOUNTER — TELEPHONE (OUTPATIENT)
Dept: INTERNAL MEDICINE CLINIC | Age: 78
End: 2023-03-03

## 2023-03-03 RX ORDER — ACETAMINOPHEN 500 MG
1000 TABLET ORAL
COMMUNITY

## 2023-03-03 RX ORDER — GUAIFENESIN 100 MG/5ML
81 LIQUID (ML) ORAL DAILY
COMMUNITY

## 2023-03-03 NOTE — PROGRESS NOTES
Ambulatory Care Management Note    Date/Time:  3/3/2023 1:07 PM    Patient Current Location: [place]    This [facility]) reviewed and updated the following screenings during this call; general assessment, disease specific assessment , self management assessment, SDOH assessments, ACP assessment and note, and medication reconciliation     Patient's challenges to self-management identified:   functional physical ability, medical condition, polypharmacy, and transportation, financial      Medication Management:  good adherence and good understanding; no report of S/E's    Advance Care Planning:          (ACP note updated 3/03/23)  Does patient have an Advance Directive:  currently not on file; education provided via [employer] message sent today    Community Services:  ACM will send tip sheet resources re: Transportation, Financial & Food  Referrals:  Pt missed [facility] f/up appt to have cast removed from LLE d/t being admitted in the hosp; Grd-dtr to reschedule this appt shortly  Durable Medical Equipment: Home O2 at 2lpm ATC via concentrator, also has portable tanks for transport; Nebulizer; Deandre lift, BP Cuff, Pulse oximeter, Scale, Glucometer, Specialty bed, walker, cane, tub seat, w/c, BS commode      Health Maintenance Due   Topic Date Due    Shingles Vaccine (1 of 2) Never done    COVID-19 Vaccine (3 - Booster for Pfizer series) 08/21/2021    Foot Exam Q1  03/04/2022     Health Maintenance Reviewed: Yes. Encouraged granddtr/CG to schedule annual Feet/Eye Exams. PCP/Specialist follow up:   Future Appointments   Date Time Provider Marie Luo   3/8/2023  1:15 PM Herrera Juarez MD [facility] MAIN BS AMB   5/1/2023 10:45 AM Herrera Juarez MD [facility] MAIN BS AMB        Top Challenges reviewed with the provider     Pt is not able to sit up w/o experiencing malaise &/or syncope. Family reports pt is drinking more water now.   They con't to make attempts to sit her up to help her tolerate position changes. Family endorses having stopped the following Rx's per hosp d/c instructions: carvedilol 12.5mg, furosemide 40mg & felodipine ER 2.5mg. Bed bound at this time. Family is turning her frequently to prevent pressure ulcers. None to date. Pt's wt at [facility] on 2/28/23 was 249lbs. Down 43lbs from 12/01/22. No longer experiencing diarrhea (C diff neg during admit). DCP: Home w/HH PT-OT-SN [facility])         Ambulatory  contacted patient for discussion and case management of CHF/COPD/DM II/CKD/AFib (all of which are stable at this time). Summary of patients top problems:   Postural Hypotension:  Currently pt is bed bound, unable to sit up w/o feeling malaise and/or syncope. Family is turning pt freq. No pressure ulcers to date. Family endorses having stopped the following Rx's per hosp d/c instructions: carvedilol 12.5mg, furosemide 40mg & felodipine ER 2.5mg. Pt is drinking more water. Goals Addressed                   This Visit's Progress     Identification of barriers to adherence to a plan of care d/t transportation barrier (pt is unable to get into family vehicle w/o syncope   On track     3/03/23:  Pt was dc'd home from the hosp on 3/01/23 for HypoNa+, SZs & Renal Failure (likely d/t dehydration per hosp notes; pt had diarrhea pta, compounded by poor po fluid intake). At this time, she is still unable to sit up w/o malaise &/or syncope. Family is concerned about getting pt to PCP's OV on 3/08/23, stating they will need stretcher transport. ACM informed family to call the [employer]'s phone number on the back of pt's [employer] Medicare Card to inquire about what, if any, transportation benefits this pt has available to her. In addition, ACM will send a tip sheet w/other transportation services to call. In the meantime, family is still trying to get pt up to see if they can get her CV system ready to tolerate transport in their car if poss.   ACM will f/up on the status of this goal in two wks, but encouraged the family to call ACM p/t this time if they need anything or have questions.  /dla             Patient's family verbalized understanding of all information discussed.  Patient has this Ambulatory Care Manager's contact information for any further questions, concerns, or needs.  Delio Briceño

## 2023-03-03 NOTE — ACP (ADVANCE CARE PLANNING)
Advance Care Planning:           Does patient have an Advance Directive:  currently not on file; education provided via [employer] sent on 3/03/23   /dlrobb

## 2023-03-07 ENCOUNTER — TELEPHONE (OUTPATIENT)
Dept: INTERNAL MEDICINE CLINIC | Age: 78
End: 2023-03-07

## 2023-03-07 NOTE — TELEPHONE ENCOUNTER
Patient was in [facility]  and they D/C the felodipine, carvedilol, and furosemide.  And the physican  there ask that labs be done but did not state what and patient is ask to please try to bring her in

## 2023-03-07 NOTE — TELEPHONE ENCOUNTER
Patient INR 3.7 TAKING 5MG COUMADIN DAILY AND 7.5MG SAT AND SUN PATIENT TO HOLD X 2 DAYS HAS APPT ON WED WITH DR Sosa Bias

## 2023-03-08 ENCOUNTER — OFFICE VISIT (OUTPATIENT)
Dept: INTERNAL MEDICINE CLINIC | Age: 78
End: 2023-03-08

## 2023-03-08 VITALS
TEMPERATURE: 97.3 F | HEART RATE: 92 BPM | RESPIRATION RATE: 18 BRPM | OXYGEN SATURATION: 100 % | SYSTOLIC BLOOD PRESSURE: 144 MMHG | DIASTOLIC BLOOD PRESSURE: 73 MMHG | BODY MASS INDEX: 53.41 KG/M2 | HEIGHT: 62 IN

## 2023-03-08 DIAGNOSIS — J43.1 PANLOBULAR EMPHYSEMA (HCC): ICD-10-CM

## 2023-03-08 DIAGNOSIS — R53.81 PHYSICAL DECONDITIONING: ICD-10-CM

## 2023-03-08 DIAGNOSIS — I50.9 CONGESTIVE HEART FAILURE, UNSPECIFIED HF CHRONICITY, UNSPECIFIED HEART FAILURE TYPE (HCC): ICD-10-CM

## 2023-03-08 DIAGNOSIS — J96.21 ACUTE ON CHRONIC RESPIRATORY FAILURE WITH HYPOXIA AND HYPERCAPNIA (HCC): ICD-10-CM

## 2023-03-08 DIAGNOSIS — I48.0 PAROXYSMAL ATRIAL FIBRILLATION (HCC): Primary | ICD-10-CM

## 2023-03-08 DIAGNOSIS — G40.909 SEIZURE DISORDER (HCC): ICD-10-CM

## 2023-03-08 DIAGNOSIS — I10 PRIMARY HYPERTENSION: ICD-10-CM

## 2023-03-08 DIAGNOSIS — E66.01 MORBID OBESITY (HCC): Chronic | ICD-10-CM

## 2023-03-08 DIAGNOSIS — E87.1 HYPONATREMIA: ICD-10-CM

## 2023-03-08 DIAGNOSIS — G47.33 OBSTRUCTIVE SLEEP APNEA: ICD-10-CM

## 2023-03-08 DIAGNOSIS — J96.22 ACUTE ON CHRONIC RESPIRATORY FAILURE WITH HYPOXIA AND HYPERCAPNIA (HCC): ICD-10-CM

## 2023-03-08 NOTE — PROGRESS NOTES
Moses Nieves is a [de-identified] y.o. female  Chief Complaint   Patient presents with    Hospital Follow Up     Patient here for hospital follow up Hyponatremia, Acute Renal Failure and Seizure. YES Answers must have Comments  1. \"Have you been to the ER, urgent care clinic since your last visit? Hospitalized since your last visit? \"    [x] YES When 02/22/2023, Where [facility], and Reason for visit Hyponatremia, Seizure and Acute Renal Failure. [] NO       2. Have you seen or consulted any other health care providers outside of [address] [place] [address] since your last visit?     [] YES   [x] NO       3. For patients aged 39-70: Have you had a colorectal cancer screening such as a colonoscopy/FIT/Cologuard? Nurse/CMA to request records if not in chart   [] YES   [] NO   [x] NA, based on age    If the patient is female:      4. For female patients aged 41-77: Lorin Madison you had a mammogram in the last two years?  Nurse/CMA to request records if not in chart   [] YES   [] NO   [x] NA, based on age    11. For female patients aged 21-65: Lorin Madison you had a pap smear?   Nurse/CMA to request records if not in chart   [] YES   [] NO  [x] NA, based on age

## 2023-03-09 LAB
ANION GAP SERPL CALC-SCNC: 6 MMOL/L (ref 5–15)
BUN SERPL-MCNC: 33 MG/DL (ref 6–20)
BUN/CREAT SERPL: 24 (ref 12–20)
CALCIUM SERPL-MCNC: 10.2 MG/DL (ref 8.5–10.1)
CHLORIDE SERPL-SCNC: 103 MMOL/L (ref 97–108)
CO2 SERPL-SCNC: 27 MMOL/L (ref 21–32)
CREAT SERPL-MCNC: 1.37 MG/DL (ref 0.55–1.02)
GLUCOSE SERPL-MCNC: 102 MG/DL (ref 65–100)
POTASSIUM SERPL-SCNC: 5.2 MMOL/L (ref 3.5–5.1)
SODIUM SERPL-SCNC: 136 MMOL/L (ref 136–145)

## 2023-03-10 ENCOUNTER — TELEPHONE (OUTPATIENT)
Dept: INTERNAL MEDICINE CLINIC | Age: 78
End: 2023-03-10

## 2023-03-12 NOTE — PROGRESS NOTES
[address] [facility]  [place]   [address] [place] [address]  Phone:  [phone]  Fax: [phone]       Chief Complaint   Patient presents with    Hospital Follow Up     Patient here for hospital follow up Hyponatremia, Acute Renal Failure and Seizure. .      SUBJECTIVE:    Julieta Rodriguez is a [de-identified] y.o. female  Dictation on: 03/12/2023 10:42 AM by: Julia Garnett [[id number]]          Current Outpatient Medications   Medication Sig Dispense Refill    aspirin 81 mg chewable tablet Take 81 mg by mouth daily. acetaminophen (TYLENOL) 500 mg tablet Take 1,000 mg by mouth every six (6) hours as needed for Pain.      levETIRAcetam 1,000 mg tablet TAKE ONE TABLET BY MOUTH TWICE DAILY 180 Tablet 5    allopurinoL (ZYLOPRIM) 300 mg tablet TAKE ONE TABLET BY MOUTH DAILY 90 Tablet 5    albuterol (PROVENTIL HFA, VENTOLIN HFA, PROAIR HFA) 90 mcg/actuation inhaler INHALE TWO PUFFS BY MOUTH EVERY 4 HOURS AS NEEDED FOR WHEEZING 20.1 g 5    polyethylene glycol (MIRALAX) 17 gram packet Take 1 Packet by mouth two (2) times daily as needed for Constipation. 60 Each 11    warfarin (COUMADIN) 5 mg tablet TAKE 1 TABLET BY MOUTH MONDAY THROUGH FRIDAY, TAKE 1 AND 1/2 TABLETS BY MOUTH ON SATURDAY AND SUNDAY 103 Tablet 3    lacosamide (VIMPAT) 50 mg tab tablet TAKE ONE TABLET BY MOUTH TWICE DAILY 180 Tablet 3    atorvastatin (LIPITOR) 80 mg tablet TAKE ONE TABLET BY MOUTH DAILY 90 Tablet 3    Spiriva with HandiHaler 18 mcg inhalation capsule INHALE THE CONTENTS OF ONE CAPSULE BY MOUTH DAILY, USING HANDIHALER 30 Capsule 11    albuterol (PROVENTIL VENTOLIN) 2.5 mg /3 mL (0.083 %) nebu USE ONE vial PER nebulizer EVERY 6 HOURS AS NEEDED FOR WHEEZING OR SHORTNESS OF BREATH 300 mL 3    Blood-Glucose Meter (OneTouch Verio Meter) misc Use to test blood sugar daily. Dx.e11.9 1 Each 0    glucose blood VI test strips (TRUE METRIX GLUCOSE TEST STRIP) strip Use to test blood sugar once daily.  Dx.e11.9 100 Strip 11 Blood-Glucose Meter (TRUE METRIX AIR GLUCOSE METER) misc 1 Device by Does Not Apply route daily. Use to test blood sugars daily. dx.e11.9 1 Each 0    lancets (TRUEPLUS LANCETS) 28 gauge misc Use to test blood sugar once daily. Dx.e11.9 100 Lancet 11    budesonide-formoterol (SYMBICORT) 160-4.5 mcg/Actuation HFA inhaler Take 2 Puffs by inhalation two (2) times a day. 10.2 Inhaler 11    glucose blood VI test strips (OneTouch Verio test strips) strip USE TO test BLOOD SUGAR ONCE DAILY (Patient not taking: No sig reported) 525 Strip 5    carvediloL (COREG) 12.5 mg tablet TAKE ONE TABLET BY MOUTH TWICE DAILY (Patient not taking: No sig reported) 180 Tablet 5    furosemide (LASIX) 40 mg tablet TAKE ONE TABLET BY MOUTH DAILY (Patient not taking: No sig reported) 90 Tablet 5    Januvia 100 mg tablet TAKE ONE TABLET BY MOUTH EVERY DAY (Patient not taking: No sig reported) 30 Tablet 11    predniSONE (DELTASONE) 20 mg tablet Take 2 Tablets by mouth daily (with breakfast).  (Patient not taking: No sig reported) 20 Tablet 0    predniSONE (DELTASONE) 20 mg tablet 3 tab x 2 days the 2 tabs x 2 days then 1 tab (Patient not taking: No sig reported) 60 Tablet 1    lancets (OneTouch Delica Plus Lancet) 33 gauge misc USE TO check BLOOD SUGAR ONCE DAILY (Patient not taking: No sig reported) 100 Lancet 3    felodipine (PLENDIL SR) 10 mg 24 hr tablet TAKE 1 TABLET BY MOUTH EVERY DAY (Patient not taking: No sig reported) 90 Tablet 3    glucose blood VI test strips (FreeStyle Lite Strips) strip Use to test blood sugar daily (Patient not taking: No sig reported) 50 Strip 11     Past Medical History:   Diagnosis Date    A-fib (HCC)     Arthritis     Chronic obstructive pulmonary disease (HCC)     CKD (chronic kidney disease) stage 3, GFR 30-59 ml/min (HCC)     Congestive heart failure (HCC)     Glaucoma     Hypertension     Other ill-defined conditions(799.89)     rt knee surgery    Seizure disorder ([facility]     Stroke (HCC)     Type 2 diabetes mellitus [facility]      Past Surgical History:   Procedure Laterality Date    HX COLONOSCOPY      HX GYN       Allergies   Allergen Reactions    Clonidine Other (comments)     Patient becomes incoherent    Metformin Unknown (comments)         REVIEW OF SYSTEMS:  General: negative for - chills or fever  ENT: negative for - headaches, nasal congestion or tinnitus  Respiratory: negative for - cough, hemoptysis, shortness of breath or wheezing  Cardiovascular : negative for - chest pain, edema, palpitations or shortness of breath  Gastrointestinal: negative for - abdominal pain, blood in stools, heartburn or nausea/vomiting  Genito-Urinary: no dysuria, trouble voiding, or hematuria  Musculoskeletal: negative for - gait disturbance, joint pain, joint stiffness or joint swelling  Neurological: no TIA or stroke symptoms  Hematologic: no bruises, no bleeding, no swollen glands  Integument: no lumps, mole changes, nail changes or rash  Endocrine: no malaise/lethargy or unexpected weight changes      Social History     Socioeconomic History    Marital status:     Number of children: 1   Occupational History    Occupation: disabled--[employer]   Tobacco Use    Smoking status: Former     Types: Cigarettes    Smokeless tobacco: Never    Tobacco comments:     20+years ago   Vaping Use    Vaping Use: Never used   Substance and Sexual Activity    Alcohol use: No    Drug use: No    Sexual activity: Not Currently     Social Determinants of Health     Financial Resource Strain: Low Risk     Difficulty of Paying Living Expenses: Not very hard   Food Insecurity: No Food Insecurity    Worried About Running Out of Food in the Last Year: Never true    Ran Out of Food in the Last Year: Never true   Transportation Needs: Unmet Transportation Needs    Lack of Transportation (Medical): Yes    Lack of Transportation (Non-Medical): Yes   Physical Activity: Inactive    Days of Exercise per Week: 0 days    Minutes of Exercise per Session: 0 min Stress: No Stress Concern Present    Feeling of Stress : Not at all   Social Connections: Socially Isolated    Frequency of Communication with Friends and Family: Twice a week    Frequency of Social Gatherings with Friends and Family: Twice a week    Attends Islam Services: Never    Active Member of Clubs or Organizations: No    Attends Club or Organization Meetings: Never    Marital Status:    Housing Stability: Low Risk     Unable to Pay for Housing in the Last Year: No    Number of Places Lived in the Last Year: 1    Unstable Housing in the Last Year: No     History reviewed. No pertinent family history. OBJECTIVE:    Visit Vitals  BP (!) 144/73   Pulse 92   Temp 97.3 °F (36.3 °C) (Oral)   Resp 18   Ht 5' 2\" (1.575 m)   SpO2 100% Comment: 2 liters   BMI 53.41 kg/m²     CONSTITUTIONAL: well , well nourished, appears age appropriate  EYES: perrla, eom intact  ENMT:moist mucous membranes, pharynx clear  NECK: supple. Thyroid normal  RESPIRATORY: Chest: clear to ascultation and percussion   CARDIOVASCULAR: Heart: regular rate and rhythm  GASTROINTESTINAL: Abdomen: soft, bowel sounds active  HEMATOLOGIC: no pathological lymph nodes palpated  MUSCULOSKELETAL: Extremities: no edema, pulse 1+   INTEGUMENT: No unusual rashes or suspicious skin lesions noted. Nails appear normal.  NEUROLOGIC: non-focal exam   MENTAL STATUS: alert and oriented, appropriate affect      ASSESSMENT:  1. Paroxysmal atrial fibrillation (HCC)    2. Hyponatremia    3. Congestive heart failure, unspecified HF chronicity, unspecified heart failure type ([address]    4. Primary hypertension    5. Seizure disorder ([address]    6. Obstructive sleep apnea    7. Acute on chronic respiratory failure with hypoxia and hypercapnia (HCC)    8. Panlobular emphysema ([address]    9. Physical deconditioning    10. Morbid obesity ([address]        PLAN:   Dictation on: 03/12/2023 10:46 AM by: Syed Landis [[id number]]   .   Orders Placed This Encounter    METABOLIC PANEL, BASIC    AMB POC PT/INR         Follow-up and Dispositions    Return in about 3 months (around 6/8/2023).            Pierre Ureña MD

## 2023-03-17 ENCOUNTER — PATIENT OUTREACH (OUTPATIENT)
Dept: CASE MANAGEMENT | Age: 78
End: 2023-03-17

## 2023-03-17 NOTE — PROGRESS NOTES
Ambulatory Care Management Note    Date/Time:  3/17/2023 11:26 AM    Patient Current Location: [place]    Patient has graduated from the Complex Case Management  program on 3/17/23. Patient/family has the ability to self-manage at this time. Care management goals have been completed. No further Ambulatory Care Manager follow up scheduled. Complex Case Management now resolved. Goals Addressed                   This Visit's Progress     COMPLETED: Identification of barriers to adherence to a plan of care d/t transportation barrier (pt is unable to get into family vehicle w/o syncope   On track     3/17/23:  Pt is now able to sit in a chair. HH PT recently started pt on wt-bearing. Her LLE cast was removed last wk and she was able to attend her PCP appt on 3/08/23 via family vehicle. Swelling in pt's LLE is present, but improved. ACM encouraged pt propping her LLE up when sitting and wear her compression stocking during the day. Pt is now off her BP meds and home BP's are running 122-137/50's w/o sx's. Now that pt is stabilizing, ACM strongly encouraged pt's grd-dtr/CG to reschedule pt w/Sleep Med so pt can resume CPAP therapy. Per Delio Rdz, pt stopped using CPAP sometime in Dec '22, when she started to get ill, d/t needing a re-eval of her CPAP machine. ACM will request referral for pt via routed message to PCP at this time. Pt now has [facility] PT/OT/SN coming to the home. Next PCP visit is scheduled for 5/01/23.    /dla    3/03/23:  Pt was dc'd home from the hosp on 3/01/23 for HypoNa+, SZs & Renal Failure (likely d/t dehydration per hosp notes; pt had diarrhea pta, compounded by poor po fluid intake). At this time, she is still unable to sit up w/o malaise &/or syncope. Family is concerned about getting pt to PCP's OV on 3/08/23, stating they will need stretcher transport.   ACM informed family to call the [employer] phone number on the back of pt's [facility] Medicare Card to inquire about what, if any, transportation benefits this pt has available to her. In addition, ACM will send a tip sheet w/other transportation services to call. In the meantime, family is still trying to get pt up to see if they can get her CV system ready to tolerate transport in their car if poss.   ACM will f/up on the status of this goal in two wks, but encouraged the family to call ACM p/t this time if they need anything or have questions.  /dla              Patients upcoming visits:    Future Appointments   Date Time Provider Marie Luo   5/1/2023 10:45 AM Destin Wheeler MD [facility] MAIN BS AMB      Patient has Ambulatory Care Manager's contact information for any further questions, concerns, or needs.    Mireya Kessler

## 2023-03-21 ENCOUNTER — TELEPHONE (OUTPATIENT)
Dept: INTERNAL MEDICINE CLINIC | Age: 78
End: 2023-03-21

## 2023-03-21 NOTE — TELEPHONE ENCOUNTER
Patient granddaughter called in INR 1.9 TAKING 1 MOM-FRI AND 1 1/2 ON SAT AND SUN. PER dR. Brown WE ARE TO INCREASE TO 1-1/2 TABS MON-WED-FRI and 1 TAB ALL OTHER DAYS CALL ON FRIDAY

## 2023-03-24 ENCOUNTER — TELEPHONE (OUTPATIENT)
Dept: INTERNAL MEDICINE CLINIC | Age: 78
End: 2023-03-24

## 2023-03-24 NOTE — TELEPHONE ENCOUNTER
Patient granddaughter called in PT 41.6 and INR 4.0, PER Dr. Nadege Bhatia patient to hold comadin x 4 days restart Tuesday at 5mg mon-fri and 7.5mg sat and sun

## 2023-04-04 ENCOUNTER — TELEPHONE (OUTPATIENT)
Dept: INTERNAL MEDICINE CLINIC | Age: 78
End: 2023-04-04

## 2023-04-04 NOTE — TELEPHONE ENCOUNTER
Granddaughter called in inr 1.4 taking 1tab mon-fri and 1-12 tabs sat and sun, we will now increase to1-1/2 mon-wed-fri and and 1 all others

## 2023-04-18 DIAGNOSIS — G40.909 SEIZURE DISORDER (HCC): ICD-10-CM

## 2023-04-19 RX ORDER — LACOSAMIDE 50 MG/1
TABLET ORAL
Qty: 180 TABLET | Refills: 3 | Status: SHIPPED | OUTPATIENT
Start: 2023-04-19

## 2023-04-21 ENCOUNTER — TELEPHONE (OUTPATIENT)
Dept: INTERNAL MEDICINE CLINIC | Age: 78
End: 2023-04-21

## 2023-04-21 NOTE — TELEPHONE ENCOUNTER
Patient granddaughter called in inr 4.0 taking 1 5mg tabtues,thurs, sat, sun and 1-1/2 tabs mon,wed,fri.  We ask her to hold until Monday and call with results

## 2023-04-24 ENCOUNTER — TELEPHONE (OUTPATIENT)
Dept: INTERNAL MEDICINE CLINIC | Age: 78
End: 2023-04-24

## 2023-04-24 NOTE — TELEPHONE ENCOUNTER
Albert Barrientos called stating patient has been off coumadin since last Wednesday and her inr is 4.3 still, she was ill over the weekend and diagnosed with pneumonia and treated with azithromycin, prednisone, augmenyin and had achest xray, states she was better today, she has to hild another 3 days

## 2023-04-28 ENCOUNTER — TELEPHONE (OUTPATIENT)
Dept: INTERNAL MEDICINE CLINIC | Age: 78
End: 2023-04-28

## 2023-04-28 NOTE — TELEPHONE ENCOUNTER
Noe Santiago called in INR 1.4 and we have been holding her coumadin x 6 days patient to restart taking 1-1/2 tabs mon- thurs and 1 tab all other days, we will check when she comes in on monday

## 2023-05-01 ENCOUNTER — OFFICE VISIT (OUTPATIENT)
Dept: INTERNAL MEDICINE CLINIC | Age: 78
End: 2023-05-01

## 2023-05-01 VITALS
TEMPERATURE: 97.6 F | BODY MASS INDEX: 53.41 KG/M2 | RESPIRATION RATE: 20 BRPM | HEART RATE: 71 BPM | HEIGHT: 62 IN | OXYGEN SATURATION: 100 % | SYSTOLIC BLOOD PRESSURE: 145 MMHG | DIASTOLIC BLOOD PRESSURE: 68 MMHG

## 2023-05-01 DIAGNOSIS — E66.01 MORBID OBESITY (HCC): Chronic | ICD-10-CM

## 2023-05-01 DIAGNOSIS — E87.1 HYPONATREMIA: ICD-10-CM

## 2023-05-01 DIAGNOSIS — J45.20 MILD INTERMITTENT REACTIVE AIRWAY DISEASE WITHOUT COMPLICATION: ICD-10-CM

## 2023-05-01 DIAGNOSIS — I50.30: ICD-10-CM

## 2023-05-01 DIAGNOSIS — I48.0 PAROXYSMAL ATRIAL FIBRILLATION (HCC): Chronic | ICD-10-CM

## 2023-05-01 DIAGNOSIS — E11.9 TYPE 2 DIABETES MELLITUS WITHOUT COMPLICATION, WITHOUT LONG-TERM CURRENT USE OF INSULIN (HCC): Chronic | ICD-10-CM

## 2023-05-01 DIAGNOSIS — J18.9 PNEUMONITIS: Primary | ICD-10-CM

## 2023-05-01 DIAGNOSIS — G40.909 SEIZURE DISORDER (HCC): ICD-10-CM

## 2023-05-01 DIAGNOSIS — I11.0: ICD-10-CM

## 2023-05-01 DIAGNOSIS — I10 PRIMARY HYPERTENSION: ICD-10-CM

## 2023-05-01 LAB
ANION GAP SERPL CALC-SCNC: 3 MMOL/L (ref 5–15)
BASOPHILS # BLD: 0 K/UL (ref 0–0.1)
BASOPHILS NFR BLD: 0 % (ref 0–1)
BUN SERPL-MCNC: 23 MG/DL (ref 6–20)
BUN/CREAT SERPL: 17 (ref 12–20)
CALCIUM SERPL-MCNC: 9.7 MG/DL (ref 8.5–10.1)
CHLORIDE SERPL-SCNC: 109 MMOL/L (ref 97–108)
CO2 SERPL-SCNC: 27 MMOL/L (ref 21–32)
CREAT SERPL-MCNC: 1.36 MG/DL (ref 0.55–1.02)
DIFFERENTIAL METHOD BLD: ABNORMAL
EOSINOPHIL # BLD: 0.3 K/UL (ref 0–0.4)
EOSINOPHIL NFR BLD: 4 % (ref 0–7)
ERYTHROCYTE [DISTWIDTH] IN BLOOD BY AUTOMATED COUNT: 17 % (ref 11.5–14.5)
GLUCOSE SERPL-MCNC: 94 MG/DL (ref 65–100)
HCT VFR BLD AUTO: 28.8 % (ref 35–47)
HGB BLD-MCNC: 8.6 G/DL (ref 11.5–16)
IMM GRANULOCYTES # BLD AUTO: 0.1 K/UL (ref 0–0.04)
IMM GRANULOCYTES NFR BLD AUTO: 1 % (ref 0–0.5)
INR BLD: 1.3
LYMPHOCYTES # BLD: 2 K/UL (ref 0.8–3.5)
LYMPHOCYTES NFR BLD: 31 % (ref 12–49)
MCH RBC QN AUTO: 29.7 PG (ref 26–34)
MCHC RBC AUTO-ENTMCNC: 29.9 G/DL (ref 30–36.5)
MCV RBC AUTO: 99.3 FL (ref 80–99)
MONOCYTES # BLD: 0.7 K/UL (ref 0–1)
MONOCYTES NFR BLD: 11 % (ref 5–13)
NEUTS SEG # BLD: 3.4 K/UL (ref 1.8–8)
NEUTS SEG NFR BLD: 53 % (ref 32–75)
NRBC # BLD: 0 K/UL (ref 0–0.01)
NRBC BLD-RTO: 0 PER 100 WBC
PLATELET # BLD AUTO: 264 K/UL (ref 150–400)
PMV BLD AUTO: 10.7 FL (ref 8.9–12.9)
POTASSIUM SERPL-SCNC: 5 MMOL/L (ref 3.5–5.1)
PT POC: 15.5 SECONDS
RBC # BLD AUTO: 2.9 M/UL (ref 3.8–5.2)
SODIUM SERPL-SCNC: 139 MMOL/L (ref 136–145)
VALID INTERNAL CONTROL?: YES
WBC # BLD AUTO: 6.5 K/UL (ref 3.6–11)

## 2023-05-01 RX ORDER — ALBUTEROL SULFATE 0.83 MG/ML
SOLUTION RESPIRATORY (INHALATION)
Qty: 300 ML | Refills: 3 | Status: SHIPPED | OUTPATIENT
Start: 2023-05-01

## 2023-05-01 NOTE — PROGRESS NOTES
[address] [facility]  [facility]  [address] [place] [address]  Phone:  [phone]  Fax: [phone]       Chief Complaint   Patient presents with    Follow-up    Diabetes    Hypertension     Patient here for follow up diabetes and high blood pressure. .      SUBJECTIVE:    Rhina Ryan is a [de-identified] y.o. female comes in for return visit having had pneumonia about one week ago. She was treated as an outpatient and feels much better currently. Her bronchospasm has also improved significantly as often times occur when she flares up for nonasthma problems. When she was hospitalized at [facility], approximately three to four weeks ago, her carvedilol was discontinued as was the felodipine. Her furosemide was also discontinued because of her hyponatremia. She is currently taking most of the medications she previously was and I suggest discontinuing the aspirin because I do not see a logical reason for it at this point. She will continue her Keppra 1000 mg b.i.d. and Vimpat 100 mg b.i.d. for seizure disorder. Addendum:    Records from her visit to [facility] was reviewed. Current Outpatient Medications   Medication Sig Dispense Refill    albuterol (PROVENTIL VENTOLIN) 2.5 mg /3 mL (0.083 %) nebu USE ONE vial PER nebulizer EVERY 4 HOURS AS NEEDED FOR WHEEZING OR SHORTNESS OF BREATH 300 mL 3    lacosamide (VIMPAT) 50 mg tab tablet TAKE ONE TABLET BY MOUTH TWICE DAILY 180 Tablet 3    aspirin 81 mg chewable tablet Take 1 Tablet by mouth daily.       acetaminophen (TYLENOL) 500 mg tablet Take 2 Tablets by mouth every six (6) hours as needed for Pain.      levETIRAcetam 1,000 mg tablet TAKE ONE TABLET BY MOUTH TWICE DAILY 180 Tablet 5    allopurinoL (ZYLOPRIM) 300 mg tablet TAKE ONE TABLET BY MOUTH DAILY 90 Tablet 5    albuterol (PROVENTIL HFA, VENTOLIN HFA, PROAIR HFA) 90 mcg/actuation inhaler INHALE TWO PUFFS BY MOUTH EVERY 4 HOURS AS NEEDED FOR WHEEZING 20.1 g 5    polyethylene glycol (MIRALAX) 17 gram packet Take 1 Packet by mouth two (2) times daily as needed for Constipation. 60 Each 11    warfarin (COUMADIN) 5 mg tablet TAKE 1 TABLET BY MOUTH MONDAY THROUGH FRIDAY, TAKE 1 AND 1/2 TABLETS BY MOUTH ON SATURDAY AND SUNDAY 103 Tablet 3    atorvastatin (LIPITOR) 80 mg tablet TAKE ONE TABLET BY MOUTH DAILY 90 Tablet 3    Spiriva with HandiHaler 18 mcg inhalation capsule INHALE THE CONTENTS OF ONE CAPSULE BY MOUTH DAILY, USING HANDIHALER 30 Capsule 11    Blood-Glucose Meter (OneTouch Verio Meter) misc Use to test blood sugar daily. Dx.e11.9 1 Each 0    glucose blood VI test strips (TRUE METRIX GLUCOSE TEST STRIP) strip Use to test blood sugar once daily. Dx.e11.9 100 Strip 11    Blood-Glucose Meter (TRUE METRIX AIR GLUCOSE METER) [facility] 1 Device by Does Not Apply route daily. Use to test blood sugars daily. dx.e11.9 1 Each 0    lancets (TRUEPLUS LANCETS) 28 gauge misc Use to test blood sugar once daily. Dx.e11.9 100 Lancet 11    budesonide-formoterol (SYMBICORT) 160-4.5 mcg/Actuation HFA inhaler Take 2 Puffs by inhalation two (2) times a day. 10.2 Inhaler 11    glucose blood VI test strips (OneTouch Verio test strips) strip USE TO test BLOOD SUGAR ONCE DAILY (Patient not taking: Reported on 3/3/2023) 525 Strip 5    carvediloL (COREG) 12.5 mg tablet TAKE ONE TABLET BY MOUTH TWICE DAILY (Patient not taking: Reported on 3/3/2023) 180 Tablet 5    furosemide (LASIX) 40 mg tablet TAKE ONE TABLET BY MOUTH DAILY (Patient not taking: Reported on 3/3/2023) 90 Tablet 5    Januvia 100 mg tablet TAKE ONE TABLET BY MOUTH EVERY DAY (Patient not taking: Reported on 11/10/2022) 30 Tablet 11    predniSONE (DELTASONE) 20 mg tablet Take 2 Tablets by mouth daily (with breakfast).  (Patient not taking: Reported on 11/9/2022) 20 Tablet 0    predniSONE (DELTASONE) 20 mg tablet 3 tab x 2 days the 2 tabs x 2 days then 1 tab (Patient not taking: Reported on 2/1/2023) 60 Tablet 1    lancets (OneTouch Delica Plus Lancet) 33 gauge misc USE TO check BLOOD SUGAR ONCE DAILY (Patient not taking: Reported on 3/3/2023) 100 Lancet 3    felodipine (PLENDIL SR) 10 mg 24 hr tablet TAKE 1 TABLET BY MOUTH EVERY DAY (Patient not taking: Reported on 3/3/2023) 90 Tablet 3    glucose blood VI test strips (FreeStyle Lite Strips) strip Use to test blood sugar daily (Patient not taking: Reported on 3/3/2023) 50 Strip 11     Past Medical History:   Diagnosis Date    A-fib [facility])     Arthritis     Chronic obstructive pulmonary disease (HCC)     CKD (chronic kidney disease) stage 3, GFR 30-59 ml/min (HCC)     Congestive heart failure (HCC)     Glaucoma     Hypertension     Other ill-defined conditions(799.89)     rt knee surgery    Seizure disorder ([facility] [address]     Stroke ([facility] [address]     Type 2 diabetes mellitus ([facility] [address]      Past Surgical History:   Procedure Laterality Date    HX COLONOSCOPY      HX GYN       Allergies   Allergen Reactions    Clonidine Other (comments)     Patient becomes incoherent    Metformin Unknown (comments)         REVIEW OF SYSTEMS:  General: negative for - chills or fever  ENT: negative for - headaches, nasal congestion or tinnitus  Respiratory: negative for - cough, hemoptysis, shortness of breath or wheezing  Cardiovascular : negative for - chest pain, edema, palpitations or shortness of breath  Gastrointestinal: negative for - abdominal pain, blood in stools, heartburn or nausea/vomiting  Genito-Urinary: no dysuria, trouble voiding, or hematuria  Musculoskeletal: negative for - gait disturbance, joint pain, joint stiffness or joint swelling  Neurological: no TIA or stroke symptoms  Hematologic: no bruises, no bleeding, no swollen glands  Integument: no lumps, mole changes, nail changes or rash  Endocrine: no malaise/lethargy or unexpected weight changes      Social History     Socioeconomic History    Marital status:     Number of children: 1   Occupational History    Occupation: disabled--[employer]   Tobacco Use    Smoking status: Former     Types: Cigarettes    Smokeless tobacco: Never    Tobacco comments:     20+years ago   Vaping Use    Vaping Use: Never used   Substance and Sexual Activity    Alcohol use: No    Drug use: No    Sexual activity: Not Currently     Social Determinants of Health     Financial Resource Strain: Low Risk     Difficulty of Paying Living Expenses: Not very hard   Food Insecurity: No Food Insecurity    Worried About Running Out of Food in the Last Year: Never true    Ran Out of Food in the Last Year: Never true   Transportation Needs: Unmet Transportation Needs    Lack of Transportation (Medical): Yes    Lack of Transportation (Non-Medical): Yes   Physical Activity: Inactive    Days of Exercise per Week: 0 days    Minutes of Exercise per Session: 0 min   Stress: No Stress Concern Present    Feeling of Stress : Not at all   Social Connections: Socially Isolated    Frequency of Communication with Friends and Family: Twice a week    Frequency of Social Gatherings with Friends and Family: Twice a week    Attends [employer] Services: Never    Active Member of Clubs or Organizations: No    Attends Club or Organization Meetings: Never    Marital Status:    Housing Stability: Low Risk     Unable to Pay for Housing in the Last Year: No    Number of Places Lived in the Last Year: 1    Unstable Housing in the Last Year: No     History reviewed. No pertinent family history. OBJECTIVE:    Visit Vitals  BP (!) 145/68   Pulse 71   Temp 97.6 °F (36.4 °C) (Oral)   Resp 20   Ht 5' 2\" (1.575 m)   SpO2 100% Comment: 2 liters   BMI 53.41 kg/m²     CONSTITUTIONAL: well , well nourished, appears age appropriate  EYES: perrla, eom intact  ENMT:moist mucous membranes, pharynx clear  NECK: supple.  Thyroid normal  RESPIRATORY: Chest: clear to ascultation and percussion   CARDIOVASCULAR: Heart: regular rate and rhythm  GASTROINTESTINAL: Abdomen: soft, bowel sounds active  HEMATOLOGIC: no pathological lymph nodes palpated  MUSCULOSKELETAL: Extremities: no edema, pulse 1+   INTEGUMENT: No unusual rashes or suspicious skin lesions noted. Nails appear normal.  NEUROLOGIC: non-focal exam   MENTAL STATUS: alert and oriented, appropriate affect      ASSESSMENT:  1. Pneumonitis    2. Hyponatremia    3. Mild intermittent reactive airway disease without complication    4. Paroxysmal atrial fibrillation (HCC)    5. Primary hypertension    6. Malignant systolic hypertension, with diastolic congestive heart failure, with preserved left ventricular function ([facility]    7. Type 2 diabetes mellitus without complication, without long-term current use of insulin ([facility]    8. Seizure disorder ([facility]    9. Morbid obesity ([facility]        PLAN:  1. The patient had a pneumonitis which was treated. This was done primarily as an outpatient. She has no further pulmonary symptoms currently. 2. She does have transient hyponatremia which has indeed improved. 3. She has reactive airway disease which is a chronic ongoing problem. She is using her inhalers as needed, as well as nebulizer. 4. She has a history of paroxysmal atrial fibrillation with controlled ventricular rate. 5. Blood pressure is excellent, no adjustments are made. 6. As it relates to this she was taken off of the calcium channel blockers and it appears to be leading to a reduction in her peripheral edema. 7. There are no overt signs of congestive heart failure, although she has had failure before related primarily to diastolic dysfunction. 8. Her diabetes is doing quite well. 9. She has had no further seizures and remains on her anticonvulsants. 10. She continues to be morbidly obese, but really ideally needs to lose, but the likelihood of this is extremely low given the fact that she is [de-identified]years of age and she is really not interested in dietary restrictions. Addendum:    The patient's INR today is 1.3.   For some strange reasons she hobbles around the lower end in spite of having significantly increasing doses of her warfarin. I will increase her warfarin to one and half tablets five days a week and 5 mg for Saturday and Sunday. I will see how she fares with this. Addendum:    I failed to mention as it relates to her pulmonary status that she is in chronic respiratory failure secondary to COPD, as well as restrictive lung disease related to her obesity. She uses 2 liters per minute. Sats have been consistently stable. Orders Placed This Encounter    CBC WITH AUTOMATED DIFF    METABOLIC PANEL, BASIC    AMB POC PT/INR    albuterol (PROVENTIL VENTOLIN) 2.5 mg /3 mL (0.083 %) nebu         Follow-up and Dispositions    Return in about 3 months (around 8/1/2023).            Candy Rockwell MD

## 2023-05-01 NOTE — PROGRESS NOTES
Anasilverio Ortiz is a [de-identified] y.o. female  Chief Complaint   Patient presents with    Follow-up    Diabetes    Hypertension     Patient here for follow up diabetes and high blood pressure. Results for orders placed or performed in visit on 05/01/23   AMB POC PT/INR   Result Value Ref Range    VALID INTERNAL CONTROL POC Yes     Prothrombin time (POC) 15.5 seconds    INR POC 1.3    Per patient's care taking patient currently taking Warfarin 5 mg 1 1/2 pills Monday through Thursday and one pill on Friday through Sunday. Per Dr. Justin Phalen patient to change Warfarin 5 mg 1 1/2 pills six days and one pill on day seven. YES Answers must have Comments  1. \"Have you been to the ER, urgent care clinic since your last visit? Hospitalized since your last visit? \"    [] YES   [x] NO       2. Have you seen or consulted any other health care providers outside of [address] [place] [address] since your last visit?     [] YES \  [x] NO       3. For patients aged 39-70: Have you had a colorectal cancer screening such as a colonoscopy/FIT/Cologuard? Nurse/CMA to request records if not in chart   [] YES   [] NO   [x] NA, based on age    If the patient is female:      4. For female patients aged 41-77: Bert Lacy you had a mammogram in the last two years?  Nurse/CMA to request records if not in chart   [] YES   [] NO   [x] NA, based on age    11. For female patients aged 21-65: Bert Lacy you had a pap smear?   Nurse/CMA to request records if not in chart   [] YES   [] NO  [x] NA, based on age

## 2023-05-08 ENCOUNTER — TELEPHONE (OUTPATIENT)
Facility: CLINIC | Age: 78
End: 2023-05-08

## 2023-05-09 RX ORDER — ALBUTEROL SULFATE 2.5 MG/3ML
SOLUTION RESPIRATORY (INHALATION)
Qty: 120 EACH | Refills: 11 | Status: SHIPPED | OUTPATIENT
Start: 2023-05-09

## 2023-05-15 ENCOUNTER — TELEPHONE (OUTPATIENT)
Facility: CLINIC | Age: 78
End: 2023-05-15

## 2023-05-16 ENCOUNTER — TELEPHONE (OUTPATIENT)
Facility: CLINIC | Age: 78
End: 2023-05-16

## 2023-05-16 NOTE — TELEPHONE ENCOUNTER
Spoke with granddaughter and ask that she staert 1 iron tab a day and b-12 2000 1 a day per Dr. Bessy Arellano

## 2023-05-16 NOTE — TELEPHONE ENCOUNTER
Patient's granddaughter call stating patient is taking 7.5 mg of coumadin Mon thur Saturdays and 5 mg on Sundays. States PT= 28.6,INR=2.7. Dr. Crystal Bravo notified. Granddaughter inform no adjustment will be made at this time and to call back with results in one week per Dr. Crystal Bravo.

## 2023-05-17 ENCOUNTER — TELEPHONE (OUTPATIENT)
Facility: CLINIC | Age: 78
End: 2023-05-17

## 2023-05-17 NOTE — TELEPHONE ENCOUNTER
Spoke with patients granddaughter and ask that she start taking 1 iron tablet daily and a b-12 2000 units daily per Dr. Bessy Arellano

## 2023-05-19 ENCOUNTER — TELEPHONE (OUTPATIENT)
Facility: CLINIC | Age: 78
End: 2023-05-19

## 2023-05-23 ENCOUNTER — TELEPHONE (OUTPATIENT)
Facility: CLINIC | Age: 78
End: 2023-05-23

## 2023-05-31 ENCOUNTER — TELEPHONE (OUTPATIENT)
Facility: CLINIC | Age: 78
End: 2023-05-31

## 2023-05-31 NOTE — TELEPHONE ENCOUNTER
Granddaughter called in PT 40.3 and INR 3.8 AND WE WERE CLOSED SO SHE HELD THE COUMADIN AND PER DR. Charly Mosley she is to hold until Thursday and restart at regular dose 1-1/2 tabs daily and 1 tab on sun.

## 2023-06-20 ENCOUNTER — TELEPHONE (OUTPATIENT)
Facility: CLINIC | Age: 78
End: 2023-06-20

## 2023-06-28 ENCOUNTER — TELEPHONE (OUTPATIENT)
Facility: CLINIC | Age: 78
End: 2023-06-28

## 2023-07-05 ENCOUNTER — TELEPHONE (OUTPATIENT)
Facility: CLINIC | Age: 78
End: 2023-07-05

## 2023-07-11 ENCOUNTER — TELEPHONE (OUTPATIENT)
Facility: CLINIC | Age: 78
End: 2023-07-11

## 2023-07-14 DIAGNOSIS — J45.20 MILD INTERMITTENT ASTHMA, UNCOMPLICATED: ICD-10-CM

## 2023-07-14 RX ORDER — TIOTROPIUM BROMIDE 18 UG/1
CAPSULE ORAL; RESPIRATORY (INHALATION)
Qty: 30 CAPSULE | Refills: 11 | Status: SHIPPED | OUTPATIENT
Start: 2023-07-14

## 2023-07-17 ENCOUNTER — TELEPHONE (OUTPATIENT)
Facility: CLINIC | Age: 78
End: 2023-07-17

## 2023-07-24 ENCOUNTER — TELEPHONE (OUTPATIENT)
Facility: CLINIC | Age: 78
End: 2023-07-24

## 2023-07-31 ENCOUNTER — TELEPHONE (OUTPATIENT)
Facility: CLINIC | Age: 78
End: 2023-07-31

## 2023-07-31 RX ORDER — ATORVASTATIN CALCIUM 80 MG/1
TABLET, FILM COATED ORAL
Qty: 90 TABLET | Refills: 3 | Status: SHIPPED | OUTPATIENT
Start: 2023-07-31

## 2023-07-31 NOTE — TELEPHONE ENCOUNTER
Granddaughter called in PT 32.2 and INR 3.0  Taking 1-1/2 tabs mon-sat and 1 tab on sun.  Patient to hold coumadin x 1 day then restart regular dose

## 2023-08-04 ENCOUNTER — TELEPHONE (OUTPATIENT)
Facility: CLINIC | Age: 78
End: 2023-08-04

## 2023-08-09 ENCOUNTER — OFFICE VISIT (OUTPATIENT)
Facility: CLINIC | Age: 78
End: 2023-08-09
Payer: COMMERCIAL

## 2023-08-09 VITALS
HEIGHT: 62 IN | BODY MASS INDEX: 53.41 KG/M2 | TEMPERATURE: 98.3 F | RESPIRATION RATE: 18 BRPM | SYSTOLIC BLOOD PRESSURE: 135 MMHG | HEART RATE: 108 BPM | OXYGEN SATURATION: 100 % | DIASTOLIC BLOOD PRESSURE: 69 MMHG

## 2023-08-09 DIAGNOSIS — I87.2 VENOUS INSUFFICIENCY: ICD-10-CM

## 2023-08-09 DIAGNOSIS — I10 PRIMARY HYPERTENSION: ICD-10-CM

## 2023-08-09 DIAGNOSIS — Z00.00 MEDICARE ANNUAL WELLNESS VISIT, SUBSEQUENT: Primary | ICD-10-CM

## 2023-08-09 DIAGNOSIS — J43.1 PANLOBULAR EMPHYSEMA (HCC): ICD-10-CM

## 2023-08-09 DIAGNOSIS — E66.01 MORBID OBESITY (HCC): ICD-10-CM

## 2023-08-09 DIAGNOSIS — I50.9 CONGESTIVE HEART FAILURE, UNSPECIFIED HF CHRONICITY, UNSPECIFIED HEART FAILURE TYPE (HCC): ICD-10-CM

## 2023-08-09 DIAGNOSIS — M10.9 GOUT, UNSPECIFIED CAUSE, UNSPECIFIED CHRONICITY, UNSPECIFIED SITE: ICD-10-CM

## 2023-08-09 DIAGNOSIS — G40.909 SEIZURE DISORDER (HCC): ICD-10-CM

## 2023-08-09 DIAGNOSIS — I48.0 PAROXYSMAL ATRIAL FIBRILLATION (HCC): ICD-10-CM

## 2023-08-09 PROCEDURE — 3023F SPIROM DOC REV: CPT | Performed by: INTERNAL MEDICINE

## 2023-08-09 PROCEDURE — G8400 PT W/DXA NO RESULTS DOC: HCPCS | Performed by: INTERNAL MEDICINE

## 2023-08-09 PROCEDURE — G0439 PPPS, SUBSEQ VISIT: HCPCS | Performed by: INTERNAL MEDICINE

## 2023-08-09 PROCEDURE — 3078F DIAST BP <80 MM HG: CPT | Performed by: INTERNAL MEDICINE

## 2023-08-09 PROCEDURE — G8417 CALC BMI ABV UP PARAM F/U: HCPCS | Performed by: INTERNAL MEDICINE

## 2023-08-09 PROCEDURE — 99214 OFFICE O/P EST MOD 30 MIN: CPT | Performed by: INTERNAL MEDICINE

## 2023-08-09 PROCEDURE — 3074F SYST BP LT 130 MM HG: CPT | Performed by: INTERNAL MEDICINE

## 2023-08-09 PROCEDURE — G8427 DOCREV CUR MEDS BY ELIG CLIN: HCPCS | Performed by: INTERNAL MEDICINE

## 2023-08-09 PROCEDURE — 1090F PRES/ABSN URINE INCON ASSESS: CPT | Performed by: INTERNAL MEDICINE

## 2023-08-09 PROCEDURE — 1123F ACP DISCUSS/DSCN MKR DOCD: CPT | Performed by: INTERNAL MEDICINE

## 2023-08-09 PROCEDURE — 1036F TOBACCO NON-USER: CPT | Performed by: INTERNAL MEDICINE

## 2023-08-09 RX ORDER — FELODIPINE 10 MG/1
10 TABLET, EXTENDED RELEASE ORAL DAILY
Qty: 30 TABLET | Refills: 11 | Status: CANCELLED | OUTPATIENT
Start: 2023-08-09

## 2023-08-09 RX ORDER — ALBUTEROL SULFATE 2.5 MG/3ML
SOLUTION RESPIRATORY (INHALATION)
Qty: 120 EACH | Refills: 11 | Status: CANCELLED | OUTPATIENT
Start: 2023-08-09

## 2023-08-09 SDOH — ECONOMIC STABILITY: INCOME INSECURITY: IN THE LAST 12 MONTHS, WAS THERE A TIME WHEN YOU WERE NOT ABLE TO PAY THE MORTGAGE OR RENT ON TIME?: NO

## 2023-08-09 SDOH — ECONOMIC STABILITY: TRANSPORTATION INSECURITY
IN THE PAST 12 MONTHS, HAS LACK OF TRANSPORTATION KEPT YOU FROM MEETINGS, WORK, OR FROM GETTING THINGS NEEDED FOR DAILY LIVING?: YES

## 2023-08-09 SDOH — ECONOMIC STABILITY: FOOD INSECURITY: WITHIN THE PAST 12 MONTHS, YOU WORRIED THAT YOUR FOOD WOULD RUN OUT BEFORE YOU GOT MONEY TO BUY MORE.: NEVER TRUE

## 2023-08-09 SDOH — ECONOMIC STABILITY: TRANSPORTATION INSECURITY
IN THE PAST 12 MONTHS, HAS THE LACK OF TRANSPORTATION KEPT YOU FROM MEDICAL APPOINTMENTS OR FROM GETTING MEDICATIONS?: YES

## 2023-08-09 SDOH — ECONOMIC STABILITY: HOUSING INSECURITY: IN THE LAST 12 MONTHS, HOW MANY PLACES HAVE YOU LIVED?: 1

## 2023-08-09 SDOH — HEALTH STABILITY: PHYSICAL HEALTH: ON AVERAGE, HOW MANY DAYS PER WEEK DO YOU ENGAGE IN MODERATE TO STRENUOUS EXERCISE (LIKE A BRISK WALK)?: 0 DAYS

## 2023-08-09 SDOH — HEALTH STABILITY: PHYSICAL HEALTH: ON AVERAGE, HOW MANY MINUTES DO YOU ENGAGE IN EXERCISE AT THIS LEVEL?: 0 MIN

## 2023-08-09 SDOH — ECONOMIC STABILITY: FOOD INSECURITY: WITHIN THE PAST 12 MONTHS, THE FOOD YOU BOUGHT JUST DIDN'T LAST AND YOU DIDN'T HAVE MONEY TO GET MORE.: NEVER TRUE

## 2023-08-09 SDOH — ECONOMIC STABILITY: HOUSING INSECURITY
IN THE LAST 12 MONTHS, WAS THERE A TIME WHEN YOU DID NOT HAVE A STEADY PLACE TO SLEEP OR SLEPT IN A SHELTER (INCLUDING NOW)?: NO

## 2023-08-09 ASSESSMENT — PATIENT HEALTH QUESTIONNAIRE - PHQ9
SUM OF ALL RESPONSES TO PHQ QUESTIONS 1-9: 0
SUM OF ALL RESPONSES TO PHQ QUESTIONS 1-9: 0
1. LITTLE INTEREST OR PLEASURE IN DOING THINGS: 0
SUM OF ALL RESPONSES TO PHQ QUESTIONS 1-9: 0
SUM OF ALL RESPONSES TO PHQ QUESTIONS 1-9: 0
2. FEELING DOWN, DEPRESSED OR HOPELESS: 0
SUM OF ALL RESPONSES TO PHQ9 QUESTIONS 1 & 2: 0

## 2023-08-09 ASSESSMENT — ANXIETY QUESTIONNAIRES
6. BECOMING EASILY ANNOYED OR IRRITABLE: 0
2. NOT BEING ABLE TO STOP OR CONTROL WORRYING: 0
IF YOU CHECKED OFF ANY PROBLEMS ON THIS QUESTIONNAIRE, HOW DIFFICULT HAVE THESE PROBLEMS MADE IT FOR YOU TO DO YOUR WORK, TAKE CARE OF THINGS AT HOME, OR GET ALONG WITH OTHER PEOPLE: NOT DIFFICULT AT ALL
1. FEELING NERVOUS, ANXIOUS, OR ON EDGE: 0
7. FEELING AFRAID AS IF SOMETHING AWFUL MIGHT HAPPEN: 0
5. BEING SO RESTLESS THAT IT IS HARD TO SIT STILL: 0
GAD7 TOTAL SCORE: 0
4. TROUBLE RELAXING: 0
3. WORRYING TOO MUCH ABOUT DIFFERENT THINGS: 0

## 2023-08-09 ASSESSMENT — SOCIAL DETERMINANTS OF HEALTH (SDOH)
WITHIN THE LAST YEAR, HAVE YOU BEEN HUMILIATED OR EMOTIONALLY ABUSED IN OTHER WAYS BY YOUR PARTNER OR EX-PARTNER?: NO
WITHIN THE LAST YEAR, HAVE YOU BEEN AFRAID OF YOUR PARTNER OR EX-PARTNER?: NO
HOW OFTEN DO YOU GET TOGETHER WITH FRIENDS OR RELATIVES?: MORE THAN THREE TIMES A WEEK
DO YOU BELONG TO ANY CLUBS OR ORGANIZATIONS SUCH AS CHURCH GROUPS UNIONS, FRATERNAL OR ATHLETIC GROUPS, OR SCHOOL GROUPS?: NO
HOW HARD IS IT FOR YOU TO PAY FOR THE VERY BASICS LIKE FOOD, HOUSING, MEDICAL CARE, AND HEATING?: NOT HARD AT ALL
WITHIN THE LAST YEAR, HAVE YOU BEEN KICKED, HIT, SLAPPED, OR OTHERWISE PHYSICALLY HURT BY YOUR PARTNER OR EX-PARTNER?: NO
HOW OFTEN DO YOU ATTENT MEETINGS OF THE CLUB OR ORGANIZATION YOU BELONG TO?: NEVER
HOW OFTEN DO YOU ATTEND CHURCH OR RELIGIOUS SERVICES?: NEVER
WITHIN THE LAST YEAR, HAVE TO BEEN RAPED OR FORCED TO HAVE ANY KIND OF SEXUAL ACTIVITY BY YOUR PARTNER OR EX-PARTNER?: NO
IN A TYPICAL WEEK, HOW MANY TIMES DO YOU TALK ON THE PHONE WITH FAMILY, FRIENDS, OR NEIGHBORS?: ONCE A WEEK

## 2023-08-09 ASSESSMENT — LIFESTYLE VARIABLES
HOW MANY STANDARD DRINKS CONTAINING ALCOHOL DO YOU HAVE ON A TYPICAL DAY: PATIENT DOES NOT DRINK
HOW OFTEN DO YOU HAVE A DRINK CONTAINING ALCOHOL: NEVER

## 2023-08-09 NOTE — PROGRESS NOTES
[facility]  [address] [facility] [address]  Phone:  [phone]  Fax: [phone]       Chief Complaint   Patient presents with    Medicare AWV     Patient here for Annual Medicare Wellness Exam.  Daughter also reports patient went to [address] [place] [address] for Seizures and Diarrhea. .      SUBJECTIVE:    Tasneem Kapadia is a 78 y.o. female  Dictation on: 08/09/2023 12:00 PM by: Kenny Trevino [[id number]]          Current Outpatient Medications   Medication Sig Dispense Refill    acetaminophen (TYLENOL) 500 MG tablet Take 2 tablets by mouth every 6 hours as needed for Pain 120 tablet 5    allopurinol (ZYLOPRIM) 300 MG tablet Take 1 tablet by mouth daily 30 tablet 11    atorvastatin (LIPITOR) 80 MG tablet TAKE ONE TABLET BY MOUTH EVERY EVENING 90 tablet 3    SPIRIVA HANDIHALER 18 MCG inhalation capsule INHALE THE CONTENTS OF ONE CAPSULE BY MOUTH DAILY, USING HANDIHALER 30 capsule 11    albuterol sulfate HFA (PROVENTIL;VENTOLIN;PROAIR) 108 (90 Base) MCG/ACT inhaler INHALE TWO PUFFS BY MOUTH EVERY 4 HOURS AS NEEDED FOR WHEEZING      lacosamide (VIMPAT) 50 MG TABS tablet TAKE ONE TABLET BY MOUTH TWICE DAILY      levETIRAcetam (KEPPRA) 1000 MG tablet TAKE ONE TABLET BY MOUTH TWICE DAILY      felodipine (PLENDIL) 10 MG extended release tablet Take 1 tablet by mouth daily 30 tablet 11    albuterol (PROVENTIL) (2.5 MG/3ML) 0.083% nebulizer solution USE ONE vial PER nebulizer EVERY 6 HOURS AS NEEDED FOR WHEEZING OR SHORTNESS OF BREATH, DX.J45.909 120 each 11    warfarin (COUMADIN) 5 MG tablet Taking 1 1/2 Mon thru Sat and one pill on sunday 60 tablet 11    aspirin 81 MG chewable tablet Take 1 tablet by mouth daily 30 tablet 11    blood glucose test strips (ONETOUCH ULTRA) strip Use to test blood sugar once daily.  Dx.e11.9 100 each 3    budesonide-formoterol (SYMBICORT) 160-4.5 MCG/ACT AERO Inhale 2 puffs into the lungs 2 times daily (Patient not taking: Reported on 8/9/2023)

## 2023-08-10 NOTE — PROGRESS NOTES
comes in for return visit stating that she has done well. She was most recently hospitalized for apparent seizure disorder and remained overnight and was discharged. No changes were made in her anticonvulsants. She has a support boot on her left foot and apparently this needs to be replaced. Her asthma has been quite stable. She has a past history of primary hypertension, dyslipidemia and diabetes mellitus and obesity, as well as COPD. INR today is acceptable. She does need chem strips, as well as lancets above and beyond her felodipine.

## 2023-08-15 ENCOUNTER — TELEPHONE (OUTPATIENT)
Facility: CLINIC | Age: 78
End: 2023-08-15

## 2023-08-15 RX ORDER — ALBUTEROL SULFATE 2.5 MG/3ML
SOLUTION RESPIRATORY (INHALATION)
Qty: 120 EACH | Refills: 11 | Status: SHIPPED | OUTPATIENT
Start: 2023-08-15

## 2023-08-15 RX ORDER — WARFARIN SODIUM 5 MG/1
TABLET ORAL
Qty: 60 TABLET | Refills: 11 | Status: SHIPPED | OUTPATIENT
Start: 2023-08-15

## 2023-08-15 RX ORDER — BLOOD SUGAR DIAGNOSTIC
STRIP MISCELLANEOUS
Qty: 100 EACH | Refills: 3 | Status: SHIPPED | OUTPATIENT
Start: 2023-08-15

## 2023-08-15 RX ORDER — ASPIRIN 81 MG/1
81 TABLET, CHEWABLE ORAL DAILY
Qty: 30 TABLET | Refills: 11 | Status: SHIPPED | OUTPATIENT
Start: 2023-08-15

## 2023-08-15 RX ORDER — FELODIPINE 10 MG/1
10 TABLET, EXTENDED RELEASE ORAL DAILY
Qty: 30 TABLET | Refills: 11 | Status: SHIPPED | OUTPATIENT
Start: 2023-08-15

## 2023-08-15 NOTE — TELEPHONE ENCOUNTER
Patient granddaughter called in PT 19.6 and INR 1.8 PATIENT RAN OUT OF COUMADIN SO WE ASK THAT SHE REPEAT ON FRIDAY

## 2023-08-16 RX ORDER — ACETAMINOPHEN 500 MG
1000 TABLET ORAL EVERY 6 HOURS PRN
Qty: 120 TABLET | Refills: 5 | Status: SHIPPED | OUTPATIENT
Start: 2023-08-16

## 2023-08-16 RX ORDER — ALLOPURINOL 300 MG/1
300 TABLET ORAL DAILY
Qty: 30 TABLET | Refills: 11 | Status: SHIPPED | OUTPATIENT
Start: 2023-08-16

## 2023-08-17 NOTE — PROGRESS NOTES
1.  The patient's seizure disorder is doing reasonably well. She has been seizure free and remains on her anticonvulsants. 2. She has paroxysmal atrial fibrillation. Her ventricular rate is controlled. INR is acceptable at 2.5.  3. Weight reduction is necessary, but she has not and will not lose weight. This however can be accomplished by eating meals, eliminating snacks, and avoiding the consumption of processed carbohydrates. 4. She is well compensated with no overt signs of cardiac decompensation. 5. Her COPD is quite stable. Complicating this is reactive airway disease, but she uses her bronchodilators on a regular basis. 6. The edema of her lower extremities is actually improved. This is secondary to chronic venous insufficiency. 7. Blood pressure is excellent and she remains on her felodipine 5 mg q.d.

## 2023-08-18 ENCOUNTER — TELEPHONE (OUTPATIENT)
Facility: CLINIC | Age: 78
End: 2023-08-18

## 2023-08-23 ENCOUNTER — TELEPHONE (OUTPATIENT)
Facility: CLINIC | Age: 78
End: 2023-08-23

## 2023-08-29 ENCOUNTER — TELEPHONE (OUTPATIENT)
Facility: CLINIC | Age: 78
End: 2023-08-29

## 2023-09-06 ENCOUNTER — TELEPHONE (OUTPATIENT)
Facility: CLINIC | Age: 78
End: 2023-09-06

## 2023-09-06 NOTE — TELEPHONE ENCOUNTER
Granddaughter called in PT 36.3 and INR 3.4 taking 1-1/2 m-sat and 1 tab sun, patient to hold x 2 days then restart back at dose

## 2023-09-07 ENCOUNTER — TELEPHONE (OUTPATIENT)
Facility: CLINIC | Age: 78
End: 2023-09-07

## 2023-09-07 NOTE — TELEPHONE ENCOUNTER
VS: VSS, afebrile this AM. PRN tylenol available if fever spikes.   O2: >90% on RA, denies SOB or CP. LSCTA.   Output: Voiding without difficulty in BR   Last BM: LBM 12/20 per pt report, +flatus, BS active   Activity: Pt up ind, able to ambulate hallways with mask in place.   Skin: Skin intact, warm, and dry.    Pain: Denies pain   CMS: Baseline lymphedema to BLE, +3 swelling (pt states not any worse than normal). Refused TAWANA hose/tubigrip. Elevating while in bed and ambulating frequently.    Dressing: None, refused TAWANA hose/tubigrip to BLE   Diet: Regular diet, denies N/V. Sliding scale discontinued and amaryl restarted.  this AM, 225 this afternoon. Continue to monitor BG with meals and at bedtime.     LDA: PIV x2 SL, fluids discontinued this AM    Equipment: Personal belongings, IV pole   Plan: Discharge to home possibly tomorrow, abx changed from zosyn to rocephin today and received first dose this AM. MD would like to see how pt does on abx change today and then discharge home tomorrow if does well.    Additional Info: COVID precautions discontinued        How

## 2023-09-12 ENCOUNTER — TELEPHONE (OUTPATIENT)
Facility: CLINIC | Age: 78
End: 2023-09-12

## 2023-09-18 ENCOUNTER — TELEPHONE (OUTPATIENT)
Facility: CLINIC | Age: 78
End: 2023-09-18

## 2023-09-25 ENCOUNTER — TELEPHONE (OUTPATIENT)
Facility: CLINIC | Age: 78
End: 2023-09-25

## 2023-10-03 ENCOUNTER — TELEPHONE (OUTPATIENT)
Facility: CLINIC | Age: 78
End: 2023-10-03

## 2023-10-11 ENCOUNTER — TELEPHONE (OUTPATIENT)
Facility: CLINIC | Age: 78
End: 2023-10-11

## 2023-10-11 NOTE — TELEPHONE ENCOUNTER
Granddaughter called in PT 26.7 and INR 2.5 taking 1-1/2 mon- sat and 1 on sun, no change .  States patient was not feeling well so she is to be seen by [facility]

## 2023-10-23 ENCOUNTER — TELEPHONE (OUTPATIENT)
Facility: CLINIC | Age: 78
End: 2023-10-23

## 2023-10-31 DIAGNOSIS — G40.909 EPILEPSY, UNSPECIFIED, NOT INTRACTABLE, WITHOUT STATUS EPILEPTICUS (HCC): ICD-10-CM

## 2023-10-31 RX ORDER — LACOSAMIDE 50 MG/1
TABLET ORAL
Qty: 180 TABLET | Refills: 3 | Status: SHIPPED | OUTPATIENT
Start: 2023-10-31 | End: 2024-01-29

## 2023-11-06 ENCOUNTER — TELEPHONE (OUTPATIENT)
Facility: CLINIC | Age: 78
End: 2023-11-06

## 2023-11-09 ENCOUNTER — OFFICE VISIT (OUTPATIENT)
Facility: CLINIC | Age: 78
End: 2023-11-09

## 2023-11-09 VITALS
RESPIRATION RATE: 16 BRPM | DIASTOLIC BLOOD PRESSURE: 75 MMHG | TEMPERATURE: 97.6 F | HEART RATE: 59 BPM | HEIGHT: 62 IN | BODY MASS INDEX: 42.88 KG/M2 | SYSTOLIC BLOOD PRESSURE: 157 MMHG | OXYGEN SATURATION: 97 % | WEIGHT: 233 LBS

## 2023-11-09 DIAGNOSIS — E11.9 TYPE 2 DIABETES MELLITUS WITHOUT COMPLICATION, WITHOUT LONG-TERM CURRENT USE OF INSULIN (HCC): ICD-10-CM

## 2023-11-09 DIAGNOSIS — D64.9 NORMOCYTIC ANEMIA: Primary | ICD-10-CM

## 2023-11-09 DIAGNOSIS — Z23 NEED FOR VACCINATION: ICD-10-CM

## 2023-11-09 DIAGNOSIS — I10 PRIMARY HYPERTENSION: ICD-10-CM

## 2023-11-09 DIAGNOSIS — E66.01 MORBID OBESITY (HCC): ICD-10-CM

## 2023-11-09 DIAGNOSIS — I48.0 PAROXYSMAL ATRIAL FIBRILLATION (HCC): ICD-10-CM

## 2023-11-09 DIAGNOSIS — I50.82 BIVENTRICULAR CONGESTIVE HEART FAILURE (HCC): ICD-10-CM

## 2023-11-09 LAB
ANION GAP SERPL CALC-SCNC: 5 MMOL/L (ref 5–15)
BASOPHILS # BLD: 0 K/UL (ref 0–0.1)
BASOPHILS NFR BLD: 0 % (ref 0–1)
BUN SERPL-MCNC: 30 MG/DL (ref 6–20)
BUN/CREAT SERPL: 16 (ref 12–20)
CALCIUM SERPL-MCNC: 9.2 MG/DL (ref 8.5–10.1)
CHLORIDE SERPL-SCNC: 111 MMOL/L (ref 97–108)
CO2 SERPL-SCNC: 24 MMOL/L (ref 21–32)
COMMENT:: NORMAL
CREAT SERPL-MCNC: 1.9 MG/DL (ref 0.55–1.02)
DIFFERENTIAL METHOD BLD: ABNORMAL
EOSINOPHIL # BLD: 0.4 K/UL (ref 0–0.4)
EOSINOPHIL NFR BLD: 5 % (ref 0–7)
ERYTHROCYTE [DISTWIDTH] IN BLOOD BY AUTOMATED COUNT: 15.2 % (ref 11.5–14.5)
GLUCOSE SERPL-MCNC: 141 MG/DL (ref 65–100)
HCT VFR BLD AUTO: 28.1 % (ref 35–47)
HGB BLD-MCNC: 8.1 G/DL (ref 11.5–16)
IMM GRANULOCYTES # BLD AUTO: 0 K/UL (ref 0–0.04)
IMM GRANULOCYTES NFR BLD AUTO: 0 % (ref 0–0.5)
LYMPHOCYTES # BLD: 2.2 K/UL (ref 0.8–3.5)
LYMPHOCYTES NFR BLD: 31 % (ref 12–49)
MCH RBC QN AUTO: 27.7 PG (ref 26–34)
MCHC RBC AUTO-ENTMCNC: 28.8 G/DL (ref 30–36.5)
MCV RBC AUTO: 96.2 FL (ref 80–99)
MONOCYTES # BLD: 0.6 K/UL (ref 0–1)
MONOCYTES NFR BLD: 9 % (ref 5–13)
NEUTS SEG # BLD: 3.8 K/UL (ref 1.8–8)
NEUTS SEG NFR BLD: 55 % (ref 32–75)
NRBC # BLD: 0 K/UL (ref 0–0.01)
NRBC BLD-RTO: 0 PER 100 WBC
PLATELET # BLD AUTO: 216 K/UL (ref 150–400)
PMV BLD AUTO: 11.4 FL (ref 8.9–12.9)
POTASSIUM SERPL-SCNC: 5.9 MMOL/L (ref 3.5–5.1)
RBC # BLD AUTO: 2.92 M/UL (ref 3.8–5.2)
RBC MORPH BLD: ABNORMAL
SODIUM SERPL-SCNC: 140 MMOL/L (ref 136–145)
SPECIMEN HOLD: NORMAL
WBC # BLD AUTO: 7 K/UL (ref 3.6–11)

## 2023-11-09 RX ORDER — FELODIPINE 5 MG/1
5 TABLET, EXTENDED RELEASE ORAL DAILY
Qty: 30 TABLET | Refills: 3 | Status: SHIPPED | OUTPATIENT
Start: 2023-11-09

## 2023-11-09 RX ORDER — WARFARIN SODIUM 5 MG/1
TABLET ORAL
Qty: 60 TABLET | Refills: 11 | Status: SHIPPED | OUTPATIENT
Start: 2023-11-09

## 2023-11-09 RX ORDER — FELODIPINE 10 MG/1
5 TABLET, EXTENDED RELEASE ORAL DAILY
Qty: 30 TABLET | Refills: 11 | Status: CANCELLED | OUTPATIENT
Start: 2023-11-09

## 2023-11-09 ASSESSMENT — PATIENT HEALTH QUESTIONNAIRE - PHQ9
SUM OF ALL RESPONSES TO PHQ QUESTIONS 1-9: 0
SUM OF ALL RESPONSES TO PHQ QUESTIONS 1-9: 0
1. LITTLE INTEREST OR PLEASURE IN DOING THINGS: 0
SUM OF ALL RESPONSES TO PHQ QUESTIONS 1-9: 0
2. FEELING DOWN, DEPRESSED OR HOPELESS: 0
SUM OF ALL RESPONSES TO PHQ9 QUESTIONS 1 & 2: 0
SUM OF ALL RESPONSES TO PHQ QUESTIONS 1-9: 0

## 2023-11-09 NOTE — PROGRESS NOTES
[facility]  [address] [address]  Phone:  [phone]  Fax: [phone]       Chief Complaint   Patient presents with    Cholesterol Problem    Diabetes   . SUBJECTIVE:    Margarita Aguirre is a 78 y.o. female  Dictation on: 11/09/2023 11:37 AM by: Rhina Maya [[id number]]          Current Outpatient Medications   Medication Sig Dispense Refill    warfarin (COUMADIN) 5 MG tablet Taking 1 1/2 Mon thru Sat and one pill on sunday 60 tablet 11    felodipine (PLENDIL) 5 MG extended release tablet Take 1 tablet by mouth daily 30 tablet 3    acetaminophen (TYLENOL) 500 MG tablet Take 2 tablets by mouth every 6 hours as needed for Pain 120 tablet 5    allopurinol (ZYLOPRIM) 300 MG tablet Take 1 tablet by mouth daily 30 tablet 11    albuterol (PROVENTIL) (2.5 MG/3ML) 0.083% nebulizer solution USE ONE vial PER nebulizer EVERY 6 HOURS AS NEEDED FOR WHEEZING OR SHORTNESS OF BREATH, DX.J45.909 120 each 11    aspirin 81 MG chewable tablet Take 1 tablet by mouth daily 30 tablet 11    blood glucose test strips (ONETOUCH ULTRA) strip Use to test blood sugar once daily.  Dx.e11.9 100 each 3    atorvastatin (LIPITOR) 80 MG tablet TAKE ONE TABLET BY MOUTH EVERY EVENING 90 tablet 3    SPIRIVA HANDIHALER 18 MCG inhalation capsule INHALE THE CONTENTS OF ONE CAPSULE BY MOUTH DAILY, USING HANDIHALER 30 capsule 11    albuterol sulfate HFA (PROVENTIL;VENTOLIN;PROAIR) 108 (90 Base) MCG/ACT inhaler INHALE TWO PUFFS BY MOUTH EVERY 4 HOURS AS NEEDED FOR WHEEZING      budesonide-formoterol (SYMBICORT) 160-4.5 MCG/ACT AERO Inhale 2 puffs into the lungs 2 times daily      levETIRAcetam (KEPPRA) 1000 MG tablet TAKE ONE TABLET BY MOUTH TWICE DAILY      polyethylene glycol (GLYCOLAX) 17 GM/SCOOP powder Take 17 g by mouth 2 times daily as needed      lacosamide (VIMPAT) 50 MG TABS tablet TAKE ONE TABLET BY MOUTH TWICE DAILY 180 tablet 3    carvedilol (COREG) 12.5 MG tablet  (Patient not taking:

## 2023-11-10 LAB
EST. AVERAGE GLUCOSE BLD GHB EST-MCNC: 111 MG/DL
HBA1C MFR BLD: 5.5 % (ref 4–5.6)

## 2023-11-10 NOTE — PROGRESS NOTES
To Dr Horn, please advise   comes in for return visit stating that she has done reasonably well. She complains about her vision, but there is nothing that can be done. She was told by the eye doctor that she needs to have reading glasses. She is on oxygen at 2 liters a minute and I suggested to the family that they check her sats on a regular basis to determine if she continues to need the oxygen. She definitely needs it nocturnally. Within the last three to four days she developed burning of her lower extremities which is somewhat disruptive. She has a past history of primary hypertension, dyslipidemia, asthma, morbid obesity and general debility along with a seizure disorder.

## 2023-11-14 ENCOUNTER — TELEPHONE (OUTPATIENT)
Facility: CLINIC | Age: 78
End: 2023-11-14

## 2023-11-14 NOTE — PROGRESS NOTES
1. She does have a mild anemia and I will continue to monitor this. 2. Her diabetes historically has been doing quite well. No adjustments will be made, but I will check a hemoglobin A1c to make sure that she is relatively euglycemic. 3. She is well compensated with no overt signs of congestive heart failure currently. 4. She has a history of paroxysmal atrial fibrillation. Her ventricular rate is quite stable and she remains on her DOAC. 5. Blood pressure is excellent, no adjustments are made. 6. She really needs to lose weight. The likelihood of this is extremely low. This however can be accomplished by eating meals, eliminating snacks, and avoiding the consumption of processed carbohydrates.

## 2023-11-24 ENCOUNTER — TELEPHONE (OUTPATIENT)
Facility: CLINIC | Age: 78
End: 2023-11-24

## 2023-11-24 NOTE — TELEPHONE ENCOUNTER
----- Message from Walter Nix MD sent at 11/19/2023  3:20 PM EST -----  Make sure patient is not taking KCl or ACE or ARB------- I did not see it in the med list

## 2023-11-24 NOTE — TELEPHONE ENCOUNTER
Spoke with granddaughter and she states patient takes no potassium but she does eat a lot of bananas we ask that they cut back

## 2023-12-05 ENCOUNTER — TELEPHONE (OUTPATIENT)
Facility: CLINIC | Age: 78
End: 2023-12-05

## 2023-12-27 ENCOUNTER — TELEPHONE (OUTPATIENT)
Facility: CLINIC | Age: 78
End: 2023-12-27

## 2024-01-02 ENCOUNTER — TELEPHONE (OUTPATIENT)
Facility: CLINIC | Age: 79
End: 2024-01-02

## 2024-01-16 ENCOUNTER — TELEPHONE (OUTPATIENT)
Facility: CLINIC | Age: 79
End: 2024-01-16

## 2024-01-16 NOTE — TELEPHONE ENCOUNTER
Granddaughter called in PT 23.2 and INR 2.2 taking 1-1/2 tabs mon- sat and 1 tab on sun. No change

## 2024-01-23 ENCOUNTER — TELEPHONE (OUTPATIENT)
Facility: CLINIC | Age: 79
End: 2024-01-23

## 2024-01-23 NOTE — TELEPHONE ENCOUNTER
Patient PT 31.8 and INR 3.2 taking 1-1/2 mon-sat and 1 on sun, per Dr. Damon hold x 2 days restart regular dose

## 2024-01-30 ENCOUNTER — TELEPHONE (OUTPATIENT)
Facility: CLINIC | Age: 79
End: 2024-01-30

## 2024-02-06 ENCOUNTER — TELEPHONE (OUTPATIENT)
Facility: CLINIC | Age: 79
End: 2024-02-06

## 2024-02-09 ENCOUNTER — TELEPHONE (OUTPATIENT)
Facility: CLINIC | Age: 79
End: 2024-02-09

## 2024-02-12 ENCOUNTER — OFFICE VISIT (OUTPATIENT)
Facility: CLINIC | Age: 79
End: 2024-02-12
Payer: MEDICARE

## 2024-02-12 VITALS
HEIGHT: 62 IN | BODY MASS INDEX: 44.53 KG/M2 | SYSTOLIC BLOOD PRESSURE: 147 MMHG | HEART RATE: 57 BPM | DIASTOLIC BLOOD PRESSURE: 74 MMHG | TEMPERATURE: 98 F | WEIGHT: 242 LBS | OXYGEN SATURATION: 92 % | RESPIRATION RATE: 16 BRPM

## 2024-02-12 DIAGNOSIS — J96.22 ACUTE ON CHRONIC RESPIRATORY FAILURE WITH HYPOXIA AND HYPERCAPNIA (HCC): ICD-10-CM

## 2024-02-12 DIAGNOSIS — I10 PRIMARY HYPERTENSION: ICD-10-CM

## 2024-02-12 DIAGNOSIS — E11.9 TYPE 2 DIABETES MELLITUS WITHOUT COMPLICATION, WITHOUT LONG-TERM CURRENT USE OF INSULIN (HCC): ICD-10-CM

## 2024-02-12 DIAGNOSIS — M10.9 GOUT, UNSPECIFIED CAUSE, UNSPECIFIED CHRONICITY, UNSPECIFIED SITE: ICD-10-CM

## 2024-02-12 DIAGNOSIS — I50.31 ACUTE DIASTOLIC CHF (CONGESTIVE HEART FAILURE) (HCC): Primary | ICD-10-CM

## 2024-02-12 DIAGNOSIS — G40.909 SEIZURE DISORDER (HCC): ICD-10-CM

## 2024-02-12 DIAGNOSIS — E78.5 DYSLIPIDEMIA: ICD-10-CM

## 2024-02-12 DIAGNOSIS — Z00.00 MEDICARE ANNUAL WELLNESS VISIT, SUBSEQUENT: ICD-10-CM

## 2024-02-12 DIAGNOSIS — N18.31 STAGE 3A CHRONIC KIDNEY DISEASE (HCC): ICD-10-CM

## 2024-02-12 DIAGNOSIS — E66.01 MORBID OBESITY (HCC): ICD-10-CM

## 2024-02-12 DIAGNOSIS — J96.21 ACUTE ON CHRONIC RESPIRATORY FAILURE WITH HYPOXIA AND HYPERCAPNIA (HCC): ICD-10-CM

## 2024-02-12 LAB
ANION GAP SERPL CALC-SCNC: 3 MMOL/L (ref 5–15)
BUN SERPL-MCNC: 30 MG/DL (ref 6–20)
BUN/CREAT SERPL: 15 (ref 12–20)
CALCIUM SERPL-MCNC: 9.8 MG/DL (ref 8.5–10.1)
CHLORIDE SERPL-SCNC: 109 MMOL/L (ref 97–108)
CO2 SERPL-SCNC: 28 MMOL/L (ref 21–32)
CREAT SERPL-MCNC: 2.05 MG/DL (ref 0.55–1.02)
GLUCOSE SERPL-MCNC: 100 MG/DL (ref 65–100)
NT PRO BNP: 1723 PG/ML
POTASSIUM SERPL-SCNC: 5.4 MMOL/L (ref 3.5–5.1)
SODIUM SERPL-SCNC: 140 MMOL/L (ref 136–145)

## 2024-02-12 PROCEDURE — 3078F DIAST BP <80 MM HG: CPT | Performed by: INTERNAL MEDICINE

## 2024-02-12 PROCEDURE — 3046F HEMOGLOBIN A1C LEVEL >9.0%: CPT | Performed by: INTERNAL MEDICINE

## 2024-02-12 PROCEDURE — G0439 PPPS, SUBSEQ VISIT: HCPCS | Performed by: INTERNAL MEDICINE

## 2024-02-12 PROCEDURE — 1123F ACP DISCUSS/DSCN MKR DOCD: CPT | Performed by: INTERNAL MEDICINE

## 2024-02-12 PROCEDURE — 3077F SYST BP >= 140 MM HG: CPT | Performed by: INTERNAL MEDICINE

## 2024-02-12 RX ORDER — LACOSAMIDE 50 MG/1
50 TABLET ORAL 2 TIMES DAILY
Qty: 180 TABLET | Refills: 3 | Status: SHIPPED | OUTPATIENT
Start: 2024-02-12 | End: 2025-02-06

## 2024-02-12 SDOH — ECONOMIC STABILITY: FOOD INSECURITY: WITHIN THE PAST 12 MONTHS, YOU WORRIED THAT YOUR FOOD WOULD RUN OUT BEFORE YOU GOT MONEY TO BUY MORE.: NEVER TRUE

## 2024-02-12 SDOH — ECONOMIC STABILITY: INCOME INSECURITY: HOW HARD IS IT FOR YOU TO PAY FOR THE VERY BASICS LIKE FOOD, HOUSING, MEDICAL CARE, AND HEATING?: NOT HARD AT ALL

## 2024-02-12 SDOH — ECONOMIC STABILITY: FOOD INSECURITY: WITHIN THE PAST 12 MONTHS, THE FOOD YOU BOUGHT JUST DIDN'T LAST AND YOU DIDN'T HAVE MONEY TO GET MORE.: NEVER TRUE

## 2024-02-12 ASSESSMENT — ANXIETY QUESTIONNAIRES
IF YOU CHECKED OFF ANY PROBLEMS ON THIS QUESTIONNAIRE, HOW DIFFICULT HAVE THESE PROBLEMS MADE IT FOR YOU TO DO YOUR WORK, TAKE CARE OF THINGS AT HOME, OR GET ALONG WITH OTHER PEOPLE: NOT DIFFICULT AT ALL
7. FEELING AFRAID AS IF SOMETHING AWFUL MIGHT HAPPEN: 0
5. BEING SO RESTLESS THAT IT IS HARD TO SIT STILL: 0
GAD7 TOTAL SCORE: 0
3. WORRYING TOO MUCH ABOUT DIFFERENT THINGS: 0
4. TROUBLE RELAXING: 0
2. NOT BEING ABLE TO STOP OR CONTROL WORRYING: 0
1. FEELING NERVOUS, ANXIOUS, OR ON EDGE: 0
6. BECOMING EASILY ANNOYED OR IRRITABLE: 0

## 2024-02-12 ASSESSMENT — LIFESTYLE VARIABLES
HOW OFTEN DO YOU HAVE A DRINK CONTAINING ALCOHOL: NEVER
HOW MANY STANDARD DRINKS CONTAINING ALCOHOL DO YOU HAVE ON A TYPICAL DAY: PATIENT DOES NOT DRINK

## 2024-02-13 DIAGNOSIS — I10 PRIMARY HYPERTENSION: ICD-10-CM

## 2024-02-13 LAB
EST. AVERAGE GLUCOSE BLD GHB EST-MCNC: ABNORMAL MG/DL
HBA1C MFR BLD: <3.8 % (ref 4–5.6)

## 2024-02-15 RX ORDER — FELODIPINE 5 MG/1
5 TABLET, EXTENDED RELEASE ORAL DAILY
Qty: 90 TABLET | Refills: 3 | Status: SHIPPED | OUTPATIENT
Start: 2024-02-15

## 2024-02-15 RX ORDER — ALBUTEROL SULFATE 2.5 MG/3ML
SOLUTION RESPIRATORY (INHALATION)
Qty: 120 EACH | Refills: 11 | Status: SHIPPED | OUTPATIENT
Start: 2024-02-15

## 2024-02-19 DIAGNOSIS — M10.9 GOUT, UNSPECIFIED CAUSE, UNSPECIFIED CHRONICITY, UNSPECIFIED SITE: ICD-10-CM

## 2024-02-19 DIAGNOSIS — J45.20 MILD INTERMITTENT ASTHMA, UNCOMPLICATED: ICD-10-CM

## 2024-02-20 ENCOUNTER — TELEPHONE (OUTPATIENT)
Facility: CLINIC | Age: 79
End: 2024-02-20

## 2024-02-20 RX ORDER — ALBUTEROL SULFATE 90 UG/1
AEROSOL, METERED RESPIRATORY (INHALATION)
Qty: 20.1 G | Refills: 3 | Status: SHIPPED | OUTPATIENT
Start: 2024-02-20

## 2024-02-20 RX ORDER — ALLOPURINOL 300 MG/1
300 TABLET ORAL DAILY
Qty: 90 TABLET | Refills: 3 | Status: SHIPPED | OUTPATIENT
Start: 2024-02-20

## 2024-02-20 RX ORDER — ACETAMINOPHEN 500 MG
1000 TABLET ORAL EVERY 6 HOURS PRN
Qty: 120 TABLET | Refills: 5 | Status: SHIPPED | OUTPATIENT
Start: 2024-02-20

## 2024-02-20 RX ORDER — LEVETIRACETAM 1000 MG/1
TABLET ORAL
Qty: 180 TABLET | Refills: 3 | Status: SHIPPED | OUTPATIENT
Start: 2024-02-20

## 2024-02-21 NOTE — PROGRESS NOTES
Chief Complaint   Patient presents with    Medicare AWV     \"Have you been to the ER, urgent care clinic since your last visit?  Hospitalized since your last visit?\"    NO    “Have you seen or consulted any other health care providers outside of [facility] since your last visit?”    NO         
hypertension    9. Type 2 diabetes mellitus without complication, without long-term current use of insulin (HCC)        PLAN:   Dictation on: 02/20/2024 11:26 PM by: ROXANNE DAMON [[id number]]     Orders Placed This Encounter   Procedures    Basic Metabolic Panel     Standing Status:   Future     Number of Occurrences:   1     Standing Expiration Date:   2/12/2025    Hemoglobin A1C     Standing Status:   Future     Number of Occurrences:   1     Standing Expiration Date:   2/12/2025    Brain Natriuretic Peptide     Standing Status:   Future     Number of Occurrences:   1     Standing Expiration Date:   2/12/2025                Roxanne Damon MD      Medicare Annual Wellness Visit    Jackie Noe is here for Medicare AWV    Assessment & Plan   Acute diastolic CHF (congestive heart failure) (HCC)  -     Brain Natriuretic Peptide; Future  Epilepsy, unspecified, not intractable, without status epilepticus (HCC)  -     lacosamide (VIMPAT) 50 MG TABS tablet; Take 1 tablet by mouth 2 times daily for 360 days. Max Daily Amount: 100 mg, Disp-180 tablet, R-3Normal  Acute on chronic respiratory failure with hypoxia and hypercapnia (HCC)  Stage 3a chronic kidney disease (HCC)  -     Basic Metabolic Panel; Future  Morbid obesity (HCC)  Seizure disorder (HCC)  Dyslipidemia  Primary hypertension  Type 2 diabetes mellitus without complication, without long-term current use of insulin (HCC)  -     Hemoglobin A1C; Future  Gout, unspecified cause, unspecified chronicity, unspecified site  -     acetaminophen (TYLENOL) 500 MG tablet; Take 2 tablets by mouth every 6 hours as needed for Pain, Disp-120 tablet, R-5Normal  Medicare annual wellness visit, subsequent    Recommendations for Preventive Services Due: see orders and patient instructions/AVS.  Recommended screening schedule for the next 5-10 years is provided to the patient in written form: see Patient Instructions/AVS.     Return in 3 months (on 5/12/2024).     Subjective

## 2024-02-22 RX ORDER — IPRATROPIUM BROMIDE AND ALBUTEROL SULFATE 2.5; .5 MG/3ML; MG/3ML
1 SOLUTION RESPIRATORY (INHALATION) EVERY 4 HOURS
Qty: 120 ML | Refills: 11 | Status: SHIPPED | OUTPATIENT
Start: 2024-02-22

## 2024-02-27 ENCOUNTER — TELEPHONE (OUTPATIENT)
Facility: CLINIC | Age: 79
End: 2024-02-27

## 2024-03-05 ENCOUNTER — TELEPHONE (OUTPATIENT)
Facility: CLINIC | Age: 79
End: 2024-03-05

## 2024-03-05 NOTE — TELEPHONE ENCOUNTER
Pt daughter Shantelle De La Cruz called in the patients PT/INR results  PT - 34.5   INR 3.4  Warfarin 1.5 tabs Monday thru Saturday                  1 tab Sunday    Per Dr Damon patient is to hold for 2 days then restart regiment for warfarin.  ELISABETH,gauri

## 2024-03-12 ENCOUNTER — TELEPHONE (OUTPATIENT)
Facility: CLINIC | Age: 79
End: 2024-03-12

## 2024-03-14 DIAGNOSIS — E11.9 TYPE 2 DIABETES MELLITUS WITHOUT COMPLICATIONS (HCC): ICD-10-CM

## 2024-03-16 RX ORDER — BLOOD SUGAR DIAGNOSTIC
STRIP MISCELLANEOUS
Qty: 525 STRIP | Refills: 5 | Status: SHIPPED | OUTPATIENT
Start: 2024-03-16

## 2024-03-19 ENCOUNTER — TELEPHONE (OUTPATIENT)
Facility: CLINIC | Age: 79
End: 2024-03-19

## 2024-03-19 NOTE — TELEPHONE ENCOUNTER
Granddaughter called in PT 32.8 and INR 3.3, per Dr. Damon patient to hold x 2 days regular dose and repeat 1 week

## 2024-03-26 ENCOUNTER — TELEPHONE (OUTPATIENT)
Facility: CLINIC | Age: 79
End: 2024-03-26

## 2024-04-02 ENCOUNTER — TELEPHONE (OUTPATIENT)
Facility: CLINIC | Age: 79
End: 2024-04-02

## 2024-04-10 ENCOUNTER — TELEPHONE (OUTPATIENT)
Facility: CLINIC | Age: 79
End: 2024-04-10

## 2024-04-10 NOTE — TELEPHONE ENCOUNTER
Patient granddaughter called in PT27.9 and INR 2.7 taking 1 1/2 mon-sat and 1 sun no change, repeat 1 week

## 2024-04-17 ENCOUNTER — TELEPHONE (OUTPATIENT)
Facility: CLINIC | Age: 79
End: 2024-04-17

## 2024-04-17 NOTE — TELEPHONE ENCOUNTER
Granddaughter called in PT31.5 and INR 3.2 TAKING 1-1/2 TAB MON-SAT AND 1 TAB ON SUN. PER  PATIENT YO HOLD  X 1 DAY THEN RESUME.

## 2024-04-23 ENCOUNTER — TELEPHONE (OUTPATIENT)
Facility: CLINIC | Age: 79
End: 2024-04-23

## 2024-04-30 ENCOUNTER — TELEPHONE (OUTPATIENT)
Facility: CLINIC | Age: 79
End: 2024-04-30

## 2024-04-30 NOTE — TELEPHONE ENCOUNTER
Patients PT 34.4 and INR 3.5 TAKING 1-1/2 TABS MON-SAT AND 1 TAB ON SUN. PER DR. BRAUN PATIENT TO HOLD X 2 DAYS THEN RESTART SAME DOSE

## 2024-05-03 DIAGNOSIS — G40.909 EPILEPSY, UNSPECIFIED, NOT INTRACTABLE, WITHOUT STATUS EPILEPTICUS (HCC): ICD-10-CM

## 2024-05-05 RX ORDER — LACOSAMIDE 50 MG/1
TABLET ORAL
Qty: 180 TABLET | Refills: 3 | Status: SHIPPED | OUTPATIENT
Start: 2024-05-05 | End: 2024-08-03

## 2024-05-07 ENCOUNTER — TELEPHONE (OUTPATIENT)
Facility: CLINIC | Age: 79
End: 2024-05-07

## 2024-05-14 ENCOUNTER — TELEPHONE (OUTPATIENT)
Facility: CLINIC | Age: 79
End: 2024-05-14

## 2024-06-04 ENCOUNTER — TELEPHONE (OUTPATIENT)
Facility: CLINIC | Age: 79
End: 2024-06-04

## 2024-06-12 ENCOUNTER — TELEPHONE (OUTPATIENT)
Facility: CLINIC | Age: 79
End: 2024-06-12

## 2024-06-12 NOTE — TELEPHONE ENCOUNTER
Granddaughter called in PT17.0 and INR1.6 no channge she is to call back on Friday and she ask that we re-order heidi lift which we did

## 2024-06-16 DIAGNOSIS — J45.20 MILD INTERMITTENT ASTHMA, UNCOMPLICATED: ICD-10-CM

## 2024-06-18 RX ORDER — TIOTROPIUM BROMIDE 18 UG/1
CAPSULE ORAL; RESPIRATORY (INHALATION)
Qty: 30 CAPSULE | Refills: 11 | Status: SHIPPED | OUTPATIENT
Start: 2024-06-18

## 2024-06-21 ENCOUNTER — TELEPHONE (OUTPATIENT)
Facility: CLINIC | Age: 79
End: 2024-06-21

## 2024-06-21 NOTE — TELEPHONE ENCOUNTER
Patient granddaughter called stating PT 30.3 and INR 3.0 , SHE IS TO HOLD X 2 DAYS RESTART SUNDAY AND CALL OFFICE AGAIN ON WEDNESDAY

## 2024-06-26 ENCOUNTER — TELEPHONE (OUTPATIENT)
Facility: CLINIC | Age: 79
End: 2024-06-26

## 2024-06-28 ENCOUNTER — TELEPHONE (OUTPATIENT)
Facility: CLINIC | Age: 79
End: 2024-06-28

## 2024-07-09 ENCOUNTER — TELEPHONE (OUTPATIENT)
Facility: CLINIC | Age: 79
End: 2024-07-09

## 2024-07-11 RX ORDER — ATORVASTATIN CALCIUM 80 MG/1
TABLET, FILM COATED ORAL
Qty: 90 TABLET | Refills: 3 | Status: SHIPPED | OUTPATIENT
Start: 2024-07-11

## 2024-07-15 ENCOUNTER — TELEPHONE (OUTPATIENT)
Facility: CLINIC | Age: 79
End: 2024-07-15

## 2024-07-30 ENCOUNTER — TELEPHONE (OUTPATIENT)
Facility: CLINIC | Age: 79
End: 2024-07-30

## 2024-08-05 ENCOUNTER — TELEPHONE (OUTPATIENT)
Facility: CLINIC | Age: 79
End: 2024-08-05

## 2024-08-05 NOTE — TELEPHONE ENCOUNTER
Corry called in PT 36.0 and INR 3.7 taking 1-1/2 mon and 1 on sun hold x 3 days then restart patient advised to eat vegetables.

## 2024-08-12 ENCOUNTER — TELEPHONE (OUTPATIENT)
Facility: CLINIC | Age: 79
End: 2024-08-12

## 2024-08-19 ENCOUNTER — TELEPHONE (OUTPATIENT)
Facility: CLINIC | Age: 79
End: 2024-08-19

## 2024-08-28 ENCOUNTER — TELEPHONE (OUTPATIENT)
Facility: CLINIC | Age: 79
End: 2024-08-28

## 2024-08-29 ENCOUNTER — TELEPHONE (OUTPATIENT)
Facility: CLINIC | Age: 79
End: 2024-08-29

## 2024-09-18 ENCOUNTER — TELEPHONE (OUTPATIENT)
Facility: CLINIC | Age: 79
End: 2024-09-18

## 2024-10-02 ENCOUNTER — TELEPHONE (OUTPATIENT)
Facility: CLINIC | Age: 79
End: 2024-10-02

## 2024-10-16 ENCOUNTER — TELEPHONE (OUTPATIENT)
Facility: CLINIC | Age: 79
End: 2024-10-16

## 2024-10-30 ENCOUNTER — TELEPHONE (OUTPATIENT)
Facility: CLINIC | Age: 79
End: 2024-10-30

## 2024-10-30 NOTE — TELEPHONE ENCOUNTER
Stevenson called PT:32.4,INR:3.3 Hold for days, keep the same dose  Call next week with readings

## 2024-11-06 ENCOUNTER — TELEPHONE (OUTPATIENT)
Facility: CLINIC | Age: 79
End: 2024-11-06

## 2024-11-11 DIAGNOSIS — G40.909 EPILEPSY, UNSPECIFIED, NOT INTRACTABLE, WITHOUT STATUS EPILEPTICUS (HCC): ICD-10-CM

## 2024-11-11 RX ORDER — LACOSAMIDE 50 MG/1
TABLET ORAL
Qty: 180 TABLET | Refills: 3 | Status: SHIPPED | OUTPATIENT
Start: 2024-11-11 | End: 2025-02-09

## 2024-11-13 ENCOUNTER — TELEPHONE (OUTPATIENT)
Facility: CLINIC | Age: 79
End: 2024-11-13

## 2024-11-21 ENCOUNTER — TELEPHONE (OUTPATIENT)
Facility: CLINIC | Age: 79
End: 2024-11-21

## 2024-11-21 NOTE — TELEPHONE ENCOUNTER
Patient granddaughter called in PT 35.8 and INR 3.6, SHE IS TO HOLD X 3 DAYS THEN RESTART IF STILL ELEVATED WE WILL CHANGE THE DOSE

## 2024-11-26 ENCOUNTER — TELEPHONE (OUTPATIENT)
Facility: CLINIC | Age: 79
End: 2024-11-26

## 2024-11-26 NOTE — TELEPHONE ENCOUNTER
Grand daughter called in PT31.3 and INR 3.0 patient to hold x 2 days restart at 1 1/2 tabs mon,tues,weds,thurs and 1 tab fri, sat and suncall on weds

## 2024-12-04 ENCOUNTER — TELEPHONE (OUTPATIENT)
Facility: CLINIC | Age: 79
End: 2024-12-04

## 2024-12-04 NOTE — TELEPHONE ENCOUNTER
Granddaughter called in PT:32.4 and INR 3.1   Patient to hold one day  1 1/2 tabs Mon,Tues,Wed and 1 tab Thurs,Fri,Sat,Sun   Call back with reading in two weeks

## 2024-12-11 RX ORDER — LANCETS 33 GAUGE
EACH MISCELLANEOUS
Qty: 100 EACH | Refills: 3 | Status: SHIPPED | OUTPATIENT
Start: 2024-12-11

## 2024-12-18 ENCOUNTER — TELEPHONE (OUTPATIENT)
Facility: CLINIC | Age: 79
End: 2024-12-18

## 2024-12-18 DIAGNOSIS — I48.0 PAROXYSMAL ATRIAL FIBRILLATION (HCC): ICD-10-CM

## 2024-12-18 RX ORDER — WARFARIN SODIUM 5 MG/1
TABLET ORAL
Qty: 60 TABLET | Refills: 11 | Status: SHIPPED | OUTPATIENT
Start: 2024-12-18

## 2024-12-18 NOTE — TELEPHONE ENCOUNTER
Soraida called in PT 41.5 and INR 4.2 taking 1-1/2 tabs m-w-f and tues,fri,sat and sun, patient to hold x 4 days restart at 1 tab daily and 1-1/2 on sat and sun.

## 2024-12-23 DIAGNOSIS — J45.20 MILD INTERMITTENT ASTHMA, UNCOMPLICATED: ICD-10-CM

## 2024-12-23 RX ORDER — ALBUTEROL SULFATE 90 UG/1
INHALANT RESPIRATORY (INHALATION)
Qty: 54 G | Refills: 3 | Status: SHIPPED | OUTPATIENT
Start: 2024-12-23

## 2025-01-15 ENCOUNTER — TELEPHONE (OUTPATIENT)
Facility: CLINIC | Age: 80
End: 2025-01-15

## 2025-01-22 ENCOUNTER — TELEPHONE (OUTPATIENT)
Facility: CLINIC | Age: 80
End: 2025-01-22

## 2025-01-22 NOTE — TELEPHONE ENCOUNTER
----- Message from Leslie Segovia MD sent at 4/20/2022  1:29 PM CDT -----  Please inform patient of results.  Blood work unremarkable Urine shows bacteria, indicating possible infection Recommendation: Start doxycycline 100 mg 1 p.o. b.i.d. to complete 10 days, supply 20 pills, no refills.  Follow-up with primary careProceed to ER with any red flags or severe symptoms.   PT 43.6 and INR 4.4 hold coumadin x 3 days repeat 1 week

## 2025-01-31 ENCOUNTER — TELEPHONE (OUTPATIENT)
Facility: CLINIC | Age: 80
End: 2025-01-31

## 2025-02-05 RX ORDER — LEVETIRACETAM 1000 MG/1
TABLET ORAL
Qty: 180 TABLET | Refills: 3 | Status: SHIPPED | OUTPATIENT
Start: 2025-02-05

## 2025-02-06 ENCOUNTER — TELEPHONE (OUTPATIENT)
Facility: CLINIC | Age: 80
End: 2025-02-06

## 2025-02-06 NOTE — TELEPHONE ENCOUNTER
Granddaughter called in PT 38.7 and INR 3.9 taking 1tab  mon-Friday and 1-1/2 sat and sun. Patient to hold x 2 days restart at regular dose

## 2025-02-12 ENCOUNTER — TELEPHONE (OUTPATIENT)
Facility: CLINIC | Age: 80
End: 2025-02-12

## 2025-02-12 NOTE — TELEPHONE ENCOUNTER
Patient granddaughter called stating PT 34.5  AND INR 3.5 , PATIENT TO HOLD X 2 DAYS RESTART REGULAR DOSE CALL IN 1 WEEK

## 2025-02-19 ENCOUNTER — TELEPHONE (OUTPATIENT)
Facility: CLINIC | Age: 80
End: 2025-02-19

## 2025-02-19 NOTE — TELEPHONE ENCOUNTER
Soraida De La Cruz called stating patient takes 1 coumadin tablet on Mondays thru Fridays and 1/2 tablet on Saturdays and Sundays. States PT=28.4, INR= 2.8. Dr. Damon notified.   Soraida instructed that no adjustments will be made at this time, continue with same dosing and recheck in 2 weeks per Dr. Damon.

## 2025-02-28 ENCOUNTER — TELEPHONE (OUTPATIENT)
Facility: CLINIC | Age: 80
End: 2025-02-28

## 2025-02-28 NOTE — TELEPHONE ENCOUNTER
Granddaughter Soraida called in PT 44.8 and INR 4.7 taking 1 tab mon -fri 1-1/12 sat sun, per Dr. Damon patient to hold coumadin x 4 days, eat veggies  and restart at regular dose

## 2025-03-05 ENCOUNTER — TELEPHONE (OUTPATIENT)
Facility: CLINIC | Age: 80
End: 2025-03-05

## 2025-03-05 DIAGNOSIS — I10 PRIMARY HYPERTENSION: ICD-10-CM

## 2025-03-05 RX ORDER — FELODIPINE 5 MG/1
5 TABLET, EXTENDED RELEASE ORAL DAILY
Qty: 90 TABLET | Refills: 3 | Status: SHIPPED | OUTPATIENT
Start: 2025-03-05

## 2025-03-13 ENCOUNTER — TELEPHONE (OUTPATIENT)
Facility: CLINIC | Age: 80
End: 2025-03-13

## 2025-03-13 NOTE — TELEPHONE ENCOUNTER
Patient granddaughter called in PT31.0 AND INR 3.1, PATIENT TO HOLD X 2 DAYS,THEN RESTART AS REGULAR DOSE.

## 2025-03-17 DIAGNOSIS — M10.9 GOUT, UNSPECIFIED CAUSE, UNSPECIFIED CHRONICITY, UNSPECIFIED SITE: ICD-10-CM

## 2025-03-19 RX ORDER — ALLOPURINOL 300 MG/1
300 TABLET ORAL DAILY
Qty: 90 TABLET | Refills: 3 | Status: SHIPPED | OUTPATIENT
Start: 2025-03-19

## 2025-03-21 ENCOUNTER — TELEPHONE (OUTPATIENT)
Facility: CLINIC | Age: 80
End: 2025-03-21

## 2025-03-28 ENCOUNTER — TELEPHONE (OUTPATIENT)
Facility: CLINIC | Age: 80
End: 2025-03-28

## 2025-03-28 NOTE — TELEPHONE ENCOUNTER
Patient granddaughter called in PT 33.9 and INR 3.4, patient to hold coumadin x 3 days then restart and called next week

## 2025-04-02 ENCOUNTER — TELEPHONE (OUTPATIENT)
Facility: CLINIC | Age: 80
End: 2025-04-02

## 2025-04-02 NOTE — TELEPHONE ENCOUNTER
Patient granddaughter called in PT 18.2 and INR 1.7 JUST restarted coumadin on Thursday call back on friday

## 2025-04-07 ENCOUNTER — TELEPHONE (OUTPATIENT)
Facility: CLINIC | Age: 80
End: 2025-04-07

## 2025-04-07 NOTE — TELEPHONE ENCOUNTER
Granddaughter called in PT 18.1and inr 1.7 no change call back on Thursday taking 1-1/2 sat and sun and 1 tab all other days

## 2025-04-10 ENCOUNTER — TELEPHONE (OUTPATIENT)
Facility: CLINIC | Age: 80
End: 2025-04-10

## 2025-04-14 DIAGNOSIS — E11.9 TYPE 2 DIABETES MELLITUS WITHOUT COMPLICATIONS: ICD-10-CM

## 2025-04-15 RX ORDER — BLOOD SUGAR DIAGNOSTIC
STRIP MISCELLANEOUS
Qty: 525 STRIP | Refills: 4 | Status: SHIPPED | OUTPATIENT
Start: 2025-04-15

## 2025-04-18 ENCOUNTER — TELEPHONE (OUTPATIENT)
Facility: CLINIC | Age: 80
End: 2025-04-18

## 2025-04-18 NOTE — TELEPHONE ENCOUNTER
Patient granddaughter called in PT32.6 and INR 3.3 patient to hold coumadin x 2 days, restart and call on tuesday

## 2025-04-22 ENCOUNTER — TELEPHONE (OUTPATIENT)
Facility: CLINIC | Age: 80
End: 2025-04-22

## 2025-04-25 ENCOUNTER — TELEPHONE (OUTPATIENT)
Facility: CLINIC | Age: 80
End: 2025-04-25

## 2025-04-30 RX ORDER — IPRATROPIUM BROMIDE AND ALBUTEROL SULFATE 2.5; .5 MG/3ML; MG/3ML
SOLUTION RESPIRATORY (INHALATION)
Qty: 90 ML | Refills: 11 | Status: SHIPPED | OUTPATIENT
Start: 2025-04-30

## 2025-05-06 ENCOUNTER — TELEPHONE (OUTPATIENT)
Facility: CLINIC | Age: 80
End: 2025-05-06

## 2025-05-06 NOTE — TELEPHONE ENCOUNTER
Granddaughter called in PT 25.7 and INR 2.4 after holding for the weekend because it was 40.3 and inr 4.0 continue current dose and call on Friday and advised to eat more greens

## 2025-05-09 ENCOUNTER — TELEPHONE (OUTPATIENT)
Facility: CLINIC | Age: 80
End: 2025-05-09

## 2025-05-09 NOTE — TELEPHONE ENCOUNTER
Granddaughter called in PT 24.9 and INR 2.3 TAKING 1TAB M-F AND 1-1/2 TAB SAT AND SUN NO CHANGE REPEAT 1 WEEK

## 2025-05-14 ENCOUNTER — TELEPHONE (OUTPATIENT)
Facility: CLINIC | Age: 80
End: 2025-05-14

## 2025-05-14 NOTE — TELEPHONE ENCOUNTER
GRANDDAUGHTER CALLED IN PT 35.4 and INR 3.5 patient to hold coumadin x 2 days then restart call on monday

## 2025-05-21 ENCOUNTER — TELEPHONE (OUTPATIENT)
Facility: CLINIC | Age: 80
End: 2025-05-21

## 2025-05-21 NOTE — TELEPHONE ENCOUNTER
Granddaughter called in PT:38.0 and INR:3.8 Per , patient to hold coumadin x 2 days then restart same dose after

## 2025-05-24 DIAGNOSIS — J45.20 MILD INTERMITTENT ASTHMA, UNCOMPLICATED: ICD-10-CM

## 2025-05-27 DIAGNOSIS — J45.20 MILD INTERMITTENT ASTHMA, UNCOMPLICATED: ICD-10-CM

## 2025-05-28 ENCOUNTER — TELEPHONE (OUTPATIENT)
Facility: CLINIC | Age: 80
End: 2025-05-28

## 2025-05-28 RX ORDER — TIOTROPIUM BROMIDE 18 UG/1
18 CAPSULE ORAL; RESPIRATORY (INHALATION) DAILY
Qty: 30 CAPSULE | Refills: 11 | Status: SHIPPED | OUTPATIENT
Start: 2025-05-28

## 2025-05-28 RX ORDER — TIOTROPIUM BROMIDE 18 UG/1
CAPSULE ORAL; RESPIRATORY (INHALATION)
Qty: 30 CAPSULE | Refills: 11 | Status: SHIPPED | OUTPATIENT
Start: 2025-05-28

## 2025-05-28 NOTE — TELEPHONE ENCOUNTER
Granddaughter PT:34.1 INR:3.3  Per Dr Damon patient to hold coumadin for one day then restart same dose after

## 2025-05-29 DIAGNOSIS — G40.909 EPILEPSY, UNSPECIFIED, NOT INTRACTABLE, WITHOUT STATUS EPILEPTICUS (HCC): ICD-10-CM

## 2025-05-30 RX ORDER — LACOSAMIDE 50 MG/1
TABLET ORAL
Qty: 180 TABLET | Refills: 3 | Status: SHIPPED | OUTPATIENT
Start: 2025-05-30 | End: 2025-08-28

## 2025-06-17 RX ORDER — ATORVASTATIN CALCIUM 80 MG/1
80 TABLET, FILM COATED ORAL NIGHTLY
Qty: 90 TABLET | Refills: 3 | Status: SHIPPED | OUTPATIENT
Start: 2025-06-17

## 2025-06-20 NOTE — TELEPHONE ENCOUNTER
Patient's Granddaughter call stating PT= 34.0 and INR= 3.4. states she is taking 7.5 mg Mondays thru Saturdays and 5 mg on Sundays. Dr Damon notified. Granddaughter inform to hold coumadin for 2 days then resume with same dosing per Dr. Damon.  
No

## 2025-06-27 ENCOUNTER — TELEPHONE (OUTPATIENT)
Facility: CLINIC | Age: 80
End: 2025-06-27

## 2025-06-27 NOTE — TELEPHONE ENCOUNTER
Patient granddaughter called in INR 3.8 TAKING 1 TAB COUMADIN 1 TAB M-F AND 1-1/2 TAB ON SAT AND SUN. PATIENT TO HOLD X 2 DAYS RESTART AT REGULAR DOSE

## 2025-07-02 ENCOUNTER — TELEPHONE (OUTPATIENT)
Facility: CLINIC | Age: 80
End: 2025-07-02

## 2025-07-09 ENCOUNTER — TELEPHONE (OUTPATIENT)
Facility: CLINIC | Age: 80
End: 2025-07-09

## 2025-07-09 NOTE — TELEPHONE ENCOUNTER
Patient granddaughter called in PT 34.0 and INR 3.3 taking 1 tab mon-fri and 1-1/2 sat and sun, per Dr. Marisol silveira to hold today call on friday

## 2025-07-22 ENCOUNTER — TELEPHONE (OUTPATIENT)
Facility: CLINIC | Age: 80
End: 2025-07-22

## 2025-07-22 NOTE — TELEPHONE ENCOUNTER
Patient INR LOW X 2 WEEKS 1.4 AND TODAY 1.6 TAKING 5MG M-F AND 7.5 SAT AND SUN, SHE WILL NOW TAKE 1-1/2 M-W-F AND 5MG ALL OTHER DAYS  REPEAT IN 1 WEEK

## 2025-07-30 ENCOUNTER — TELEPHONE (OUTPATIENT)
Facility: CLINIC | Age: 80
End: 2025-07-30

## 2025-08-20 ENCOUNTER — TELEPHONE (OUTPATIENT)
Facility: CLINIC | Age: 80
End: 2025-08-20

## 2025-08-25 ENCOUNTER — TELEPHONE (OUTPATIENT)
Facility: CLINIC | Age: 80
End: 2025-08-25

## 2025-09-05 ENCOUNTER — TELEPHONE (OUTPATIENT)
Facility: CLINIC | Age: 80
End: 2025-09-05